# Patient Record
Sex: MALE | Race: WHITE | NOT HISPANIC OR LATINO | Employment: OTHER | URBAN - METROPOLITAN AREA
[De-identification: names, ages, dates, MRNs, and addresses within clinical notes are randomized per-mention and may not be internally consistent; named-entity substitution may affect disease eponyms.]

---

## 2018-07-25 ENCOUNTER — NON-PROVIDER VISIT (OUTPATIENT)
Dept: OCCUPATIONAL MEDICINE | Facility: CLINIC | Age: 58
End: 2018-07-25

## 2018-07-25 DIAGNOSIS — Z23 NEED FOR HEPATITIS VACCINATION: ICD-10-CM

## 2018-07-25 PROCEDURE — 90746 HEPB VACCINE 3 DOSE ADULT IM: CPT | Performed by: PREVENTIVE MEDICINE

## 2018-12-19 ENCOUNTER — HOSPITAL ENCOUNTER (INPATIENT)
Facility: MEDICAL CENTER | Age: 58
LOS: 21 days | DRG: 224 | End: 2019-01-10
Attending: EMERGENCY MEDICINE | Admitting: INTERNAL MEDICINE
Payer: COMMERCIAL

## 2018-12-19 DIAGNOSIS — I49.01 VENTRICULAR FIBRILLATION (HCC): ICD-10-CM

## 2018-12-19 DIAGNOSIS — E87.20 LACTIC ACIDOSIS: ICD-10-CM

## 2018-12-19 DIAGNOSIS — R40.2431 GLASGOW COMA SCALE TOTAL SCORE 3-8, IN THE FIELD (EMT OR AMBULANCE) (HCC): ICD-10-CM

## 2018-12-19 DIAGNOSIS — I46.9 CARDIAC ARREST (HCC): ICD-10-CM

## 2018-12-19 DIAGNOSIS — J96.01 ACUTE RESPIRATORY FAILURE WITH HYPOXIA (HCC): ICD-10-CM

## 2018-12-19 LAB — EKG IMPRESSION: NORMAL

## 2018-12-19 PROCEDURE — 36600 WITHDRAWAL OF ARTERIAL BLOOD: CPT

## 2018-12-19 PROCEDURE — 84484 ASSAY OF TROPONIN QUANT: CPT

## 2018-12-19 PROCEDURE — 82803 BLOOD GASES ANY COMBINATION: CPT

## 2018-12-19 PROCEDURE — 99291 CRITICAL CARE FIRST HOUR: CPT

## 2018-12-19 PROCEDURE — 83735 ASSAY OF MAGNESIUM: CPT

## 2018-12-19 PROCEDURE — 94002 VENT MGMT INPAT INIT DAY: CPT

## 2018-12-19 PROCEDURE — 83880 ASSAY OF NATRIURETIC PEPTIDE: CPT

## 2018-12-19 PROCEDURE — 85027 COMPLETE CBC AUTOMATED: CPT

## 2018-12-19 PROCEDURE — 80053 COMPREHEN METABOLIC PANEL: CPT

## 2018-12-19 PROCEDURE — 82010 KETONE BODYS QUAN: CPT

## 2018-12-19 PROCEDURE — 83605 ASSAY OF LACTIC ACID: CPT | Mod: 91

## 2018-12-19 PROCEDURE — 85610 PROTHROMBIN TIME: CPT

## 2018-12-19 PROCEDURE — 87040 BLOOD CULTURE FOR BACTERIA: CPT

## 2018-12-19 PROCEDURE — 85730 THROMBOPLASTIN TIME PARTIAL: CPT

## 2018-12-19 PROCEDURE — 84100 ASSAY OF PHOSPHORUS: CPT

## 2018-12-19 PROCEDURE — 93005 ELECTROCARDIOGRAM TRACING: CPT | Performed by: EMERGENCY MEDICINE

## 2018-12-19 RX ORDER — VECURONIUM BROMIDE 1 MG/ML
0.1 INJECTION, POWDER, LYOPHILIZED, FOR SOLUTION INTRAVENOUS ONCE
Status: DISCONTINUED | OUTPATIENT
Start: 2018-12-20 | End: 2018-12-20

## 2018-12-19 RX ORDER — MIDAZOLAM HYDROCHLORIDE 1 MG/ML
2 INJECTION INTRAMUSCULAR; INTRAVENOUS ONCE
Status: DISCONTINUED | OUTPATIENT
Start: 2018-12-20 | End: 2018-12-20

## 2018-12-19 RX ORDER — SODIUM CHLORIDE 9 MG/ML
INJECTION, SOLUTION INTRAVENOUS CONTINUOUS
Status: DISPENSED | OUTPATIENT
Start: 2018-12-20 | End: 2018-12-21

## 2018-12-19 RX ORDER — 0.9 % SODIUM CHLORIDE 0.9 %
10 INTRAVENOUS SOLUTION INTRAVENOUS
Status: DISCONTINUED | OUTPATIENT
Start: 2018-12-19 | End: 2018-12-21

## 2018-12-19 RX ORDER — MAGNESIUM SULFATE HEPTAHYDRATE 40 MG/ML
.5-2 INJECTION, SOLUTION INTRAVENOUS CONTINUOUS
Status: DISPENSED | OUTPATIENT
Start: 2018-12-20 | End: 2018-12-21

## 2018-12-19 RX ORDER — MIDAZOLAM HYDROCHLORIDE 1 MG/ML
1-5 INJECTION INTRAMUSCULAR; INTRAVENOUS
Status: DISCONTINUED | OUTPATIENT
Start: 2018-12-19 | End: 2018-12-20

## 2018-12-19 RX ORDER — MEPERIDINE HYDROCHLORIDE 50 MG/ML
25 INJECTION INTRAMUSCULAR; INTRAVENOUS; SUBCUTANEOUS
Status: DISCONTINUED | OUTPATIENT
Start: 2018-12-19 | End: 2018-12-20

## 2018-12-19 RX ORDER — 0.9 % SODIUM CHLORIDE 0.9 %
20 INTRAVENOUS SOLUTION INTRAVENOUS ONCE
Status: COMPLETED | OUTPATIENT
Start: 2018-12-20 | End: 2018-12-20

## 2018-12-20 ENCOUNTER — APPOINTMENT (OUTPATIENT)
Dept: RADIOLOGY | Facility: MEDICAL CENTER | Age: 58
DRG: 224 | End: 2018-12-20
Attending: INTERNAL MEDICINE
Payer: COMMERCIAL

## 2018-12-20 ENCOUNTER — APPOINTMENT (OUTPATIENT)
Dept: RADIOLOGY | Facility: MEDICAL CENTER | Age: 58
DRG: 224 | End: 2018-12-20
Attending: EMERGENCY MEDICINE
Payer: COMMERCIAL

## 2018-12-20 ENCOUNTER — HOSPITAL ENCOUNTER (OUTPATIENT)
Dept: RADIOLOGY | Facility: MEDICAL CENTER | Age: 58
End: 2018-12-20
Attending: EMERGENCY MEDICINE

## 2018-12-20 ENCOUNTER — APPOINTMENT (OUTPATIENT)
Dept: CARDIOLOGY | Facility: MEDICAL CENTER | Age: 58
DRG: 224 | End: 2018-12-20
Attending: INTERNAL MEDICINE
Payer: COMMERCIAL

## 2018-12-20 PROBLEM — G93.1 ANOXIC BRAIN INJURY (HCC): Status: ACTIVE | Noted: 2018-12-20

## 2018-12-20 PROBLEM — E83.39 HYPERPHOSPHATEMIA: Status: ACTIVE | Noted: 2018-12-20

## 2018-12-20 PROBLEM — D64.9 ANEMIA: Status: ACTIVE | Noted: 2018-12-20

## 2018-12-20 PROBLEM — R74.8 ELEVATED LIVER ENZYMES: Status: ACTIVE | Noted: 2018-12-20

## 2018-12-20 PROBLEM — E87.6 HYPOKALEMIA: Status: ACTIVE | Noted: 2018-12-20

## 2018-12-20 PROBLEM — E87.20 LACTIC ACIDOSIS: Status: ACTIVE | Noted: 2018-12-20

## 2018-12-20 PROBLEM — S22.43XA CLOSED FRACTURE OF MULTIPLE RIBS OF BOTH SIDES: Status: ACTIVE | Noted: 2018-12-20

## 2018-12-20 PROBLEM — D68.9 COAGULOPATHY (HCC): Status: ACTIVE | Noted: 2018-12-20

## 2018-12-20 PROBLEM — D72.823 LEUKEMOID REACTION: Status: ACTIVE | Noted: 2018-12-20

## 2018-12-20 PROBLEM — I46.9 CARDIAC ARREST (HCC): Status: ACTIVE | Noted: 2018-12-20

## 2018-12-20 PROBLEM — S22.41XA CLOSED FRACTURE OF MULTIPLE RIBS OF RIGHT SIDE: Status: ACTIVE | Noted: 2018-12-20

## 2018-12-20 PROBLEM — I49.01 VENTRICULAR FIBRILLATION (HCC): Status: ACTIVE | Noted: 2018-12-20

## 2018-12-20 PROBLEM — E87.20 METABOLIC ACIDOSIS: Status: ACTIVE | Noted: 2018-12-20

## 2018-12-20 PROBLEM — J96.01 ACUTE RESPIRATORY FAILURE WITH HYPOXIA (HCC): Status: ACTIVE | Noted: 2018-12-20

## 2018-12-20 PROBLEM — D72.829 LEUKOCYTOSIS: Status: ACTIVE | Noted: 2018-12-20

## 2018-12-20 LAB
ACTION RANGE TRIGGERED IACRT: YES
ACTION RANGE TRIGGERED IACRT: YES
ALBUMIN SERPL BCP-MCNC: 3.1 G/DL (ref 3.2–4.9)
ALBUMIN SERPL BCP-MCNC: 3.7 G/DL (ref 3.2–4.9)
ALBUMIN/GLOB SERPL: 1.4 G/DL
ALP SERPL-CCNC: 57 U/L (ref 30–99)
ALP SERPL-CCNC: 84 U/L (ref 30–99)
ALT SERPL-CCNC: 281 U/L (ref 2–50)
ALT SERPL-CCNC: 348 U/L (ref 2–50)
ANION GAP SERPL CALC-SCNC: 12 MMOL/L (ref 0–11.9)
ANION GAP SERPL CALC-SCNC: 19 MMOL/L (ref 0–11.9)
ANION GAP SERPL CALC-SCNC: 8 MMOL/L (ref 0–11.9)
ANION GAP SERPL CALC-SCNC: 9 MMOL/L (ref 0–11.9)
ANION GAP SERPL CALC-SCNC: 9 MMOL/L (ref 0–11.9)
APTT PPP: 26 SEC (ref 24.7–36)
APTT PPP: 27 SEC (ref 24.7–36)
APTT PPP: 28.6 SEC (ref 24.7–36)
APTT PPP: 29.7 SEC (ref 24.7–36)
APTT PPP: 46.2 SEC (ref 24.7–36)
AST SERPL-CCNC: 204 U/L (ref 12–45)
AST SERPL-CCNC: 277 U/L (ref 12–45)
B-OH-BUTYR SERPL-MCNC: 0.19 MMOL/L (ref 0.02–0.27)
BASE EXCESS BLDA CALC-SCNC: -10 MMOL/L (ref -4–3)
BASE EXCESS BLDA CALC-SCNC: -19 MMOL/L (ref -4–3)
BILIRUB CONJ SERPL-MCNC: 0.2 MG/DL (ref 0.1–0.5)
BILIRUB INDIRECT SERPL-MCNC: 0.4 MG/DL (ref 0–1)
BILIRUB SERPL-MCNC: 0.3 MG/DL (ref 0.1–1.5)
BILIRUB SERPL-MCNC: 0.6 MG/DL (ref 0.1–1.5)
BNP SERPL-MCNC: 186 PG/ML (ref 0–100)
BODY TEMPERATURE: ABNORMAL DEGREES
BODY TEMPERATURE: ABNORMAL DEGREES
BUN SERPL-MCNC: 15 MG/DL (ref 8–22)
BUN SERPL-MCNC: 21 MG/DL (ref 8–22)
BUN SERPL-MCNC: 21 MG/DL (ref 8–22)
BUN SERPL-MCNC: 23 MG/DL (ref 8–22)
BUN SERPL-MCNC: 25 MG/DL (ref 8–22)
CALCIUM SERPL-MCNC: 8 MG/DL (ref 8.5–10.5)
CALCIUM SERPL-MCNC: 8.2 MG/DL (ref 8.5–10.5)
CALCIUM SERPL-MCNC: 8.2 MG/DL (ref 8.5–10.5)
CALCIUM SERPL-MCNC: 8.3 MG/DL (ref 8.5–10.5)
CALCIUM SERPL-MCNC: 9.2 MG/DL (ref 8.5–10.5)
CHLORIDE SERPL-SCNC: 104 MMOL/L (ref 96–112)
CHLORIDE SERPL-SCNC: 110 MMOL/L (ref 96–112)
CHLORIDE SERPL-SCNC: 114 MMOL/L (ref 96–112)
CO2 BLDA-SCNC: 11 MMOL/L (ref 20–33)
CO2 BLDA-SCNC: 17 MMOL/L (ref 20–33)
CO2 SERPL-SCNC: 15 MMOL/L (ref 20–33)
CO2 SERPL-SCNC: 16 MMOL/L (ref 20–33)
CREAT SERPL-MCNC: 0.64 MG/DL (ref 0.5–1.4)
CREAT SERPL-MCNC: 0.69 MG/DL (ref 0.5–1.4)
CREAT SERPL-MCNC: 0.79 MG/DL (ref 0.5–1.4)
CREAT SERPL-MCNC: 0.89 MG/DL (ref 0.5–1.4)
CREAT SERPL-MCNC: 0.99 MG/DL (ref 0.5–1.4)
CYTOLOGY REG CYTOL: NORMAL
ERYTHROCYTE [DISTWIDTH] IN BLOOD BY AUTOMATED COUNT: 48.2 FL (ref 35.9–50)
ERYTHROCYTE [DISTWIDTH] IN BLOOD BY AUTOMATED COUNT: 48.3 FL (ref 35.9–50)
ERYTHROCYTE [DISTWIDTH] IN BLOOD BY AUTOMATED COUNT: 48.5 FL (ref 35.9–50)
ERYTHROCYTE [DISTWIDTH] IN BLOOD BY AUTOMATED COUNT: 48.9 FL (ref 35.9–50)
ERYTHROCYTE [DISTWIDTH] IN BLOOD BY AUTOMATED COUNT: 52.3 FL (ref 35.9–50)
GLOBULIN SER CALC-MCNC: 2.7 G/DL (ref 1.9–3.5)
GLUCOSE BLD-MCNC: 105 MG/DL (ref 65–99)
GLUCOSE BLD-MCNC: 108 MG/DL (ref 65–99)
GLUCOSE BLD-MCNC: 109 MG/DL (ref 65–99)
GLUCOSE BLD-MCNC: 112 MG/DL (ref 65–99)
GLUCOSE BLD-MCNC: 113 MG/DL (ref 65–99)
GLUCOSE BLD-MCNC: 124 MG/DL (ref 65–99)
GLUCOSE BLD-MCNC: 136 MG/DL (ref 65–99)
GLUCOSE BLD-MCNC: 159 MG/DL (ref 65–99)
GLUCOSE BLD-MCNC: 163 MG/DL (ref 65–99)
GLUCOSE BLD-MCNC: 195 MG/DL (ref 65–99)
GLUCOSE BLD-MCNC: 199 MG/DL (ref 65–99)
GLUCOSE BLD-MCNC: 336 MG/DL (ref 65–99)
GLUCOSE BLD-MCNC: 95 MG/DL (ref 65–99)
GLUCOSE SERPL-MCNC: 116 MG/DL (ref 65–99)
GLUCOSE SERPL-MCNC: 118 MG/DL (ref 65–99)
GLUCOSE SERPL-MCNC: 135 MG/DL (ref 65–99)
GLUCOSE SERPL-MCNC: 216 MG/DL (ref 65–99)
GLUCOSE SERPL-MCNC: 356 MG/DL (ref 65–99)
GRAM STN SPEC: NORMAL
HCO3 BLDA-SCNC: 10.2 MMOL/L (ref 17–25)
HCO3 BLDA-SCNC: 16 MMOL/L (ref 17–25)
HCT VFR BLD AUTO: 29.4 % (ref 42–52)
HCT VFR BLD AUTO: 32.4 % (ref 42–52)
HCT VFR BLD AUTO: 33.9 % (ref 42–52)
HCT VFR BLD AUTO: 36.7 % (ref 42–52)
HCT VFR BLD AUTO: 39.7 % (ref 42–52)
HGB BLD-MCNC: 10.1 G/DL (ref 14–18)
HGB BLD-MCNC: 10.8 G/DL (ref 14–18)
HGB BLD-MCNC: 11.6 G/DL (ref 14–18)
HGB BLD-MCNC: 11.6 G/DL (ref 14–18)
HGB BLD-MCNC: 9.6 G/DL (ref 14–18)
HOROWITZ INDEX BLDA+IHG-RTO: 200 MM[HG]
HOROWITZ INDEX BLDA+IHG-RTO: 340 MM[HG]
INR PPP: 1.15 (ref 0.87–1.13)
INR PPP: 1.17 (ref 0.87–1.13)
INR PPP: 1.2 (ref 0.87–1.13)
INR PPP: 1.25 (ref 0.87–1.13)
INR PPP: 1.71 (ref 0.87–1.13)
INST. QUALIFIED PATIENT IIQPT: YES
INST. QUALIFIED PATIENT IIQPT: YES
LACTATE BLD-SCNC: 11.8 MMOL/L (ref 0.5–2)
LACTATE BLD-SCNC: 2.6 MMOL/L (ref 0.5–2)
LACTATE BLD-SCNC: 2.9 MMOL/L (ref 0.5–2)
LACTATE BLD-SCNC: 8.9 MMOL/L (ref 0.5–2)
LV EJECT FRACT  99904: 45
LV EJECT FRACT MOD 2C 99903: 15.92
LV EJECT FRACT MOD 4C 99902: 39.8
LV EJECT FRACT MOD BP 99901: 17.5
MAGNESIUM SERPL-MCNC: 2.8 MG/DL (ref 1.5–2.5)
MAGNESIUM SERPL-MCNC: 3 MG/DL (ref 1.5–2.5)
MAGNESIUM SERPL-MCNC: 3.4 MG/DL (ref 1.5–2.5)
MAGNESIUM SERPL-MCNC: 3.8 MG/DL (ref 1.5–2.5)
MAGNESIUM SERPL-MCNC: 3.9 MG/DL (ref 1.5–2.5)
MCH RBC QN AUTO: 27.9 PG (ref 27–33)
MCH RBC QN AUTO: 28.2 PG (ref 27–33)
MCH RBC QN AUTO: 28.3 PG (ref 27–33)
MCH RBC QN AUTO: 28.4 PG (ref 27–33)
MCH RBC QN AUTO: 28.7 PG (ref 27–33)
MCHC RBC AUTO-ENTMCNC: 29.2 G/DL (ref 33.7–35.3)
MCHC RBC AUTO-ENTMCNC: 31.2 G/DL (ref 33.7–35.3)
MCHC RBC AUTO-ENTMCNC: 31.6 G/DL (ref 33.7–35.3)
MCHC RBC AUTO-ENTMCNC: 31.9 G/DL (ref 33.7–35.3)
MCHC RBC AUTO-ENTMCNC: 32.7 G/DL (ref 33.7–35.3)
MCV RBC AUTO: 87.8 FL (ref 81.4–97.8)
MCV RBC AUTO: 89 FL (ref 81.4–97.8)
MCV RBC AUTO: 89.5 FL (ref 81.4–97.8)
MCV RBC AUTO: 89.7 FL (ref 81.4–97.8)
MCV RBC AUTO: 96.6 FL (ref 81.4–97.8)
O2/TOTAL GAS SETTING VFR VENT: 100 %
O2/TOTAL GAS SETTING VFR VENT: 40 %
PCO2 BLDA: 34.5 MMHG (ref 26–37)
PCO2 BLDA: 36.7 MMHG (ref 26–37)
PCO2 TEMP ADJ BLDA: 34.4 MMHG (ref 26–37)
PCO2 TEMP ADJ BLDA: 34.5 MMHG (ref 26–37)
PH BLDA: 7.08 [PH] (ref 7.4–7.5)
PH BLDA: 7.25 [PH] (ref 7.4–7.5)
PH TEMP ADJ BLDA: 7.08 [PH] (ref 7.4–7.5)
PH TEMP ADJ BLDA: 7.27 [PH] (ref 7.4–7.5)
PHOSPHATE SERPL-MCNC: 10.2 MG/DL (ref 2.5–4.5)
PLATELET # BLD AUTO: 293 K/UL (ref 164–446)
PLATELET # BLD AUTO: 313 K/UL (ref 164–446)
PLATELET # BLD AUTO: 336 K/UL (ref 164–446)
PLATELET # BLD AUTO: 341 K/UL (ref 164–446)
PLATELET # BLD AUTO: 418 K/UL (ref 164–446)
PMV BLD AUTO: 10 FL (ref 9–12.9)
PMV BLD AUTO: 9.6 FL (ref 9–12.9)
PMV BLD AUTO: 9.7 FL (ref 9–12.9)
PMV BLD AUTO: 9.7 FL (ref 9–12.9)
PMV BLD AUTO: 9.8 FL (ref 9–12.9)
PO2 BLDA: 340 MMHG (ref 64–87)
PO2 BLDA: 80 MMHG (ref 64–87)
PO2 TEMP ADJ BLDA: 340 MMHG (ref 64–87)
PO2 TEMP ADJ BLDA: 73 MMHG (ref 64–87)
POTASSIUM SERPL-SCNC: 3.1 MMOL/L (ref 3.6–5.5)
POTASSIUM SERPL-SCNC: 3.8 MMOL/L (ref 3.6–5.5)
POTASSIUM SERPL-SCNC: 3.9 MMOL/L (ref 3.6–5.5)
POTASSIUM SERPL-SCNC: 3.9 MMOL/L (ref 3.6–5.5)
POTASSIUM SERPL-SCNC: 4.5 MMOL/L (ref 3.6–5.5)
PROT SERPL-MCNC: 5.6 G/DL (ref 6–8.2)
PROT SERPL-MCNC: 6.4 G/DL (ref 6–8.2)
PROTHROMBIN TIME: 14.8 SEC (ref 12–14.6)
PROTHROMBIN TIME: 15 SEC (ref 12–14.6)
PROTHROMBIN TIME: 15.4 SEC (ref 12–14.6)
PROTHROMBIN TIME: 15.8 SEC (ref 12–14.6)
PROTHROMBIN TIME: 20.1 SEC (ref 12–14.6)
RBC # BLD AUTO: 3.35 M/UL (ref 4.7–6.1)
RBC # BLD AUTO: 3.62 M/UL (ref 4.7–6.1)
RBC # BLD AUTO: 3.81 M/UL (ref 4.7–6.1)
RBC # BLD AUTO: 4.09 M/UL (ref 4.7–6.1)
RBC # BLD AUTO: 4.11 M/UL (ref 4.7–6.1)
SAO2 % BLDA: 100 % (ref 93–99)
SAO2 % BLDA: 94 % (ref 93–99)
SIGNIFICANT IND 70042: NORMAL
SITE SITE: NORMAL
SODIUM SERPL-SCNC: 137 MMOL/L (ref 135–145)
SODIUM SERPL-SCNC: 138 MMOL/L (ref 135–145)
SOURCE SOURCE: NORMAL
SPECIMEN DRAWN FROM PATIENT: ABNORMAL
SPECIMEN DRAWN FROM PATIENT: ABNORMAL
TROPONIN I SERPL-MCNC: 0.33 NG/ML (ref 0–0.04)
TROPONIN I SERPL-MCNC: 6.83 NG/ML (ref 0–0.04)
WBC # BLD AUTO: 12.8 K/UL (ref 4.8–10.8)
WBC # BLD AUTO: 13.6 K/UL (ref 4.8–10.8)
WBC # BLD AUTO: 14.1 K/UL (ref 4.8–10.8)
WBC # BLD AUTO: 15.8 K/UL (ref 4.8–10.8)
WBC # BLD AUTO: 21.1 K/UL (ref 4.8–10.8)

## 2018-12-20 PROCEDURE — 82962 GLUCOSE BLOOD TEST: CPT | Mod: 91

## 2018-12-20 PROCEDURE — 02H633Z INSERTION OF INFUSION DEVICE INTO RIGHT ATRIUM, PERCUTANEOUS APPROACH: ICD-10-PCS | Performed by: INTERNAL MEDICINE

## 2018-12-20 PROCEDURE — 87070 CULTURE OTHR SPECIMN AEROBIC: CPT

## 2018-12-20 PROCEDURE — 36556 INSERT NON-TUNNEL CV CATH: CPT

## 2018-12-20 PROCEDURE — 302978 HCHG BRONCHOSCOPY-DIAGNOSTIC

## 2018-12-20 PROCEDURE — 700111 HCHG RX REV CODE 636 W/ 250 OVERRIDE (IP): Performed by: INTERNAL MEDICINE

## 2018-12-20 PROCEDURE — B244ZZZ ULTRASONOGRAPHY OF RIGHT HEART: ICD-10-PCS | Performed by: INTERNAL MEDICINE

## 2018-12-20 PROCEDURE — 82803 BLOOD GASES ANY COMBINATION: CPT

## 2018-12-20 PROCEDURE — 700101 HCHG RX REV CODE 250: Performed by: EMERGENCY MEDICINE

## 2018-12-20 PROCEDURE — 80076 HEPATIC FUNCTION PANEL: CPT

## 2018-12-20 PROCEDURE — 87205 SMEAR GRAM STAIN: CPT

## 2018-12-20 PROCEDURE — 31624 DX BRONCHOSCOPE/LAVAGE: CPT | Performed by: INTERNAL MEDICINE

## 2018-12-20 PROCEDURE — 99291 CRITICAL CARE FIRST HOUR: CPT | Mod: 25 | Performed by: INTERNAL MEDICINE

## 2018-12-20 PROCEDURE — 700111 HCHG RX REV CODE 636 W/ 250 OVERRIDE (IP): Performed by: EMERGENCY MEDICINE

## 2018-12-20 PROCEDURE — 85730 THROMBOPLASTIN TIME PARTIAL: CPT

## 2018-12-20 PROCEDURE — 31645 BRNCHSC W/THER ASPIR 1ST: CPT | Performed by: INTERNAL MEDICINE

## 2018-12-20 PROCEDURE — 80048 BASIC METABOLIC PNL TOTAL CA: CPT

## 2018-12-20 PROCEDURE — 96365 THER/PROPH/DIAG IV INF INIT: CPT

## 2018-12-20 PROCEDURE — A9270 NON-COVERED ITEM OR SERVICE: HCPCS | Performed by: INTERNAL MEDICINE

## 2018-12-20 PROCEDURE — 87077 CULTURE AEROBIC IDENTIFY: CPT

## 2018-12-20 PROCEDURE — 85027 COMPLETE CBC AUTOMATED: CPT

## 2018-12-20 PROCEDURE — 96375 TX/PRO/DX INJ NEW DRUG ADDON: CPT

## 2018-12-20 PROCEDURE — 700105 HCHG RX REV CODE 258: Performed by: EMERGENCY MEDICINE

## 2018-12-20 PROCEDURE — 70450 CT HEAD/BRAIN W/O DYE: CPT

## 2018-12-20 PROCEDURE — 83735 ASSAY OF MAGNESIUM: CPT

## 2018-12-20 PROCEDURE — 6A4Z0ZZ HYPOTHERMIA, SINGLE: ICD-10-PCS | Performed by: EMERGENCY MEDICINE

## 2018-12-20 PROCEDURE — 303105 HCHG CATHETER EXTRA

## 2018-12-20 PROCEDURE — 83605 ASSAY OF LACTIC ACID: CPT

## 2018-12-20 PROCEDURE — 36556 INSERT NON-TUNNEL CV CATH: CPT | Mod: RT | Performed by: INTERNAL MEDICINE

## 2018-12-20 PROCEDURE — 0B9J8ZX DRAINAGE OF LEFT LOWER LUNG LOBE, VIA NATURAL OR ARTIFICIAL OPENING ENDOSCOPIC, DIAGNOSTIC: ICD-10-PCS | Performed by: INTERNAL MEDICINE

## 2018-12-20 PROCEDURE — 700102 HCHG RX REV CODE 250 W/ 637 OVERRIDE(OP): Performed by: INTERNAL MEDICINE

## 2018-12-20 PROCEDURE — 700102 HCHG RX REV CODE 250 W/ 637 OVERRIDE(OP): Performed by: EMERGENCY MEDICINE

## 2018-12-20 PROCEDURE — 99223 1ST HOSP IP/OBS HIGH 75: CPT | Performed by: INTERNAL MEDICINE

## 2018-12-20 PROCEDURE — 5A1945Z RESPIRATORY VENTILATION, 24-96 CONSECUTIVE HOURS: ICD-10-PCS | Performed by: INTERNAL MEDICINE

## 2018-12-20 PROCEDURE — 770022 HCHG ROOM/CARE - ICU (200)

## 2018-12-20 PROCEDURE — 93306 TTE W/DOPPLER COMPLETE: CPT | Mod: 26 | Performed by: INTERNAL MEDICINE

## 2018-12-20 PROCEDURE — 700117 HCHG RX CONTRAST REV CODE 255: Performed by: EMERGENCY MEDICINE

## 2018-12-20 PROCEDURE — 99292 CRITICAL CARE ADDL 30 MIN: CPT | Mod: 25 | Performed by: INTERNAL MEDICINE

## 2018-12-20 PROCEDURE — 71045 X-RAY EXAM CHEST 1 VIEW: CPT

## 2018-12-20 PROCEDURE — 700105 HCHG RX REV CODE 258: Performed by: INTERNAL MEDICINE

## 2018-12-20 PROCEDURE — 96368 THER/DIAG CONCURRENT INF: CPT

## 2018-12-20 PROCEDURE — 96367 TX/PROPH/DG ADDL SEQ IV INF: CPT

## 2018-12-20 PROCEDURE — 36620 INSERTION CATHETER ARTERY: CPT | Performed by: INTERNAL MEDICINE

## 2018-12-20 PROCEDURE — 36620 INSERTION CATHETER ARTERY: CPT

## 2018-12-20 PROCEDURE — 700101 HCHG RX REV CODE 250: Performed by: INTERNAL MEDICINE

## 2018-12-20 PROCEDURE — 87186 SC STD MICRODIL/AGAR DIL: CPT

## 2018-12-20 PROCEDURE — 96366 THER/PROPH/DIAG IV INF ADDON: CPT

## 2018-12-20 PROCEDURE — 51702 INSERT TEMP BLADDER CATH: CPT

## 2018-12-20 PROCEDURE — 03HY32Z INSERTION OF MONITORING DEVICE INTO UPPER ARTERY, PERCUTANEOUS APPROACH: ICD-10-PCS | Performed by: INTERNAL MEDICINE

## 2018-12-20 PROCEDURE — 71275 CT ANGIOGRAPHY CHEST: CPT

## 2018-12-20 PROCEDURE — 88112 CYTOPATH CELL ENHANCE TECH: CPT

## 2018-12-20 PROCEDURE — C1751 CATH, INF, PER/CENT/MIDLINE: HCPCS

## 2018-12-20 PROCEDURE — 0B9F8ZX DRAINAGE OF RIGHT LOWER LUNG LOBE, VIA NATURAL OR ARTIFICIAL OPENING ENDOSCOPIC, DIAGNOSTIC: ICD-10-PCS | Performed by: INTERNAL MEDICINE

## 2018-12-20 PROCEDURE — 88305 TISSUE EXAM BY PATHOLOGIST: CPT

## 2018-12-20 PROCEDURE — 36415 COLL VENOUS BLD VENIPUNCTURE: CPT

## 2018-12-20 PROCEDURE — 37799 UNLISTED PX VASCULAR SURGERY: CPT

## 2018-12-20 PROCEDURE — 85610 PROTHROMBIN TIME: CPT

## 2018-12-20 PROCEDURE — 93306 TTE W/DOPPLER COMPLETE: CPT

## 2018-12-20 PROCEDURE — A9270 NON-COVERED ITEM OR SERVICE: HCPCS | Performed by: EMERGENCY MEDICINE

## 2018-12-20 PROCEDURE — 84484 ASSAY OF TROPONIN QUANT: CPT

## 2018-12-20 PROCEDURE — 94003 VENT MGMT INPAT SUBQ DAY: CPT

## 2018-12-20 PROCEDURE — 99292 CRITICAL CARE ADDL 30 MIN: CPT | Performed by: INTERNAL MEDICINE

## 2018-12-20 RX ORDER — BISACODYL 10 MG
10 SUPPOSITORY, RECTAL RECTAL
Status: DISCONTINUED | OUTPATIENT
Start: 2018-12-20 | End: 2018-12-20

## 2018-12-20 RX ORDER — DEXTROSE MONOHYDRATE 25 G/50ML
12.5-25 INJECTION, SOLUTION INTRAVENOUS PRN
Status: DISCONTINUED | OUTPATIENT
Start: 2018-12-20 | End: 2019-01-10 | Stop reason: HOSPADM

## 2018-12-20 RX ORDER — SERTRALINE HYDROCHLORIDE 25 MG/1
25 TABLET, FILM COATED ORAL EVERY MORNING
COMMUNITY

## 2018-12-20 RX ORDER — AMOXICILLIN 250 MG
2 CAPSULE ORAL 2 TIMES DAILY
Status: DISCONTINUED | OUTPATIENT
Start: 2018-12-20 | End: 2018-12-21

## 2018-12-20 RX ORDER — POTASSIUM CHLORIDE 29.8 MG/ML
40 INJECTION INTRAVENOUS ONCE
Status: DISPENSED | OUTPATIENT
Start: 2018-12-20 | End: 2018-12-21

## 2018-12-20 RX ORDER — POTASSIUM CHLORIDE 7.45 MG/ML
10 INJECTION INTRAVENOUS ONCE
Status: COMPLETED | OUTPATIENT
Start: 2018-12-20 | End: 2018-12-20

## 2018-12-20 RX ORDER — ROCURONIUM BROMIDE 10 MG/ML
50 INJECTION, SOLUTION INTRAVENOUS ONCE
Status: COMPLETED | OUTPATIENT
Start: 2018-12-20 | End: 2018-12-20

## 2018-12-20 RX ORDER — ATORVASTATIN CALCIUM 80 MG/1
80 TABLET, FILM COATED ORAL EVERY EVENING
Status: DISCONTINUED | OUTPATIENT
Start: 2018-12-20 | End: 2018-12-21

## 2018-12-20 RX ORDER — DEXTROSE MONOHYDRATE 25 G/50ML
25 INJECTION, SOLUTION INTRAVENOUS
Status: DISCONTINUED | OUTPATIENT
Start: 2018-12-20 | End: 2018-12-20

## 2018-12-20 RX ORDER — FAMOTIDINE 20 MG/1
20 TABLET, FILM COATED ORAL EVERY 12 HOURS
Status: DISCONTINUED | OUTPATIENT
Start: 2018-12-20 | End: 2018-12-21

## 2018-12-20 RX ORDER — POLYETHYLENE GLYCOL 3350 17 G/17G
1 POWDER, FOR SOLUTION ORAL
Status: DISCONTINUED | OUTPATIENT
Start: 2018-12-20 | End: 2018-12-21

## 2018-12-20 RX ORDER — IPRATROPIUM BROMIDE AND ALBUTEROL SULFATE 2.5; .5 MG/3ML; MG/3ML
3 SOLUTION RESPIRATORY (INHALATION)
Status: DISCONTINUED | OUTPATIENT
Start: 2018-12-20 | End: 2018-12-26

## 2018-12-20 RX ORDER — LORAZEPAM 2 MG/ML
2 INJECTION INTRAMUSCULAR ONCE
Status: COMPLETED | OUTPATIENT
Start: 2018-12-20 | End: 2018-12-20

## 2018-12-20 RX ORDER — HEPARIN SODIUM 5000 [USP'U]/ML
5000 INJECTION, SOLUTION INTRAVENOUS; SUBCUTANEOUS EVERY 8 HOURS
Status: DISCONTINUED | OUTPATIENT
Start: 2018-12-20 | End: 2018-12-21

## 2018-12-20 RX ORDER — ASPIRIN 81 MG/1
324 TABLET, CHEWABLE ORAL DAILY
Status: DISCONTINUED | OUTPATIENT
Start: 2018-12-20 | End: 2018-12-21

## 2018-12-20 RX ORDER — ASPIRIN 325 MG
325 TABLET ORAL DAILY
Status: DISCONTINUED | OUTPATIENT
Start: 2018-12-20 | End: 2018-12-21

## 2018-12-20 RX ORDER — POLYETHYLENE GLYCOL 3350 17 G/17G
1 POWDER, FOR SOLUTION ORAL
Status: DISCONTINUED | OUTPATIENT
Start: 2018-12-20 | End: 2018-12-20

## 2018-12-20 RX ORDER — BISACODYL 10 MG
10 SUPPOSITORY, RECTAL RECTAL
Status: DISCONTINUED | OUTPATIENT
Start: 2018-12-20 | End: 2018-12-21

## 2018-12-20 RX ORDER — ASPIRIN 300 MG/1
300 SUPPOSITORY RECTAL DAILY
Status: DISCONTINUED | OUTPATIENT
Start: 2018-12-20 | End: 2018-12-21

## 2018-12-20 RX ORDER — LISINOPRIL 5 MG/1
5 TABLET ORAL EVERY MORNING
Status: ON HOLD | COMMUNITY
End: 2019-01-10

## 2018-12-20 RX ORDER — AMOXICILLIN 250 MG
2 CAPSULE ORAL 2 TIMES DAILY
Status: DISCONTINUED | OUTPATIENT
Start: 2018-12-20 | End: 2018-12-20

## 2018-12-20 RX ADMIN — FAMOTIDINE 20 MG: 10 INJECTION INTRAVENOUS at 17:35

## 2018-12-20 RX ADMIN — SODIUM CHLORIDE: 9 INJECTION, SOLUTION INTRAVENOUS at 05:27

## 2018-12-20 RX ADMIN — HEPARIN SODIUM 5000 UNITS: 5000 INJECTION, SOLUTION INTRAVENOUS; SUBCUTANEOUS at 23:08

## 2018-12-20 RX ADMIN — FENTANYL CITRATE 200 MCG: 50 INJECTION, SOLUTION INTRAMUSCULAR; INTRAVENOUS at 11:10

## 2018-12-20 RX ADMIN — MAGNESIUM SULFATE IN WATER 0.5 G/HR: 40 INJECTION, SOLUTION INTRAVENOUS at 01:18

## 2018-12-20 RX ADMIN — NOREPINEPHRINE BITARTRATE 20 MCG/MIN: 1 INJECTION INTRAVENOUS at 02:07

## 2018-12-20 RX ADMIN — SODIUM CHLORIDE 1632 ML: 9 INJECTION, SOLUTION INTRAVENOUS at 00:56

## 2018-12-20 RX ADMIN — ROCURONIUM BROMIDE 50 MG: 10 INJECTION, SOLUTION INTRAVENOUS at 03:45

## 2018-12-20 RX ADMIN — NOREPINEPHRINE BITARTRATE 6 MCG/MIN: 1 INJECTION INTRAVENOUS at 19:23

## 2018-12-20 RX ADMIN — MEPERIDINE HYDROCHLORIDE 25 MG: 50 INJECTION INTRAMUSCULAR; INTRAVENOUS; SUBCUTANEOUS at 01:26

## 2018-12-20 RX ADMIN — FENTANYL CITRATE 50 MCG/HR: 50 INJECTION, SOLUTION INTRAMUSCULAR; INTRAVENOUS at 01:31

## 2018-12-20 RX ADMIN — PROPOFOL 10 MCG/KG/MIN: 10 INJECTION, EMULSION INTRAVENOUS at 12:54

## 2018-12-20 RX ADMIN — SODIUM CHLORIDE 2 UNITS/HR: 9 INJECTION, SOLUTION INTRAVENOUS at 04:46

## 2018-12-20 RX ADMIN — LORAZEPAM 2 MG: 2 INJECTION INTRAMUSCULAR; INTRAVENOUS at 00:36

## 2018-12-20 RX ADMIN — MINERAL OIL AND WHITE PETROLATUM 1 APPLICATION: 150; 830 OINTMENT OPHTHALMIC at 14:08

## 2018-12-20 RX ADMIN — MINERAL OIL AND WHITE PETROLATUM 1 APPLICATION: 150; 830 OINTMENT OPHTHALMIC at 23:08

## 2018-12-20 RX ADMIN — FENTANYL CITRATE 200 MCG: 50 INJECTION, SOLUTION INTRAMUSCULAR; INTRAVENOUS at 12:40

## 2018-12-20 RX ADMIN — POTASSIUM CHLORIDE 10 MEQ: 7.46 INJECTION, SOLUTION INTRAVENOUS at 18:17

## 2018-12-20 RX ADMIN — ATORVASTATIN CALCIUM 80 MG: 80 TABLET, FILM COATED ORAL at 17:36

## 2018-12-20 RX ADMIN — PROPOFOL 50 MCG/KG/MIN: 10 INJECTION, EMULSION INTRAVENOUS at 05:35

## 2018-12-20 RX ADMIN — Medication 400 MCG/HR: at 13:23

## 2018-12-20 RX ADMIN — IOHEXOL 60 ML: 350 INJECTION, SOLUTION INTRAVENOUS at 00:28

## 2018-12-20 RX ADMIN — PROPOFOL 25 MCG/KG/MIN: 10 INJECTION, EMULSION INTRAVENOUS at 00:53

## 2018-12-20 RX ADMIN — PROPOFOL 40 MCG/KG/MIN: 10 INJECTION, EMULSION INTRAVENOUS at 22:57

## 2018-12-20 RX ADMIN — SODIUM CHLORIDE: 9 INJECTION, SOLUTION INTRAVENOUS at 16:35

## 2018-12-20 RX ADMIN — PROPOFOL 40 MCG/KG/MIN: 10 INJECTION, EMULSION INTRAVENOUS at 17:16

## 2018-12-20 RX ADMIN — FENTANYL CITRATE 100 MCG: 50 INJECTION, SOLUTION INTRAMUSCULAR; INTRAVENOUS at 10:42

## 2018-12-20 RX ADMIN — HEPARIN SODIUM 5000 UNITS: 5000 INJECTION, SOLUTION INTRAVENOUS; SUBCUTANEOUS at 05:28

## 2018-12-20 RX ADMIN — Medication 400 MCG/HR: at 19:26

## 2018-12-20 RX ADMIN — POTASSIUM CHLORIDE 10 MEQ: 7.46 INJECTION, SOLUTION INTRAVENOUS at 14:27

## 2018-12-20 RX ADMIN — FENTANYL CITRATE 100 MCG: 50 INJECTION, SOLUTION INTRAMUSCULAR; INTRAVENOUS at 05:49

## 2018-12-20 RX ADMIN — FENTANYL CITRATE 200 MCG: 50 INJECTION, SOLUTION INTRAMUSCULAR; INTRAVENOUS at 11:45

## 2018-12-20 RX ADMIN — FENTANYL CITRATE 100 MCG: 50 INJECTION, SOLUTION INTRAMUSCULAR; INTRAVENOUS at 08:55

## 2018-12-20 RX ADMIN — FAMOTIDINE 20 MG: 10 INJECTION INTRAVENOUS at 05:28

## 2018-12-20 RX ADMIN — FENTANYL CITRATE 200 MCG: 50 INJECTION, SOLUTION INTRAMUSCULAR; INTRAVENOUS at 16:07

## 2018-12-20 RX ADMIN — HEPARIN SODIUM 5000 UNITS: 5000 INJECTION, SOLUTION INTRAVENOUS; SUBCUTANEOUS at 14:08

## 2018-12-20 RX ADMIN — MEPERIDINE HYDROCHLORIDE 25 MG: 50 INJECTION INTRAMUSCULAR; INTRAVENOUS; SUBCUTANEOUS at 06:42

## 2018-12-20 RX ADMIN — PROPOFOL 20 MCG/KG/MIN: 10 INJECTION, EMULSION INTRAVENOUS at 02:11

## 2018-12-20 NOTE — PROCEDURES
Procedure Note    Date: 12/20/2018  Time: 0 345    Procedure: Arterial Line placement  Site: Right radial artery    Indication: Shock requiring continuous BP monitoring, frequent ABG monitoring  Consent: Informed consent obtained from patient or designated decision maker after explaining the benefits/risks of the procedure including but not limited to bleeding, infection, nerve or other deep structure injury, or failure of placement. Patient or surrogate expressed understanding and agreement and signed consent which can be found in the patient's chart.    Procedure: After obtaining consent, a time-out was performed. An Hola's test was performed to test for adequate collateral circulation. Patient positioned, prepped, and draped in sterile fashion.  0 mL of local anesthetic injected (1% lidocaine without epinephrine) achieving appropriate comfort level for patient. Artery was located using physical exam. A 18 gauge catheter was placed using Seldinger technique. Appropriate arterial blood visualized from the catheter and the arterial waveform confirmed on the monitor. Line secured and dressed in place. Patient tolerated procedure well without any difficulties and left in care of bedside nurse.     EBL: minimal  Complications: None    Jeremy Gonda, MD  Critical Care Medicine

## 2018-12-20 NOTE — PROCEDURES
Procedure Note    Date: 12/20/2018  Time: 0330    Procedure: Bronchoscopy with bronchoalveolar lavage and therapeutic aspiration of secretions    Indication: Atelectasis, pulmonary contusion  Consent: Informed consent obtained from patient or designated decision maker after explaining the benefits/risks of the procedure including but not limited to bleeding, infection, airway trauma or loss therof, pneumothorax/hemothorax, arrythmia, or death. Patient or surrogate expressed understanding and agreement and signed consent which can be found in the patient's chart.    Procedure: After obtaining consent, a time-out was performed. Respiratory therapy and nursing at bedside throughout procedure. Patient provided sedation and analgesia throughout the procedure. Placed on full ventilator support with an FiO2 of 100% throughout the procedure. Using a fiberoptic bronchoscope, trachea entered via 8.0 endotracheal tube.  0 mL of local anesthetic sprayed at the adan (2% lidocaine) achieving appropriate comfort level for patient. Airways visualized directly and the following intervention was performed: diagnostic and therapeutic lavage with all areas of lungs suctioned to clear. Findings as below. Patient tolerated procedure well without any difficulties and left in care of bedside nurse/RT.     Medications: Rocuronium 50 mg, propofol and fentanyl drips  Findings: Upper airway - Not visualized as bronchoscope passed through ETT.        Trachea to adan -normal appearing mucosa without lesions or mass, ETT tip measured 3 cm from the adan.        R proximal and distal airways -edematous, friable appearing mucosa without mass/lesion/anatomic variance, secretions: Thick, bloody, moderate        L proximal and distal airways -edematous, friable appearing mucosa without mass/lesion/anatomic variance, secretions: Thick, bloody, moderate        Samples -bilateral lower lobe bronchoalveolar lavage    Complications: None  CXR (if  applicable): Pending    Jeremy Gonda, MD  Critical Care Medicine

## 2018-12-20 NOTE — ED NOTES
Tech from Lab called with critical result of Lactic acid 8.9 at 0012. Critical lab result read back to tech.   Dr. Hawkins notified of critical lab result at 0012.  Critical lab result read back by Dr. Hawkins.

## 2018-12-20 NOTE — ASSESSMENT & PLAN NOTE
Probable given prolonged downtime and current neurologic exam  Temperature targeted management post cardiac arrest  Eventual EEG/neurology consultation once rewarmed  Close neurologic monitoring    Concerning picture for bad neurologic outcome  Will re-evaluate  Consider MRI in 1-2 days although bedside neuro exam most predictive test     Woke up as noted  Formal neurologic/cognitive testing may be warranted at a later date

## 2018-12-20 NOTE — CONSULTS
Critical Care Consultation    Date of consult: 2018    Referring Physician  CHANDRAKANT Shoemaker*    Reason for Consultation  Out of hospital cardiac arrest, respiratory failure    History of Presenting Illness  58 y.o. male who presented 2018 with a past medical history significant for hypertension, anxiety who was in his usual state of health today including going to work.  He ate dinner and was watching a movie on the couch this evening.  His son was speaking to him and approximately 30 minutes later came back and found him with agonal respiratory effort.  His son and wife started CPR and noted perioral cyanosis and he was unresponsive.  EMS was called immediately and arrived finding patient in ventricular fibrillation.  He was defibrillated 3 times and loaded with amiodarone.  He converted to torsades and then asystole requiring 20-30 minutes of CPR with 3 rounds of epinephrine before return of spontaneous circulation.  He was intubated in the field and arrived neurologically unresponsive and hypotensive.  In the emergency department patient was evaluated by cardiology and underwent CT imaging of his brain as well as chest to rule out pulmonary embolism which was negative.  He is being started on temperature targeted management post cardiac arrest and will be admitted to the cardiac ICU where I will manage his critical condition.  Per patient's wife he has not had any recent travel, chest pain, congestive medications.  He does not drink alcohol nor use illicit drugs.  His brother  a few months ago from unknown causes and his father passed away in his 80s from stroke.    Code Status  No Order -full code    Review of Systems  Review of Systems   Unable to perform ROS: Intubated       Past Medical History   has no past medical history on file. -Hypertension and anxiety    Surgical History   has no past surgical history on file. -Appendectomy    Family History  family history is not on file.  -Stroke, hypertension    Social History   reports that he has never smoked. He does not have any smokeless tobacco history on file. He reports that he does not drink alcohol or use drugs. 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       • famotidine (PEPCID) tablet 20 mg  20 mg Oral Q12HRS Jeremy M Gonda, M.D.        Or   • famotidine (PEPCID) injection 20 mg  20 mg Intravenous Q12HRS Jeremy M Gonda, M.D.       • senna-docusate (PERICOLACE or SENOKOT S) 8.6-50 MG per tablet 2 Tab  2 Tab Oral BID Jeremy M Gonda, M.D.        And   • polyethylene glycol/lytes (MIRALAX) PACKET 1 Packet  1 Packet Oral QDAY PRN Jeremy M Gonda, M.D.        And   • magnesium hydroxide (MILK OF MAGNESIA) suspension 30 mL  30 mL Oral QDAY PRN Jeremy M Gonda, M.D.        And   • bisacodyl (DULCOLAX) suppository 10 mg  10 mg Rectal QDAY PRN Jeremy M Gonda, M.D.       • MD Alert...ICU Electrolyte Replacement per Pharmacy   Other pharmacy to dose Jeremy M Gonda, M.D.       • heparin injection 5,000 Units  5,000 Units Subcutaneous Q8HRS Jeremy M Gonda, M.D.       • MD Alert...PROPOFOL CRITICAL CARE PROTOCOL 1 Each  1 Each Other PRN Jeremy M Gonda, M.D.       • ipratropium-albuterol (DUONEB) nebulizer solution  3 mL Nebulization Q2HRS PRN (RT) Jeremy M Gonda, M.D.       • insulin regular (HUMULIN R) injection 1-6 Units  1-6 Units Subcutaneous Q6HRS Jeremy M Gonda, M.D.        And   • glucose 4 g chewable tablet 16 g  16 g Oral Q15 MIN PRN Jeremy M Gonda, M.D.        And   • dextrose 50% (D50W) injection 25 mL  25 mL Intravenous Q15 MIN PRN Jeremy M Gonda, M.D.       • Cold NS infusion 816 mL   10 mL/kg Intravenous Once PRN Yevgeniy Hawkins M.D.       • NS infusion   Intravenous Continuous Yevgeniy Hawkins M.D.       • magnesium sulfate 20 g/500mL infusion  0.5-2 g/hr Intravenous Continuous Yevgeniy Hawkins M.D. 13 mL/hr at 12/20/18 0118 0.5 g/hr at 12/20/18 0118   • meperidine (DEMEROL) injection 25 mg  25 mg Intravenous Q HOUR PRN Yevgeniy Hawkins M.D.   25 mg at 12/20/18 0126   • fentaNYL (SUBLIMAZE) injection 100 mcg  100 mcg Intravenous Q HOUR PRN Yevgeniy Hawkins M.D.       • fentanyl 50 mcg/mL infusion   Intravenous Continuous Yevgeniy Hawkins M.D. 1 mL/hr at 12/20/18 0131 50 mcg/hr at 12/20/18 0131   • midazolam (VERSED) 2 MG/2ML injection 2 mg  2 mg Intravenous Once Yevgeniy Hawkins M.D.   Stopped at 12/20/18 0015    Followed by   • midazolam (VERSED) premix 125 mg/125 mL NS  0-10 mg/hr Intravenous Continuous Yevgeniy Hawkins M.D.   Stopped at 12/20/18 0015   • vecuronium (NORCURON) injection 8.16 mg  0.1 mg/kg Intravenous Once Yevgeniy Hawkins M.D.   Stopped at 12/20/18 0015    Followed by   • vecuronium (NORCURON) 60 mg in D5W 500 mL Infusion  0-2 mcg/kg/min Intravenous Continuous Yevgeniy Hawkins M.D.       • artificial tear ointment (REFRESH,LACRI-LUBE) 1 Application  1 Application Both Eyes Q8HRS Yevgeniy Hawkins M.D.       • norepinephrine (LEVOPHED) 8 mg in  mL Infusion  0-30 mcg/min Intravenous Continuous Yevgeniy Hawkins M.D.   Stopped at 12/20/18 0015   • K+ Scale: Goal of 4.5  1 Each Intravenous Q6HRS Yevgeniy Hawkins M.D.       • sodium phosphate 15 mmol in 1/2  mL ivpb  15 mmol Intravenous Once PRN Yevgeniy Hawkins M.D.        Or   • sodium phosphate 30 mmol in 1/2  mL ivpb  30 mmol Intravenous Once PRN Yevgeniy Hawkins M.D.       • propofol (DIPRIVAN) infusion  0-80 mcg/kg/min Intravenous Continuous Jeremy M Gonda, M.D. 14.7 mL/hr at 12/20/18 0141 30 mcg/kg/min at 12/20/18 0141   • midazolam  (VERSED) injection 1-5 mg  1-5 mg Intravenous Q HOUR PRN Yevgeniy Hawkins M.D.       • fentaNYL (SUBLIMAZE) injection 100 mcg  100 mcg Intravenous Once Yevgeniy Hawkins M.D.        And   • fentaNYL (SUBLIMAZE) injection 100 mcg  100 mcg Intravenous Q HOUR PRN Yevgeniy Hawkins M.D.         No current outpatient prescriptions on file.       Allergies  No Known Allergies    Vital Signs last 24 hours  Temp:  [35.3 °C (95.5 °F)] 35.3 °C (95.5 °F)  Pulse:  [64-78] 64  Resp:  [17-29] 28  BP: (143)/(87) 143/87   SaO2 99 200% on 40% FiO2    Physical Exam  Physical Exam   Constitutional: He appears well-developed and well-nourished. He has a sickly appearance. He appears ill. He is sedated and intubated.   HENT:   Head: Normocephalic and atraumatic.   Nose: Nose normal.   Mouth/Throat: Oropharynx is clear and moist. No oropharyngeal exudate.   Endotracheal tube and orogastric tube in position without evidence of intraoral trauma   Eyes: Pupils are equal, round, and reactive to light. Conjunctivae are normal. No scleral icterus.   Pupils 2-3 mm bilaterally symmetric   Neck: Neck supple. No JVD present. No tracheal deviation present.   Cardiovascular: Regular rhythm.   Occasional extrasystoles are present. Tachycardia present.  PMI is displaced.  Exam reveals distant heart sounds and decreased pulses.    No murmur heard.  Pulses:       Radial pulses are 1+ on the right side, and 1+ on the left side.   Delayed capillary refill, mottled   Pulmonary/Chest: No accessory muscle usage. Tachypnea noted. He is intubated. He has no decreased breath sounds. He has no wheezes. He has rhonchi in the right middle field, the right lower field, the left middle field and the left lower field. He has no rales.   CMV with a rate of 22, tidal volume 440, PEEP of 8, 40% FiO2   Abdominal: Soft. Bowel sounds are normal. He exhibits no distension. There is no tenderness. There is no rebound.   Palpable umbilical hernia that is  reducible, scar in right lower quadrant   Genitourinary: Penis normal.   Genitourinary Comments: Ramon catheter in place   Musculoskeletal: He exhibits no edema, tenderness or deformity.   Lymphadenopathy:     He has no cervical adenopathy.   Neurological: He is unresponsive. He exhibits normal muscle tone.   Sedated on propofol drip, myoclonic jerking with stimulation, spontaneous movement of right greater than left side of body but not crossing midline, no following commands, no opening eyes, withdraws to pain on left   Skin: Skin is dry. There is pallor.   Cool, mottled   Psychiatric:   Unable to assess given current clinical condition   Nursing note and vitals reviewed.      Fluids  No intake or output data in the 24 hours ending 12/20/18 0147    Laboratory  Recent Results (from the past 48 hour(s))   Troponin    Collection Time: 12/19/18 11:47 PM   Result Value Ref Range    Troponin I 0.33 (H) 0.00 - 0.04 ng/mL   CBC Without Differential every 6 hours until rewamed    Collection Time: 12/19/18 11:47 PM   Result Value Ref Range    WBC 12.8 (H) 4.8 - 10.8 K/uL    RBC 4.11 (L) 4.70 - 6.10 M/uL    Hemoglobin 11.6 (L) 14.0 - 18.0 g/dL    Hematocrit 39.7 (L) 42.0 - 52.0 %    MCV 96.6 81.4 - 97.8 fL    MCH 28.2 27.0 - 33.0 pg    MCHC 29.2 (L) 33.7 - 35.3 g/dL    RDW 52.3 (H) 35.9 - 50.0 fL    Platelet Count 313 164 - 446 K/uL    MPV 10.0 9.0 - 12.9 fL   APTT Every 6 hours until rewamed    Collection Time: 12/19/18 11:47 PM   Result Value Ref Range    APTT 46.2 (H) 24.7 - 36.0 sec   Prothrombin Time (INR) every 6 hours until rewamed    Collection Time: 12/19/18 11:47 PM   Result Value Ref Range    PT 20.1 (H) 12.0 - 14.6 sec    INR 1.71 (H) 0.87 - 1.13   Magnesium every 6 hours until rewamed    Collection Time: 12/19/18 11:47 PM   Result Value Ref Range    Magnesium 3.0 (H) 1.5 - 2.5 mg/dL   Phosphorus    Collection Time: 12/19/18 11:47 PM   Result Value Ref Range    Phosphorus 10.2 (H) 2.5 - 4.5 mg/dL   Btype  Natriuretic Peptide    Collection Time: 18 11:47 PM   Result Value Ref Range    B Natriuretic Peptide 186 (H) 0 - 100 pg/mL   LACTIC ACID    Collection Time: 18 11:47 PM   Result Value Ref Range    Lactic Acid 8.9 (HH) 0.5 - 2.0 mmol/L   CMP    Collection Time: 18 11:47 PM   Result Value Ref Range    Sodium 138 135 - 145 mmol/L    Potassium 3.1 (L) 3.6 - 5.5 mmol/L    Chloride 104 96 - 112 mmol/L    Co2 15 (L) 20 - 33 mmol/L    Anion Gap 19.0 (H) 0.0 - 11.9    Glucose 356 (H) 65 - 99 mg/dL    Bun 15 8 - 22 mg/dL    Creatinine 0.99 0.50 - 1.40 mg/dL    Calcium 9.2 8.5 - 10.5 mg/dL    AST(SGOT) 277 (H) 12 - 45 U/L    ALT(SGPT) 348 (H) 2 - 50 U/L    Alkaline Phosphatase 84 30 - 99 U/L    Total Bilirubin 0.3 0.1 - 1.5 mg/dL    Albumin 3.7 3.2 - 4.9 g/dL    Total Protein 6.4 6.0 - 8.2 g/dL    Globulin 2.7 1.9 - 3.5 g/dL    A-G Ratio 1.4 g/dL   ESTIMATED GFR    Collection Time: 18 11:47 PM   Result Value Ref Range    GFR If African American >60 >60 mL/min/1.73 m 2    GFR If Non African American >60 >60 mL/min/1.73 m 2   EKG    Collection Time: 18 11:54 PM   Result Value Ref Range    Report       [facility]    Test Date:  2018  Pt Name:    WINDY OTERO               Department: ER  MRN:        [id number]                      Room:       RD 09  Gender:     Male                         Technician: [id number]  :        1960                   Requested By:ER TRIAGE PROTOCOL  Order #:    [id number]                    Barbara MD:    Measurements  Intervals                                Axis  Rate:       60                           P:  KY:                                      QRS:        97  QRSD:       114                          T:          243  QT:         420  QTc:        420    Interpretive Statements  ACCELERATED JUNCTIONAL ESCAPE RHYTHM  INCOMPLETE RIGHT BUNDLE BRANCH BLOCK  ST DEPRESSION, CONSIDER ISCHEMIA, DIFFUSE LDS  No previous ECG available  for comparison     ISTAT ARTERIAL BLOOD GAS    Collection Time: 12/19/18 11:58 PM   Result Value Ref Range    Ph 7.080 (LL) 7.400 - 7.500    Pco2 34.5 26.0 - 37.0 mmHg    Po2 340 (H) 64 - 87 mmHg    Tco2 11 (L) 20 - 33 mmol/L    S02 100 (H) 93 - 99 %    Hco3 10.2 (L) 17.0 - 25.0 mmol/L    BE -19 (L) -4 - 3 mmol/L    Body Temp 37.0 C degrees    O2 Therapy 100 %    iPF Ratio 340     Ph Temp Daphne 7.080 (LL) 7.400 - 7.500    Pco2 Temp Co 34.5 26.0 - 37.0 mmHg    Po2 Temp Cor 340 (H) 64 - 87 mmHg    Specimen Arterial     Action Range Triggered YES     Inst. Qualified Patient YES    ISTAT LACTATE    Collection Time: 12/19/18 11:58 PM   Result Value Ref Range    iStat Lactate 11.8 (HH) 0.5 - 2.0 mmol/L   ACCU-CHEK GLUCOSE    Collection Time: 12/20/18 12:01 AM   Result Value Ref Range    Glucose - Accu-Ck 336 (H) 65 - 99 mg/dL       Imaging  CT-CTA CHEST PULMONARY ARTERY W/ RECONS   Final Result         1.  No large central pulmonary embolus is appreciated, evaluation of the subsegmental branches is essentially nondiagnostic due to motion artifacts. Additional imaging would be required for definitive exclusion of small distal pulmonary emboli.   2.  Hazy groundglass pulmonary opacities, predominantly on the right, appearance suggests atypical edema or infiltrates.   3.  Anterior bilateral second through sixth rib fractures   4.  Cardiomegaly   5.  Left nephrolithiasis   6.  Atherosclerosis and atherosclerotic coronary artery disease.      CT-HEAD W/O   Final Result         1.  No acute intracranial abnormality.   2.  Atherosclerosis.      DX-CHEST-PORTABLE (1 VIEW)   Final Result         1.  No acute cardiopulmonary disease.   2.  Cardiomegaly      DX-CHEST-PORTABLE (1 VIEW)    (Results Pending)    *Personally reviewed chest x-ray and compared to prior film showing a large cardiac silhouette with scattered infiltrates, endotracheal tube and gastric tube in good position   *Personally reviewed EKG showing accelerated  junctional escape rhythm at 60 with incomplete right bundle branch block, diffuse ST depression/ischemia, QTc interval of 420    Assessment/Plan  Ventricular fibrillation (HCC)   Assessment & Plan    Primary arrhythmia per EMS status post defibrillation  Continuous telemetry with initiation of amiodarone should it recur  Optimize electrolytes  Coronary evaluation pending     Anoxic brain injury (HCC)   Assessment & Plan    Probable given prolonged downtime and current neurologic exam  Temperature targeted management post cardiac arrest  Eventual EEG/neurology consultation once rewarmed  Close neurologic monitoring     Acute respiratory failure with hypoxia (HCC)   Assessment & Plan    Intubated in field 12/19  Continue full mechanical ventilatory support, increased respiratory rate to 22  Titrate FiO2 to goal SaO2 greater than 92%  RT/O2 protocol  Propofol drip with as needed fentanyl for sedation/analgesia  Hold on mobilization/sedation vacations/SBT while hypothermic       Cardiac arrest (HCC)   Assessment & Plan    Unknown etiology status post return of spontaneous circulation with prolonged downtime  Supportive care  Stat echocardiography  Cardiology consultation  Holding off on PCI evaluation in the setting of anoxic brain injury and need for ongoing resuscitation     Lactic acidosis   Assessment & Plan    Secondary to shock/cardiac arrest  Trend with resuscitation attempts     Coagulopathy (HCC)   Assessment & Plan    Secondary to cardiac arrest, possible DIC  Monitor platelet count, check fibrinogen, monitor for bleeding     Hypokalemia   Assessment & Plan    Aggressive repletion and monitoring closely     Metabolic acidosis   Assessment & Plan    Severe, partially compensated  Increase mandatory minute ventilation to help compensate  Fluid resuscitation     Hyperphosphatemia   Assessment & Plan    Monitor closely with resuscitation attempts     Anemia   Assessment & Plan    Monitor daily CBC  Conservative  transfusion strategy     Leukemoid reaction   Assessment & Plan    Stress induced, hold off on antibiotics for now as no preceding infectious symptoms     Closed fracture of multiple ribs of both sides   Assessment & Plan    Secondary to CPR  Analgesia as needed  Positive pressure ventilation     Full code, prophylaxis, poor prognosis, palliative care consultation    Discussed patient condition and risk of morbidity and/or mortality with Family, RN, RT, Pharmacy, Charge nurse / hot rounds, cardiology and Emergency physician.    The patient remains critically ill.  Critical care time = 125 minutes in directly providing and coordinating critical care and extensive data review.  No time overlap and excludes procedures.

## 2018-12-20 NOTE — ASSESSMENT & PLAN NOTE
Secondary to ventricular fibrillation likely associated with HOCM   [facility] - Non-obstructive mild coronary artery disease.12/28/2018  ICD placement 1/3/2019

## 2018-12-20 NOTE — ASSESSMENT & PLAN NOTE
Intubated in field 12/19 -12/29  Encourage incentive spirometry, out of bed to chair  Mobilization  RT/O2 protocols targeting SaO2 greater than 92%  Cont diuresis  IPV

## 2018-12-20 NOTE — ED TRIAGE NOTES
"Chief Complaint   Patient presents with   • Cardiac Arrest     witnessed      /87   Pulse 69   Temp (!) 35.3 °C (95.5 °F) (Hsu)   Resp 18   Ht 1.6 m (5' 3\")   Wt 81.6 kg (180 lb)   SpO2 98%   BMI 31.89 kg/m²     Pt had a witnessed cardiac arrest, approximately 30min before 30min before cpr started. In route pt was given 3 doses of epinephrine, and two of amiodarone. ROSC was obtained prior to arrival to ED. Upon arrival to ED pt was placed on zoll, PIVs established, hsu placed, and pt attacked to vent. EKG done and cardiology was called to bedside.     Pt was taken to Ct.   "

## 2018-12-20 NOTE — ASSESSMENT & PLAN NOTE
Initiated on hypothermic protocol  Monitor neurochecks once rewarmed  May need EEG and MRI if not improving

## 2018-12-20 NOTE — PROGRESS NOTES
Cardiology brief note    Patient evaluated.  No new recommendations from a cardiac standpoint.  Discussed plan with the patient's wife.    Ilene Moreno MD  Cardiologist  [facility]

## 2018-12-20 NOTE — H&P
"Hospital Medicine History & Physical Note    Date of Service  12/20/2018    Primary Care Physician  Pcp Pt States None    Consultants  Cardiology  Critical care    Code Status  Full code    Chief Complaint  Cardiac arrest    History of Presenting Illness  58 y.o. male with a past medical history of depression and hypertension who presented 12/19/2018 with unresponsiveness.  History is obtained from son and wife at bedside.  Patient was last seen in his usual state of health around 10:30 PM when he was watching TV.  The son the patient to be unresponsive with agonal breathing at around 10:50 PM.  The son started CPR and EMS was called.  The patient was found to be in ventricular fibrillation for which she was defibrillated 3 times and loaded with amiodarone. He converted to torsades for which he was shocked again.  Was given multiple rounds of CPR and epinephrine when he was found to be in asystole.  He then went into PEA and eventually had return of spontaneous circulation.  Patient was intubated in the field and brought to [facility] ER.  Family denies any previous history of MI, stroke, seizures.  Patient does not smoke, drink alcohol or use illicit drugs.  Patient takes sertraline and lisinopril.  [facility] \"[facility]\".  In the ER the patient was started on hypothermic protocol.  Cardiology and critical care were consulted by the ER physician.    EKG interpreted by me reveals ST elevation in V1 and V2 and ST depression in inferior lateral leads  Chest x-ray interpreted by me reveals no acute cardiopulmonary process.  Cardiomegaly    Review of Systems  Review of Systems   Unable to perform ROS: Intubated       Past Medical History  Unable to obtain at this time    Surgical History  Unable to obtain at this time    Family History  Unable to obtain at this    Social History   reports that he has never smoked. He does not have any smokeless tobacco history on file. He reports that he " does not drink alcohol or use drugs.    Allergies  No Known Allergies    Medications  None       Physical Exam  Temp:  [35.3 °C (95.5 °F)] 35.3 °C (95.5 °F)  Pulse:  [64-78] 64  Resp:  [17-29] 28  BP: (143)/(87) 143/87    Physical Exam   Constitutional: He appears well-developed.   HENT:   Head: Normocephalic and atraumatic.   Eyes: No scleral icterus.   Pinpoint pupils bilaterally   Neck: No thyromegaly present.   Cardiovascular: Normal rate, regular rhythm and normal heart sounds.    Pulmonary/Chest:   Clear to auscultation bilaterally   Abdominal: Soft. He exhibits no distension.   Musculoskeletal: He exhibits no edema.   Lymphadenopathy:     He has no cervical adenopathy.   Neurological:   Intubated and sedated  Unable to assess   Skin: No rash noted. There is pallor.   Cool and mottled   Psychiatric:   Unable to assess at this time   Nursing note and vitals reviewed.      Laboratory:  Recent Labs      12/19/18 2347   WBC  12.8*   RBC  4.11*   HEMOGLOBIN  11.6*   HEMATOCRIT  39.7*   MCV  96.6   MCH  28.2   MCHC  29.2*   RDW  52.3*   PLATELETCT  313   MPV  10.0     Recent Labs      12/19/18   2347   SODIUM  138   POTASSIUM  3.1*   CHLORIDE  104   CO2  15*   GLUCOSE  356*   BUN  15   CREATININE  0.99   CALCIUM  9.2     Recent Labs      12/19/18   2347   ALTSGPT  348*   ASTSGOT  277*   ALKPHOSPHAT  84   TBILIRUBIN  0.3   GLUCOSE  356*     Recent Labs      12/19/18   2347   APTT  46.2*   INR  1.71*     Recent Labs      12/19/18   2347   BNPBTYPENAT  186*         Recent Labs      12/19/18   2347   TROPONINI  0.33*       Urinalysis:    No results found     Imaging:  DX-CHEST-LIMITED (1 VIEW)   Final Result         1.  No acute cardiopulmonary disease.   2.  Right internal jugular central line terminates in the right atrium, could be withdrawn 3 cm.   3.  Cardiomegaly      CT-CTA CHEST PULMONARY ARTERY W/ RECONS   Final Result         1.  No large central pulmonary embolus is appreciated, evaluation of the  subsegmental branches is essentially nondiagnostic due to motion artifacts. Additional imaging would be required for definitive exclusion of small distal pulmonary emboli.   2.  Hazy groundglass pulmonary opacities, predominantly on the right, appearance suggests atypical edema or infiltrates.   3.  Anterior bilateral second through sixth rib fractures   4.  Cardiomegaly   5.  Left nephrolithiasis   6.  Atherosclerosis and atherosclerotic coronary artery disease.      CT-HEAD W/O   Final Result         1.  No acute intracranial abnormality.   2.  Atherosclerosis.      DX-CHEST-PORTABLE (1 VIEW)   Final Result         1.  No acute cardiopulmonary disease.   2.  Cardiomegaly      EC-ECHOCARDIOGRAM COMPLETE W/O CONT    (Results Pending)         Assessment/Plan:  I anticipate this patient will require at least two midnights for appropriate medical management, necessitating inpatient admission.    Ventricular fibrillation (HCC)- (present on admission)   Assessment & Plan    Status post cardioversion  Continuous cardiac monitoring  Cardiology has been consulted     Anoxic brain injury (HCC)- (present on admission)   Assessment & Plan    Patient has been started on hypothermic protocol  Aggressive IV fluid hydration  We will consider EEG and neurology consultation     Acute respiratory failure with hypoxia (HCC)- (present on admission)   Assessment & Plan    Patient has been intubated and is on full mechanical support  Pulmonology has been consulted     Cardiac arrest (HCC)- (present on admission)   Assessment & Plan    Unknown etiology, query ACS  I have started the patient on full dose aspirin  Cardiology has been consulted  2D echo pending  CTA chest was negative for PE       Lactic acidosis- (present on admission)   Assessment & Plan    Profound lactic acidosis  Aggressive IV fluid hydration with normal saline  Trend lactate     Metabolic acidosis- (present on admission)   Assessment & Plan    IV fluid hydration with  normal saline  Monitor BMP     Leukemoid reaction- (present on admission)   Assessment & Plan    Does not appear to be septic  Monitor CBC and vitals     Closed fracture of multiple ribs of both sides- (present on admission)   Assessment & Plan    Secondary to CPR         VTE prophylaxis: heparin

## 2018-12-20 NOTE — CARE PLAN
Problem: Ventilation Defect:  Goal: Ability to achieve and maintain unassisted ventilation or tolerate decreased levels of ventilator support  Adult Ventilation Update    Total Vent Days: 2    Patient Lines/Drains/Airways Status    Active Airway     Name: Placement date: Placement time: Site: Days:    Airway ETT Oral 7.0 12/19/18      Oral   1              In the last 24 hours, the patient tolerated SBT for 0 hours              Plateau Pressure (Q Shift): 11 (12/20/18 1125)  Static Compliance (ml / cm H2O): 28.5 (12/20/18 0908)    Patient failed trials because of    Barriers to SBT    Length of Weaning Trial           Events/Summary/Plan: ABG draw (12/20/18 1289)

## 2018-12-20 NOTE — ASSESSMENT & PLAN NOTE
Unknown etiology s/p ROSC with prolonged downtime  Continue supportive care  Therapies  Optimize electrolytes and continuous telemetry

## 2018-12-20 NOTE — PROGRESS NOTES
Hospital Medicine Daily Progress Note    Date of Service  12/20/2018    Chief Complaint  Out of hospital cardiac arrest at home with [facility]    58 y.o. male with a history of hypertension and depression admitted 12/19/2018 with witness cardiac arrest for which the son started CPR until EMS arrived.  He was found to be in ventricular fibrillation for which he was shocked 3 times and loaded with amiodarone and converted to torsades for which he was shocked again.  He had multiple rounds of CPR and epinephrine.  He went into pulseless electrical activity and then eventually had spontaneous return of circulation.      Interval Problem Update  On vent day #2  Hypothermic protocol  Pinpoint pupils  Sinus bradycardia  On propofol and fentanyl drip      Consultants/Specialty  Cardiology  Critical Care    Code Status  FULL    Disposition  To remain in ICU    Review of Systems  Review of Systems   Unable to perform ROS: Intubated        Physical Exam  Temp:  [34.1 °C (93.4 °F)-35.4 °C (95.7 °F)] 34.1 °C (93.4 °F)  Pulse:  [44-78] 47  Resp:  [17-29] 28  BP: (143)/(87) 143/87    Physical Exam   Constitutional: No distress. He is sedated, intubated and restrained.   Eyes: Pupils are equal, round, and reactive to light. Conjunctivae are normal. Right eye exhibits no discharge. Left eye exhibits no discharge.   Neck: No tracheal deviation present.   Cardiovascular: Regular rhythm.  Tachycardia present.    No murmur heard.  Pulses:       Radial pulses are 2+ on the right side, and 2+ on the left side.        Dorsalis pedis pulses are 2+ on the right side, and 2+ on the left side.   Pulmonary/Chest: Effort normal. He is intubated. No respiratory distress. He has no wheezes. He has no rales.   Abdominal: Soft. He exhibits no distension. There is no tenderness.   Musculoskeletal: He exhibits no edema.   Lymphadenopathy:     He has no cervical adenopathy.   Skin: Skin is warm. He is not diaphoretic.        Fluids    Intake/Output Summary (Last 24 hours) at 12/20/18 1526  Last data filed at 12/20/18 1500   Gross per 24 hour   Intake          3159.29 ml   Output              805 ml   Net          2354.29 ml       Laboratory  Recent Labs      12/19/18   2347  12/20/18   0515  12/20/18   1115   WBC  12.8*  21.1*  14.1*   RBC  4.11*  4.09*  3.81*   HEMOGLOBIN  11.6*  11.6*  10.8*   HEMATOCRIT  39.7*  36.7*  33.9*   MCV  96.6  89.7  89.0   MCH  28.2  28.4  28.3   MCHC  29.2*  31.6*  31.9*   RDW  52.3*  48.3  48.2   PLATELETCT  313  418  341   MPV  10.0  9.7  9.7     Recent Labs      12/19/18   2347  12/20/18   0515  12/20/18   1115   SODIUM  138  138  138   POTASSIUM  3.1*  4.5  3.9   CHLORIDE  104  110  114*   CO2  15*  16*  15*   GLUCOSE  356*  216*  118*   BUN  15  21  25*   CREATININE  0.99  0.89  0.79   CALCIUM  9.2  8.3*  8.2*     Recent Labs      12/19/18   2347  12/20/18   0515  12/20/18   1115   APTT  46.2*  27.0  26.0   INR  1.71*  1.20*  1.17*     Recent Labs      12/19/18 2347   BNPBTYPENAT  186*           Imaging  EC-ECHOCARDIOGRAM COMPLETE W/O CONT   Final Result      DX-CHEST-LIMITED (1 VIEW)   Final Result         1.  No acute cardiopulmonary disease.   2.  Right internal jugular central line terminates in the right atrium, could be withdrawn 3 cm.   3.  Cardiomegaly      CT-CTA CHEST PULMONARY ARTERY W/ RECONS   Final Result         1.  No large central pulmonary embolus is appreciated, evaluation of the subsegmental branches is essentially nondiagnostic due to motion artifacts. Additional imaging would be required for definitive exclusion of small distal pulmonary emboli.   2.  Hazy groundglass pulmonary opacities, predominantly on the right, appearance suggests atypical edema or infiltrates.   3.  Anterior bilateral second through sixth rib fractures   4.  Cardiomegaly   5.  Left nephrolithiasis   6.  Atherosclerosis and atherosclerotic coronary artery disease.      CT-HEAD W/O   Final Result          1.  No acute intracranial abnormality.   2.  Atherosclerosis.      DX-CHEST-PORTABLE (1 VIEW)   Final Result         1.  No acute cardiopulmonary disease.   2.  Cardiomegaly           Assessment/Plan  Ventricular fibrillation (HCC)- (present on admission)   Assessment & Plan    Status post cardioversion  Cardiology consulting  Monitor on telemetry  Correct electrolyte abnormalities     Anoxic brain injury (HCC)- (present on admission)   Assessment & Plan    Initiated on hypothermic protocol  Monitor neurochecks once rewarmed  May need EEG and MRI if not improving     Acute respiratory failure with hypoxia (HCC)- (present on admission)   Assessment & Plan    On ventilator  Minimize sedation as able  Monitor ABG, chest x-ray, labs, strict I's and O's, and vitals  Critical care consulting discussed with Dr. Flores     Cardiac arrest (HCC)- (present on admission)   Assessment & Plan    CTA negative for PE  Cardiology consulting  Monitor on telemetry  Aspirin, atorvastatin  Currently on pressor support with levophed to keep map greater than 65 and systolic blood pressure greater than 90  Await echocardiogram       Lactic acidosis- (present on admission)   Assessment & Plan    12/19 lactic acid: 8.9  Downtrending with 12/20 lactic acid: 2.6     Hypokalemia   Assessment & Plan    Repleted and monitoring     Metabolic acidosis- (present on admission)   Assessment & Plan    IV fluid hydration with normal saline  Monitor BMP     Elevated liver enzymes   Assessment & Plan    Question of hypotensive event/cardiac arrest prior to admission  Follow-up on hepatic function panel     Anemia- (present on admission)   Assessment & Plan    Normocytic normochromic  Monitor CBC  No obvious sign of bleeding  Check iron levels and guaiac stools     Leukocytosis- (present on admission)   Assessment & Plan    12/19 WBC: 12.8  12/20 WBC: 14.1  Watch for signs of infection     Closed fracture of multiple ribs of both sides- (present on  admission)   Assessment & Plan    Secondary to CPR          VTE prophylaxis: heparin

## 2018-12-20 NOTE — ED NOTES
Report given to [facility] RN, pt transported to [facility] with RNx3 and RT x 2.

## 2018-12-20 NOTE — ED PROVIDER NOTES
ED Provider Note    Scribed for Yevgeniy Hawkins M.D. by Carmita Sanders. 12/19/2018, 11:41 PM.    Primary care provider: Pcp Pt States None  Means of arrival: EMS  History obtained from: EMS  History limited by: patient is intubated upon arrival    CHIEF COMPLAINT  Cardiac arrest    JASMIN Nicholas is a 58 y.o. male who presents to the Emergency Department for evaluation of cardiac arrest onset just prior to arrival. Family reports that they found him around 9:00 PM with altered mental status with an altered breathing pattern which they thought was just snoring. He was last seen normal around 8:30 PM. They tried to wake him up but he was not arousable. They began CPR and called 911 at this time. Per EMS, he was initially in ventricular fibrillation upon arrival and then went into asystole, back to ventricular fibrillation, and torsades. He received four shocks, three rounds of epinephrine, and 300 mg amiodarone in the field. Patient received CPR and had 25-35 minutes of down time prior to arrival. He was in normal sinus rhythm with a rate of 80 beats per minute upon arrival to the ED. Patient did not receive any magnesium in the field. Per EMS, he has a history of cardiac valve insufficiency and hypertension but has no other history of cardiac problems. He is currently taking Zoloft for anxiety. Patient was afebrile upon arrival.    Further HPI limited secondary to the patient being intubated upon arrival.    REVIEW OF SYSTEMS  Pertinent positives include ventricular fibrillation, asystole. Pertinent negatives include fever.     Further ROS limited secondary to the patient being intubated upon arrival.     PAST MEDICAL HISTORY       SURGICAL HISTORY  patient denies any surgical history    SOCIAL HISTORY  Social History   Substance Use Topics   • Smoking status: Never Smoker   •     • Alcohol use No      History   Drug Use No       FAMILY HISTORY  None noted.     CURRENT MEDICATIONS  Daily Zoloft  "    ALLERGIES  No Known Allergies    PHYSICAL EXAM  VITAL SIGNS: Ht 1.6 m (5' 3\")   Wt 81.6 kg (180 lb)   BMI 31.89 kg/m²     Constitutional: Well developed, Well nourished.  In extremis extremities.  No response to painful stimulus  HENT: Normocephalic, Atraumatic, ET tube in place  Eyes: Conjunctiva normal, No discharge. Pupils are sluggish but reactive about 3 mm  Cardiovascular: Normal heart rate, Normal rhythm, No murmurs, palpable pulses.   Pulmonary: Normal breath sounds, No wheezing, No rales, No rhonchi. Guppy breathing  Chest: No chest wall deformity.   Abdomen:Soft, No masses, no rebound, no guarding. No obvious trauma  Musculoskeletal: No major deformities noted.   Skin: Warm, Dry, No erythema, No rash. Skin mottling from about the nipple line up, cool to touch  Neurologic patient has no response to painful stimulus.  Occasional agonal breathing on his own    LABS  Results for orders placed or performed during the hospital encounter of 12/19/18   Troponin   Result Value Ref Range    Troponin I 0.33 (H) 0.00 - 0.04 ng/mL   CBC Without Differential every 6 hours until rewamed   Result Value Ref Range    WBC 12.8 (H) 4.8 - 10.8 K/uL    RBC 4.11 (L) 4.70 - 6.10 M/uL    Hemoglobin 11.6 (L) 14.0 - 18.0 g/dL    Hematocrit 39.7 (L) 42.0 - 52.0 %    MCV 96.6 81.4 - 97.8 fL    MCH 28.2 27.0 - 33.0 pg    MCHC 29.2 (L) 33.7 - 35.3 g/dL    RDW 52.3 (H) 35.9 - 50.0 fL    Platelet Count 313 164 - 446 K/uL    MPV 10.0 9.0 - 12.9 fL   APTT Every 6 hours until rewamed   Result Value Ref Range    APTT 46.2 (H) 24.7 - 36.0 sec   Prothrombin Time (INR) every 6 hours until rewamed   Result Value Ref Range    PT 20.1 (H) 12.0 - 14.6 sec    INR 1.71 (H) 0.87 - 1.13   Magnesium every 6 hours until rewamed   Result Value Ref Range    Magnesium 3.0 (H) 1.5 - 2.5 mg/dL   Phosphorus   Result Value Ref Range    Phosphorus 10.2 (H) 2.5 - 4.5 mg/dL   Btype Natriuretic Peptide   Result Value Ref Range    B Natriuretic Peptide 186 (H) " 0 - 100 pg/mL   LACTIC ACID   Result Value Ref Range    Lactic Acid 8.9 (HH) 0.5 - 2.0 mmol/L   CMP   Result Value Ref Range    Sodium 138 135 - 145 mmol/L    Potassium 3.1 (L) 3.6 - 5.5 mmol/L    Chloride 104 96 - 112 mmol/L    Co2 15 (L) 20 - 33 mmol/L    Anion Gap 19.0 (H) 0.0 - 11.9    Glucose 356 (H) 65 - 99 mg/dL    Bun 15 8 - 22 mg/dL    Creatinine 0.99 0.50 - 1.40 mg/dL    Calcium 9.2 8.5 - 10.5 mg/dL    AST(SGOT) 277 (H) 12 - 45 U/L    ALT(SGPT) 348 (H) 2 - 50 U/L    Alkaline Phosphatase 84 30 - 99 U/L    Total Bilirubin 0.3 0.1 - 1.5 mg/dL    Albumin 3.7 3.2 - 4.9 g/dL    Total Protein 6.4 6.0 - 8.2 g/dL    Globulin 2.7 1.9 - 3.5 g/dL    A-G Ratio 1.4 g/dL   ESTIMATED GFR   Result Value Ref Range    GFR If African American >60 >60 mL/min/1.73 m 2    GFR If Non African American >60 >60 mL/min/1.73 m 2   BETA-HYDROXYBUTYRIC ACID   Result Value Ref Range    beta-Hydroxybutyric Acid 0.19 0.02 - 0.27 mmol/L   ACCU-CHEK GLUCOSE   Result Value Ref Range    Glucose - Accu-Ck 336 (H) 65 - 99 mg/dL   ISTAT LACTATE   Result Value Ref Range    iStat Lactate 11.8 (HH) 0.5 - 2.0 mmol/L   EKG   Result Value Ref Range    Report       [facility] Emergency Dept.    Test Date:  2018  Pt Name:    WINDY OTERO               Department: ER  MRN:        [id number]                      Room:       RD 09  Gender:     Male                         Technician: [id number]  :        1960                   Requested By:ER TRIAGE PROTOCOL  Order #:    [id number]                    Reading MD:    Measurements  Intervals                                Axis  Rate:       60                           P:  UT:                                      QRS:        97  QRSD:       114                          T:          243  QT:         420  QTc:        420    Interpretive Statements  ACCELERATED JUNCTIONAL ESCAPE RHYTHM  INCOMPLETE RIGHT BUNDLE BRANCH BLOCK  ST DEPRESSION, CONSIDER ISCHEMIA, DIFFUSE LDS  No previous ECG  available for comparison     ISTAT ARTERIAL BLOOD GAS   Result Value Ref Range    Ph 7.080 (LL) 7.400 - 7.500    Pco2 34.5 26.0 - 37.0 mmHg    Po2 340 (H) 64 - 87 mmHg    Tco2 11 (L) 20 - 33 mmol/L    S02 100 (H) 93 - 99 %    Hco3 10.2 (L) 17.0 - 25.0 mmol/L    BE -19 (L) -4 - 3 mmol/L    Body Temp 37.0 C degrees    O2 Therapy 100 %    iPF Ratio 340     Ph Temp Daphne 7.080 (LL) 7.400 - 7.500    Pco2 Temp Co 34.5 26.0 - 37.0 mmHg    Po2 Temp Cor 340 (H) 64 - 87 mmHg    Specimen Arterial     Action Range Triggered YES     Inst. Qualified Patient YES        All labs reviewed by me.    EKG  12 Lead EKG performed at 11:40 PM interpreted by me shows a normal sinus rhythm at a rate of 78. Rightward axis. RBBB. 4 mm ST elevations in V2. 3 mm ST elevations in V4-V6, lead 1, AVL. ST depresions in 2, 3, AVF. No T wave inversions. Final impression: concern for lateral MI with reciprocal changes.    Repeat EK Lead EKG performed at 11:54 PM interpreted by me shows accelerated junctional rhythm at a rate of 60. Rightward axis. RBBB. ST depressions in 2, 3, AVF, V4-6. ST elevations in lateral leads are resolved. 2mm ST elevation in V2. 1 mm ST elevation in V1. No T wave inversions. Final impression: Ischemic changes diffusely, no longer ST elevation MI criteria.    RADIOLOGY  CT-CTA CHEST PULMONARY ARTERY W/ RECONS   Final Result         1.  No large central pulmonary embolus is appreciated, evaluation of the subsegmental branches is essentially nondiagnostic due to motion artifacts. Additional imaging would be required for definitive exclusion of small distal pulmonary emboli.   2.  Hazy groundglass pulmonary opacities, predominantly on the right, appearance suggests atypical edema or infiltrates.   3.  Anterior bilateral second through sixth rib fractures   4.  Cardiomegaly   5.  Left nephrolithiasis   6.  Atherosclerosis and atherosclerotic coronary artery disease.      CT-HEAD W/O   Final Result         1.  No acute  intracranial abnormality.   2.  Atherosclerosis.      DX-CHEST-PORTABLE (1 VIEW)   Final Result         1.  No acute cardiopulmonary disease.   2.  Cardiomegaly      EC-ECHOCARDIOGRAM COMPLETE W/O CONT    (Results Pending)   DX-CHEST-LIMITED (1 VIEW)    (Results Pending)     The radiologist's interpretation of all radiological studies have been reviewed by me.    COURSE & MEDICAL DECISION MAKING  Pertinent Labs & Imaging studies reviewed. (See chart for details)    11:41 PM - Patient seen and examined at bedside. Patient will be treated with 25 mg demerol, 100 mcg sublimaze, 15 mmol odium phosphate, 2 mg versed, 0.05% afrin, 8 mg levophed, 60 mg norcuron, and cooling protocol. The patient will receive IV fluids because he is NPO status at this time. Ordered Phosphorus, lactic acid, BNP, blood culture, Dx-chest, EKG, troponin, BMP, CBC, APTT, PTT, magnesium, CT-head to evaluate his symptoms. The differential diagnoses include but are not limited to: intercranial hemorrhage, STEMI, cardiac arrest,      11:54 PM Spoke to Dr. Porter (cardiology) who does not believe the patient needs to go to the Cath Lab at this time.    12:08 AM Paged Critical Care Team.    12:14 AM Spoke to Dr. Gonda (Critical Care Medicine) who agrees to consult the patient.    12:28 AM Patient will receive 2 mg ativan at this time.     1:12 AM Patient reevaluated at bedside. He is mildly twitching at this time. Discussed plan of care with family and explained that he is currently being cooled in order to preserve brain function and prevent any possible damage. Explained that he does not have a PE or brain bleeding at this time. Discussed diagnostic results which reveal an elevated troponin and explained that it is possible that he had a myocardial infarction. Patient's family understands and agrees to plan of care at this time.     CRITICAL CARE  I provided critical care services, which included medication orders, frequent reevaluations of  the patient's condition and response to treatment, ordering and reviewing test results, and discussing the case with various consultants.  The critical care time associated with the care of the patient was 45 minutes. Review chart for interventions. This time is exclusive of any other billable procedures.       Medical Decision Making: This point time exact etiology of the patient's cardiac arrest is unclear may have been a primary arrhythmia.  CT of the head is negative for intracranial hemorrhage therefore because of the first rhythm being V. fib and he does not have any neurologic response to painful stimulus we will go ahead and cool him.  Initial EKG appears to be possibly a acute STEMI therefore cardiology was emergently consulted but repeat EKGs do not continue to show ST elevation.  It is felt that this point time the elevation was likely secondary to cardiac electric shock.  Possibly being so acidotic.  Patient does have a mildly elevated troponin.  CT of the chest was done to make sure that this was not a pulmonary embolism that puts the patient into cardiac arrest.        DISPOSITION:  Patient will be admitted to Dr. Magallanes (hospitalist) in critical condition.     FINAL IMPRESSION  1. Cardiac arrest (HCC)    2. Ventricular fibrillation (HCC)    3. Acute respiratory failure with hypoxia (HCC)    4. Lactic acidosis    5. [place] coma scale total score 3-8, in the field (EMT or ambulance) (HCC)         The critical care time associated with the care of the patient was 45 minutes. Review chart for interventions. This time is exclusive of any other billable procedures.      Carmita CORONADO (Alexsandra), am scribing for, and in the presence of, Yevgeniy Hawkins M.D.    Electronically signed by: Carmita Sanders (Alexsandra), 12/19/2018    Yevgeniy CORONADO M.D. personally performed the services described in this documentation, as scribed by Carmita Sanders in my presence, and it is both accurate and complete.  C    The note accurately reflects work and decisions made by me.  Yevgeniy Hawkins  12/20/2018  4:28 AM

## 2018-12-20 NOTE — PROGRESS NOTES
Received bedside report from ED nurse, Patient's vital signs stable, no apparent distress. Transferred to [facility].

## 2018-12-20 NOTE — DISCHARGE PLANNING
Medical Social Work     Referral: cardiac arrest     SW responded to a cardiac arrest. The pt was brought in by RA and Jason Fang. The pt name is Amadou Nicholas (: 1960). RA advised MISTI that the pt wife and family are on [facility] way [facility]. The ER lobby called and advised SW that the pt family arrived. SW escorted the pt family to the family room. The pt emergency contact is Marianne Nicholas (wife) [phone] and Asad (son) [phone]. The ERP met with the pt family and provided a medical update on the pt medical status. SW will escort the family to the pt room when SW is notified it is okay to take the family back to the pt room.     Plan: SW will remain available for pt and family support.

## 2018-12-20 NOTE — PROGRESS NOTES
Critical Care Progress Note    Date of admission  12/19/2018    Chief Complaint  58 y.o. male admitted 12/19/2018 s/p cardiopulmonary arrest with prolonged CPR in the field  Hospital Course  TTM started in ED and continued in ICU    Interval Problem Update  Mechan vent ARDS net settings  Levophed  Target temp management  Replete lytes  Serial neuro exams  Urine OK:      Review of Systems  Review of Systems   Unable to perform ROS: Intubated        Vital Signs for last 24 hours   Temp:  [34.1 °C (93.4 °F)-35.4 °C (95.7 °F)] 34.1 °C (93.4 °F)  Pulse:  [42-78] 44  Resp:  [17-29] 28  BP: (143)/(87) 143/87    Hemodynamic parameters for last 24 hours  CVP:  [9 MM HG-243 MM HG] 10 MM HG    Respiratory  Brooks Vent Mode: APVCMV  Rate (breaths/min): 28  Vt Target (mL): 350  PEEP/CPAP: 8  FiO2: 30  P MEAN: 14  Control VTE (exp VT): 358    Physical Exam   Physical Exam   Constitutional: He appears well-developed and well-nourished.   HENT:   Head: Normocephalic and atraumatic.   Eyes: Pupils are equal, round, and reactive to light. EOM are normal.   Sluggish but on IV narcotics   Neck: Normal range of motion.   Cardiovascular: Normal rate and regular rhythm.    Pulmonary/Chest: Effort normal and breath sounds normal.   ON mechanical vent   Abdominal: Soft. Bowel sounds are normal.   Musculoskeletal: Normal range of motion.   Neurological:   Sedated, no paralized. No withdrawal or purposeful movement on my exam after propofol stopped for 30 plus minutes   Skin:   TTM tempt 35.5       Medications  Current Facility-Administered Medications   Medication Dose Route Frequency Provider Last Rate Last Dose   • Respiratory Care per Protocol   Nebulization Continuous RT Jeremy M Gonda, M.D.       • famotidine (PEPCID) tablet 20 mg  20 mg Oral Q12HRS Jeremy M Gonda, M.D.        Or   • famotidine (PEPCID) injection 20 mg  20 mg Intravenous Q12HRS Jeremy M Gonda, M.D.   20 mg at 12/20/18 0528   • senna-docusate (PERICOLACE or SENOKOT S)  8.6-50 MG per tablet 2 Tab  2 Tab Oral BID Jeremy M Gonda, M.D.   Stopped at 12/20/18 0600    And   • polyethylene glycol/lytes (MIRALAX) PACKET 1 Packet  1 Packet Oral QDAY PRN Jeremy M Gonda, M.D.        And   • magnesium hydroxide (MILK OF MAGNESIA) suspension 30 mL  30 mL Oral QDAY PRN Jeremy M Gonda, M.D.        And   • bisacodyl (DULCOLAX) suppository 10 mg  10 mg Rectal QDAY PRN Jeremy M Gonda, M.D.       • MD Alert...ICU Electrolyte Replacement per Pharmacy   Other pharmacy to dose Jeremy M Gonda, M.D.       • heparin injection 5,000 Units  5,000 Units Subcutaneous Q8HRS Jeremy M Gonda, M.D.   5,000 Units at 12/20/18 1408   • MD Alert...PROPOFOL CRITICAL CARE PROTOCOL 1 Each  1 Each Other PRN Jeremy M Gonda, M.D.       • ipratropium-albuterol (DUONEB) nebulizer solution  3 mL Nebulization Q2HRS PRN (RT) Jeremy M Gonda, M.D.       • potassium chloride in water (KCL) ivpb **Administer in ICU only** 40 mEq  40 mEq Intravenous Once Jeremy M Gonda, M.D.   Stopped at 12/20/18 0430   • dextrose 50% (D50W) injection 25-50 mL  12.5-25 g Intravenous PRN Jeremy M Gonda, M.D.       • EPINEPHrine 4 mg in  mL Infusion  0-10 mcg/min Intravenous Continuous Jeremy M Gonda, M.D.   Stopped at 12/20/18 0230   • atorvastatin (LIPITOR) tablet 80 mg  80 mg Oral Q EVENING Yaron Magallanes M.D.       • aspirin (ASA) tablet 325 mg  325 mg Oral DAILY Yaron Magallanes M.D.   Stopped at 12/20/18 0230    Or   • aspirin (ASA) chewable tab 324 mg  324 mg Oral DAILY Yaron Magallanes M.D.        Or   • aspirin (ASA) suppository 300 mg  300 mg Rectal DAILY Yaron Magallanes M.D.       • fentanyl 50 mcg/mL infusion   Intravenous Continuous Bradley Flores M.D. 8 mL/hr at 12/20/18 1323 400 mcg/hr at 12/20/18 1323   • fentaNYL (SUBLIMAZE) injection 50 mcg  50 mcg Intravenous Q30 MIN PRN Bradley Flores M.D.        Or   • fentaNYL (SUBLIMAZE) injection 100 mcg  100 mcg Intravenous Q30 MIN PRN Bradley Flores M.D.   100 mcg at 12/20/18 1042    Or   •  fentaNYL (SUBLIMAZE) injection 200 mcg  200 mcg Intravenous Q30 MIN PRN Bradley Flores M.D.   200 mcg at 12/20/18 1240   • propofol (DIPRIVAN) injection  0-80 mcg/kg/min Intravenous Continuous Bradley Flores M.D. 9.8 mL/hr at 12/20/18 1450 20 mcg/kg/min at 12/20/18 1450   • Cold NS infusion 816 mL  10 mL/kg Intravenous Once PRN Yevgeniy Hawkins M.D.       • NS infusion   Intravenous Continuous Yevgeniy Hawkins M.D. 100 mL/hr at 12/20/18 0527     • magnesium sulfate 20 g/500mL infusion  0.5-2 g/hr Intravenous Continuous Bradley Flores M.D. 13 mL/hr at 12/20/18 0118 0.5 g/hr at 12/20/18 0118   • artificial tear ointment (REFRESH,LACRI-LUBE) 1 Application  1 Application Both Eyes Q8HRS Yevgeniy Hawkins M.D.   1 Application at 12/20/18 1408   • norepinephrine (LEVOPHED) 8 mg in  mL Infusion  0-30 mcg/min Intravenous Continuous Yevgeniy Hawkins M.D. 11.3 mL/hr at 12/20/18 1425 6 mcg/min at 12/20/18 1425   • K+ Scale: Goal of 4.5  1 Each Intravenous Q6HRS Bradley Flores M.D.   1 Each at 12/20/18 1200   • sodium phosphate 15 mmol in 1/2  mL ivpb  15 mmol Intravenous Once PRN Yevgeniy Hawkins M.D.        Or   • sodium phosphate 30 mmol in 1/2  mL ivpb  30 mmol Intravenous Once PRN Yevgeniy Hawkins M.D.           Fluids    Intake/Output Summary (Last 24 hours) at 12/20/18 1556  Last data filed at 12/20/18 1500   Gross per 24 hour   Intake          3159.29 ml   Output              805 ml   Net          2354.29 ml       Laboratory  Recent Labs      12/19/18   2358  12/20/18   0445   ISTATAPH  7.080*  7.247*   ISTATAPCO2  34.5  36.7   ISTATAPO2  340*  80   ISTATATCO2  11*  17*   JIHTHOU8ORL  100*  94   ISTATARTHCO3  10.2*  16.0*   ISTATARTBE  -19*  -10*   ISTATTEMP  37.0 C  35.5 C   ISTATFIO2  100  40   ISTATSPEC  Arterial  Arterial   ISTATAPHTC  7.080*  7.268*   [id number]  340*  73     Recent Labs      12/19/18   2347  12/20/18   0515   TROPONINI  0.33*  6.83*    BNPBTYPENAT  186*   --      Recent Labs      12/19/18   2347  12/20/18   0515  12/20/18   1115   SODIUM  138  138  138   POTASSIUM  3.1*  4.5  3.9   CHLORIDE  104  110  114*   CO2  15*  16*  15*   BUN  15  21  25*   CREATININE  0.99  0.89  0.79   MAGNESIUM  3.0*  2.8*  3.4*   PHOSPHORUS  10.2*   --    --    CALCIUM  9.2  8.3*  8.2*     Recent Labs      12/19/18   2347  12/20/18   0515  12/20/18   1115   ALTSGPT  348*   --    --    ASTSGOT  277*   --    --    ALKPHOSPHAT  84   --    --    TBILIRUBIN  0.3   --    --    GLUCOSE  356*  216*  118*     Recent Labs      12/19/18   2347  12/20/18   0515  12/20/18   1115   WBC  12.8*  21.1*  14.1*   ASTSGOT  277*   --    --    ALTSGPT  348*   --    --    ALKPHOSPHAT  84   --    --    TBILIRUBIN  0.3   --    --      Recent Labs      12/19/18   2347  12/20/18   0515  12/20/18   1115   RBC  4.11*  4.09*  3.81*   HEMOGLOBIN  11.6*  11.6*  10.8*   HEMATOCRIT  39.7*  36.7*  33.9*   PLATELETCT  313  418  341   PROTHROMBTM  20.1*  15.4*  15.0*   APTT  46.2*  27.0  26.0   INR  1.71*  1.20*  1.17*       Imaging  X-Ray:  I have personally reviewed the images and compared with prior images.    Assessment/Plan  Ventricular fibrillation (HCC)- (present on admission)   Assessment & Plan    Primary arrhythmia per EMS status post defibrillation  Continuous telemetry with initiation of amiodarone should it recur  Optimize electrolytes  Coronary evaluation pending    Because of potential for bad brain (no bystander CPR, long duration CPR by medics, poor neuro status on presentation) will defer on invasive cardiac w/u for now     Anoxic brain injury (HCC)- (present on admission)   Assessment & Plan    Probable given prolonged downtime and current neurologic exam  Temperature targeted management post cardiac arrest  Eventual EEG/neurology consultation once rewarmed  Close neurologic monitoring    Concerning picture for bad neurologic outcome  Will re-evaluate  Consider MRI in 1-2 days although  bedside neuro exam most predictive test      Acute respiratory failure with hypoxia (HCC)- (present on admission)   Assessment & Plan    Intubated in field 12/19  Continue full mechanical ventilatory support, increased respiratory rate to 22  Titrate FiO2 to goal SaO2 greater than 92%  RT/O2 protocol  Propofol drip with as needed fentanyl for sedation/analgesia  Hold on mobilization/sedation vacations/SBT while hypothermic       Cardiac arrest (HCC)- (present on admission)   Assessment & Plan    Unknown etiology status post return of spontaneous circulation with prolonged downtime  Supportive care  Stat echocardiography  Cardiology consultation  Holding off on PCI evaluation in the setting of anoxic brain injury and need for ongoing resuscitation    Palliative care consult  Prognosis for full recovery grave     Lactic acidosis- (present on admission)   Assessment & Plan    Secondary to shock/cardiac arrest  Trend with resuscitation attempts     Coagulopathy (HCC)- (present on admission)   Assessment & Plan    Secondary to cardiac arrest, possible DIC  Monitor platelet count, check fibrinogen, monitor for bleeding     Hypokalemia   Assessment & Plan    Continue to track and replete     Metabolic acidosis- (present on admission)   Assessment & Plan    Severe, partially compensated  Increase mandatory minute ventilation to help compensate  Fluid resuscitation    Improved with fluids, pressors   Trend     Elevated liver enzymes   Assessment & Plan    Likely secondary to shock liver     Hyperphosphatemia- (present on admission)   Assessment & Plan    Monitor closely with resuscitation attempts     Anemia- (present on admission)   Assessment & Plan    Monitor daily CBC  Conservative transfusion strategy     Leukocytosis- (present on admission)   Assessment & Plan    Stress induced, hold off on antibiotics for now as no preceding infectious symptoms  Doubt acute infection- inflammation from poor perfusion, poor heart  function and multi-organ dysfunction s/p prolonged arrest without bystander CPR     Closed fracture of multiple ribs of both sides- (present on admission)   Assessment & Plan    Secondary to CPR  Analgesia as needed  Positive pressure ventilation          VTE:  Heparin  Ulcer: PPI  Lines: Central Line  Ongoing indication addressed    I have performed a physical exam and reviewed and updated ROS and Plan today (12/20/2018). In review of yesterday's note (12/19/2018), there are no changes except as documented above.     Discussed patient condition and risk of morbidity and/or mortality with Hospitalist, RN, RT, Pharmacy and Charge nurse / hot rounds  The patient remains critically ill.  Critical care time = 95 minutes in directly providing and coordinating critical care and extensive data review.  No time overlap and excludes procedures.

## 2018-12-20 NOTE — ED NOTES
Code chill initiated, CIC RNx2 at bedside. Pt attached to code chill machine, medicated per MAR.     BP in 80's systolic, pharmacy contacted for levo gtt.

## 2018-12-20 NOTE — THERAPY
PT cardiac rehab eval order received. Pt currently intubated and non-responsive, additionally pending cardiac POC. Not appropriate for phase I cardiac rehab.

## 2018-12-20 NOTE — CONSULTS
"Cardiology consultation note    Requesting physician; Dr. Yevgeniy Hawkins    Reason for consultation; Out of the hospital cardiac arrest    History of present illness;    Patient is a 58 years old male with history of hypertension and anxiety but no known prior cardiac issue.  [facility]  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  He was brought in by EMS. The history was obtained from his family.    Reportedly he was in state of usual health earlier today.  This evening he was found unresponsive with agonal breathing by his family around 9 PM.  He was last seen normal around 8:30 PM. EMS was summoned.  On their arrival reportedly was in ventricular fibrillation.  He received defibrillation and amiodarone and reportedly went into asystole then torsade de pointes.  On arrival to emergency room he was unresponsive.  Initial electrocardiogram showed junctional rhythm with wide QRS complex with repolarization abnormality and appearance of ST elevation in the anterolateral precordial leads.  However subsequent electrocardiogram show improvement of the QRS duration and disappearance of ST elevation.  Initial arterial blood gas showed severe acidemia pH of 7.08 with lactic acid of over 11.  Reportedly he is taking Zoloft for anxiety. It is unclear what he takes for his hypertension    PAST MEDICAL HISTORY  Remarkable for hypertension and anxiety as mentioned above    SURGICAL HISTORY  patient denies any surgical history     SOCIAL HISTORY       Social History   Substance Use Topics   • Smoking status: Never Smoker   •       • Alcohol use No          History   Drug Use No         FAMILY HISTORY  None noted.     CURRENT MEDICATIONS  Daily Zoloft      ALLERGIES  No Known Allergies     Review of systems; unobtainable due to unresponsive    PHYSICAL EXAM  VITAL SIGNS: Ht 1.6 m (5' 3\")   Wt 81.6 kg (180 lb)   BMI 31.89 kg/m²      Constitutional: Well developed, Well nourished  HENT: Normocephalic, " Atraumatic, Oropharynx moist.   Eyes: Conjunctiva normal, No discharge. Pupils are sluggish but reactive  Cardiovascular: Normal heart rate, Normal rhythm, No murmurs, palpable pulses.   Pulmonary: Normal breath sounds, No wheezing, No rales, No rhonchi. Guppy breathing  Chest: No chest wall deformity.   Abdomen:Soft, No masses, no rebound, no guarding. No obvious trauma  Musculoskeletal: No major deformities noted.   Skin: Warm, Dry, No erythema, No rash. Skin mottling, cool to touch  Neurologic:   Unresponsive to pain stimuli  Psychiatric:  Unable to assess     LABS         Results for orders placed or performed during the hospital encounter of 12/19/18   CBC Without Differential every 6 hours until rewamed   Result Value Ref Range     WBC 12.8 (H) 4.8 - 10.8 K/uL     RBC 4.11 (L) 4.70 - 6.10 M/uL     Hemoglobin 11.6 (L) 14.0 - 18.0 g/dL     Hematocrit 39.7 (L) 42.0 - 52.0 %     MCV 96.6 81.4 - 97.8 fL     MCH 28.2 27.0 - 33.0 pg     MCHC 29.2 (L) 33.7 - 35.3 g/dL     RDW 52.3 (H) 35.9 - 50.0 fL     Platelet Count 313 164 - 446 K/uL     MPV 10.0 9.0 - 12.9 fL   LACTIC ACID   Result Value Ref Range     Lactic Acid 8.9 (HH) 0.5 - 2.0 mmol/L   ACCU-CHEK GLUCOSE   Result Value Ref Range     Glucose - Accu-Ck 336 (H) 65 - 99 mg/dL   ISTAT LACTATE   Result Value Ref Range     iStat Lactate 11.8 (HH) 0.5 - 2.0 mmol/L     Electrocardiogram from today at 2354 by my review showed accelerated junctional rhythm with diffuse ST depression and slight ST elevation in lead V1 and V2  CMP showed potassium 3.1 CO2 of 15 anion gap of 19 glucose 356   lactic acid 8.9 troponin  0.33 magnesium 3 phosphorus 10.2  CT head did not show any acute abnormality  CT chest did not show any large pulmonary embolus coronary artery calcification/atherosclerosis were noted    Assessment and plans;    1.  Out-of-hospital cardiac arrest  Patient has a relatively prolonged downtime.  He is currently unresponsive with severe lactic  acidosis.  Electrocardiogram did not show definite ST elevation myocardial infarction.  I believe that the first step should be to try to manage an anoxic brain injury.  He has been started on cooling protocol.  He is relatively stable hemodynamically and has not have any significant tachyarrhythmia since his arrival to emergency room. I do not believe that he required emergent cardiac catheterization.We will perform echocardiography. I will perform bedside echocardiography tonight. We will continue serial troponin.  We should try to correct his acidemia and metabolic derangement.     2.  History of hypertension  Blood pressure is borderline low or in the normotensive range.  We will withhold antihistamines hypertensive medication for now unless blood pressure become elevated.    3.  History of anxiety  Would withhold anxiolytic for now given his current neurological status and report torsade de pointes.  Would also repeat EKG once acidemia and electrolyte imbalance improved.    Critical care time more than 60 minutes.  Will follow the patient along with you.  Thank you for allowing us to participate in the care of this patient.

## 2018-12-20 NOTE — PROCEDURES
Procedure Note    Date: 12/20/2018  Time: 0 330    Procedure: Central Venous Line placement  Site: Right IJ vein    Indication: Shock  Consent: Informed consent obtained from patient or designated decision maker after explaining the benefits/risks of the procedure including but not limited to bleeding, infection, nerve or other deep structure injury, pneumothorax/hemothorax, arrythmia, or death. Patient or surrogate expressed understanding and agreement and signed consent which can be found in the patient's chart.    Procedure: After obtaining consent, a time-out was performed. Appropriate site confirmed with ultrasound and patient positioned, prepped, and draped in sterile fashion. All those present wearing cap and mask and those physically participating remained adhering to sterile fashion with cap, mask, gloves, and gown.  5 mL of local anesthetic injected (1% lidocaine without epinephrine) achieving appropriate comfort level for patient. Vein localized and accessed using continuous ultrasound guidance and a 7 Fr triple-lumen catheter placed using Seldinger technique. Able to aspirate dark, non-pulsatile blood through each lumen and sterile saline flushed easily before capping. Line secured and dressed in sterile fashion. Patient tolerated procedure well without any difficulties and remains in care of bedside nurse. CXR will be performed to confirm appropriate placement for internal jugular or subclavian CVLs.    EBL: minimal  Complications: None  CXR: Pending    Jeremy Gonda, MD  Critical Care Medicine

## 2018-12-21 ENCOUNTER — APPOINTMENT (OUTPATIENT)
Dept: RADIOLOGY | Facility: MEDICAL CENTER | Age: 58
DRG: 224 | End: 2018-12-21
Attending: INTERNAL MEDICINE
Payer: COMMERCIAL

## 2018-12-21 LAB
ACTION RANGE TRIGGERED IACRT: NO
ACTION RANGE TRIGGERED IACRT: YES
ACTION RANGE TRIGGERED IACRT: YES
ALBUMIN SERPL BCP-MCNC: 2.9 G/DL (ref 3.2–4.9)
ALBUMIN/GLOB SERPL: 1.3 G/DL
ALP SERPL-CCNC: 52 U/L (ref 30–99)
ALT SERPL-CCNC: 225 U/L (ref 2–50)
ANION GAP SERPL CALC-SCNC: 7 MMOL/L (ref 0–11.9)
APTT PPP: 29.2 SEC (ref 24.7–36)
AST SERPL-CCNC: 121 U/L (ref 12–45)
BASE EXCESS BLDA CALC-SCNC: -6 MMOL/L (ref -4–3)
BASE EXCESS BLDA CALC-SCNC: -9 MMOL/L (ref -4–3)
BASE EXCESS BLDA CALC-SCNC: -9 MMOL/L (ref -4–3)
BASOPHILS # BLD AUTO: 0.3 % (ref 0–1.8)
BASOPHILS # BLD: 0.03 K/UL (ref 0–0.12)
BILIRUB SERPL-MCNC: 0.6 MG/DL (ref 0.1–1.5)
BODY TEMPERATURE: ABNORMAL DEGREES
BUN SERPL-MCNC: 19 MG/DL (ref 8–22)
CALCIUM SERPL-MCNC: 7.9 MG/DL (ref 8.5–10.5)
CHLORIDE SERPL-SCNC: 113 MMOL/L (ref 96–112)
CO2 BLDA-SCNC: 15 MMOL/L (ref 20–33)
CO2 BLDA-SCNC: 20 MMOL/L (ref 20–33)
CO2 BLDA-SCNC: 23 MMOL/L (ref 20–33)
CO2 SERPL-SCNC: 17 MMOL/L (ref 20–33)
CREAT SERPL-MCNC: 0.68 MG/DL (ref 0.5–1.4)
EKG IMPRESSION: NORMAL
EOSINOPHIL # BLD AUTO: 0.06 K/UL (ref 0–0.51)
EOSINOPHIL NFR BLD: 0.5 % (ref 0–6.9)
ERYTHROCYTE [DISTWIDTH] IN BLOOD BY AUTOMATED COUNT: 50.1 FL (ref 35.9–50)
GLOBULIN SER CALC-MCNC: 2.3 G/DL (ref 1.9–3.5)
GLUCOSE BLD-MCNC: 109 MG/DL (ref 65–99)
GLUCOSE BLD-MCNC: 76 MG/DL (ref 65–99)
GLUCOSE BLD-MCNC: 80 MG/DL (ref 65–99)
GLUCOSE BLD-MCNC: 97 MG/DL (ref 65–99)
GLUCOSE SERPL-MCNC: 128 MG/DL (ref 65–99)
HCO3 BLDA-SCNC: 14.7 MMOL/L (ref 17–25)
HCO3 BLDA-SCNC: 18.8 MMOL/L (ref 17–25)
HCO3 BLDA-SCNC: 21.3 MMOL/L (ref 17–25)
HCT VFR BLD AUTO: 30 % (ref 42–52)
HGB BLD-MCNC: 9.4 G/DL (ref 14–18)
HOROWITZ INDEX BLDA+IHG-RTO: 115 MM[HG]
HOROWITZ INDEX BLDA+IHG-RTO: 156 MM[HG]
HOROWITZ INDEX BLDA+IHG-RTO: 323 MM[HG]
IMM GRANULOCYTES # BLD AUTO: 0.04 K/UL (ref 0–0.11)
IMM GRANULOCYTES NFR BLD AUTO: 0.3 % (ref 0–0.9)
INR PPP: 1.24 (ref 0.87–1.13)
INST. QUALIFIED PATIENT IIQPT: YES
LACTATE BLD-SCNC: 1 MMOL/L (ref 0.5–2)
LACTATE BLD-SCNC: 1.2 MMOL/L (ref 0.5–2)
LYMPHOCYTES # BLD AUTO: 1.15 K/UL (ref 1–4.8)
LYMPHOCYTES NFR BLD: 9.9 % (ref 22–41)
MAGNESIUM SERPL-MCNC: 3.4 MG/DL (ref 1.5–2.5)
MCH RBC QN AUTO: 27.9 PG (ref 27–33)
MCHC RBC AUTO-ENTMCNC: 31.3 G/DL (ref 33.7–35.3)
MCV RBC AUTO: 89 FL (ref 81.4–97.8)
MONOCYTES # BLD AUTO: 0.49 K/UL (ref 0–0.85)
MONOCYTES NFR BLD AUTO: 4.2 % (ref 0–13.4)
NEUTROPHILS # BLD AUTO: 9.87 K/UL (ref 1.82–7.42)
NEUTROPHILS NFR BLD: 84.8 % (ref 44–72)
NRBC # BLD AUTO: 0 K/UL
NRBC BLD-RTO: 0 /100 WBC
O2/TOTAL GAS SETTING VFR VENT: 30 %
O2/TOTAL GAS SETTING VFR VENT: 50 %
O2/TOTAL GAS SETTING VFR VENT: 80 %
PCO2 BLDA: 25 MMHG (ref 26–37)
PCO2 BLDA: 48.7 MMHG (ref 26–37)
PCO2 BLDA: 50.1 MMHG (ref 26–37)
PCO2 TEMP ADJ BLDA: 23.2 MMHG (ref 26–37)
PCO2 TEMP ADJ BLDA: 49.4 MMHG (ref 26–37)
PCO2 TEMP ADJ BLDA: 49.8 MMHG (ref 26–37)
PH BLDA: 7.2 [PH] (ref 7.4–7.5)
PH BLDA: 7.24 [PH] (ref 7.4–7.5)
PH BLDA: 7.38 [PH] (ref 7.4–7.5)
PH TEMP ADJ BLDA: 7.19 [PH] (ref 7.4–7.5)
PH TEMP ADJ BLDA: 7.24 [PH] (ref 7.4–7.5)
PH TEMP ADJ BLDA: 7.4 [PH] (ref 7.4–7.5)
PHOSPHATE SERPL-MCNC: 2.9 MG/DL (ref 2.5–4.5)
PLATELET # BLD AUTO: 291 K/UL (ref 164–446)
PMV BLD AUTO: 10.1 FL (ref 9–12.9)
PO2 BLDA: 78 MMHG (ref 64–87)
PO2 BLDA: 92 MMHG (ref 64–87)
PO2 BLDA: 97 MMHG (ref 64–87)
PO2 TEMP ADJ BLDA: 80 MMHG (ref 64–87)
PO2 TEMP ADJ BLDA: 87 MMHG (ref 64–87)
PO2 TEMP ADJ BLDA: 90 MMHG (ref 64–87)
POTASSIUM SERPL-SCNC: 4.1 MMOL/L (ref 3.6–5.5)
PROT SERPL-MCNC: 5.2 G/DL (ref 6–8.2)
PROTHROMBIN TIME: 15.7 SEC (ref 12–14.6)
RBC # BLD AUTO: 3.37 M/UL (ref 4.7–6.1)
SAO2 % BLDA: 92 % (ref 93–99)
SAO2 % BLDA: 95 % (ref 93–99)
SAO2 % BLDA: 98 % (ref 93–99)
SODIUM SERPL-SCNC: 137 MMOL/L (ref 135–145)
SPECIMEN DRAWN FROM PATIENT: ABNORMAL
WBC # BLD AUTO: 11.6 K/UL (ref 4.8–10.8)

## 2018-12-21 PROCEDURE — A9270 NON-COVERED ITEM OR SERVICE: HCPCS | Performed by: INTERNAL MEDICINE

## 2018-12-21 PROCEDURE — 93010 ELECTROCARDIOGRAM REPORT: CPT | Performed by: INTERNAL MEDICINE

## 2018-12-21 PROCEDURE — 0BC68ZZ EXTIRPATION OF MATTER FROM RIGHT LOWER LOBE BRONCHUS, VIA NATURAL OR ARTIFICIAL OPENING ENDOSCOPIC: ICD-10-PCS | Performed by: INTERNAL MEDICINE

## 2018-12-21 PROCEDURE — 87106 FUNGI IDENTIFICATION YEAST: CPT

## 2018-12-21 PROCEDURE — 302978 HCHG BRONCHOSCOPY-DIAGNOSTIC

## 2018-12-21 PROCEDURE — 88112 CYTOPATH CELL ENHANCE TECH: CPT

## 2018-12-21 PROCEDURE — 770022 HCHG ROOM/CARE - ICU (200)

## 2018-12-21 PROCEDURE — 700111 HCHG RX REV CODE 636 W/ 250 OVERRIDE (IP): Performed by: INTERNAL MEDICINE

## 2018-12-21 PROCEDURE — 71045 X-RAY EXAM CHEST 1 VIEW: CPT

## 2018-12-21 PROCEDURE — 94150 VITAL CAPACITY TEST: CPT

## 2018-12-21 PROCEDURE — 31500 INSERT EMERGENCY AIRWAY: CPT | Performed by: INTERNAL MEDICINE

## 2018-12-21 PROCEDURE — 83735 ASSAY OF MAGNESIUM: CPT

## 2018-12-21 PROCEDURE — 87206 SMEAR FLUORESCENT/ACID STAI: CPT

## 2018-12-21 PROCEDURE — 700101 HCHG RX REV CODE 250: Performed by: INTERNAL MEDICINE

## 2018-12-21 PROCEDURE — 82803 BLOOD GASES ANY COMBINATION: CPT

## 2018-12-21 PROCEDURE — 88305 TISSUE EXAM BY PATHOLOGIST: CPT

## 2018-12-21 PROCEDURE — 87205 SMEAR GRAM STAIN: CPT

## 2018-12-21 PROCEDURE — 0BC38ZZ EXTIRPATION OF MATTER FROM RIGHT MAIN BRONCHUS, VIA NATURAL OR ARTIFICIAL OPENING ENDOSCOPIC: ICD-10-PCS | Performed by: INTERNAL MEDICINE

## 2018-12-21 PROCEDURE — 83605 ASSAY OF LACTIC ACID: CPT | Mod: 91

## 2018-12-21 PROCEDURE — 87186 SC STD MICRODIL/AGAR DIL: CPT

## 2018-12-21 PROCEDURE — 87102 FUNGUS ISOLATION CULTURE: CPT

## 2018-12-21 PROCEDURE — 700102 HCHG RX REV CODE 250 W/ 637 OVERRIDE(OP): Performed by: INTERNAL MEDICINE

## 2018-12-21 PROCEDURE — 87281 PNEUMOCYSTIS CARINII AG IF: CPT

## 2018-12-21 PROCEDURE — 99291 CRITICAL CARE FIRST HOUR: CPT | Mod: 25 | Performed by: INTERNAL MEDICINE

## 2018-12-21 PROCEDURE — 31645 BRNCHSC W/THER ASPIR 1ST: CPT | Performed by: INTERNAL MEDICINE

## 2018-12-21 PROCEDURE — 51798 US URINE CAPACITY MEASURE: CPT

## 2018-12-21 PROCEDURE — 5A1955Z RESPIRATORY VENTILATION, GREATER THAN 96 CONSECUTIVE HOURS: ICD-10-PCS | Performed by: INTERNAL MEDICINE

## 2018-12-21 PROCEDURE — 99292 CRITICAL CARE ADDL 30 MIN: CPT | Mod: 25 | Performed by: INTERNAL MEDICINE

## 2018-12-21 PROCEDURE — 31500 INSERT EMERGENCY AIRWAY: CPT

## 2018-12-21 PROCEDURE — 99233 SBSQ HOSP IP/OBS HIGH 50: CPT | Performed by: INTERNAL MEDICINE

## 2018-12-21 PROCEDURE — 0B9F8ZX DRAINAGE OF RIGHT LOWER LUNG LOBE, VIA NATURAL OR ARTIFICIAL OPENING ENDOSCOPIC, DIAGNOSTIC: ICD-10-PCS | Performed by: INTERNAL MEDICINE

## 2018-12-21 PROCEDURE — 31624 DX BRONCHOSCOPE/LAVAGE: CPT | Performed by: INTERNAL MEDICINE

## 2018-12-21 PROCEDURE — 84100 ASSAY OF PHOSPHORUS: CPT

## 2018-12-21 PROCEDURE — 85025 COMPLETE CBC W/AUTO DIFF WBC: CPT

## 2018-12-21 PROCEDURE — 94003 VENT MGMT INPAT SUBQ DAY: CPT

## 2018-12-21 PROCEDURE — 93005 ELECTROCARDIOGRAM TRACING: CPT | Performed by: HOSPITALIST

## 2018-12-21 PROCEDURE — 87116 MYCOBACTERIA CULTURE: CPT

## 2018-12-21 PROCEDURE — 0BH18EZ INSERTION OF ENDOTRACHEAL AIRWAY INTO TRACHEA, VIA NATURAL OR ARTIFICIAL OPENING ENDOSCOPIC: ICD-10-PCS | Performed by: INTERNAL MEDICINE

## 2018-12-21 PROCEDURE — 99233 SBSQ HOSP IP/OBS HIGH 50: CPT | Performed by: HOSPITALIST

## 2018-12-21 PROCEDURE — 85610 PROTHROMBIN TIME: CPT

## 2018-12-21 PROCEDURE — 37799 UNLISTED PX VASCULAR SURGERY: CPT

## 2018-12-21 PROCEDURE — 87077 CULTURE AEROBIC IDENTIFY: CPT

## 2018-12-21 PROCEDURE — 82962 GLUCOSE BLOOD TEST: CPT | Mod: 91

## 2018-12-21 PROCEDURE — 80053 COMPREHEN METABOLIC PANEL: CPT

## 2018-12-21 PROCEDURE — 85730 THROMBOPLASTIN TIME PARTIAL: CPT

## 2018-12-21 PROCEDURE — 87070 CULTURE OTHR SPECIMN AEROBIC: CPT

## 2018-12-21 RX ORDER — SODIUM CHLORIDE 9 MG/ML
INJECTION, SOLUTION INTRAVENOUS CONTINUOUS
Status: CANCELLED | OUTPATIENT
Start: 2018-12-27

## 2018-12-21 RX ORDER — FAMOTIDINE 20 MG/1
20 TABLET, FILM COATED ORAL EVERY 12 HOURS
Status: DISCONTINUED | OUTPATIENT
Start: 2018-12-22 | End: 2018-12-25

## 2018-12-21 RX ORDER — ASPIRIN 81 MG/1
324 TABLET, CHEWABLE ORAL DAILY
Status: DISCONTINUED | OUTPATIENT
Start: 2018-12-22 | End: 2019-01-04

## 2018-12-21 RX ORDER — BISACODYL 10 MG
10 SUPPOSITORY, RECTAL RECTAL
Status: DISCONTINUED | OUTPATIENT
Start: 2018-12-21 | End: 2018-12-21

## 2018-12-21 RX ORDER — PROPOFOL 10 MG/ML
200 INJECTION, EMULSION INTRAVENOUS ONCE
Status: COMPLETED | OUTPATIENT
Start: 2018-12-21 | End: 2018-12-21

## 2018-12-21 RX ORDER — FUROSEMIDE 10 MG/ML
20 INJECTION INTRAMUSCULAR; INTRAVENOUS EVERY 12 HOURS
Status: DISCONTINUED | OUTPATIENT
Start: 2018-12-22 | End: 2018-12-22

## 2018-12-21 RX ORDER — POLYETHYLENE GLYCOL 3350 17 G/17G
1 POWDER, FOR SOLUTION ORAL
Status: DISCONTINUED | OUTPATIENT
Start: 2018-12-21 | End: 2018-12-21

## 2018-12-21 RX ORDER — BISACODYL 10 MG
10 SUPPOSITORY, RECTAL RECTAL
Status: DISCONTINUED | OUTPATIENT
Start: 2018-12-21 | End: 2019-01-10 | Stop reason: HOSPADM

## 2018-12-21 RX ORDER — HEPARIN SODIUM 5000 [USP'U]/ML
5000 INJECTION, SOLUTION INTRAVENOUS; SUBCUTANEOUS EVERY 8 HOURS
Status: DISCONTINUED | OUTPATIENT
Start: 2018-12-21 | End: 2018-12-22

## 2018-12-21 RX ORDER — PHENYLEPHRINE HCL IN 0.9% NACL 0.5 MG/5ML
SYRINGE (ML) INTRAVENOUS
Status: DISCONTINUED
Start: 2018-12-21 | End: 2018-12-22

## 2018-12-21 RX ORDER — ROCURONIUM BROMIDE 10 MG/ML
50 INJECTION, SOLUTION INTRAVENOUS ONCE
Status: COMPLETED | OUTPATIENT
Start: 2018-12-21 | End: 2018-12-21

## 2018-12-21 RX ORDER — ASPIRIN 300 MG/1
300 SUPPOSITORY RECTAL DAILY
Status: DISCONTINUED | OUTPATIENT
Start: 2018-12-22 | End: 2019-01-04

## 2018-12-21 RX ORDER — POLYETHYLENE GLYCOL 3350 17 G/17G
1 POWDER, FOR SOLUTION ORAL
Status: DISCONTINUED | OUTPATIENT
Start: 2018-12-21 | End: 2019-01-10 | Stop reason: HOSPADM

## 2018-12-21 RX ORDER — AMOXICILLIN 250 MG
2 CAPSULE ORAL 2 TIMES DAILY
Status: DISCONTINUED | OUTPATIENT
Start: 2018-12-21 | End: 2018-12-21

## 2018-12-21 RX ORDER — AMOXICILLIN 250 MG
2 CAPSULE ORAL 2 TIMES DAILY
Status: DISCONTINUED | OUTPATIENT
Start: 2018-12-22 | End: 2019-01-10 | Stop reason: HOSPADM

## 2018-12-21 RX ORDER — ATORVASTATIN CALCIUM 80 MG/1
80 TABLET, FILM COATED ORAL EVERY EVENING
Status: DISCONTINUED | OUTPATIENT
Start: 2018-12-22 | End: 2019-01-10 | Stop reason: HOSPADM

## 2018-12-21 RX ORDER — ASPIRIN 325 MG
325 TABLET ORAL DAILY
Status: DISCONTINUED | OUTPATIENT
Start: 2018-12-22 | End: 2019-01-04

## 2018-12-21 RX ORDER — FAMOTIDINE 20 MG/1
20 TABLET, FILM COATED ORAL EVERY 12 HOURS
Status: DISCONTINUED | OUTPATIENT
Start: 2018-12-21 | End: 2018-12-21

## 2018-12-21 RX ADMIN — FENTANYL CITRATE 200 MCG: 50 INJECTION, SOLUTION INTRAMUSCULAR; INTRAVENOUS at 17:20

## 2018-12-21 RX ADMIN — PROPOFOL 40 MCG/KG/MIN: 10 INJECTION, EMULSION INTRAVENOUS at 03:20

## 2018-12-21 RX ADMIN — HEPARIN SODIUM 5000 UNITS: 5000 INJECTION, SOLUTION INTRAVENOUS; SUBCUTANEOUS at 21:52

## 2018-12-21 RX ADMIN — FENTANYL CITRATE 100 MCG: 50 INJECTION, SOLUTION INTRAMUSCULAR; INTRAVENOUS at 21:19

## 2018-12-21 RX ADMIN — FENTANYL CITRATE 200 MCG: 50 INJECTION, SOLUTION INTRAMUSCULAR; INTRAVENOUS at 22:58

## 2018-12-21 RX ADMIN — FAMOTIDINE 20 MG: 10 INJECTION INTRAVENOUS at 18:29

## 2018-12-21 RX ADMIN — FENTANYL CITRATE 200 MCG: 50 INJECTION, SOLUTION INTRAMUSCULAR; INTRAVENOUS at 21:59

## 2018-12-21 RX ADMIN — ROCURONIUM BROMIDE 50 MG: 10 INJECTION, SOLUTION INTRAVENOUS at 16:29

## 2018-12-21 RX ADMIN — FENTANYL CITRATE 200 MCG: 50 INJECTION, SOLUTION INTRAMUSCULAR; INTRAVENOUS at 00:00

## 2018-12-21 RX ADMIN — Medication 400 MCG/HR: at 01:31

## 2018-12-21 RX ADMIN — PROPOFOL 200 MG: 10 INJECTION, EMULSION INTRAVENOUS at 16:26

## 2018-12-21 RX ADMIN — ASPIRIN 325 MG: 325 TABLET, COATED ORAL at 04:56

## 2018-12-21 RX ADMIN — HEPARIN SODIUM 5000 UNITS: 5000 INJECTION, SOLUTION INTRAVENOUS; SUBCUTANEOUS at 04:56

## 2018-12-21 RX ADMIN — FUROSEMIDE 20 MG: 10 INJECTION, SOLUTION INTRAMUSCULAR; INTRAVENOUS at 23:36

## 2018-12-21 RX ADMIN — MINERAL OIL AND WHITE PETROLATUM 1 APPLICATION: 150; 830 OINTMENT OPHTHALMIC at 04:56

## 2018-12-21 RX ADMIN — Medication 100 MCG/HR: at 16:32

## 2018-12-21 RX ADMIN — FENTANYL CITRATE 200 MCG: 50 INJECTION, SOLUTION INTRAMUSCULAR; INTRAVENOUS at 16:30

## 2018-12-21 RX ADMIN — FAMOTIDINE 20 MG: 20 TABLET ORAL at 04:56

## 2018-12-21 RX ADMIN — MINERAL OIL AND WHITE PETROLATUM 1 APPLICATION: 150; 830 OINTMENT OPHTHALMIC at 22:00

## 2018-12-21 RX ADMIN — PROPOFOL 5 MCG/KG/MIN: 10 INJECTION, EMULSION INTRAVENOUS at 23:32

## 2018-12-21 RX ADMIN — HEPARIN SODIUM 5000 UNITS: 5000 INJECTION, SOLUTION INTRAVENOUS; SUBCUTANEOUS at 15:24

## 2018-12-21 ASSESSMENT — PAIN SCALES - GENERAL
PAINLEVEL_OUTOF10: 0

## 2018-12-21 ASSESSMENT — PULMONARY FUNCTION TESTS: FVC: 1.1

## 2018-12-21 NOTE — PROGRESS NOTES
Critical Care Progress Note    Date of admission  12/19/2018    Chief Complaint  58 y.o. male admitted 12/19/2018 s/p cardiopulmonary arrest with prolonged CPR in the field  Hospital Course  TTM started in ED and continued in ICU    Interval Problem Update  Last 24 hours  Rewarmed  Followed commands  Passed SBT   Trial of extubation  Failed secondary to inability to manage secretions  Reintubated  Bronched for airway clearance  Desats during night leading to adding propofol, paralysis  Propofol lead to hypotension, bradycardia  Norepi added  This am propofol stopped with improved heart rate, off pressors, improved oxygenation  On Fentanyl 750 mics/hour  Follows commands  Sleepy (Rass minus 2)    Prior 24 hours  Mechan vent ARDS net settings  Levophed  Target temp management  Replete lytes  Serial neuro exams  Urine OK:      Review of Systems  Review of Systems   Unable to perform ROS: Intubated        Vital Signs for last 24 hours   Temp:  [36.3 °C (97.3 °F)-36.5 °C (97.7 °F)] 36.5 °C (97.7 °F)  Pulse:  [] 59  Resp:  [13-31] 30    Hemodynamic parameters for last 24 hours  CVP:  [7 MM HG-15 MM HG] 7 MM HG    Respiratory  Brooks Vent Mode: APVCMV  Rate (breaths/min): 30  Vt Target (mL): 350  PEEP/CPAP: 8  FiO2: 50  P MEAN: 17  Control VTE (exp VT): 398    Physical Exam   Physical Exam   Constitutional: He appears well-developed and well-nourished.   HENT:   Head: Normocephalic and atraumatic.   Eyes: Pupils are equal, round, and reactive to light. EOM are normal.   Sluggish but on IV narcotics   Neck: Normal range of motion.   Cardiovascular: Normal rate and regular rhythm.    Pulmonary/Chest: Effort normal and breath sounds normal.   ON mechanical vent   Abdominal: Soft. Bowel sounds are normal.   Musculoskeletal: Normal range of motion.   Neurological:   Sedated on fentanyl   Skin: Skin is warm and dry.   TTM tempt 35.5       Medications  Current Facility-Administered Medications   Medication Dose Route  Frequency Provider Last Rate Last Dose   • heparin injection 3,200 Units  3,200 Units Intravenous PRN Ilene ANTONIO Moreno        And   • heparin infusion 25,000 units in 500 ml 0.45% nacl   Intravenous Continuous Ilene ANTONIO Moreno 24 mL/hr at 12/22/18 1142 1,200 Units/hr at 12/22/18 1142   • Respiratory Care per Protocol   Nebulization Continuous RT Bradley Flores M.D.       • MD Alert...ICU Electrolyte Replacement per Pharmacy   Other pharmacy to dose Bradley Flores M.D.       • Pharmacy Consult: Enteral tube feeding - review meds/change route/product selection   Other PRN Bradley Flores M.D.       • aspirin (ASA) tablet 325 mg  325 mg Feeding Tube DAILY Yaron Magallanes M.D.   325 mg at 12/22/18 0522    Or   • aspirin (ASA) chewable tab 324 mg  324 mg Per NG Tube DAILY Yaron Magallanes M.D.        Or   • aspirin (ASA) suppository 300 mg  300 mg Rectal DAILY Yaron Magallanes M.D.       • atorvastatin (LIPITOR) tablet 80 mg  80 mg Per NG Tube Q EVENING Yaron Magallanes M.D.       • famotidine (PEPCID) tablet 20 mg  20 mg Feeding Tube Q12HRS Yaron Magallanes M.D.   20 mg at 12/22/18 0522    Or   • famotidine (PEPCID) injection 20 mg  20 mg Intravenous Q12HRS Yaron Magallanes M.D.       • senna-docusate (PERICOLACE or SENOKOT S) 8.6-50 MG per tablet 2 Tab  2 Tab Feeding Tube BID Yaron Magallanes M.D.   2 Tab at 12/22/18 0522    And   • polyethylene glycol/lytes (MIRALAX) PACKET 1 Packet  1 Packet Feeding Tube QDAY PRN Yaron Magallanes M.D.        And   • magnesium hydroxide (MILK OF MAGNESIA) suspension 30 mL  30 mL Feeding Tube QDAY PRN Yaron Magallanes M.D.        And   • bisacodyl (DULCOLAX) suppository 10 mg  10 mg Rectal QDAY PRN Yaron Magallanes M.D.       • ipratropium-albuterol (DUONEB) nebulizer solution  3 mL Nebulization Q2HRS PRN (RT) Jeremy M Gonda, M.D.       • dextrose 50% (D50W) injection 25-50 mL  12.5-25 g Intravenous PRN Jeremy M Gonda, M.D.       • fentanyl 50 mcg/mL infusion   Intravenous Continuous Bradley Flores M.D. 14  mL/hr at 12/22/18 0903 700 mcg at 12/22/18 1109   • fentaNYL (SUBLIMAZE) injection 50 mcg  50 mcg Intravenous Q30 MIN PRN Bradley Flores M.D.        Or   • fentaNYL (SUBLIMAZE) injection 100 mcg  100 mcg Intravenous Q30 MIN PRN Bradley Flores M.D.   Stopped at 12/21/18 2257    Or   • fentaNYL (SUBLIMAZE) injection 200 mcg  200 mcg Intravenous Q30 MIN PRN Bradley Flores M.D.   200 mcg at 12/22/18 0639   • propofol (DIPRIVAN) injection  0-80 mcg/kg/min Intravenous Continuous Bradley Flores M.D.   Stopped at 12/22/18 0550   • artificial tear ointment (REFRESH,LACRI-LUBE) 1 Application  1 Application Both Eyes Q8HRS Yevgeniy Hawkins M.D.   1 Application at 12/22/18 0600   • norepinephrine (LEVOPHED) 8 mg in  mL Infusion  0-30 mcg/min Intravenous Continuous Yevgeniy Hawkins M.D. 1.9 mL/hr at 12/22/18 1217 1 mcg/min at 12/22/18 1217       Fluids    Intake/Output Summary (Last 24 hours) at 12/22/18 1302  Last data filed at 12/22/18 1200   Gross per 24 hour   Intake            842.2 ml   Output             1675 ml   Net           -832.8 ml       Laboratory  Recent Labs      12/21/18   1840  12/22/18   0406  12/22/18   0838   ISTATAPH  7.238*  7.245*  7.394*   ISTATAPCO2  50.1*  52.0*  35.3   ISTATAPO2  92*  147*  106*   ISTATATCO2  23  24  23   KIEJAOL1TDU  95  99  98   ISTATARTHCO3  21.3  22.5  21.6   ISTATARTBE  -6*  -5*  -3   ISTATTEMP  36.7 C  36.5 C  36.5 C   ISTATFIO2  80  60  50   ISTATSPEC  Arterial  Arterial  Arterial   ISTATAPHTC  7.242*  7.252*  7.401   ZNTNEKRH1NX  90*  144*  103*     Recent Labs      12/19/18   2347  12/20/18   0515   TROPONINI  0.33*  6.83*   BNPBTYPENAT  186*   --      Recent Labs      12/19/18   2347   12/20/18   2300  12/21/18   0440  12/22/18   0500   SODIUM  138   < >  137  137  140   POTASSIUM  3.1*   < >  3.9  4.1  4.5   CHLORIDE  104   < >  114*  113*  112   CO2  15*   < >  15*  17*  21   BUN  15   < >  21  19  24*   CREATININE  0.99   < >  0.64  0.68  0.70    MAGNESIUM  3.0*   < >  3.9*  3.4*  2.3   PHOSPHORUS  10.2*   --    --   2.9  4.3   CALCIUM  9.2   < >  8.0*  7.9*  9.8    < > = values in this interval not displayed.     Recent Labs      12/19/18 2347 12/20/18 1700 12/20/18 2300 12/21/18 0440  12/22/18   0500   ALTSGPT  348*   --   281*   --   225*   --    ASTSGOT  277*   --   204*   --   121*   --    ALKPHOSPHAT  84   --   57   --   52   --    TBILIRUBIN  0.3   --   0.6   --   0.6   --    DBILIRUBIN   --    --   0.2   --    --    --    GLUCOSE  356*   < >  116*  135*  128*  124*    < > = values in this interval not displayed.     Recent Labs      12/19/18 2347 12/20/18 1700 12/20/18 2300 12/21/18   0440   WBC  12.8*   < >  15.8*  13.6*  11.6*   NEUTSPOLYS   --    --    --    --   84.80*   LYMPHOCYTES   --    --    --    --   9.90*   MONOCYTES   --    --    --    --   4.20   EOSINOPHILS   --    --    --    --   0.50   BASOPHILS   --    --    --    --   0.30   ASTSGOT  277*   --   204*   --   121*   ALTSGPT  348*   --   281*   --   225*   ALKPHOSPHAT  84   --   57   --   52   TBILIRUBIN  0.3   --   0.6   --   0.6    < > = values in this interval not displayed.     Recent Labs      12/20/18 1700 12/20/18 2300 12/21/18 0440 12/22/18   1126   RBC  3.62*  3.35*  3.37*   --    HEMOGLOBIN  10.1*  9.6*  9.4*   --    HEMATOCRIT  32.4*  29.4*  30.0*   --    PLATELETCT  336  293  291   --    PROTHROMBTM  14.8*  15.8*  15.7*   --    APTT  29.7  28.6  29.2  35.8   INR  1.15*  1.25*  1.24*   --        Imaging  X-Ray:  I have personally reviewed the images and compared with prior images.    Assessment/Plan  Ventricular fibrillation (HCC)- (present on admission)   Assessment & Plan    Primary arrhythmia per EMS status post defibrillation  Continuous telemetry with initiation of amiodarone should it recur  Optimize electrolytes  Coronary evaluation pending    Because of potential for bad brain (no bystander CPR, long duration CPR by medics, poor  neuro status on presentation) will defer on invasive cardiac w/u for now    Cardiology brought pt to cath lab but decided to defer cath for now    No arrhythmia noted during ICU stay. Cardiology notes will re-evaluate for cath several days post-extubation     Acute respiratory failure with hypoxia (HCC)- (present on admission)   Assessment & Plan    Intubated in field 12/19  Continue full mechanical ventilatory support, increased respiratory rate to 22  Extubated but reintubated for pulmonary toilet    Will use fentanyl alone as needed       Cardiac arrest (HCC)- (present on admission)   Assessment & Plan    Unknown etiology status post return of spontaneous circulation with prolonged downtime  Supportive care  Stat echocardiography  Cardiology consultation  Holding off on PCI evaluation in the setting of anoxic brain injury and need for ongoing resuscitation      Weakly follows commands today with all four despite high dose fentanyl infusion  Will actively decrease fentanyl inf     Lactic acidosis- (present on admission)   Assessment & Plan    Secondary to shock/cardiac arrest  Trend with resuscitation attempts    Clinically resoloved     Coagulopathy (HCC)- (present on admission)   Assessment & Plan    Secondary to cardiac arrest, possible DIC  Monitor platelet count, check fibrinogen, monitor for bleeding    No evidence bleeding- monitor     Hypokalemia   Assessment & Plan    Continue to track and replete     Metabolic acidosis- (present on admission)   Assessment & Plan    Severe, partially compensated  Increase mandatory minute ventilation to help compensate  Fluid resuscitation    Improved with fluids, pressors   Trend    Resolved     Elevated liver enzymes   Assessment & Plan    Likely secondary to shock liver     Hyperphosphatemia- (present on admission)   Assessment & Plan    Monitor closely with resuscitation attempts     Anemia- (present on admission)   Assessment & Plan    Monitor daily CBC  Conservative  transfusion strategy     Leukocytosis- (present on admission)   Assessment & Plan    Stress induced, hold off on antibiotics for now as no preceding infectious symptoms  Doubt acute infection- inflammation from poor perfusion, poor heart function and multi-organ dysfunction s/p prolonged arrest without bystander CPR    Infection still seems unlikely     Closed fracture of multiple ribs of both sides- (present on admission)   Assessment & Plan    Secondary to CPR  Analgesia as needed  Positive pressure ventilation          VTE:  Heparin  Ulcer: PPI  Lines: Central Line  Ongoing indication addressed    I have performed a physical exam and reviewed and updated ROS and Plan today (12/22/2018). In review of yesterday's note (12/21/2018), there are no changes except as documented above.     Discussed patient condition and risk of morbidity and/or mortality with Hospitalist, RN, RT, Pharmacy and Charge nurse / hot rounds  The patient remains critically ill.  Critical care time = 100 minutes in directly providing and coordinating critical care and extensive data review.  No time overlap and excludes procedures.

## 2018-12-21 NOTE — CARE PLAN
Problem: Safety  Goal: Will remain free from falls  Outcome: PROGRESSING AS EXPECTED  Treaded socks on, bed in lowest position, personal belongings within reach, patient educated to use call light, and lower bed rails up.     Problem: Bowel/Gastric:  Goal: Normal bowel function is maintained or improved  Outcome: PROGRESSING SLOWER THAN EXPECTED

## 2018-12-21 NOTE — CARE PLAN
Problem: Ventilation Defect:  Goal: Ability to achieve and maintain unassisted ventilation or tolerate decreased levels of ventilator support    Intervention: Support and monitor invasive and noninvasive mechanical ventilation  Adult Ventilation Update    Total Vent Days: 2  Vent: CMV 28/350/+8/40%.    Patient Lines/Drains/Airways Status    Active Airway     Name:7.0 @  23 Placement date: Placement time: Site: Days:2    Airway ETT Oral 7.0 12/19/18      Oral   2                Barriers to SBT: Hypothermia protocol    Mobility  Activity Performed: Unable to mobilize (12/20/18 1600)  Reason Not Mobilized: Unstable condition (12/20/18 1600)  Mobilization Comments: Hypothermia protocol (12/20/18 1600)    Events/Summary/Plan: Titrate O2 to 21%. (12/20/18 3169).

## 2018-12-21 NOTE — PROGRESS NOTES
Cardiology Follow Up Progress Note    Date of Service  12/21/2018    Attending Physician  Yaron Magallanes M.D.    Chief Complaint   Cardiac arrest    HPI  Amadou Nicholas is a 58 y.o. male admitted 12/19/2018 post cardiac arrest.    Interim Events  Still intubated but awake. Following commands. Moving around in bed.     Review of Systems  Review of Systems   Unable to perform ROS: Intubated       Vital signs in last 24 hours  Temp:  [34.1 °C (93.4 °F)-36.4 °C (97.5 °F)] 36.4 °C (97.5 °F)  Pulse:  [42-90] 90  Resp:  [7-29] 21    Physical Exam  Physical Exam   Constitutional: No distress.   Intubated.  Moving around in bed.  Following commands   HENT:   Head: Normocephalic and atraumatic.   Neck: Neck supple.   Cardiovascular: Normal rate and regular rhythm.  Exam reveals no gallop and no friction rub.    No murmur heard.  Pulmonary/Chest: He has no wheezes. He has no rales.   Abdominal: Soft. He exhibits no distension.   Musculoskeletal: He exhibits no edema.   Neurological:   intubated   Skin: Skin is warm. He is not diaphoretic.   Psychiatric:   intubated   Nursing note and vitals reviewed.    Lab Review  Lab Results   Component Value Date/Time    WBC 11.6 (H) 12/21/2018 04:40 AM    RBC 3.37 (L) 12/21/2018 04:40 AM    HEMOGLOBIN 9.4 (L) 12/21/2018 04:40 AM    HEMATOCRIT 30.0 (L) 12/21/2018 04:40 AM    MCV 89.0 12/21/2018 04:40 AM    MCH 27.9 12/21/2018 04:40 AM    MCHC 31.3 (L) 12/21/2018 04:40 AM    MPV 10.1 12/21/2018 04:40 AM      Lab Results   Component Value Date/Time    SODIUM 137 12/21/2018 04:40 AM    POTASSIUM 4.1 12/21/2018 04:40 AM    CHLORIDE 113 (H) 12/21/2018 04:40 AM    CO2 17 (L) 12/21/2018 04:40 AM    GLUCOSE 128 (H) 12/21/2018 04:40 AM    BUN 19 12/21/2018 04:40 AM    CREATININE 0.68 12/21/2018 04:40 AM      Lab Results   Component Value Date/Time    ASTSGOT 121 (H) 12/21/2018 04:40 AM    ALTSGPT 225 (H) 12/21/2018 04:40 AM     Lab Results   Component Value Date/Time    TROPONINI 6.83 (H) 12/20/2018  05:15 AM       Recent Labs      12/19/18   2347   BNPBTYPENAT  186*     Labs reviewed. Stable creatinine.     Cardiac Imaging and Procedures Review  Telemetry reviewed and shows sinus rhythm. Frequent PVCs noted.     Echocardiogram performed yesterday was personally reviewed and per my interpretation shows systolic function with ejection fraction of about 45-50%.    Assessment/Plan    Status post cardiac arrest:  Elevated troponin:  Mild cardiomyopathy:    Patient is alert and following commands.  He will be extubated later today.  We will plan for coronary angiogram later today after extubation.  His elevated troponin could be a primary event versus secondary to his cardiac arrest.  We will follow-up on coronary angiogram results.  For now he is on aspirin and statin.  Ideally he needs a heparin drip however since the patient will be going for a coronary angiogram later today we will hold off for now.  He does not have much blood pressure room for addition of other cardioprotective medications.    Will follow.     Ilene Moreno M.D.   Cardiologist, [facility]  [phone]

## 2018-12-21 NOTE — CONSULTS
Reason for PC Consult: Advance Care Planning    Consulted by: Jeremy M Gonda, M.D.    Assessment:  General: 58 y.o. male admitted 12/19/2018 s/p cardiopulmonary arrest with prolonged CPR in the field. Pt emergently intubated. PMH: depression and htn.     Dyspnea: Yes-  (30% Ventilator)  Last BM: 12/21/18-    Pain: No-  (Per RN Assessment)  Depression: Unable to determine-    Dementia: Unable to Determine;       Spiritual:  Is Adventist or spirituality important for coping with this illness? Unable to determine  Has a  or spiritual provider visit been requested? No    Palliative Performance Scale: 20%    Advance Directive: None-   DPOA: No- NOK, spouse, Marianne Nicholas [phone].    POLST: None    Code Status: Full-      Outcome:  Pt is vented and disoriented to event and time.     PC RN left detailed vm message for pt's spouse, Marianne ([phone]) re: setting up a PC RN consult meeting time. No return call as of yet.     Updated: BS RN, Palliative     Plan: TBD     Recommendations: I do not recommend an ethics or hospice consult at this time because pt is actively pursuing aggressive treatment. PC RN to meet with pt's family to establish pt GOC..    Thank you for allowing Palliative Care to participate in this patient's care. Please feel free to call [phone] with any questions or concerns.

## 2018-12-21 NOTE — RESPIRATORY CARE
Extubation    Cuff leak noted Yes  Stridor present No     FiO2%: 30 % (12/21/18 1000)        Patient toleration Yes    RCP Complete? Yes  Events/Summary/Plan: Extubation (MD aware of weaning parameters.) (12/21/18 1035)

## 2018-12-21 NOTE — PROGRESS NOTES
12 Hour Chart Check    MS: .20/.10/.48  Sinus Julian 30s to 50s. Occasional PVCs.    2 RN Skin Check

## 2018-12-21 NOTE — PROGRESS NOTES
Monitor Summary:  Rhythm-\SB\SR\  HR-\40-50  Ectopy-  Measurements-.20/.08/.58  12-Hour chart check  Patient turned every two hours, skin assessed at beginning of shift, heels floated and lines, cords and drains are free from the skin.

## 2018-12-21 NOTE — PROGRESS NOTES
Med rec complete per pt family with RX bottles at bedside  Verified RX bottles and returned to family at bedside  Allergies have been verified  No oral ABX within the last 30 days

## 2018-12-21 NOTE — PROGRESS NOTES
Hospital Medicine Daily Progress Note    Date of Service  12/21/2018    Chief Complaint  Out of hospital cardiac arrest at home with [facility]    58 y.o. male with a history of hypertension and depression admitted 12/19/2018 with witness cardiac arrest for which the son started CPR until EMS arrived.  He was found to be in ventricular fibrillation for which he was shocked 3 times and loaded with amiodarone and converted to torsades for which he was shocked again.  He had multiple rounds of CPR and epinephrine.  He went into pulseless electrical activity and then eventually had spontaneous return of circulation.      Interval Problem Update  On vent day #3  Rewarmed and follows  Moves all ext  Off levophed earlier this am.  NPO  UOP;535cc overnight  Met with the patient's wife and gave updates    Update: Extubated this a.m. after I had evaluated.  Requiring oxygen mask.  Later in the day he had difficulty clearing his secretions and increased respiratory distress and was reintubated by Dr. Flores.      Consultants/Specialty  Cardiology  Critical Care    Code Status  FULL    Disposition  To remain in ICU    Review of Systems  Review of Systems   Unable to perform ROS: Intubated        Physical Exam  Temp:  [34.5 °C (94.1 °F)-36.4 °C (97.5 °F)] 36.3 °C (97.3 °F)  Pulse:  [] 98  Resp:  [7-30] 24    Physical Exam   Constitutional: No distress. He is sedated, intubated and restrained.   Eyes: Pupils are equal, round, and reactive to light. Conjunctivae are normal. Right eye exhibits no discharge. Left eye exhibits no discharge.   Neck: No tracheal deviation present.   Cardiovascular: Regular rhythm.  Tachycardia present.    No murmur heard.  Pulses:       Radial pulses are 2+ on the right side, and 2+ on the left side.        Dorsalis pedis pulses are 2+ on the right side, and 2+ on the left side.   Pulmonary/Chest: Effort normal. He is intubated. No respiratory distress. He has no wheezes. He has  no rales.   Abdominal: Soft. He exhibits no distension. There is no tenderness.   Musculoskeletal: He exhibits no edema.   Lymphadenopathy:     He has no cervical adenopathy.   Neurological: No cranial nerve deficit. Coordination normal.   Skin: Skin is warm. He is not diaphoretic.   Psychiatric: He has a normal mood and affect.       Fluids    Intake/Output Summary (Last 24 hours) at 12/21/18 1924  Last data filed at 12/21/18 1800   Gross per 24 hour   Intake          1208.09 ml   Output             1235 ml   Net           -26.91 ml       Laboratory  Recent Labs      12/20/18   1700  12/20/18   2300  12/21/18   0440   WBC  15.8*  13.6*  11.6*   RBC  3.62*  3.35*  3.37*   HEMOGLOBIN  10.1*  9.6*  9.4*   HEMATOCRIT  32.4*  29.4*  30.0*   MCV  89.5  87.8  89.0   MCH  27.9  28.7  27.9   MCHC  31.2*  32.7*  31.3*   RDW  48.9  48.5  50.1*   PLATELETCT  336  293  291   MPV  9.8  9.6  10.1     Recent Labs      12/20/18   1700  12/20/18   2300  12/21/18   0440   SODIUM  138  137  137   POTASSIUM  3.8  3.9  4.1   CHLORIDE  114*  114*  113*   CO2  15*  15*  17*   GLUCOSE  116*  135*  128*   BUN  23*  21  19   CREATININE  0.69  0.64  0.68   CALCIUM  8.2*  8.0*  7.9*     Recent Labs      12/20/18   1700  12/20/18   2300  12/21/18   0440   APTT  29.7  28.6  29.2   INR  1.15*  1.25*  1.24*     Recent Labs      12/19/18   2347   BNPBTYPENAT  186*           Imaging  DX-CHEST-PORTABLE (1 VIEW)   Final Result      1.  Stable cardiomegaly      2.  Worsening pulmonary vascular congestion and interstitial edema.      3.  Bibasilar opacities may be related to atelectasis. Developing pneumonia could have a similar appearance.      DX-CHEST-PORTABLE (1 VIEW)   Final Result         1.  Mild pulmonary edema and/or infiltrates.   2.  Right internal jugular central line terminates in the right atrium, could be withdrawn 3 cm.   3.  Cardiomegaly         EC-ECHOCARDIOGRAM COMPLETE W/O CONT   Final Result      DX-CHEST-LIMITED (1 VIEW)   Final  Result         1.  No acute cardiopulmonary disease.   2.  Right internal jugular central line terminates in the right atrium, could be withdrawn 3 cm.   3.  Cardiomegaly      CT-CTA CHEST PULMONARY ARTERY W/ RECONS   Final Result         1.  No large central pulmonary embolus is appreciated, evaluation of the subsegmental branches is essentially nondiagnostic due to motion artifacts. Additional imaging would be required for definitive exclusion of small distal pulmonary emboli.   2.  Hazy groundglass pulmonary opacities, predominantly on the right, appearance suggests atypical edema or infiltrates.   3.  Anterior bilateral second through sixth rib fractures   4.  Cardiomegaly   5.  Left nephrolithiasis   6.  Atherosclerosis and atherosclerotic coronary artery disease.      CT-HEAD W/O   Final Result         1.  No acute intracranial abnormality.   2.  Atherosclerosis.      DX-CHEST-PORTABLE (1 VIEW)   Final Result         1.  No acute cardiopulmonary disease.   2.  Cardiomegaly      DX-ABDOMEN FOR TUBE PLACEMENT    (Results Pending)        Assessment/Plan  Ventricular fibrillation (HCC)- (present on admission)   Assessment & Plan    Status post cardioversion  Cardiology consulting  Monitor on telemetry  Correct electrolyte abnormalities     Acute respiratory failure with hypoxia (HCC)- (present on admission)   Assessment & Plan    On ventilator  Minimize sedation as able  Monitor ABG, chest x-ray, labs, strict I's and O's, and vitals  Extubated and reintubated 12/21  Critical care consulting     Cardiac arrest (HCC)- (present on admission)   Assessment & Plan    CTA negative for PE  Cardiology consulting  I have discussed with cardiologist 12/21 consideration of cardiac catheterization as patient appears to be waking up and following commands and unclear etiology of cardiac arrest.  I discussed with Dr. Moreno and patient was originally placed on Cath Lab schedule however due to reintubation this is been  postponed.  Monitor on telemetry  Aspirin, atorvastatin  pressor support as needed with Levophed to keep map greater than 65 and systolic blood pressure greater than 90  Echocardiogram showing EF 45%  Evaluate neuro status for any signs of anoxic brain injury.  On 12/21 patient appear to be following commands and appropriate.       Lactic acidosis- (present on admission)   Assessment & Plan    12/19 lactic acid: 8.9  Downtrending with 12/20 lactic acid: 2.6     Hypokalemia   Assessment & Plan    Repleted and monitoring     Metabolic acidosis- (present on admission)   Assessment & Plan    IV fluid hydration with normal saline  Monitor BMP     Elevated liver enzymes   Assessment & Plan    Question of hypotensive event/cardiac arrest prior to admission  Follow-up on hepatic function panel     Anemia- (present on admission)   Assessment & Plan    Normocytic normochromic  Monitor CBC  No obvious sign of bleeding     Leukocytosis- (present on admission)   Assessment & Plan    12/19 WBC: 12.8  12/20 WBC: 14.1  12/21 WBC: 11.6  Watch for signs of infection     Closed fracture of multiple ribs of both sides- (present on admission)   Assessment & Plan    Secondary to CPR  Pain management          VTE prophylaxis: heparin

## 2018-12-21 NOTE — DISCHARGE PLANNING
LSW spoke with bedside regarding request for current admission letter to provide Pt's work, LSW provided letter and CONI to be filled out. LSW tubed letter to [phone] per request.

## 2018-12-21 NOTE — CARE PLAN
Problem: Ventilation Defect:  Goal: Ability to achieve and maintain unassisted ventilation or tolerate decreased levels of ventilator support    Intervention: Support and monitor invasive and noninvasive mechanical ventilation  Adult Ventilation Update    Total Vent Days: 3    Patient Lines/Drains/Airways Status    Active Airway     Name: Placement date: Placement time: Site: Days:    Airway ETT Oral 7.0 @ 23 12/19/18      Oral   3              VD#3  APVCMV  28  350  +8  30%    12/21   ABG drawn      7.40  23  87-P02  14     Will continue to monitor

## 2018-12-21 NOTE — CARE PLAN
Problem: Safety  Goal: Will remain free from injury  Outcome: PROGRESSING AS EXPECTED  Assessed environment for risks of injury.    Problem: Skin Integrity  Goal: Risk for impaired skin integrity will decrease  Outcome: PROGRESSING AS EXPECTED  Assessed patient for signs and symptoms of skin breakdown frequently. Provided Q2H turns, waffle cushion in place, heels floated on pillows. Checked under arctic sun pads for sings of breakdown.

## 2018-12-22 ENCOUNTER — APPOINTMENT (OUTPATIENT)
Dept: RADIOLOGY | Facility: MEDICAL CENTER | Age: 58
DRG: 224 | End: 2018-12-22
Attending: INTERNAL MEDICINE
Payer: COMMERCIAL

## 2018-12-22 PROBLEM — J96.91 RESPIRATORY FAILURE WITH HYPOXIA (HCC): Status: ACTIVE | Noted: 2018-12-22

## 2018-12-22 LAB
ACTION RANGE TRIGGERED IACRT: NO
ACTION RANGE TRIGGERED IACRT: YES
ANION GAP SERPL CALC-SCNC: 7 MMOL/L (ref 0–11.9)
APTT PPP: 35.8 SEC (ref 24.7–36)
APTT PPP: 37.8 SEC (ref 24.7–36)
BASE EXCESS BLDA CALC-SCNC: -3 MMOL/L (ref -4–3)
BASE EXCESS BLDA CALC-SCNC: -5 MMOL/L (ref -4–3)
BODY TEMPERATURE: ABNORMAL DEGREES
BODY TEMPERATURE: ABNORMAL DEGREES
BUN SERPL-MCNC: 24 MG/DL (ref 8–22)
CALCIUM SERPL-MCNC: 9.8 MG/DL (ref 8.5–10.5)
CHLORIDE SERPL-SCNC: 112 MMOL/L (ref 96–112)
CO2 BLDA-SCNC: 23 MMOL/L (ref 20–33)
CO2 BLDA-SCNC: 24 MMOL/L (ref 20–33)
CO2 SERPL-SCNC: 21 MMOL/L (ref 20–33)
CREAT SERPL-MCNC: 0.7 MG/DL (ref 0.5–1.4)
CYTOLOGY REG CYTOL: NORMAL
EKG IMPRESSION: NORMAL
GLUCOSE BLD-MCNC: 102 MG/DL (ref 65–99)
GLUCOSE BLD-MCNC: 108 MG/DL (ref 65–99)
GLUCOSE SERPL-MCNC: 124 MG/DL (ref 65–99)
GRAM STN SPEC: NORMAL
HCO3 BLDA-SCNC: 21.6 MMOL/L (ref 17–25)
HCO3 BLDA-SCNC: 22.5 MMOL/L (ref 17–25)
HOROWITZ INDEX BLDA+IHG-RTO: 212 MM[HG]
HOROWITZ INDEX BLDA+IHG-RTO: 245 MM[HG]
INST. QUALIFIED PATIENT IIQPT: YES
INST. QUALIFIED PATIENT IIQPT: YES
MAGNESIUM SERPL-MCNC: 2.3 MG/DL (ref 1.5–2.5)
O2/TOTAL GAS SETTING VFR VENT: 50 %
O2/TOTAL GAS SETTING VFR VENT: 60 %
P JIROVECII AG SPEC QL IF: NORMAL
PCO2 BLDA: 35.3 MMHG (ref 26–37)
PCO2 BLDA: 52 MMHG (ref 26–37)
PCO2 TEMP ADJ BLDA: 34.5 MMHG (ref 26–37)
PCO2 TEMP ADJ BLDA: 50.8 MMHG (ref 26–37)
PH BLDA: 7.25 [PH] (ref 7.4–7.5)
PH BLDA: 7.39 [PH] (ref 7.4–7.5)
PH TEMP ADJ BLDA: 7.25 [PH] (ref 7.4–7.5)
PH TEMP ADJ BLDA: 7.4 [PH] (ref 7.4–7.5)
PHOSPHATE SERPL-MCNC: 4.3 MG/DL (ref 2.5–4.5)
PO2 BLDA: 106 MMHG (ref 64–87)
PO2 BLDA: 147 MMHG (ref 64–87)
PO2 TEMP ADJ BLDA: 103 MMHG (ref 64–87)
PO2 TEMP ADJ BLDA: 144 MMHG (ref 64–87)
POTASSIUM SERPL-SCNC: 4.5 MMOL/L (ref 3.6–5.5)
RHODAMINE-AURAMINE STN SPEC: NORMAL
SAO2 % BLDA: 98 % (ref 93–99)
SAO2 % BLDA: 99 % (ref 93–99)
SIGNIFICANT IND 70042: NORMAL
SITE SITE: NORMAL
SODIUM SERPL-SCNC: 140 MMOL/L (ref 135–145)
SOURCE SOURCE: NORMAL
SPECIMEN DRAWN FROM PATIENT: ABNORMAL
SPECIMEN DRAWN FROM PATIENT: ABNORMAL

## 2018-12-22 PROCEDURE — 700105 HCHG RX REV CODE 258

## 2018-12-22 PROCEDURE — 770022 HCHG ROOM/CARE - ICU (200)

## 2018-12-22 PROCEDURE — 700111 HCHG RX REV CODE 636 W/ 250 OVERRIDE (IP): Performed by: INTERNAL MEDICINE

## 2018-12-22 PROCEDURE — A9270 NON-COVERED ITEM OR SERVICE: HCPCS | Performed by: INTERNAL MEDICINE

## 2018-12-22 PROCEDURE — 82803 BLOOD GASES ANY COMBINATION: CPT

## 2018-12-22 PROCEDURE — 85730 THROMBOPLASTIN TIME PARTIAL: CPT

## 2018-12-22 PROCEDURE — 99233 SBSQ HOSP IP/OBS HIGH 50: CPT | Performed by: HOSPITALIST

## 2018-12-22 PROCEDURE — 700102 HCHG RX REV CODE 250 W/ 637 OVERRIDE(OP): Performed by: INTERNAL MEDICINE

## 2018-12-22 PROCEDURE — 83735 ASSAY OF MAGNESIUM: CPT

## 2018-12-22 PROCEDURE — 700101 HCHG RX REV CODE 250: Performed by: EMERGENCY MEDICINE

## 2018-12-22 PROCEDURE — 93005 ELECTROCARDIOGRAM TRACING: CPT | Performed by: INTERNAL MEDICINE

## 2018-12-22 PROCEDURE — 80048 BASIC METABOLIC PNL TOTAL CA: CPT

## 2018-12-22 PROCEDURE — 71045 X-RAY EXAM CHEST 1 VIEW: CPT

## 2018-12-22 PROCEDURE — 84100 ASSAY OF PHOSPHORUS: CPT

## 2018-12-22 PROCEDURE — 37799 UNLISTED PX VASCULAR SURGERY: CPT

## 2018-12-22 PROCEDURE — 94003 VENT MGMT INPAT SUBQ DAY: CPT

## 2018-12-22 PROCEDURE — 93010 ELECTROCARDIOGRAM REPORT: CPT | Performed by: INTERNAL MEDICINE

## 2018-12-22 PROCEDURE — 700105 HCHG RX REV CODE 258: Performed by: EMERGENCY MEDICINE

## 2018-12-22 PROCEDURE — 99291 CRITICAL CARE FIRST HOUR: CPT | Performed by: INTERNAL MEDICINE

## 2018-12-22 PROCEDURE — 99233 SBSQ HOSP IP/OBS HIGH 50: CPT | Performed by: INTERNAL MEDICINE

## 2018-12-22 RX ORDER — MAGNESIUM SULFATE HEPTAHYDRATE 40 MG/ML
2 INJECTION, SOLUTION INTRAVENOUS ONCE
Status: COMPLETED | OUTPATIENT
Start: 2018-12-22 | End: 2018-12-23

## 2018-12-22 RX ORDER — SODIUM CHLORIDE, SODIUM LACTATE, POTASSIUM CHLORIDE, CALCIUM CHLORIDE 600; 310; 30; 20 MG/100ML; MG/100ML; MG/100ML; MG/100ML
INJECTION, SOLUTION INTRAVENOUS
Status: COMPLETED
Start: 2018-12-22 | End: 2018-12-22

## 2018-12-22 RX ORDER — ACETAMINOPHEN 325 MG/1
650 TABLET ORAL EVERY 4 HOURS PRN
Status: DISCONTINUED | OUTPATIENT
Start: 2018-12-22 | End: 2018-12-24

## 2018-12-22 RX ORDER — SODIUM CHLORIDE, SODIUM LACTATE, POTASSIUM CHLORIDE, CALCIUM CHLORIDE 600; 310; 30; 20 MG/100ML; MG/100ML; MG/100ML; MG/100ML
250 INJECTION, SOLUTION INTRAVENOUS ONCE
Status: COMPLETED | OUTPATIENT
Start: 2018-12-22 | End: 2018-12-22

## 2018-12-22 RX ORDER — HEPARIN SODIUM 1000 [USP'U]/ML
3200 INJECTION, SOLUTION INTRAVENOUS; SUBCUTANEOUS PRN
Status: DISCONTINUED | OUTPATIENT
Start: 2018-12-22 | End: 2018-12-26

## 2018-12-22 RX ORDER — HEPARIN SODIUM 1000 [USP'U]/ML
6000 INJECTION, SOLUTION INTRAVENOUS; SUBCUTANEOUS ONCE
Status: COMPLETED | OUTPATIENT
Start: 2018-12-22 | End: 2018-12-22

## 2018-12-22 RX ORDER — MEPERIDINE HYDROCHLORIDE 50 MG/ML
75 INJECTION INTRAMUSCULAR; INTRAVENOUS; SUBCUTANEOUS EVERY 4 HOURS PRN
Status: DISCONTINUED | OUTPATIENT
Start: 2018-12-22 | End: 2018-12-25

## 2018-12-22 RX ORDER — VECURONIUM BROMIDE 1 MG/ML
10 INJECTION, POWDER, LYOPHILIZED, FOR SOLUTION INTRAVENOUS ONCE
Status: COMPLETED | OUTPATIENT
Start: 2018-12-22 | End: 2018-12-22

## 2018-12-22 RX ADMIN — FENTANYL CITRATE 200 MCG: 50 INJECTION, SOLUTION INTRAMUSCULAR; INTRAVENOUS at 16:09

## 2018-12-22 RX ADMIN — ASPIRIN 325 MG: 325 TABLET ORAL at 05:22

## 2018-12-22 RX ADMIN — MEPERIDINE HYDROCHLORIDE 75 MG: 50 INJECTION INTRAMUSCULAR; INTRAVENOUS; SUBCUTANEOUS at 22:22

## 2018-12-22 RX ADMIN — FENTANYL CITRATE 200 MCG: 50 INJECTION, SOLUTION INTRAMUSCULAR; INTRAVENOUS at 05:59

## 2018-12-22 RX ADMIN — Medication 700 MCG: at 11:09

## 2018-12-22 RX ADMIN — HEPARIN SODIUM 5000 UNITS: 5000 INJECTION, SOLUTION INTRAVENOUS; SUBCUTANEOUS at 05:22

## 2018-12-22 RX ADMIN — SODIUM CHLORIDE, POTASSIUM CHLORIDE, SODIUM LACTATE AND CALCIUM CHLORIDE 250 ML: 600; 310; 30; 20 INJECTION, SOLUTION INTRAVENOUS at 05:34

## 2018-12-22 RX ADMIN — STANDARDIZED SENNA CONCENTRATE AND DOCUSATE SODIUM 2 TABLET: 8.6; 5 TABLET, FILM COATED ORAL at 17:55

## 2018-12-22 RX ADMIN — FENTANYL CITRATE 200 MCG: 50 INJECTION, SOLUTION INTRAMUSCULAR; INTRAVENOUS at 06:39

## 2018-12-22 RX ADMIN — Medication 700 MCG/HR: at 20:37

## 2018-12-22 RX ADMIN — HEPARIN SODIUM 1450 UNITS: 5000 INJECTION, SOLUTION INTRAVENOUS at 19:13

## 2018-12-22 RX ADMIN — STANDARDIZED SENNA CONCENTRATE AND DOCUSATE SODIUM 2 TABLET: 8.6; 5 TABLET, FILM COATED ORAL at 05:22

## 2018-12-22 RX ADMIN — HEPARIN SODIUM 1200 UNITS/HR: 5000 INJECTION, SOLUTION INTRAVENOUS at 11:42

## 2018-12-22 RX ADMIN — Medication 750 MCG/HR: at 07:22

## 2018-12-22 RX ADMIN — NOREPINEPHRINE BITARTRATE 2 MCG/MIN: 1 INJECTION INTRAVENOUS at 09:02

## 2018-12-22 RX ADMIN — FAMOTIDINE 20 MG: 20 TABLET ORAL at 05:22

## 2018-12-22 RX ADMIN — VECURONIUM BROMIDE 10 MG: 10 INJECTION, POWDER, LYOPHILIZED, FOR SOLUTION INTRAVENOUS at 04:50

## 2018-12-22 RX ADMIN — Medication 700 MCG/HR: at 13:44

## 2018-12-22 RX ADMIN — Medication 200 MCG/HR: at 02:29

## 2018-12-22 RX ADMIN — SODIUM CHLORIDE, SODIUM LACTATE, POTASSIUM CHLORIDE, CALCIUM CHLORIDE 250 ML: 600; 310; 30; 20 INJECTION, SOLUTION INTRAVENOUS at 05:34

## 2018-12-22 RX ADMIN — MAGNESIUM SULFATE IN WATER 2 G: 40 INJECTION, SOLUTION INTRAVENOUS at 22:21

## 2018-12-22 RX ADMIN — MINERAL OIL AND WHITE PETROLATUM 1 APPLICATION: 150; 830 OINTMENT OPHTHALMIC at 14:00

## 2018-12-22 RX ADMIN — ATORVASTATIN CALCIUM 80 MG: 80 TABLET, FILM COATED ORAL at 17:55

## 2018-12-22 RX ADMIN — FAMOTIDINE 20 MG: 20 TABLET ORAL at 17:55

## 2018-12-22 RX ADMIN — Medication 700 MCG/HR: at 17:05

## 2018-12-22 RX ADMIN — MINERAL OIL AND WHITE PETROLATUM 1 APPLICATION: 150; 830 OINTMENT OPHTHALMIC at 06:00

## 2018-12-22 RX ADMIN — HEPARIN SODIUM 6000 UNITS: 1000 INJECTION, SOLUTION INTRAVENOUS; SUBCUTANEOUS at 11:42

## 2018-12-22 RX ADMIN — MINERAL OIL AND WHITE PETROLATUM 1 APPLICATION: 150; 830 OINTMENT OPHTHALMIC at 22:00

## 2018-12-22 RX ADMIN — HEPARIN SODIUM 3200 UNITS: 1000 INJECTION, SOLUTION INTRAVENOUS; SUBCUTANEOUS at 19:11

## 2018-12-22 NOTE — PROGRESS NOTES
Patient continues to be unable to clear secretions. Dr. Flores at bedside decided to reintubate patient. Wife signed concent. 200mg propofol and 50mg rocuronium administered. Fentanyl gtt started.    Patient pressure drops to the 60s, levophed restarted. Once intubated patient O2 saturation drops to the 70%, RT bagged pt to improve sats. Dr Vargas at bedside to bronch.

## 2018-12-22 NOTE — CARE PLAN
Problem: Communication  Goal: The ability to communicate needs accurately and effectively will improve  Outcome: PROGRESSING AS EXPECTED  Educated patient on the importance of communicating needs and wants. Patient needs frequent orientation.    Problem: Pain Management  Goal: Pain level will decrease to patient's comfort goal  Outcome: PROGRESSING AS EXPECTED  Assessed patient for signs and symptoms of skin breakdown frequently. Provided Q2H turns, waffle cushion in place, heels floated on pillows.

## 2018-12-22 NOTE — PROCEDURES
Procedures  Procedure Note    Date: 12/21/2018  Time: 5:35    Procedure: Bronchoscopy    Indication: Aspiration, mucus plugging  Consent: Informed consent obtained from patient or designated decision maker after explaining the benefits/risks of the procedure including but not limited to bleeding, infection, airway trauma or loss therof, pneumothorax/hemothorax, arrythmia, or death. Patient or surrogate expressed understanding and agreement and signed consent which can be found in the patient's chart.    Procedure: After obtaining consent, a time-out was performed. Respiratory therapy and nursing at bedside throughout procedure. Patient provided sedation and analgesia throughout the procedure. Placed on full ventilator support with an FiO2 of 100% throughout the procedure. Using a fiberoptic bronchoscope, trachea entered via ETT.  5 mL of local anesthetic sprayed at the adan (2% lidocaine) achieving appropriate comfort level for patient. Airways visualized directly and the following intervention was performed: Suctioning of obstructing mucus and bronchial alveolar lavage. Findings as below. Patient tolerated procedure well without any difficulties and left in care of bedside nurse/RT.     Medications: Lidocaine, fentanyl  Findings: Upper airway - Not visualized as bronchoscope passed through ETT, trace amount of blood seen above the glottis.        Trachea to adan -normal appearing mucosa without lesions or mass, ETT tip measured 3 cm from the adan.        R proximal and distal airways -normal appearing mucosa without mass/lesion/anatomic variance, secretions: Thin, brown/tan, mucus in the right mainstem and right lower lobe.  All bronchi were lavaged until clear        L proximal and distal airways -normal appearing mucosa without mass/lesion/anatomic variance, secretions: Scant, clear, thin mucus        Samples -right lower lobe BAL    Complications: None apparent  CXR (if applicable): Pending    Steve  Alicia, DO  Critical Care Medicine

## 2018-12-22 NOTE — PROGRESS NOTES
12 Hour Chart Check    MS: .20/.08/.40  Sinus Rhythm 70s to 100s. Occasional PVCs.    2 RN Skin Check

## 2018-12-22 NOTE — PROGRESS NOTES
Pt moving all extremities on fentanyl 750 mcg/hr, intermittently follows commands.  Pt's systolic blood pressure dropping to < 90 and MAP < 60.  Per orders, levophed drip initiated at 2 mcg/min to maintain blood pressure within prescribed ranges and fentanyl drip decreased to 700 mcg/hr to allow pt to become more alert prior to mobilization and assist with blood pressure control.

## 2018-12-22 NOTE — PROGRESS NOTES
Patient sounds gurgly and has coarse lung sounds. Patient unable to give a strong cough to clear secretions. RT and RN attempt NT suctions. Moderate sputum cleared. Patient sounds improved.

## 2018-12-22 NOTE — CARE PLAN
Problem: Safety  Goal: Will remain free from falls  Outcome: PROGRESSING AS EXPECTED  Bed in locked and in low position, lower bed rails raised, bed alarm in use, call light within reach, and patient room near nursing station.     Patient's family communicates and demonstrates understanding that a nurse must be present during mobilization while patient in the ICU and how to use the call light when staff assistance is required.     See safety flow sheet for further documentation.     Problem: Pain Management  Goal: Pain level will decrease to patient's comfort goal  Outcome: PROGRESSING AS EXPECTED  Assess pain every two hours. Administer prescribed PRN pain medication per CPOT scale. Reassess pain within two hours of pharmacological administration.     See pain flow sheet for further documentation.

## 2018-12-22 NOTE — PROCEDURES
Intubation  Date/Time: 12/21/2018 5:01 PM  Performed by: BETHANIE FREGOSO  Authorized by: BETHANIE FREGOSO     Consent:     Consent obtained:  Verbal    Consent given by:  Spouse    Risks discussed:  Aspiration, brain injury, dental trauma, laryngeal injury, bleeding, death, hypoxia and pneumothorax    Alternatives discussed:  No treatment  Pre-procedure details:     Patient status:  Altered mental status    Mallampati score:  II    Pretreatment medications:  None  Procedure details:     Preoxygenation:  Nonrebreather mask    CPR in progress: no      Intubation method:  Oral    Laryngoscope type:  GlideScope    Laryngoscope blade:  Mac 3    Tube size (mm):  7.5    Tube type:  Cuffed    Number of attempts:  1    Cricoid pressure: no      Tube visualized through cords: yes    Placement assessment:     ETT to lip:  23    ETT to teeth:  22    Tube secured with:  ETT evans    Breath sounds:  Equal and absent over the epigastrium    Placement verification: chest rise, condensation, direct visualization, equal breath sounds, ETCO2 detector and tube exhalation    Post-procedure details:     Patient tolerance of procedure:  Tolerated well, no immediate complications  Comments:      Copious oral secretions, transient desats post intubation responding well to suctioning and manual bagging

## 2018-12-22 NOTE — PROGRESS NOTES
Patient transported to cath lab via hospital bed by Patient transport and RN. VSS. Patient still unable to clear secretions.    1530: Cath lab holding called and said they are going to postpone the cath due to patient status. Patient returned to room and connected to unit monitors. VSS.

## 2018-12-22 NOTE — RESPIRATORY CARE
Critical ABG Value- hand delivered to Dr. Flores, New orders received increase RR-30 and ABG in the morning. RN aware.    ABG  Ph-7.24  Co2- 49.4  P02- 90  Hco3-21.3

## 2018-12-22 NOTE — DIETARY
Nutrition Services: Nutrition Support Assessment  Day 2 of admit.  Amadou Nicholas is a 58 y.o. male with admitting DX of Cardiac arrest (HCC)     Current problem list:  1. Cardiac arrest  2. Acute respiratory failure with hypoxia  3. Ventricular fibrillation  4. Metabolic acidosis  5. Hypokalemia  6. Coagulopathy  7. Lactic acidosis  8. Elevated liver enzymes  9. Closed fracture of multiple ribs of both sides  10. Leukocytosis  11. Anemia  12. Hyperphosphatemia     Assessment:  Estimated Nutritional Needs: based on: Ht: 160 cm, Wt : 84.9 kg via bed scale, IBW: 56.2 kg, BMI: 33.16   Current wt : 86.5 kg via bed scale - wt increased since admit, suspect related to fluids (per I/Os pt +2.7 L)     Calculation/Equation: REE per MSJ x 1 = 1566 kcal/day (65-70%=2826-0160 kcal/day); RMR per PSU (vent L/min 10.1, T max 24 hours 36.8) = 1748 kcal/day (65-70%=3509-3300 kcal/day)  Total Calories / day: 934 - 1189  (Calories / k - 14)  Total Grams Protein / day: 84 - 112  (Grams Protein / kg IBW: 1.5 - 2) (Grams Protein / k - 1.3)  Total Fluids ml / day: 2123  (mL / k)    Evaluation:   1. Consult received for TF assessment. Pt is intubated and in need on nutrition support. Pt was extubated on , then re-intubated.   2. Enteral access: Cortrak placed in distal stomach or proximal duodenum   3. Consult received for cardiac diet education, will provide education when appropriate  4. Labs: Glucose 124, BUN 24  5. Meds: lipitor, pepcid, electrolyte replacement, pericolace, levophed @ 2 mcg/min, propofol (paused)  6. Pt with BMI > 30 will hypocalorically feed pt per SCCM guidelines.   7. Last BM:   8. Peptamen Intense VHP appropriate to meet pt's estimated protein needs.       Recommendations/Plan:  Start Peptamen Intense VHP @ 25 ml/hr and advance per protocol to 50 ml/hr (goal rate) to provide 1200 kcal (14 kcal/kg), 110 grams protein (1.3 gm/kg, 2 gm/kg IBW), and 1008 ml free water per day.   Fluids  per MD. LOPEZ following

## 2018-12-22 NOTE — PROGRESS NOTES
Dr. Gonda paged and informed of patients UOP of ~ 20mls/hr and RASS +3 despite multiple Fentanyl IVP administrations. Dr. Gonda ordered fentanyl and OK'd use of propofol. Propofol started at 5mcg/kg/min

## 2018-12-22 NOTE — PROGRESS NOTES
Cardiology Follow Up Progress Note    Date of Service  12/22/2018    Attending Physician  Yaron Magallanes M.D.    Chief Complaint   Cardiac arrest    HPI  Amadou Nicholas is a 58 y.o. male admitted 12/19/2018 post cardiac arrest.    Interim Events  Patient was extubated yesterday and was following commands, doing well.  Later in the afternoon, patient started having thicker secretions.  Coronary angiogram was canceled.  ABG at that time showed a pH of 7.1.  He was subsequently reintubated.    Review of Systems  Review of Systems   Unable to perform ROS: Intubated       Vital signs in last 24 hours  Temp:  [36.3 °C (97.3 °F)-36.4 °C (97.5 °F)] 36.3 °C (97.3 °F)  Pulse:  [] 61  Resp:  [13-31] 30    Physical Exam  Physical Exam   Constitutional: He is oriented to person, place, and time. He appears well-developed and well-nourished. No distress.   Eyes: Conjunctivae are normal.   Neck: Normal range of motion. Neck supple.   Cardiovascular: Normal rate, regular rhythm and intact distal pulses.  Exam reveals no gallop and no friction rub.    No murmur heard.  Pulmonary/Chest: He has no wheezes. He has no rales.   Coarse breath sounds bilaterally   Abdominal: Soft. There is no tenderness.   Musculoskeletal: He exhibits no edema.   Neurological: He is alert and oriented to person, place, and time.   Skin: Skin is warm and dry. He is not diaphoretic.   Psychiatric: He has a normal mood and affect.   Nursing note and vitals reviewed.    Lab Review  Lab Results   Component Value Date/Time    WBC 11.6 (H) 12/21/2018 04:40 AM    RBC 3.37 (L) 12/21/2018 04:40 AM    HEMOGLOBIN 9.4 (L) 12/21/2018 04:40 AM    HEMATOCRIT 30.0 (L) 12/21/2018 04:40 AM    MCV 89.0 12/21/2018 04:40 AM    MCH 27.9 12/21/2018 04:40 AM    MCHC 31.3 (L) 12/21/2018 04:40 AM    MPV 10.1 12/21/2018 04:40 AM      Lab Results   Component Value Date/Time    SODIUM 140 12/22/2018 05:00 AM    POTASSIUM 4.5 12/22/2018 05:00 AM    CHLORIDE 112 12/22/2018 05:00 AM     CO2 21 12/22/2018 05:00 AM    GLUCOSE 124 (H) 12/22/2018 05:00 AM    BUN 24 (H) 12/22/2018 05:00 AM    CREATININE 0.70 12/22/2018 05:00 AM      Lab Results   Component Value Date/Time    ASTSGOT 121 (H) 12/21/2018 04:40 AM    ALTSGPT 225 (H) 12/21/2018 04:40 AM     Lab Results   Component Value Date/Time    TROPONINI 6.83 (H) 12/20/2018 05:15 AM       Recent Labs      12/19/18   2347   BNPBTYPENAT  186*     Labs reviewed.  Stable creatinine.    Cardiac Imaging and Procedures Review  Telemetry personally reviewed.  Sinus rhythm.  ST changes are noted on telemetry compared to yesterday.    EKG performed today at 0603 hours was personally reviewed and per my interpretation shows sinus bradycardia at 46 bpm, left ventricular hypertrophy with ST depressions in the anterolateral leads.  No significant change from prior EKG.    Assessment/Plan    Status post cardiac arrest:  Elevated troponin:  Mild cardiomyopathy:    Patient unfortunately got reintubated last night.  From a cardiac standpoint he appears to be fairly stable.  Continue aspirin and Lipitor.  Coronary angiogram will be deferred for now as the patient got reintubated.  In the meantime, he will be started on heparin drip for medical management of his NSTEMI.  He is not fluid overloaded on exam.  He does not have adequate blood pressure and also addition of other cardioprotective medications.    Will follow.     Ilene Moreno M.D.   Cardiologist, [facility]  [phone]

## 2018-12-22 NOTE — PROGRESS NOTES
Cortrak Placement    Tube Team verified patient name and medical record number prior to tube placement.  Cortrak tube (43 inches, 10 [place]) placed at 80 cm in right nare.  Per Cortrak picture, tube appears to be in the stomach.  Nursing Instructions: Awaiting KUB to confirm placement before use for medications or feeding. Once placement confirmed, flush tube with 30 ml of water, and then remove and save stylet, in patient medication drawer.

## 2018-12-22 NOTE — PROGRESS NOTES
Critical Care Progress Note    Date of admission  12/19/2018    Chief Complaint  58 y.o. male admitted 12/19/2018 s/p cardiopulmonary arrest with prolonged CPR in the field  Hospital Course  TTM started in ED and continued in ICU    Interval Problem Updates  Past 24 hours  Woke up this am off sedation  Followed commands with all 4  Good cough  Passed SBT  Extubated  1-2 hours post extubation had gurgling respirations with inability to clear  Reintubuted by me uneventfully but copious secretions  Bronch by Dr Vargas post-intubation  Cooling finished last night  Serial exams today by me  Taken by cardiology to cath lab but returned no cath   Family updated multiple times    Prior 24 hours  Mechan vent ARDS net settings  Levophed  Target temp management  Replete lytes  Serial neuro exams  Urine OK:      Review of Systems  Review of Systems   Unable to perform ROS: Intubated        Vital Signs for last 24 hours   Temp:  [34.5 °C (94.1 °F)-36.4 °C (97.5 °F)] 36.3 °C (97.3 °F)  Pulse:  [42-99] 86  Resp:  [7-28] 19    Hemodynamic parameters for last 24 hours  CVP:  [10 MM HG-21 MM HG] 12 MM HG    Respiratory  Brooks Vent Mode: APVCMV  Rate (breaths/min): 28  Vt Target (mL): 350  PEEP/CPAP: 8  FiO2: 30  P MEAN: 12  Length of Weaning Trial (Hours): .3  Control VTE (exp VT): 346    Physical Exam   Physical Exam   Constitutional: He appears well-developed and well-nourished.   HENT:   Head: Normocephalic and atraumatic.   Eyes: Pupils are equal, round, and reactive to light. EOM are normal.   Sluggish but on IV narcotics   Neck: Normal range of motion.   Cardiovascular: Normal rate and regular rhythm.    Pulmonary/Chest: Breath sounds normal. He is in respiratory distress.   Gurgling respirations post extubation   Abdominal: Soft. Bowel sounds are normal.   Musculoskeletal: Normal range of motion.   Neurological: He is alert.   Appropriate, oriented X 2, modest cough, did not handle secretions adequately post extubation    Skin: Skin is warm and dry.   TTM tempt 35.5   Psychiatric: He has a normal mood and affect.       Medications  Current Facility-Administered Medications   Medication Dose Route Frequency Provider Last Rate Last Dose   • PHENYLEPHRINE HCL 10 MG/ML INJ SOLN            • PHENYLEPHRINE HCL-NACL 1-0.9 MG/10ML-% IV SOSY            • Respiratory Care per Protocol   Nebulization Continuous RT Jeremy M Gonda, M.D.       • famotidine (PEPCID) tablet 20 mg  20 mg Oral Q12HRS Jeremy M Gonda, M.D.   20 mg at 12/21/18 0456    Or   • famotidine (PEPCID) injection 20 mg  20 mg Intravenous Q12HRS Jeremy M Gonda, M.D.   20 mg at 12/20/18 1735   • senna-docusate (PERICOLACE or SENOKOT S) 8.6-50 MG per tablet 2 Tab  2 Tab Oral BID Jeremy M Gonda, M.D.   Stopped at 12/20/18 0600    And   • polyethylene glycol/lytes (MIRALAX) PACKET 1 Packet  1 Packet Oral QDAY PRN Jeremy M Gonda, M.D.        And   • magnesium hydroxide (MILK OF MAGNESIA) suspension 30 mL  30 mL Oral QDAY PRN Jeremy M Gonda, M.D.        And   • bisacodyl (DULCOLAX) suppository 10 mg  10 mg Rectal QDAY PRN Jeremy M Gonda, M.D.       • MD Alert...ICU Electrolyte Replacement per Pharmacy   Other pharmacy to dose Jeremy M Gonda, M.D.       • heparin injection 5,000 Units  5,000 Units Subcutaneous Q8HRS Jeremy M Gonda, M.D.   5,000 Units at 12/21/18 1524   • MD Alert...PROPOFOL CRITICAL CARE PROTOCOL 1 Each  1 Each Other PRN Jeremy M Gonda, M.D.       • ipratropium-albuterol (DUONEB) nebulizer solution  3 mL Nebulization Q2HRS PRN (RT) Jeremy M Gonda, M.D.       • dextrose 50% (D50W) injection 25-50 mL  12.5-25 g Intravenous PRN Jeremy M Gonda, M.D.       • EPINEPHrine 4 mg in  mL Infusion  0-10 mcg/min Intravenous Continuous Jeremy M Gonda, M.D.   Stopped at 12/20/18 0230   • atorvastatin (LIPITOR) tablet 80 mg  80 mg Oral Q EVENING Yaron Magallanes M.D.   80 mg at 12/20/18 1736   • aspirin (ASA) tablet 325 mg  325 mg Oral DAILY Yaron Magallanes M.D.   325 mg at 12/21/18  0456    Or   • aspirin (ASA) chewable tab 324 mg  324 mg Oral DAILY Yaron Magallanes M.D.        Or   • aspirin (ASA) suppository 300 mg  300 mg Rectal DAILY Yaron Magallanes M.D.       • fentanyl 50 mcg/mL infusion   Intravenous Continuous Bradley Flores M.D.   Stopped at 12/21/18 0825   • fentaNYL (SUBLIMAZE) injection 50 mcg  50 mcg Intravenous Q30 MIN PRN Bradley Flores M.D.        Or   • fentaNYL (SUBLIMAZE) injection 100 mcg  100 mcg Intravenous Q30 MIN PRN Bradley Flores M.D.   100 mcg at 12/20/18 1042    Or   • fentaNYL (SUBLIMAZE) injection 200 mcg  200 mcg Intravenous Q30 MIN PRN Bradley Flores M.D.   200 mcg at 12/21/18 0000   • propofol (DIPRIVAN) injection  0-80 mcg/kg/min Intravenous Continuous Bradley Flores M.D.   Stopped at 12/21/18 0740   • artificial tear ointment (REFRESH,LACRI-LUBE) 1 Application  1 Application Both Eyes Q8HRS Yevgeniy Hawkins M.D.   Stopped at 12/21/18 1400   • norepinephrine (LEVOPHED) 8 mg in  mL Infusion  0-30 mcg/min Intravenous Continuous Yevgeniy Hawkins M.D.   Stopped at 12/21/18 0745       Fluids    Intake/Output Summary (Last 24 hours) at 12/21/18 1720  Last data filed at 12/21/18 1400   Gross per 24 hour   Intake          1504.37 ml   Output             1075 ml   Net           429.37 ml       Laboratory  Recent Labs      12/20/18   0445  12/21/18   0317  12/21/18   1402   ISTATAPH  7.247*  7.377*  7.195*   ISTATAPCO2  36.7  25.0*  48.7*   ISTATAPO2  80  97*  78   ISTATATCO2  17*  15*  20   EMCWLDJ2NUP  94  98  92*   ISTATARTHCO3  16.0*  14.7*  18.8   ISTATARTBE  -10*  -9*  -9*   ISTATTEMP  35.5 C  35.3 C  37.5 C   ISTATFIO2  40  30  50   ISTATSPEC  Arterial  Arterial  Arterial   ISTATAPHTC  7.268*  7.402  7.189*   LSXRJQUI2OE  73  87  80     Recent Labs      12/19/18   2347  12/20/18   0515   TROPONINI  0.33*  6.83*   BNPBTYPENAT  186*   --      Recent Labs      12/19/18   2347   12/20/18   1700  12/20/18   2300  12/21/18   0440   SODIUM  138    < >  138  137  137   POTASSIUM  3.1*   < >  3.8  3.9  4.1   CHLORIDE  104   < >  114*  114*  113*   CO2  15*   < >  15*  15*  17*   BUN  15   < >  23*  21  19   CREATININE  0.99   < >  0.69  0.64  0.68   MAGNESIUM  3.0*   < >  3.8*  3.9*  3.4*   PHOSPHORUS  10.2*   --    --    --   2.9   CALCIUM  9.2   < >  8.2*  8.0*  7.9*    < > = values in this interval not displayed.     Recent Labs      12/19/18 2347 12/20/18 1700 12/20/18 2300 12/21/18   0440   ALTSGPT  348*   --   281*   --   225*   ASTSGOT  277*   --   204*   --   121*   ALKPHOSPHAT  84   --   57   --   52   TBILIRUBIN  0.3   --   0.6   --   0.6   DBILIRUBIN   --    --   0.2   --    --    GLUCOSE  356*   < >  116*  135*  128*    < > = values in this interval not displayed.     Recent Labs      12/19/18 2347 12/20/18 1700 12/20/18 2300 12/21/18   0440   WBC  12.8*   < >  15.8*  13.6*  11.6*   NEUTSPOLYS   --    --    --    --   84.80*   LYMPHOCYTES   --    --    --    --   9.90*   MONOCYTES   --    --    --    --   4.20   EOSINOPHILS   --    --    --    --   0.50   BASOPHILS   --    --    --    --   0.30   ASTSGOT  277*   --   204*   --   121*   ALTSGPT  348*   --   281*   --   225*   ALKPHOSPHAT  84   --   57   --   52   TBILIRUBIN  0.3   --   0.6   --   0.6    < > = values in this interval not displayed.     Recent Labs      12/20/18 1700 12/20/18 2300 12/21/18   0440   RBC  3.62*  3.35*  3.37*   HEMOGLOBIN  10.1*  9.6*  9.4*   HEMATOCRIT  32.4*  29.4*  30.0*   PLATELETCT  336  293  291   PROTHROMBTM  14.8*  15.8*  15.7*   APTT  29.7  28.6  29.2   INR  1.15*  1.25*  1.24*       Imaging  X-Ray:  I have personally reviewed the images and compared with prior images.    Assessment/Plan  Ventricular fibrillation (HCC)- (present on admission)   Assessment & Plan    Primary arrhythmia per EMS status post defibrillation  Continuous telemetry with initiation of amiodarone should it recur  Optimize electrolytes  Coronary evaluation  pending    Because of potential for bad brain (no bystander CPR, long duration CPR by medics, poor neuro status on presentation) will defer on invasive cardiac w/u for now    Cardiology brought pt to cath lab but decided to defer cath for now     Anoxic brain injury (HCC)- (present on admission)   Assessment & Plan    Probable given prolonged downtime and current neurologic exam  Temperature targeted management post cardiac arrest  Eventual EEG/neurology consultation once rewarmed  Close neurologic monitoring    Concerning picture for bad neurologic outcome  Will re-evaluate  Consider MRI in 1-2 days although bedside neuro exam most predictive test     Woke up as noted  Formal neurologic/cognitive testing may be warranted at a later date     Acute respiratory failure with hypoxia (HCC)- (present on admission)   Assessment & Plan    Intubated in field 12/19  Continue full mechanical ventilatory support, increased respiratory rate to 22  Extubated but reintubated for pulmonary toilet    Will use fentanyl alone as needed       Cardiac arrest (HCC)- (present on admission)   Assessment & Plan    Unknown etiology status post return of spontaneous circulation with prolonged downtime  Supportive care  Stat echocardiography  Cardiology consultation  Holding off on PCI evaluation in the setting of anoxic brain injury and need for ongoing resuscitation    Palliative care consult  Prognosis for full recovery grave    Surprisingly good neuro response today. Unknown yet if pt will get back to baseline but did wake up, followed commands and was appropriate     Lactic acidosis- (present on admission)   Assessment & Plan    Secondary to shock/cardiac arrest  Trend with resuscitation attempts    Clinically resoloved     Coagulopathy (HCC)- (present on admission)   Assessment & Plan    Secondary to cardiac arrest, possible DIC  Monitor platelet count, check fibrinogen, monitor for bleeding    No evidence bleeding- monitor      Hypokalemia   Assessment & Plan    Continue to track and replete     Metabolic acidosis- (present on admission)   Assessment & Plan    Severe, partially compensated  Increase mandatory minute ventilation to help compensate  Fluid resuscitation    Improved with fluids, pressors   Trend    Resolved     Elevated liver enzymes   Assessment & Plan    Likely secondary to shock liver     Hyperphosphatemia- (present on admission)   Assessment & Plan    Monitor closely with resuscitation attempts     Anemia- (present on admission)   Assessment & Plan    Monitor daily CBC  Conservative transfusion strategy     Leukocytosis- (present on admission)   Assessment & Plan    Stress induced, hold off on antibiotics for now as no preceding infectious symptoms  Doubt acute infection- inflammation from poor perfusion, poor heart function and multi-organ dysfunction s/p prolonged arrest without bystander CPR    No change in perception of infection     Closed fracture of multiple ribs of both sides- (present on admission)   Assessment & Plan    Secondary to CPR  Analgesia as needed  Positive pressure ventilation          VTE:  Heparin  Ulcer: PPI  Lines: Central Line  Ongoing indication addressed    I have performed a physical exam and reviewed and updated ROS and Plan today (12/21/2018). In review of yesterday's note (12/20/2018), there are no changes except as documented above.     Discussed patient condition and risk of morbidity and/or mortality with Hospitalist, RN, RT, Pharmacy and Charge nurse / hot rounds  The patient remains critically ill.  Critical care time = 100 minutes in directly providing and coordinating critical care and extensive data review.  No time overlap and excludes procedures.

## 2018-12-22 NOTE — PROGRESS NOTES
Pt mobilized to edge of bed with 3 staff assist.  Pt had eyes open, did follow some commands to kick legs, required max assist to hold upright.  Pt did not cough during activity, did have brown drainage from mouth and nose.  Wife at bedside at the time, updated on pt condition and plan of care.  Wife assisted with coaching patient to exercise during activity.

## 2018-12-22 NOTE — PROGRESS NOTES
Hospital Medicine Daily Progress Note    Date of Service  12/22/2018    Chief Complaint  Out of hospital cardiac arrest at home with [facility] Course    58 y.o. male with a history of hypertension and depression admitted 12/19/2018 with witness cardiac arrest for which the son started CPR until EMS arrived.  He was found to be in ventricular fibrillation for which he was shocked 3 times and loaded with amiodarone and converted to torsades for which he was shocked again.  He had multiple rounds of CPR and epinephrine.  He went into pulseless electrical activity and then eventually had spontaneous return of circulation.      Interval Problem Update  On ventilator  Mobilized to edge of bed with nursing during my evaluation of patient.  Awake. Follows and moves all ext  Wife at bedside.  On fentanyl drip  NSR  On levophed drip  Tmax:36  Enteral feed at goal  CXR low lung volumes, some edema RLL      Consultants/Specialty  Cardiology  Critical Care    Code Status  FULL    Disposition  To remain in ICU    Review of Systems  Review of Systems   Unable to perform ROS: Intubated        Physical Exam  Temp:  [36.3 °C (97.3 °F)-36.5 °C (97.7 °F)] 36.5 °C (97.7 °F)  Pulse:  [] 60  Resp:  [13-31] 25    Physical Exam   Constitutional: He appears ill. No distress. He is sedated, intubated and restrained.   Eyes: Pupils are equal, round, and reactive to light. Conjunctivae and EOM are normal. Right eye exhibits no discharge. Left eye exhibits no discharge. No scleral icterus.   Neck: No tracheal deviation present.   Cardiovascular: Normal rate and regular rhythm.    No murmur heard.  Pulses:       Radial pulses are 2+ on the right side, and 2+ on the left side.        Dorsalis pedis pulses are 2+ on the right side, and 2+ on the left side.   Pulmonary/Chest: Effort normal and breath sounds normal. He is intubated. No respiratory distress. He has no wheezes. He has no rales.   Abdominal: Soft. He exhibits no  distension. There is no tenderness.   Musculoskeletal: He exhibits no edema.   Neurological: No cranial nerve deficit. Coordination normal.   Skin: Skin is warm. He is not diaphoretic.   Psychiatric: He has a normal mood and affect.   Vitals reviewed.      Fluids    Intake/Output Summary (Last 24 hours) at 12/22/18 1513  Last data filed at 12/22/18 1200   Gross per 24 hour   Intake            842.2 ml   Output             1575 ml   Net           -732.8 ml       Laboratory  Recent Labs      12/20/18   1700  12/20/18   2300  12/21/18   0440   WBC  15.8*  13.6*  11.6*   RBC  3.62*  3.35*  3.37*   HEMOGLOBIN  10.1*  9.6*  9.4*   HEMATOCRIT  32.4*  29.4*  30.0*   MCV  89.5  87.8  89.0   MCH  27.9  28.7  27.9   MCHC  31.2*  32.7*  31.3*   RDW  48.9  48.5  50.1*   PLATELETCT  336  293  291   MPV  9.8  9.6  10.1     Recent Labs      12/20/18   2300  12/21/18   0440  12/22/18   0500   SODIUM  137  137  140   POTASSIUM  3.9  4.1  4.5   CHLORIDE  114*  113*  112   CO2  15*  17*  21   GLUCOSE  135*  128*  124*   BUN  21  19  24*   CREATININE  0.64  0.68  0.70   CALCIUM  8.0*  7.9*  9.8     Recent Labs      12/20/18   1700  12/20/18   2300  12/21/18   0440  12/22/18   1126   APTT  29.7  28.6  29.2  35.8   INR  1.15*  1.25*  1.24*   --      Recent Labs      12/19/18   2347   BNPBTYPENAT  186*           Imaging  DX-CHEST-PORTABLE (1 VIEW)   Final Result         1.  Mild pulmonary edema and/or infiltrates.   2.  Right internal jugular central line terminates in the right atrium, could be withdrawn 3 cm.   3.  Cardiomegaly            DX-ABDOMEN FOR TUBE PLACEMENT   Final Result      Feeding tube placement with the tip projecting over the distal stomach or proximal duodenum      DX-CHEST-PORTABLE (1 VIEW)   Final Result      1.  Stable cardiomegaly      2.  Worsening pulmonary vascular congestion and interstitial edema.      3.  Bibasilar opacities may be related to atelectasis. Developing pneumonia could have a similar appearance.       DX-CHEST-PORTABLE (1 VIEW)   Final Result         1.  Mild pulmonary edema and/or infiltrates.   2.  Right internal jugular central line terminates in the right atrium, could be withdrawn 3 cm.   3.  Cardiomegaly         EC-ECHOCARDIOGRAM COMPLETE W/O CONT   Final Result      DX-CHEST-LIMITED (1 VIEW)   Final Result         1.  No acute cardiopulmonary disease.   2.  Right internal jugular central line terminates in the right atrium, could be withdrawn 3 cm.   3.  Cardiomegaly      CT-CTA CHEST PULMONARY ARTERY W/ RECONS   Final Result         1.  No large central pulmonary embolus is appreciated, evaluation of the subsegmental branches is essentially nondiagnostic due to motion artifacts. Additional imaging would be required for definitive exclusion of small distal pulmonary emboli.   2.  Hazy groundglass pulmonary opacities, predominantly on the right, appearance suggests atypical edema or infiltrates.   3.  Anterior bilateral second through sixth rib fractures   4.  Cardiomegaly   5.  Left nephrolithiasis   6.  Atherosclerosis and atherosclerotic coronary artery disease.      CT-HEAD W/O   Final Result         1.  No acute intracranial abnormality.   2.  Atherosclerosis.      DX-CHEST-PORTABLE (1 VIEW)   Final Result         1.  No acute cardiopulmonary disease.   2.  Cardiomegaly           Assessment/Plan  Ventricular fibrillation (HCC)- (present on admission)   Assessment & Plan    Status post cardioversion  Cardiology consulting  Monitor on telemetry  Correct electrolyte abnormalities     Acute respiratory failure with hypoxia (HCC)- (present on admission)   Assessment & Plan    On ventilator  Minimize sedation as able  Monitor ABG, chest x-ray, labs, strict I's and O's, and vitals  Extubated and reintubated 12/21  Critical care consulting     Cardiac arrest (HCC)- (present on admission)   Assessment & Plan    CTA negative for PE  Cardiology consulting  I have discussed with cardiologist 12/21  consideration of cardiac catheterization as patient appears to be waking up and following commands and unclear etiology of cardiac arrest.  I discussed with Dr. Moreno and patient was originally placed on Cath Lab schedule however due to reintubation this is been postponed.  Monitor on telemetry  Aspirin, atorvastatin  pressor support as needed with Levophed to keep map greater than 65 and systolic blood pressure greater than 90  Echocardiogram showing EF 45%  Evaluate neuro status for any signs of anoxic brain injury.  On 12/21 patient appear to be following commands and appropriate.       Lactic acidosis- (present on admission)   Assessment & Plan    12/19 lactic acid: 8.9  Downtrending with 12/20 lactic acid: 2.6     Hypokalemia   Assessment & Plan    Repleted and monitoring     Metabolic acidosis- (present on admission)   Assessment & Plan    IV fluid hydration with normal saline  Monitor BMP     Elevated liver enzymes   Assessment & Plan    Question of hypotensive event/cardiac arrest prior to admission  Follow-up on hepatic function panel     Hyperphosphatemia- (present on admission)   Assessment & Plan    Monitor     Anemia- (present on admission)   Assessment & Plan    Normocytic normochromic  Monitor CBC  No obvious sign of bleeding     Leukocytosis- (present on admission)   Assessment & Plan    12/19 WBC: 12.8  12/20 WBC: 14.1  12/21 WBC: 11.6  Watch for signs of infection     Closed fracture of multiple ribs of both sides- (present on admission)   Assessment & Plan    Secondary to CPR  Pain management          VTE prophylaxis: heparin

## 2018-12-22 NOTE — PROGRESS NOTES
Dr. Flores at bedside. Ordered to stop propofol, increase fentanyl to 750 and give a 200mcg IVP now.

## 2018-12-23 ENCOUNTER — APPOINTMENT (OUTPATIENT)
Dept: RADIOLOGY | Facility: MEDICAL CENTER | Age: 58
DRG: 224 | End: 2018-12-23
Attending: INTERNAL MEDICINE
Payer: COMMERCIAL

## 2018-12-23 ENCOUNTER — APPOINTMENT (OUTPATIENT)
Dept: CARDIOLOGY | Facility: MEDICAL CENTER | Age: 58
DRG: 224 | End: 2018-12-23
Attending: INTERNAL MEDICINE
Payer: COMMERCIAL

## 2018-12-23 PROBLEM — G93.1 ANOXIC BRAIN INJURY (HCC): Status: ACTIVE | Noted: 2018-12-23

## 2018-12-23 LAB
ACTION RANGE TRIGGERED IACRT: NO
ANION GAP SERPL CALC-SCNC: 5 MMOL/L (ref 0–11.9)
ANISOCYTOSIS BLD QL SMEAR: ABNORMAL
APTT PPP: 120.6 SEC (ref 24.7–36)
APTT PPP: 74.4 SEC (ref 24.7–36)
APTT PPP: 78.2 SEC (ref 24.7–36)
BACTERIA BRONCH AEROBE CULT: ABNORMAL
BACTERIA SPEC RESP CULT: ABNORMAL
BACTERIA SPEC RESP CULT: ABNORMAL
BASE EXCESS BLDA CALC-SCNC: -4 MMOL/L (ref -4–3)
BASOPHILS # BLD AUTO: 0.9 % (ref 0–1.8)
BASOPHILS # BLD: 0.07 K/UL (ref 0–0.12)
BODY TEMPERATURE: ABNORMAL DEGREES
BUN SERPL-MCNC: 30 MG/DL (ref 8–22)
CALCIUM SERPL-MCNC: 9.3 MG/DL (ref 8.5–10.5)
CHLORIDE SERPL-SCNC: 108 MMOL/L (ref 96–112)
CO2 BLDA-SCNC: 22 MMOL/L (ref 20–33)
CO2 SERPL-SCNC: 24 MMOL/L (ref 20–33)
CREAT SERPL-MCNC: 0.58 MG/DL (ref 0.5–1.4)
EOSINOPHIL # BLD AUTO: 0.07 K/UL (ref 0–0.51)
EOSINOPHIL NFR BLD: 0.8 % (ref 0–6.9)
ERYTHROCYTE [DISTWIDTH] IN BLOOD BY AUTOMATED COUNT: 50 FL (ref 35.9–50)
GLUCOSE SERPL-MCNC: 111 MG/DL (ref 65–99)
GRAM STN SPEC: ABNORMAL
GRAM STN SPEC: ABNORMAL
HCO3 BLDA-SCNC: 20.8 MMOL/L (ref 17–25)
HCT VFR BLD AUTO: 28.8 % (ref 42–52)
HGB BLD-MCNC: 8.8 G/DL (ref 14–18)
HOROWITZ INDEX BLDA+IHG-RTO: 225 MM[HG]
INST. QUALIFIED PATIENT IIQPT: YES
LV EJECT FRACT  99904: 60
LV EJECT FRACT MOD 2C 99903: 58.92
LV EJECT FRACT MOD 4C 99902: 59.77
LV EJECT FRACT MOD BP 99901: 60.35
LYMPHOCYTES # BLD AUTO: 0.5 K/UL (ref 1–4.8)
LYMPHOCYTES NFR BLD: 6 % (ref 22–41)
MAGNESIUM SERPL-MCNC: 2.5 MG/DL (ref 1.5–2.5)
MANUAL DIFF BLD: NORMAL
MCH RBC QN AUTO: 27.7 PG (ref 27–33)
MCHC RBC AUTO-ENTMCNC: 30.6 G/DL (ref 33.7–35.3)
MCV RBC AUTO: 90.6 FL (ref 81.4–97.8)
METAMYELOCYTES NFR BLD MANUAL: 0.9 %
MONOCYTES # BLD AUTO: 0.22 K/UL (ref 0–0.85)
MONOCYTES NFR BLD AUTO: 2.6 % (ref 0–13.4)
MORPHOLOGY BLD-IMP: NORMAL
NEUTROPHILS # BLD AUTO: 7.37 K/UL (ref 1.82–7.42)
NEUTROPHILS NFR BLD: 81.9 % (ref 44–72)
NEUTS BAND NFR BLD MANUAL: 6.9 % (ref 0–10)
NRBC # BLD AUTO: 0 K/UL
NRBC BLD-RTO: 0 /100 WBC
O2/TOTAL GAS SETTING VFR VENT: 40 %
OVALOCYTES BLD QL SMEAR: NORMAL
PCO2 BLDA: 37.6 MMHG (ref 26–37)
PCO2 TEMP ADJ BLDA: 36.9 MMHG (ref 26–37)
PH BLDA: 7.35 [PH] (ref 7.4–7.5)
PH TEMP ADJ BLDA: 7.36 [PH] (ref 7.4–7.5)
PHOSPHATE SERPL-MCNC: 2.6 MG/DL (ref 2.5–4.5)
PLATELET # BLD AUTO: 259 K/UL (ref 164–446)
PLATELET BLD QL SMEAR: NORMAL
PMV BLD AUTO: 10.3 FL (ref 9–12.9)
PO2 BLDA: 90 MMHG (ref 64–87)
PO2 TEMP ADJ BLDA: 88 MMHG (ref 64–87)
POIKILOCYTOSIS BLD QL SMEAR: NORMAL
POLYCHROMASIA BLD QL SMEAR: NORMAL
POTASSIUM SERPL-SCNC: 4 MMOL/L (ref 3.6–5.5)
RBC # BLD AUTO: 3.18 M/UL (ref 4.7–6.1)
RBC BLD AUTO: PRESENT
SAO2 % BLDA: 97 % (ref 93–99)
SIGNIFICANT IND 70042: ABNORMAL
SIGNIFICANT IND 70042: ABNORMAL
SITE SITE: ABNORMAL
SITE SITE: ABNORMAL
SODIUM SERPL-SCNC: 137 MMOL/L (ref 135–145)
SOURCE SOURCE: ABNORMAL
SOURCE SOURCE: ABNORMAL
SPECIMEN DRAWN FROM PATIENT: ABNORMAL
WBC # BLD AUTO: 8.3 K/UL (ref 4.8–10.8)

## 2018-12-23 PROCEDURE — 85027 COMPLETE CBC AUTOMATED: CPT

## 2018-12-23 PROCEDURE — 700111 HCHG RX REV CODE 636 W/ 250 OVERRIDE (IP): Performed by: INTERNAL MEDICINE

## 2018-12-23 PROCEDURE — 94690 O2 UPTK REST INDIRECT: CPT

## 2018-12-23 PROCEDURE — 71045 X-RAY EXAM CHEST 1 VIEW: CPT

## 2018-12-23 PROCEDURE — 85730 THROMBOPLASTIN TIME PARTIAL: CPT | Mod: 91

## 2018-12-23 PROCEDURE — 94003 VENT MGMT INPAT SUBQ DAY: CPT

## 2018-12-23 PROCEDURE — 99233 SBSQ HOSP IP/OBS HIGH 50: CPT | Performed by: INTERNAL MEDICINE

## 2018-12-23 PROCEDURE — 37799 UNLISTED PX VASCULAR SURGERY: CPT

## 2018-12-23 PROCEDURE — 93325 DOPPLER ECHO COLOR FLOW MAPG: CPT

## 2018-12-23 PROCEDURE — 99292 CRITICAL CARE ADDL 30 MIN: CPT | Performed by: INTERNAL MEDICINE

## 2018-12-23 PROCEDURE — 99291 CRITICAL CARE FIRST HOUR: CPT | Performed by: INTERNAL MEDICINE

## 2018-12-23 PROCEDURE — 74018 RADEX ABDOMEN 1 VIEW: CPT

## 2018-12-23 PROCEDURE — 700102 HCHG RX REV CODE 250 W/ 637 OVERRIDE(OP): Performed by: INTERNAL MEDICINE

## 2018-12-23 PROCEDURE — A9270 NON-COVERED ITEM OR SERVICE: HCPCS | Performed by: INTERNAL MEDICINE

## 2018-12-23 PROCEDURE — 80048 BASIC METABOLIC PNL TOTAL CA: CPT

## 2018-12-23 PROCEDURE — 93308 TTE F-UP OR LMTD: CPT | Mod: 26 | Performed by: INTERNAL MEDICINE

## 2018-12-23 PROCEDURE — 770022 HCHG ROOM/CARE - ICU (200)

## 2018-12-23 PROCEDURE — 99233 SBSQ HOSP IP/OBS HIGH 50: CPT | Performed by: HOSPITALIST

## 2018-12-23 PROCEDURE — 82803 BLOOD GASES ANY COMBINATION: CPT

## 2018-12-23 PROCEDURE — 93325 DOPPLER ECHO COLOR FLOW MAPG: CPT | Performed by: INTERNAL MEDICINE

## 2018-12-23 PROCEDURE — 700105 HCHG RX REV CODE 258: Performed by: INTERNAL MEDICINE

## 2018-12-23 PROCEDURE — 84100 ASSAY OF PHOSPHORUS: CPT

## 2018-12-23 PROCEDURE — 83735 ASSAY OF MAGNESIUM: CPT

## 2018-12-23 PROCEDURE — 93321 DOPPLER ECHO F-UP/LMTD STD: CPT | Performed by: INTERNAL MEDICINE

## 2018-12-23 PROCEDURE — 85007 BL SMEAR W/DIFF WBC COUNT: CPT

## 2018-12-23 RX ADMIN — FENTANYL CITRATE 200 MCG: 50 INJECTION, SOLUTION INTRAMUSCULAR; INTRAVENOUS at 17:10

## 2018-12-23 RX ADMIN — FENTANYL CITRATE 200 MCG: 50 INJECTION, SOLUTION INTRAMUSCULAR; INTRAVENOUS at 14:14

## 2018-12-23 RX ADMIN — STANDARDIZED SENNA CONCENTRATE AND DOCUSATE SODIUM 2 TABLET: 8.6; 5 TABLET, FILM COATED ORAL at 17:13

## 2018-12-23 RX ADMIN — FAMOTIDINE 20 MG: 10 INJECTION INTRAVENOUS at 06:14

## 2018-12-23 RX ADMIN — Medication 500 MCG/HR: at 23:20

## 2018-12-23 RX ADMIN — ATORVASTATIN CALCIUM 80 MG: 80 TABLET, FILM COATED ORAL at 17:13

## 2018-12-23 RX ADMIN — MEPERIDINE HYDROCHLORIDE 75 MG: 50 INJECTION INTRAMUSCULAR; INTRAVENOUS; SUBCUTANEOUS at 14:25

## 2018-12-23 RX ADMIN — Medication 300 MCG/HR: at 14:08

## 2018-12-23 RX ADMIN — AMPICILLIN SODIUM AND SULBACTAM SODIUM 3 G: 2; 1 INJECTION, POWDER, FOR SOLUTION INTRAMUSCULAR; INTRAVENOUS at 11:27

## 2018-12-23 RX ADMIN — ACETAMINOPHEN 650 MG: 325 TABLET, FILM COATED ORAL at 04:25

## 2018-12-23 RX ADMIN — POLYETHYLENE GLYCOL 3350 1 PACKET: 17 POWDER, FOR SOLUTION ORAL at 11:36

## 2018-12-23 RX ADMIN — FENTANYL CITRATE 100 MCG: 50 INJECTION, SOLUTION INTRAMUSCULAR; INTRAVENOUS at 14:15

## 2018-12-23 RX ADMIN — STANDARDIZED SENNA CONCENTRATE AND DOCUSATE SODIUM 2 TABLET: 8.6; 5 TABLET, FILM COATED ORAL at 06:14

## 2018-12-23 RX ADMIN — MEPERIDINE HYDROCHLORIDE 75 MG: 50 INJECTION INTRAMUSCULAR; INTRAVENOUS; SUBCUTANEOUS at 02:57

## 2018-12-23 RX ADMIN — MEPERIDINE HYDROCHLORIDE 75 MG: 50 INJECTION INTRAMUSCULAR; INTRAVENOUS; SUBCUTANEOUS at 23:01

## 2018-12-23 RX ADMIN — Medication 500 MCG/HR: at 18:45

## 2018-12-23 RX ADMIN — AMPICILLIN SODIUM AND SULBACTAM SODIUM 3 G: 2; 1 INJECTION, POWDER, FOR SOLUTION INTRAMUSCULAR; INTRAVENOUS at 17:13

## 2018-12-23 RX ADMIN — FENTANYL CITRATE 200 MCG: 50 INJECTION, SOLUTION INTRAMUSCULAR; INTRAVENOUS at 14:00

## 2018-12-23 RX ADMIN — MINERAL OIL AND WHITE PETROLATUM 1 APPLICATION: 150; 830 OINTMENT OPHTHALMIC at 22:00

## 2018-12-23 RX ADMIN — METOPROLOL TARTRATE 12.5 MG: 25 TABLET, FILM COATED ORAL at 11:43

## 2018-12-23 RX ADMIN — ACETAMINOPHEN 650 MG: 325 TABLET, FILM COATED ORAL at 14:25

## 2018-12-23 RX ADMIN — FENTANYL CITRATE 200 MCG: 50 INJECTION, SOLUTION INTRAMUSCULAR; INTRAVENOUS at 11:08

## 2018-12-23 RX ADMIN — HEPARIN SODIUM 1200 UNITS/HR: 5000 INJECTION, SOLUTION INTRAVENOUS at 09:19

## 2018-12-23 RX ADMIN — ACETAMINOPHEN 650 MG: 325 TABLET, FILM COATED ORAL at 18:27

## 2018-12-23 RX ADMIN — MINERAL OIL AND WHITE PETROLATUM 1 APPLICATION: 150; 830 OINTMENT OPHTHALMIC at 14:20

## 2018-12-23 RX ADMIN — Medication 700 MCG/HR: at 00:28

## 2018-12-23 RX ADMIN — METOPROLOL TARTRATE 12.5 MG: 25 TABLET, FILM COATED ORAL at 17:13

## 2018-12-23 RX ADMIN — MINERAL OIL AND WHITE PETROLATUM 1 APPLICATION: 150; 830 OINTMENT OPHTHALMIC at 06:00

## 2018-12-23 RX ADMIN — ACETAMINOPHEN 650 MG: 325 TABLET, FILM COATED ORAL at 08:42

## 2018-12-23 RX ADMIN — MEPERIDINE HYDROCHLORIDE 75 MG: 50 INJECTION INTRAMUSCULAR; INTRAVENOUS; SUBCUTANEOUS at 08:46

## 2018-12-23 RX ADMIN — FAMOTIDINE 20 MG: 20 TABLET ORAL at 17:13

## 2018-12-23 RX ADMIN — Medication 2500 MCG: at 04:14

## 2018-12-23 RX ADMIN — ACETAMINOPHEN 650 MG: 325 TABLET, FILM COATED ORAL at 23:02

## 2018-12-23 RX ADMIN — MEPERIDINE HYDROCHLORIDE 75 MG: 50 INJECTION INTRAMUSCULAR; INTRAVENOUS; SUBCUTANEOUS at 18:37

## 2018-12-23 ASSESSMENT — PAIN SCALES - GENERAL: PAINLEVEL_OUTOF10: 0

## 2018-12-23 NOTE — PROGRESS NOTES
Patient RASS +3 and blood pressure rising to the 200s. Unable to calm patient down. Administered 200mcg push of fentanyl, no affect seen. SBP 230s, updated Dr Flores. Per MD give 300mcg push of fentanyl and increase fentanyl gtt to 500mcg/hr. Patient calmed down RASS -1 and started shivering, demerol and tylenol administered.

## 2018-12-23 NOTE — PROGRESS NOTES
Monitor summary-sinus bradycardia, sinus rhythm, rate 55-90 with PVCs noted frequently.  VT=0.16/QRS=0.08/0.38

## 2018-12-23 NOTE — PROGRESS NOTES
[facility] Medicine Daily Progress Note    Date of Service  12/23/2018    Chief Complaint  Out of hospital cardiac arrest at home with [facility]    58 y.o. male with a history of hypertension and depression admitted 12/19/2018 with witness cardiac arrest for which the son started CPR until EMS arrived.  He was found to be in ventricular fibrillation for which he was shocked 3 times and loaded with amiodarone and converted to torsades for which he was shocked again.  He had multiple rounds of CPR and epinephrine.  He went into pulseless electrical activity and then eventually had spontaneous return of circulation.      Interval Problem Update  Opens eyes to name  Stood up with mobilization while on vent with three of us for balance.  Wife given updates  SBT as tolerates  Much secretions per RT      Consultants/Specialty  Cardiology  Critical Care    Code Status  FULL    Disposition  To remain in ICU    Review of Systems  Review of Systems   Unable to perform ROS: Intubated        Physical Exam  Temp:  [36 °C (96.8 °F)-36.8 °C (98.2 °F)] 36.2 °C (97.2 °F)  Pulse:  [] 70  Resp:  [13-31] 31    Physical Exam   Constitutional: He appears lethargic. He appears ill. No distress. He is sedated, intubated and restrained.   HENT:   Head: Normocephalic and atraumatic.   Eyes: Pupils are equal, round, and reactive to light. Conjunctivae and EOM are normal. Right eye exhibits no discharge. Left eye exhibits no discharge. No scleral icterus.   Neck: No tracheal deviation present.   Cardiovascular: Normal rate and regular rhythm.    No murmur heard.  Pulses:       Radial pulses are 2+ on the right side, and 2+ on the left side.        Dorsalis pedis pulses are 2+ on the right side, and 2+ on the left side.   Pulmonary/Chest: Effort normal. He is intubated. No respiratory distress. He has no wheezes. He has rales.   Abdominal: Soft. He exhibits no distension. There is no tenderness.   Musculoskeletal: He  exhibits no edema.   Neurological: He appears lethargic. No cranial nerve deficit. Coordination normal.   Skin: Skin is warm. He is not diaphoretic.   Psychiatric: He has a normal mood and affect.   Vitals reviewed.      Fluids    Intake/Output Summary (Last 24 hours) at 12/23/18 2147  Last data filed at 12/23/18 1800   Gross per 24 hour   Intake             1916 ml   Output              885 ml   Net             1031 ml       Laboratory  Recent Labs      12/20/18   2300  12/21/18   0440  12/23/18   0408   WBC  13.6*  11.6*  8.3   RBC  3.35*  3.37*  3.18*   HEMOGLOBIN  9.6*  9.4*  8.8*   HEMATOCRIT  29.4*  30.0*  28.8*   MCV  87.8  89.0  90.6   MCH  28.7  27.9  27.7   MCHC  32.7*  31.3*  30.6*   RDW  48.5  50.1*  50.0   PLATELETCT  293  291  259   MPV  9.6  10.1  10.3     Recent Labs      12/21/18   0440  12/22/18   0500  12/23/18   0408   SODIUM  137  140  137   POTASSIUM  4.1  4.5  4.0   CHLORIDE  113*  112  108   CO2  17*  21  24   GLUCOSE  128*  124*  111*   BUN  19  24*  30*   CREATININE  0.68  0.70  0.58   CALCIUM  7.9*  9.8  9.3     Recent Labs      12/20/18   2300  12/21/18   0440   12/23/18   0121  12/23/18   0920  12/23/18   1513   APTT  28.6  29.2   < >  120.6*  74.4*  78.2*   INR  1.25*  1.24*   --    --    --    --     < > = values in this interval not displayed.               Imaging  [id number] VIEW   Final Result         No specific finding to suggest small bowel obstruction.      EC-ECHOCARDIOGRAM LTD W/O CONT   Final Result      DX-CHEST-PORTABLE (1 VIEW)   Final Result         1.  Pulmonary edema and/or infiltrates are identified, which are stable since the prior exam.   2.  Right internal jugular central line terminates in the right atrium, could be withdrawn 3 cm.   3.  Cardiomegaly               DX-CHEST-PORTABLE (1 VIEW)   Final Result         1.  Mild pulmonary edema and/or infiltrates.   2.  Right internal jugular central line terminates in the right atrium, could be withdrawn 3 cm.   3.   Cardiomegaly            DX-ABDOMEN FOR TUBE PLACEMENT   Final Result      Feeding tube placement with the tip projecting over the distal stomach or proximal duodenum      DX-CHEST-PORTABLE (1 VIEW)   Final Result      1.  Stable cardiomegaly      2.  Worsening pulmonary vascular congestion and interstitial edema.      3.  Bibasilar opacities may be related to atelectasis. Developing pneumonia could have a similar appearance.      DX-CHEST-PORTABLE (1 VIEW)   Final Result         1.  Mild pulmonary edema and/or infiltrates.   2.  Right internal jugular central line terminates in the right atrium, could be withdrawn 3 cm.   3.  Cardiomegaly         EC-ECHOCARDIOGRAM COMPLETE W/O CONT   Final Result      DX-CHEST-LIMITED (1 VIEW)   Final Result         1.  No acute cardiopulmonary disease.   2.  Right internal jugular central line terminates in the right atrium, could be withdrawn 3 cm.   3.  Cardiomegaly      CT-CTA CHEST PULMONARY ARTERY W/ RECONS   Final Result         1.  No large central pulmonary embolus is appreciated, evaluation of the subsegmental branches is essentially nondiagnostic due to motion artifacts. Additional imaging would be required for definitive exclusion of small distal pulmonary emboli.   2.  Hazy groundglass pulmonary opacities, predominantly on the right, appearance suggests atypical edema or infiltrates.   3.  Anterior bilateral second through sixth rib fractures   4.  Cardiomegaly   5.  Left nephrolithiasis   6.  Atherosclerosis and atherosclerotic coronary artery disease.      CT-HEAD W/O   Final Result         1.  No acute intracranial abnormality.   2.  Atherosclerosis.      DX-CHEST-PORTABLE (1 VIEW)   Final Result         1.  No acute cardiopulmonary disease.   2.  Cardiomegaly      DX-CHEST-PORTABLE (1 VIEW)    (Results Pending)   DX-CHEST-PORTABLE (1 VIEW)    (Results Pending)        Assessment/Plan  Ventricular fibrillation (HCC)- (present on admission)   Assessment & Plan     Status post cardioversion  Cardiology consulting  Monitor on telemetry  Correct electrolyte abnormalities     Acute respiratory failure with hypoxia (HCC)- (present on admission)   Assessment & Plan    On ventilator  Minimize sedation as able  Monitor ABG, chest x-ray, labs, strict I's and O's, and vitals  Extubated and reintubated 12/21  Critical care consulting     Cardiac arrest (HCC)- (present on admission)   Assessment & Plan    CTA negative for PE  Cardiology consulting  I have discussed with cardiologist 12/21 consideration of cardiac catheterization as patient appears to be waking up and following commands and unclear etiology of cardiac arrest.  I discussed with Dr. Moreno and patient was originally placed on Cath Lab schedule however due to reintubation this is been postponed.  Monitor on telemetry  Aspirin, atorvastatin  pressor support as needed with Levophed to keep map greater than 65 and systolic blood pressure greater than 90  Echocardiogram showing EF 45%  Evaluate neuro status for any signs of anoxic brain injury.  On 12/21 patient appear to be following commands and appropriate.       Lactic acidosis- (present on admission)   Assessment & Plan    12/19 lactic acid: 8.9  Downtrending with 12/20 lactic acid: 2.6     Hypokalemia   Assessment & Plan    Repleted and monitoring     Metabolic acidosis- (present on admission)   Assessment & Plan    IV fluid hydration with normal saline  Monitor BMP     Elevated liver enzymes   Assessment & Plan    Question of hypotensive event/cardiac arrest prior to admission  Follow-up on hepatic function panel     Hyperphosphatemia- (present on admission)   Assessment & Plan    Monitor     Anemia- (present on admission)   Assessment & Plan    Normocytic normochromic  Monitor CBC  No obvious sign of bleeding  12/23 HGb:8.8  12/20 HGb:9.6     Leukocytosis- (present on admission)   Assessment & Plan    12/19 WBC: 12.8  12/20 WBC: 14.1  12/21 WBC: 11.6  Watch for signs of  infection     Closed fracture of multiple ribs of both sides- (present on admission)   Assessment & Plan    Secondary to CPR  Pain management          VTE prophylaxis: heparin

## 2018-12-23 NOTE — PROGRESS NOTES
Cardiology Follow Up Progress Note    Date of Service  12/23/2018    Attending Physician  Yaron Magallanes M.D.    Chief Complaint   Cardiac arrest    HPI  Amadou Nicholas is a 58 y.o. male admitted 12/19/2018 post cardiac arrest.    Interim Events  Patient remains intubated.  No acute events overnight.    Review of Systems  Review of Systems   Unable to perform ROS: Intubated     Vital signs in last 24 hours  Temp:  [36 °C (96.8 °F)-36.9 °C (98.4 °F)] 36.4 °C (97.5 °F)  Pulse:  [57-98] 65  Resp:  [11-34] 27    Physical Exam  Physical Exam   Constitutional:   Intubated.    HENT:   Head: Normocephalic and atraumatic.   Neck: Neck supple.   Cardiovascular: Normal rate and regular rhythm.  Exam reveals no gallop and no friction rub.    Murmur heard.   Systolic murmur is present with a grade of 2/6   Holosystolic murmur appreciated throughout.   Pulmonary/Chest: He has no wheezes. He has no rales.   Abdominal: Soft. He exhibits no distension.   Musculoskeletal: He exhibits no edema.   Neurological:   intubated   Skin: Skin is warm.   Psychiatric:   intubated   Nursing note and vitals reviewed.    Lab Review  Lab Results   Component Value Date/Time    WBC 8.3 12/23/2018 04:08 AM    RBC 3.18 (L) 12/23/2018 04:08 AM    HEMOGLOBIN 8.8 (L) 12/23/2018 04:08 AM    HEMATOCRIT 28.8 (L) 12/23/2018 04:08 AM    MCV 90.6 12/23/2018 04:08 AM    MCH 27.7 12/23/2018 04:08 AM    MCHC 30.6 (L) 12/23/2018 04:08 AM    MPV 10.3 12/23/2018 04:08 AM      Lab Results   Component Value Date/Time    SODIUM 137 12/23/2018 04:08 AM    POTASSIUM 4.0 12/23/2018 04:08 AM    CHLORIDE 108 12/23/2018 04:08 AM    CO2 24 12/23/2018 04:08 AM    GLUCOSE 111 (H) 12/23/2018 04:08 AM    BUN 30 (H) 12/23/2018 04:08 AM    CREATININE 0.58 12/23/2018 04:08 AM      Lab Results   Component Value Date/Time    ASTSGOT 121 (H) 12/21/2018 04:40 AM    ALTSGPT 225 (H) 12/21/2018 04:40 AM     Lab Results   Component Value Date/Time    TROPONINI 6.83 (H) 12/20/2018 05:15 AM        Labs reviewed.  Stable creatinine.  Hemoglobin trending downward.    Cardiac Imaging and Procedures Review  Telemetry reviewed.  Sinus rhythm.  No sustained events.      Assessment/Plan    Systolic murmur: New today.  An urgent echocardiogram has been ordered for further evaluation for valvular pathology.  Patient also had significant LVH on his echocardiogram on presentation.  We will need to evaluate for LVOT obstruction.    Status post cardiac arrest:  Elevated troponin:  Mild cardiomyopathy:  Patient continues to be intubated.  Coronary angiogram held for now given his respiratory status.  Once the patient has been extubated, will reevaluate for coronary angiogram.  In the meantime continue heparin drip.  Continue aspirin and statin.  Patient is not fluid overloaded on exam.    Will follow.     Ilene Moreno M.D.   Cardiologist, [facility]  [phone]    ADDENDUM:  Echocardiogram performed today was personally reviewed and per my interpretation shows LV outflow tract obstruction with a peak gradient of 64 mmHg.  Systolic anterior motion of the mitral valve is noted as well.  There is a concern for possible echodensity attached to the anterior mitral valve leaflet however this could be artifact.     We will consider repeat echocardiogram in about 2-3 days for reevaluation of this echodensity.  Start metoprolol 12.5 mg twice daily and titrate as tolerated for the LVOT obstruction.   Avoid diuretics.  Avoid beta agonists.    Will follow.     Ilene Moreno MD  Cardiologist  [facility]

## 2018-12-24 ENCOUNTER — APPOINTMENT (OUTPATIENT)
Dept: RADIOLOGY | Facility: MEDICAL CENTER | Age: 58
DRG: 224 | End: 2018-12-24
Attending: INTERNAL MEDICINE
Payer: COMMERCIAL

## 2018-12-24 PROBLEM — R50.9 FEVER: Status: ACTIVE | Noted: 2018-12-24

## 2018-12-24 LAB
ACTION RANGE TRIGGERED IACRT: YES
ALBUMIN SERPL BCP-MCNC: 2.9 G/DL (ref 3.2–4.9)
ALBUMIN/GLOB SERPL: 0.9 G/DL
ALP SERPL-CCNC: 60 U/L (ref 30–99)
ALT SERPL-CCNC: 85 U/L (ref 2–50)
ANION GAP SERPL CALC-SCNC: 10 MMOL/L (ref 0–11.9)
ANISOCYTOSIS BLD QL SMEAR: ABNORMAL
APTT PPP: 59 SEC (ref 24.7–36)
AST SERPL-CCNC: 45 U/L (ref 12–45)
BASE EXCESS BLDA CALC-SCNC: -3 MMOL/L (ref -4–3)
BASOPHILS # BLD AUTO: 0 % (ref 0–1.8)
BASOPHILS # BLD: 0 K/UL (ref 0–0.12)
BILIRUB SERPL-MCNC: 0.6 MG/DL (ref 0.1–1.5)
BODY TEMPERATURE: ABNORMAL DEGREES
BUN SERPL-MCNC: 33 MG/DL (ref 8–22)
CALCIUM SERPL-MCNC: 9.4 MG/DL (ref 8.5–10.5)
CHLORIDE SERPL-SCNC: 105 MMOL/L (ref 96–112)
CO2 BLDA-SCNC: 25 MMOL/L (ref 20–33)
CO2 SERPL-SCNC: 23 MMOL/L (ref 20–33)
CREAT SERPL-MCNC: 0.54 MG/DL (ref 0.5–1.4)
DACRYOCYTES BLD QL SMEAR: NORMAL
EOSINOPHIL # BLD AUTO: 0.15 K/UL (ref 0–0.51)
EOSINOPHIL NFR BLD: 1.7 % (ref 0–6.9)
ERYTHROCYTE [DISTWIDTH] IN BLOOD BY AUTOMATED COUNT: 48 FL (ref 35.9–50)
GLOBULIN SER CALC-MCNC: 3.2 G/DL (ref 1.9–3.5)
GLUCOSE SERPL-MCNC: 99 MG/DL (ref 65–99)
HCO3 BLDA-SCNC: 23.4 MMOL/L (ref 17–25)
HCT VFR BLD AUTO: 27.1 % (ref 42–52)
HGB BLD-MCNC: 8.4 G/DL (ref 14–18)
HOROWITZ INDEX BLDA+IHG-RTO: 163 MM[HG]
INST. QUALIFIED PATIENT IIQPT: YES
LG PLATELETS BLD QL SMEAR: NORMAL
LYMPHOCYTES # BLD AUTO: 0.71 K/UL (ref 1–4.8)
LYMPHOCYTES NFR BLD: 7.8 % (ref 22–41)
MANUAL DIFF BLD: ABNORMAL
MCH RBC QN AUTO: 27.7 PG (ref 27–33)
MCHC RBC AUTO-ENTMCNC: 31 G/DL (ref 33.7–35.3)
MCV RBC AUTO: 89.4 FL (ref 81.4–97.8)
METAMYELOCYTES NFR BLD MANUAL: 0.9 %
MONOCYTES # BLD AUTO: 0.64 K/UL (ref 0–0.85)
MONOCYTES NFR BLD AUTO: 7 % (ref 0–13.4)
MORPHOLOGY BLD-IMP: NORMAL
MYELOCYTES NFR BLD MANUAL: 0.9 %
NEUTROPHILS # BLD AUTO: 7.35 K/UL (ref 1.82–7.42)
NEUTROPHILS NFR BLD: 33 % (ref 44–72)
NEUTS BAND NFR BLD MANUAL: 47.8 % (ref 0–10)
NRBC # BLD AUTO: 0.03 K/UL
NRBC BLD-RTO: 0.3 /100 WBC
O2/TOTAL GAS SETTING VFR VENT: 40 %
OVALOCYTES BLD QL SMEAR: NORMAL
PCO2 BLDA: 50 MMHG (ref 26–37)
PCO2 TEMP ADJ BLDA: 49 MMHG (ref 26–37)
PH BLDA: 7.28 [PH] (ref 7.4–7.5)
PH TEMP ADJ BLDA: 7.29 [PH] (ref 7.4–7.5)
PLATELET # BLD AUTO: 270 K/UL (ref 164–446)
PLATELET BLD QL SMEAR: NORMAL
PMV BLD AUTO: 10.2 FL (ref 9–12.9)
PO2 BLDA: 65 MMHG (ref 64–87)
PO2 TEMP ADJ BLDA: 62 MMHG (ref 64–87)
POIKILOCYTOSIS BLD QL SMEAR: NORMAL
POLYCHROMASIA BLD QL SMEAR: NORMAL
POTASSIUM SERPL-SCNC: 4 MMOL/L (ref 3.6–5.5)
PROMYELOCYTES NFR BLD MANUAL: 0.9 %
PROT SERPL-MCNC: 6.1 G/DL (ref 6–8.2)
RBC # BLD AUTO: 3.03 M/UL (ref 4.7–6.1)
RBC BLD AUTO: PRESENT
SAO2 % BLDA: 89 % (ref 93–99)
SODIUM SERPL-SCNC: 138 MMOL/L (ref 135–145)
SPECIMEN DRAWN FROM PATIENT: ABNORMAL
WBC # BLD AUTO: 9.1 K/UL (ref 4.8–10.8)

## 2018-12-24 PROCEDURE — 99233 SBSQ HOSP IP/OBS HIGH 50: CPT | Performed by: INTERNAL MEDICINE

## 2018-12-24 PROCEDURE — A9270 NON-COVERED ITEM OR SERVICE: HCPCS | Performed by: INTERNAL MEDICINE

## 2018-12-24 PROCEDURE — 80053 COMPREHEN METABOLIC PANEL: CPT

## 2018-12-24 PROCEDURE — 37799 UNLISTED PX VASCULAR SURGERY: CPT

## 2018-12-24 PROCEDURE — 700111 HCHG RX REV CODE 636 W/ 250 OVERRIDE (IP): Performed by: HOSPITALIST

## 2018-12-24 PROCEDURE — 700102 HCHG RX REV CODE 250 W/ 637 OVERRIDE(OP): Performed by: HOSPITALIST

## 2018-12-24 PROCEDURE — 700111 HCHG RX REV CODE 636 W/ 250 OVERRIDE (IP): Performed by: INTERNAL MEDICINE

## 2018-12-24 PROCEDURE — A9270 NON-COVERED ITEM OR SERVICE: HCPCS | Performed by: HOSPITALIST

## 2018-12-24 PROCEDURE — 85007 BL SMEAR W/DIFF WBC COUNT: CPT

## 2018-12-24 PROCEDURE — 700102 HCHG RX REV CODE 250 W/ 637 OVERRIDE(OP): Performed by: INTERNAL MEDICINE

## 2018-12-24 PROCEDURE — 85027 COMPLETE CBC AUTOMATED: CPT

## 2018-12-24 PROCEDURE — 700105 HCHG RX REV CODE 258: Performed by: INTERNAL MEDICINE

## 2018-12-24 PROCEDURE — 94003 VENT MGMT INPAT SUBQ DAY: CPT

## 2018-12-24 PROCEDURE — 71045 X-RAY EXAM CHEST 1 VIEW: CPT

## 2018-12-24 PROCEDURE — 770022 HCHG ROOM/CARE - ICU (200)

## 2018-12-24 PROCEDURE — 82803 BLOOD GASES ANY COMBINATION: CPT

## 2018-12-24 PROCEDURE — 700105 HCHG RX REV CODE 258: Performed by: HOSPITALIST

## 2018-12-24 PROCEDURE — 700105 HCHG RX REV CODE 258

## 2018-12-24 PROCEDURE — 99291 CRITICAL CARE FIRST HOUR: CPT | Performed by: INTERNAL MEDICINE

## 2018-12-24 PROCEDURE — 99233 SBSQ HOSP IP/OBS HIGH 50: CPT | Performed by: HOSPITALIST

## 2018-12-24 PROCEDURE — 85730 THROMBOPLASTIN TIME PARTIAL: CPT

## 2018-12-24 RX ORDER — SODIUM CHLORIDE 9 MG/ML
INJECTION, SOLUTION INTRAVENOUS
Status: COMPLETED
Start: 2018-12-24 | End: 2018-12-24

## 2018-12-24 RX ORDER — ACETAMINOPHEN 325 MG/1
650 TABLET ORAL EVERY 6 HOURS PRN
Status: DISCONTINUED | OUTPATIENT
Start: 2018-12-24 | End: 2018-12-25

## 2018-12-24 RX ADMIN — ACETAMINOPHEN 650 MG: 325 TABLET, FILM COATED ORAL at 08:41

## 2018-12-24 RX ADMIN — ACETAMINOPHEN 650 MG: 325 TABLET, FILM COATED ORAL at 23:45

## 2018-12-24 RX ADMIN — METOPROLOL TARTRATE 12.5 MG: 25 TABLET, FILM COATED ORAL at 06:35

## 2018-12-24 RX ADMIN — AMPICILLIN SODIUM AND SULBACTAM SODIUM 3 G: 2; 1 INJECTION, POWDER, FOR SOLUTION INTRAMUSCULAR; INTRAVENOUS at 11:40

## 2018-12-24 RX ADMIN — ACETAMINOPHEN 650 MG: 325 TABLET, FILM COATED ORAL at 04:14

## 2018-12-24 RX ADMIN — MINERAL OIL AND WHITE PETROLATUM 1 APPLICATION: 150; 830 OINTMENT OPHTHALMIC at 06:00

## 2018-12-24 RX ADMIN — PROPOFOL 10 MCG/KG/MIN: 10 INJECTION, EMULSION INTRAVENOUS at 19:41

## 2018-12-24 RX ADMIN — AMPICILLIN SODIUM AND SULBACTAM SODIUM 3 G: 2; 1 INJECTION, POWDER, FOR SOLUTION INTRAMUSCULAR; INTRAVENOUS at 17:17

## 2018-12-24 RX ADMIN — FAMOTIDINE 20 MG: 10 INJECTION INTRAVENOUS at 06:36

## 2018-12-24 RX ADMIN — HEPARIN SODIUM 1200 UNITS/HR: 5000 INJECTION, SOLUTION INTRAVENOUS at 08:16

## 2018-12-24 RX ADMIN — AMPICILLIN SODIUM AND SULBACTAM SODIUM 3 G: 2; 1 INJECTION, POWDER, FOR SOLUTION INTRAMUSCULAR; INTRAVENOUS at 06:35

## 2018-12-24 RX ADMIN — AMPICILLIN SODIUM AND SULBACTAM SODIUM 3 G: 2; 1 INJECTION, POWDER, FOR SOLUTION INTRAMUSCULAR; INTRAVENOUS at 23:29

## 2018-12-24 RX ADMIN — Medication 300 MCG/HR: at 09:39

## 2018-12-24 RX ADMIN — MEPERIDINE HYDROCHLORIDE 75 MG: 50 INJECTION INTRAMUSCULAR; INTRAVENOUS; SUBCUTANEOUS at 04:14

## 2018-12-24 RX ADMIN — ACETAMINOPHEN 650 MG: 325 TABLET, FILM COATED ORAL at 14:55

## 2018-12-24 RX ADMIN — FAMOTIDINE 20 MG: 20 TABLET ORAL at 17:17

## 2018-12-24 RX ADMIN — STANDARDIZED SENNA CONCENTRATE AND DOCUSATE SODIUM 2 TABLET: 8.6; 5 TABLET, FILM COATED ORAL at 17:18

## 2018-12-24 RX ADMIN — AMPICILLIN SODIUM AND SULBACTAM SODIUM 3 G: 2; 1 INJECTION, POWDER, FOR SOLUTION INTRAMUSCULAR; INTRAVENOUS at 00:00

## 2018-12-24 RX ADMIN — MAGNESIUM HYDROXIDE 30 ML: 400 SUSPENSION ORAL at 12:28

## 2018-12-24 RX ADMIN — PROPOFOL 20 MCG/KG/MIN: 10 INJECTION, EMULSION INTRAVENOUS at 08:47

## 2018-12-24 RX ADMIN — METOPROLOL TARTRATE 12.5 MG: 25 TABLET, FILM COATED ORAL at 17:18

## 2018-12-24 RX ADMIN — Medication 500 MCG/HR: at 04:11

## 2018-12-24 RX ADMIN — ATORVASTATIN CALCIUM 80 MG: 80 TABLET, FILM COATED ORAL at 17:18

## 2018-12-24 RX ADMIN — MEPERIDINE HYDROCHLORIDE 75 MG: 50 INJECTION INTRAMUSCULAR; INTRAVENOUS; SUBCUTANEOUS at 09:15

## 2018-12-24 RX ADMIN — SODIUM CHLORIDE 500 ML: 9 INJECTION, SOLUTION INTRAVENOUS at 23:45

## 2018-12-24 RX ADMIN — STANDARDIZED SENNA CONCENTRATE AND DOCUSATE SODIUM 2 TABLET: 8.6; 5 TABLET, FILM COATED ORAL at 06:36

## 2018-12-24 NOTE — CARE PLAN
Problem: Bowel/Gastric:  Goal: Normal bowel function is maintained or improved  Outcome: PROGRESSING SLOWER THAN EXPECTED  Receiving stool softeners to encourage bowel movements.

## 2018-12-24 NOTE — PALLIATIVE CARE
Palliative Care follow-up  PC RN left detailed vm message again for pt's spouse, Marianne - [phone] to set up a PC consult time. No return call as of yet.        Updated: ADRIAN RN, Palliative    Plan: TBD.     Thank you for allowing Palliative Care to participate in this patient's care. Please feel free to call [phone] with any questions or concerns.

## 2018-12-24 NOTE — PROGRESS NOTES
Critical Care Progress Note    Date of admission  12/19/2018    Chief Complaint  58 y.o. male admitted 12/19/2018 s/p cardiopulmonary arrest with prolonged CPR in the field  Hospital Course  TTM started in ED and continued in ICU    Interval Problem Updates  Past 24 hours  Shaking chills but kept cool with [place]  Shivers like related to [place]  CXR clearing, lines and tubes in good position  Follows verbal commands   Fentanyl infusion at 500 to RASS minus 1  Antibiotics started to treat possible aspiration pnuemonia (based on the bronch results  Sat on side of bed today and stood in room  Acts appropriately  Neuro exam grossly intact  Apneic during SBT (on high dose narcotics)      Prior 24 hours  Periodic desats  PEEP 10, AC, Pplat < 28  Afebrile  Bronch cultures pending    2 days ago  Woke up this am off sedation  Followed commands with all 4  Good cough  Passed SBT  Extubated  1-2 hours post extubation had gurgling respirations with inability to clear  Reintubuted by me uneventfully but copious secretions  Bronch by Dr Vargas post-intubation  Cooling finished last night  Serial exams today by me  Taken by cardiology to cath lab but returned no cath   Family updated multiple times    Prior 24 hours  Mechan vent ARDS net settings  Levophed  Target temp management  Replete lytes  Serial neuro exams  Urine OK:      Review of Systems  Review of Systems   Unable to perform ROS: Intubated        Vital Signs for last 24 hours   Temp:  [36 °C (96.8 °F)-36.9 °C (98.4 °F)] 36.8 °C (98.2 °F)  Pulse:  [] 63  Resp:  [11-31] 24    Hemodynamic parameters for last 24 hours  CVP:  [16 MM HG-21 MM HG] 18 MM HG    Respiratory  Brooks Vent Mode: APVCMV  Rate (breaths/min): 30  Vt Target (mL): 350  PEEP/CPAP: 10  FiO2: 50  P MEAN: 17  Length of Weaning Trial (Hours): 0  Control VTE (exp VT): 383    Physical Exam   Physical Exam   Constitutional: He appears well-developed and well-nourished.   HENT:   Head:  Normocephalic and atraumatic.   Eyes: Pupils are equal, round, and reactive to light. EOM are normal.   Sluggish but on IV narcotics   Neck: Normal range of motion.   Cardiovascular: Normal rate and regular rhythm.    Pulmonary/Chest: Breath sounds normal. He is in respiratory distress.   Gurgling respirations post extubation   Abdominal: Soft. Bowel sounds are normal.   Musculoskeletal: Normal range of motion.   Neurological: He is alert.   Appropriate, oriented X 2, modest cough, did not handle secretions adequately post extubation   Skin: Skin is warm and dry.   TTM tempt 35.5   Psychiatric: He has a normal mood and affect.       Medications  Current Facility-Administered Medications   Medication Dose Route Frequency Provider Last Rate Last Dose   • ampicillin/sulbactam (UNASYN) 3 g in  mL IVPB  3 g Intravenous Q6HRS Bradley Flores M.D. 200 mL/hr at 12/23/18 1713 3 g at 12/23/18 1713   • metoprolol (LOPRESSOR) tablet 12.5 mg  12.5 mg Oral TWICE DAILY Ilene ANTONIO Moreno   12.5 mg at 12/23/18 1713   • heparin injection 3,200 Units  3,200 Units Intravenous PRN Ilene ANTONIO Moreno   3,200 Units at 12/22/18 1911    And   • heparin infusion 25,000 units in 500 ml 0.45% nacl   Intravenous Continuous Ilene ANTONIO Moreno 24 mL/hr at 12/23/18 1535 1,200 Units/hr at 12/23/18 1535   • meperidine (DEMEROL) injection 75 mg  75 mg Intravenous Q4HRS PRN Jeremy M Gonda, M.D.   75 mg at 12/23/18 1425   • acetaminophen (TYLENOL) tablet 650 mg  650 mg Oral Q4HRS PRN Jeremy M Gonda, M.D.   650 mg at 12/23/18 1425   • Respiratory Care per Protocol   Nebulization Continuous RT Bradley Flores M.D.       • MD Alert...ICU Electrolyte Replacement per Pharmacy   Other pharmacy to dose Bradley Flores M.D.       • Pharmacy Consult: Enteral tube feeding - review meds/change route/product selection   Other PRN Bradley Flores M.D.       • aspirin (ASA) tablet 325 mg  325 mg Feeding Tube DAILY Yaron Magallanes M.D.   Stopped at 12/23/18  0600    Or   • aspirin (ASA) chewable tab 324 mg  324 mg Per NG Tube DAILY Yaron Magallanes M.D.        Or   • aspirin (ASA) suppository 300 mg  300 mg Rectal DAILY Yaron Magallanes M.D.       • atorvastatin (LIPITOR) tablet 80 mg  80 mg Per NG Tube Q EVENING Yaron Magallanes M.D.   80 mg at 12/23/18 1713   • famotidine (PEPCID) tablet 20 mg  20 mg Feeding Tube Q12HRS Yaron Magallanes M.D.   20 mg at 12/23/18 1713    Or   • famotidine (PEPCID) injection 20 mg  20 mg Intravenous Q12HRS Yaron Magallanes M.D.   20 mg at 12/23/18 0614   • senna-docusate (PERICOLACE or SENOKOT S) 8.6-50 MG per tablet 2 Tab  2 Tab Feeding Tube BID Yaron Magallanes M.D.   2 Tab at 12/23/18 1713    And   • polyethylene glycol/lytes (MIRALAX) PACKET 1 Packet  1 Packet Feeding Tube QDAY PRN Yaron Magallanes M.D.   1 Packet at 12/23/18 1136    And   • magnesium hydroxide (MILK OF MAGNESIA) suspension 30 mL  30 mL Feeding Tube QDAY PRN Yaron Magallanes M.D.        And   • bisacodyl (DULCOLAX) suppository 10 mg  10 mg Rectal QDAY PRN Yaron Magallanes M.D.       • ipratropium-albuterol (DUONEB) nebulizer solution  3 mL Nebulization Q2HRS PRN (RT) Jeremy M Gonda, M.D.       • dextrose 50% (D50W) injection 25-50 mL  12.5-25 g Intravenous PRN Jeremy M Gonda, M.D.       • fentanyl 50 mcg/mL infusion   Intravenous Continuous Bradley Flores M.D. 10 mL/hr at 12/23/18 1410 500 mcg/hr at 12/23/18 1410   • fentaNYL (SUBLIMAZE) injection 50 mcg  50 mcg Intravenous Q30 MIN PRN Bradley Flores M.D.        Or   • fentaNYL (SUBLIMAZE) injection 100 mcg  100 mcg Intravenous Q30 MIN PRN Bradley Flores M.D.   100 mcg at 12/23/18 1415    Or   • fentaNYL (SUBLIMAZE) injection 200 mcg  200 mcg Intravenous Q30 MIN PRN Bradley Flores M.D.   200 mcg at 12/23/18 1710   • propofol (DIPRIVAN) injection  0-80 mcg/kg/min Intravenous Continuous Bradley Flores M.D.   Stopped at 12/22/18 0550   • artificial tear ointment (REFRESH,LACRI-LUBE) 1 Application  1 Application Both Eyes Q8HRS Yevgeniy BARBER  ANTONIO Hawkins   1 Application at 12/23/18 1420   • norepinephrine (LEVOPHED) 8 mg in  mL Infusion  0-30 mcg/min Intravenous Continuous Yevgeniy Hawkins M.D.   Stopped at 12/22/18 1500       Fluids    Intake/Output Summary (Last 24 hours) at 12/23/18 1742  Last data filed at 12/23/18 1600   Gross per 24 hour   Intake             1869 ml   Output              970 ml   Net              899 ml       Laboratory  Recent Labs      12/22/18   0406  12/22/18   0838  12/23/18   0405   ISTATAPH  7.245*  7.394*  7.351*   ISTATAPCO2  52.0*  35.3  37.6*   ISTATAPO2  147*  106*  90*   ISTATATCO2  24  23  22   [id number]  99  98  97   ISTATARTHCO3  22.5  21.6  20.8   ISTATARTBE  -5*  -3  -4   ISTATTEMP  36.5 C  36.5 C  36.6 C   ISTATFIO2  60  50  40   ISTATSPEC  Arterial  Arterial  Arterial   ISTATAPHTC  7.252*  7.401  7.357*   [id number]  144*  103*  88*         Recent Labs      12/21/18   0440  12/22/18   0500  12/23/18   0408   SODIUM  137  140  137   POTASSIUM  4.1  4.5  4.0   CHLORIDE  113*  112  108   CO2  17*  21  24   BUN  19  24*  30*   CREATININE  0.68  0.70  0.58   MAGNESIUM  3.4*  2.3  2.5   PHOSPHORUS  2.9  4.3  2.6   CALCIUM  7.9*  9.8  9.3     Recent Labs      12/21/18   0440  12/22/18   0500  12/23/18   0408   ALTSGPT  225*   --    --    ASTSGOT  121*   --    --    ALKPHOSPHAT  52   --    --    TBILIRUBIN  0.6   --    --    GLUCOSE  128*  124*  111*     Recent Labs      12/20/18   2300  12/21/18   0440  12/23/18   0408   WBC  13.6*  11.6*  8.3   NEUTSPOLYS   --   84.80*  81.90*   LYMPHOCYTES   --   9.90*  6.00*   MONOCYTES   --   4.20  2.60   EOSINOPHILS   --   0.50  0.80   BASOPHILS   --   0.30  0.90   ASTSGOT   --   121*   --    ALTSGPT   --   225*   --    ALKPHOSPHAT   --   52   --    TBILIRUBIN   --   0.6   --      Recent Labs      12/20/18   2300  12/21/18   0440   12/23/18   0121  12/23/18   0408  12/23/18   0920  12/23/18   1513   RBC  3.35*  3.37*   --    --   3.18*   --    --    HEMOGLOBIN   9.6*  9.4*   --    --   8.8*   --    --    HEMATOCRIT  29.4*  30.0*   --    --   28.8*   --    --    PLATELETCT  293  291   --    --   259   --    --    PROTHROMBTM  15.8*  15.7*   --    --    --    --    --    APTT  28.6  29.2   < >  120.6*   --   74.4*  78.2*   INR  1.25*  1.24*   --    --    --    --    --     < > = values in this interval not displayed.       Imaging  X-Ray:  I have personally reviewed the images and compared with prior images.    Assessment/Plan  Respiratory failure with hypoxia (HCC)   Assessment & Plan    Acute hypoxic respiratory failure secondary to cardio-pulmonary arrest possibly complicated by aspiration pneumonia with mixed infection by bronch results. Antibiotics for 7 days  Daily SBT  Recommend that pt be awake, alert, vigorous prior to next trial of extubation. Pt failed trial of extubation 2 days ago after easily passing SBT. Pt could not handle his secretions.     Ventricular fibrillation (HCC)- (present on admission)   Assessment & Plan    Primary arrhythmia per EMS status post defibrillation  Continuous telemetry with initiation of amiodarone should it recur  Optimize electrolytes  Coronary evaluation pending    Because of potential for bad brain (no bystander CPR, long duration CPR by medics, poor neuro status on presentation) will defer on invasive cardiac w/u for now    Cardiology brought pt to cath lab but decided to defer cath for now    No arrhythmia noted during ICU stay. Cardiology notes will re-evaluate for cath several days post-extubation    No cardiac events since admission, on heparin infusion per cardiology. Cardiology did not state during of heparin treatment that they would like to use.     Acute respiratory failure with hypoxia (HCC)- (present on admission)   Assessment & Plan    Intubated in field 12/19  Continue full mechanical ventilatory support, increased respiratory rate to 22  Extubated but reintubated for pulmonary toilet    Will use fentanyl alone to  RASS minus one to plus one with daily sedation holidays       Cardiac arrest (HCC)- (present on admission)   Assessment & Plan    Unknown etiology status post return of spontaneous circulation with prolonged downtime  Supportive care  Stat echocardiography  Cardiology consultation  Holding off on PCI evaluation in the setting of anoxic brain injury and need for ongoing resuscitation      Weakly follows commands today with all four despite high dose fentanyl infusion  Will actively decrease fentanyl inf     Lactic acidosis- (present on admission)   Assessment & Plan    Secondary to shock/cardiac arrest  Trend with resuscitation attempts    Clinically resoloved     Coagulopathy (HCC)- (present on admission)   Assessment & Plan    Secondary to cardiac arrest, possible DIC  Monitor platelet count, check fibrinogen, monitor for bleeding    No evidence bleeding- monitor     Hypokalemia   Assessment & Plan    Continue to track and replete     Metabolic acidosis- (present on admission)   Assessment & Plan    Severe, partially compensated  Increase mandatory minute ventilation to help compensate  Fluid resuscitation    Improved with fluids, pressors   Trend    Resolved     Anoxic brain injury (HCC)- (present on admission)   Assessment & Plan    Pt had remarkable recovery post prolonged arrest. He was awake, appropriate, non-focal. However he failed a trial of extubation secondary to inablity to handle his secretions. Pt may have significant long-term cognitive deficits but will be difficult to measure until extubated     Elevated liver enzymes   Assessment & Plan    Likely secondary to shock liver     Hyperphosphatemia- (present on admission)   Assessment & Plan    Monitor closely with resuscitation attempts     Anemia- (present on admission)   Assessment & Plan    Monitor daily CBC  Conservative transfusion strategy     Leukocytosis- (present on admission)   Assessment & Plan    Stress induced, hold off on antibiotics for  now as no preceding infectious symptoms  Doubt acute infection- inflammation from poor perfusion, poor heart function and multi-organ dysfunction s/p prolonged arrest without bystander CPR     Antibiotics started today     Closed fracture of multiple ribs of both sides- (present on admission)   Assessment & Plan    Secondary to CPR  Analgesia as needed  Positive pressure ventilation          VTE:  Heparin  Ulcer: PPI  Lines: Central Line  Ongoing indication addressed    I have performed a physical exam and reviewed and updated ROS and Plan today (12/23/2018). In review of yesterday's note (12/22/2018), there are no changes except as documented above.     Discussed patient condition and risk of morbidity and/or mortality with Hospitalist, RN, RT, Pharmacy and Charge nurse / hot rounds  The patient remains critically ill.  Critical care time = 95 minutes in directly providing and coordinating critical care and extensive data review.  No time overlap and excludes procedures.

## 2018-12-24 NOTE — ASSESSMENT & PLAN NOTE
Pt had remarkable recovery post prolonged arrest. He was awake, appropriate, non-focal. However he failed a trial of extubation secondary to inablity to handle his secretions. Pt may have significant long-term cognitive deficits but will be difficult to measure until extubated

## 2018-12-24 NOTE — CARE PLAN
Problem: Nutritional:  Goal: Nutrition support tolerated and meeting greater than 85% of estimated needs  Outcome: MET Date Met: 12/24/18  TF @ goal: Peptamen Intense @ 50 mL/hr.

## 2018-12-24 NOTE — PROGRESS NOTES
Critical Care Progress Note    Date of admission  12/19/2018    Chief Complaint  58 y.o. male admitted 12/19/2018 s/p cardiopulmonary arrest with prolonged CPR in the field  Hospital Course  TTM started in ED and continued in ICU    Interval Problem Updates  Past 24 hours  +1170 fluid balance   Ph 7.279 this morning with pco2 of 50.0  APTT 59.0  Afebrile overnight   On Unasyn for aspiration pneumonia   Started on propofol drips   Fentanyl decreased to 200  Did not tolerate SBT this morning   Arctic Marlena kwokglenda (was on to treat fever)  Chest x-ray did not show significant change from yesterday  Urine output total of 1000 mL yesterday  Follows simple commands, move all extremities    Review of Systems  Review of Systems   Unable to perform ROS: Intubated        Vital Signs for last 24 hours   Temp:  [36.2 °C (97.2 °F)-36.8 °C (98.2 °F)] 36.3 °C (97.3 °F)  Pulse:  [] 74  Resp:  [14-33] 30  BP: (115)/(58) 115/58    Hemodynamic parameters for last 24 hours  CVP:  [16 MM HG-171 MM HG] 17 MM HG    Respiratory  Brooks Vent Mode: APVCMV  Rate (breaths/min): 301  Vt Target (mL): 350  PEEP/CPAP: 10  FiO2: 40  P MEAN: 19  Length of Weaning Trial (Hours): 0  Control VTE (exp VT): 351    Physical Exam   Physical Exam   Constitutional: He appears well-developed and well-nourished. He is intubated.   HENT:   Head: Normocephalic and atraumatic.   Eyes: Pupils are equal, round, and reactive to light. EOM are normal.   Neck: Normal range of motion.   Cardiovascular: Normal rate and regular rhythm.    Pulmonary/Chest: Effort normal and breath sounds normal. No accessory muscle usage. He is intubated. No respiratory distress.   's home scattered rhonchi no wheezing   Abdominal: Soft. Bowel sounds are normal.   Musculoskeletal: Normal range of motion.   Neurological:   Intubated, slightly sedated follow commands move all extremities   Skin: Skin is warm and dry.   Psychiatric: He has a normal mood and affect.        Medications  Current Facility-Administered Medications   Medication Dose Route Frequency Provider Last Rate Last Dose   • ampicillin/sulbactam (UNASYN) 3 g in  mL IVPB  3 g Intravenous Q6HRS Bradley Flores M.D. 200 mL/hr at 12/24/18 0635 3 g at 12/24/18 0635   • metoprolol (LOPRESSOR) tablet 12.5 mg  12.5 mg Oral TWICE DAILY Ilene Moreno M.D.   12.5 mg at 12/24/18 0635   • heparin injection 3,200 Units  3,200 Units Intravenous PRN Ilene Moreno M.D.   3,200 Units at 12/22/18 1911    And   • heparin infusion 25,000 units in 500 ml 0.45% nacl   Intravenous Continuous Ilene ANTONIO Moreno   Stopped at 12/24/18 0214   • meperidine (DEMEROL) injection 75 mg  75 mg Intravenous Q4HRS PRN Jeremy M Gonda, M.D.   75 mg at 12/24/18 0414   • acetaminophen (TYLENOL) tablet 650 mg  650 mg Oral Q4HRS PRN Jeremy M Gonda, M.D.   650 mg at 12/24/18 0414   • Respiratory Care per Protocol   Nebulization Continuous RT Bradley Flores M.D.       • MD Alert...ICU Electrolyte Replacement per Pharmacy   Other pharmacy to dose Bradley Flores M.D.       • Pharmacy Consult: Enteral tube feeding - review meds/change route/product selection   Other PRN Bradley Flores M.D.       • aspirin (ASA) tablet 325 mg  325 mg Feeding Tube DAILY Yaron Magallanes M.D.   Stopped at 12/23/18 0600    Or   • aspirin (ASA) chewable tab 324 mg  324 mg Per NG Tube DAILY Yaron Magallanes M.D.        Or   • aspirin (ASA) suppository 300 mg  300 mg Rectal DAILY Yaron Magallanes M.D.       • atorvastatin (LIPITOR) tablet 80 mg  80 mg Per NG Tube Q EVENING Yaron Magallanes M.D.   80 mg at 12/23/18 1713   • famotidine (PEPCID) tablet 20 mg  20 mg Feeding Tube Q12HRS Yaron Magallanes M.D.   20 mg at 12/23/18 1713    Or   • famotidine (PEPCID) injection 20 mg  20 mg Intravenous Q12HRS Yaron Magallanes M.D.   20 mg at 12/24/18 0636   • senna-docusate (PERICOLACE or SENOKOT S) 8.6-50 MG per tablet 2 Tab  2 Tab Feeding Tube BID Yaron Magallanes M.D.   2 Tab at 12/24/18 0636    And   •  polyethylene glycol/lytes (MIRALAX) PACKET 1 Packet  1 Packet Feeding Tube QDAY PRN Yaron Mgaallanes M.D.   1 Packet at 12/23/18 1136    And   • magnesium hydroxide (MILK OF MAGNESIA) suspension 30 mL  30 mL Feeding Tube QDAY PRN Yaron Magallanes M.D.        And   • bisacodyl (DULCOLAX) suppository 10 mg  10 mg Rectal QDAY PRN Yaron Magallanes M.D.       • ipratropium-albuterol (DUONEB) nebulizer solution  3 mL Nebulization Q2HRS PRN (RT) Jeremy M Gonda, M.D.       • dextrose 50% (D50W) injection 25-50 mL  12.5-25 g Intravenous PRN Jeremy M Gonda, M.D.       • fentanyl 50 mcg/mL infusion   Intravenous Continuous Bradley Flores M.D. 10 mL/hr at 12/24/18 0411 500 mcg/hr at 12/24/18 0411   • fentaNYL (SUBLIMAZE) injection 50 mcg  50 mcg Intravenous Q30 MIN PRN Bradley Flores M.D.        Or   • fentaNYL (SUBLIMAZE) injection 100 mcg  100 mcg Intravenous Q30 MIN PRN Bradley Flores M.D.   100 mcg at 12/23/18 1415    Or   • fentaNYL (SUBLIMAZE) injection 200 mcg  200 mcg Intravenous Q30 MIN PRN Bradley Flores M.D.   200 mcg at 12/23/18 1710   • propofol (DIPRIVAN) injection  0-80 mcg/kg/min Intravenous Continuous Bradley Flores M.D.   Stopped at 12/22/18 0550   • artificial tear ointment (REFRESH,LACRI-LUBE) 1 Application  1 Application Both Eyes Q8HRS Yevgeniy Hawkins M.D.   1 Application at 12/24/18 0600   • norepinephrine (LEVOPHED) 8 mg in  mL Infusion  0-30 mcg/min Intravenous Continuous Yevgeniy Hawkins M.D.   Stopped at 12/22/18 1500       Fluids    Intake/Output Summary (Last 24 hours) at 12/24/18 0650  Last data filed at 12/24/18 0400   Gross per 24 hour   Intake             2170 ml   Output             1000 ml   Net             1170 ml       Laboratory  Recent Labs      12/22/18   0838  12/23/18   0405  12/24/18   0420   ISTATAPH  7.394*  7.351*  7.279*   ISTATAPCO2  35.3  37.6*  50.0*   ISTATAPO2  106*  90*  65   ISTATATCO2  23  22  25   [id number]  98  97  89*   ISTATARTHCO3  21.6  20.8   23.4   ISTATARTBE  -3  -4  -3   ISTATTEMP  36.5 C  36.6 C  36.5 C   ISTATFIO2  50  40  40   ISTATSPEC  Arterial  Arterial  Arterial   ISTATAPHTC  7.401  7.357*  7.286*   [id number]  103*  88*  62*         Recent Labs      12/22/18   0500  12/23/18   0408  12/24/18   0440   SODIUM  140  137  138   POTASSIUM  4.5  4.0  4.0   CHLORIDE  112  108  105   CO2  21  24  23   BUN  24*  30*  33*   CREATININE  0.70  0.58  0.54   MAGNESIUM  2.3  2.5   --    PHOSPHORUS  4.3  2.6   --    CALCIUM  9.8  9.3  9.4     Recent Labs      12/22/18   0500  12/23/18   0408  12/24/18   0440   ALTSGPT   --    --   85*   ASTSGOT   --    --   45   ALKPHOSPHAT   --    --   60   TBILIRUBIN   --    --   0.6   GLUCOSE  124*  111*  99     Recent Labs      12/23/18   0408  12/24/18   0440   WBC  8.3  9.1   NEUTSPOLYS  81.90*  33.00*   LYMPHOCYTES  6.00*  7.80*   MONOCYTES  2.60  7.00   EOSINOPHILS  0.80  1.70   BASOPHILS  0.90  0.00   ASTSGOT   --   45   ALTSGPT   --   85*   ALKPHOSPHAT   --   60   TBILIRUBIN   --   0.6     Recent Labs      12/23/18   0408  12/23/18   0920  12/23/18   1513  12/24/18   0440   RBC  3.18*   --    --   3.03*   HEMOGLOBIN  8.8*   --    --   8.4*   HEMATOCRIT  28.8*   --    --   27.1*   PLATELETCT  259   --    --   270   APTT   --   74.4*  78.2*  59.0*       Imaging  X-Ray:  I have personally reviewed the images and compared with prior images.    Assessment/Plan  Respiratory failure with hypoxia (HCC)   Assessment & Plan    Acute hypoxic respiratory failure secondary to cardio-pulmonary arrest possibly complicated by aspiration pneumonia with mixed infection by bronch results. Antibiotics for 7 days  Daily SBT, did not tolerate SBT today with increased high blood pressure and respiratory distress  Failed extubation 2 days ago with excessive secretions  Continue daily chest x-ray and ABG  HOB greater than 30 degrees  Oral care, PPI for GI prophylaxis.  Will attempt to extubate tomorrow morning.     Ventricular fibrillation  (HCC)- (present on admission)   Assessment & Plan    Primary arrhythmia per EMS status post defibrillation  Continuous telemetry with initiation of amiodarone should it recur  Optimize electrolytes  Coronary evaluation pending    Cardiology service following     Acute respiratory failure with hypoxia (HCC)- (present on admission)   Assessment & Plan    Intubated in field 12/19  Continue full mechanical ventilatory support, increased respiratory rate to 22  Extubated but reintubated for pulmonary toilet  Failed extubation 2 days ago with excessive secretions  Continue daily chest x-ray and ABG  HOB greater than 30 degrees  Oral care, PPI for GI prophylaxis.  Will attempt to extubate tomorrow morning.  Fentanyl decreased to 200 today and patient was started on propofol  Daily weaning trials       Cardiac arrest (HCC)- (present on admission)   Assessment & Plan    Unknown etiology status post return of spontaneous circulation with prolonged downtime  Supportive care  Stat echocardiography, moderate to severe mitral regurgitation  Cardiology consultation, the patient will need cardiac catheterization when he is more stable  Holding off on PCI evaluation in the setting of anoxic brain injury and need for ongoing resuscitation     Lactic acidosis- (present on admission)   Assessment & Plan    Secondary to shock/cardiac arrest  Clinically resoloved     Coagulopathy (HCC)- (present on admission)   Assessment & Plan    Probably secondary to acute illness/cardiac arrest improved significantly since yesterday  No evidence bleeding- monitor     Hypokalemia   Assessment & Plan    Continue to track and replete               Anoxic brain injury (HCC)- (present on admission)   Assessment & Plan    Remarkable recovery after prolonged cardiac arrest  Following commands moving all extremities  Difficult to assess long-term cognitive effects of his cardiac arrest..      Elevated liver enzymes   Assessment & Plan    Likely secondary to  shock liver  Improving   Hyperphosphatemia- (present on admission)   Assessment & Plan    Monitor closely with resuscitation attempts     Anemia- (present on admission)   Assessment & Plan    Monitor daily CBC  Conservative transfusion strategy     Closed fracture of multiple ribs of both sides- (present on admission)   Assessment & Plan    Secondary to CPR  Analgesia as needed  Positive pressure ventilation     Aspiration pneumonia  Continue antibiotics  Follow-up cultures  VTE:  Heparin  Ulcer: PPI  Lines: Central Line  Ongoing indication addressed    I have performed a physical exam and reviewed and updated ROS and Plan today (12/24/2018). In review of yesterday's note (12/23/2018), there are no changes except as documented above.     Discussed patient condition and risk of morbidity and/or mortality with Hospitalist, Family, RN, RT, Pharmacy, Charge nurse / hot rounds and cardiology  The patient remains critically ill.  Critical care time = 45  minutes in directly providing and coordinating critical care and extensive data review.  No time overlap and excludes procedures.   none

## 2018-12-24 NOTE — PROGRESS NOTES
Hospital Medicine Daily Progress Note    Date of Service  12/24/2018    Chief Complaint  Out of hospital cardiac arrest at home with [facility] Course    58 y.o. male with a history of hypertension and depression admitted 12/19/2018 with witness cardiac arrest for which the son started CPR until EMS arrived.  He was found to be in ventricular fibrillation for which he was shocked 3 times and loaded with amiodarone and converted to torsades for which he was shocked again.  He had multiple rounds of CPR and epinephrine.  He went into pulseless electrical activity and then eventually had spontaneous return of circulation.      Interval Problem Update  Remains with fevers  On cooling blanket and tylenol  Enteral feed at goal  On vent  Mobilizing  SBT but had a jump in his BP>180 and dc'd SBT per RT      Consultants/Specialty  Cardiology  Critical Care    Code Status  FULL    Disposition  To remain in ICU    Review of Systems  Review of Systems   Unable to perform ROS: Intubated        Physical Exam  Temp:  [36.2 °C (97.2 °F)-38.2 °C (100.8 °F)] 38.2 °C (100.8 °F)  Pulse:  [] 121  Resp:  [15-33] 15  BP: (115)/(58) 115/58    Physical Exam   Constitutional: He appears well-developed. He appears ill. No distress. He is sedated, intubated and restrained.   HENT:   Head: Normocephalic and atraumatic.   Eyes: Pupils are equal, round, and reactive to light. Conjunctivae and EOM are normal. Right eye exhibits no discharge. Left eye exhibits no discharge. No scleral icterus.   Neck: No tracheal deviation present.   Cardiovascular: Normal rate, regular rhythm and normal heart sounds.    No murmur heard.  Pulses:       Radial pulses are 2+ on the right side, and 2+ on the left side.        Dorsalis pedis pulses are 2+ on the right side, and 2+ on the left side.   Pulmonary/Chest: Effort normal. He is intubated. No respiratory distress. He has no wheezes.   Abdominal: Soft. He exhibits no distension. There is no  tenderness.   Musculoskeletal: He exhibits no edema.   Neurological: He is alert. No cranial nerve deficit. Coordination normal.   Skin: Skin is warm. He is not diaphoretic.   Psychiatric: He has a normal mood and affect.   Vitals reviewed.      Fluids    Intake/Output Summary (Last 24 hours) at 12/24/18 1646  Last data filed at 12/24/18 1600   Gross per 24 hour   Intake          2265.95 ml   Output             1315 ml   Net           950.95 ml       Laboratory  Recent Labs      12/23/18   0408  12/24/18   0440   WBC  8.3  9.1   RBC  3.18*  3.03*   HEMOGLOBIN  8.8*  8.4*   HEMATOCRIT  28.8*  27.1*   MCV  90.6  89.4   MCH  27.7  27.7   MCHC  30.6*  31.0*   RDW  50.0  48.0   PLATELETCT  259  270   MPV  10.3  10.2     Recent Labs      12/22/18   0500  12/23/18   0408  12/24/18   0440   SODIUM  140  137  138   POTASSIUM  4.5  4.0  4.0   CHLORIDE  112  108  105   CO2  21  24  23   GLUCOSE  124*  111*  99   BUN  24*  30*  33*   CREATININE  0.70  0.58  0.54   CALCIUM  9.8  9.3  9.4     Recent Labs      12/23/18   0920  12/23/18   1513  12/24/18   0440   APTT  74.4*  78.2*  59.0*               Imaging  DX-CHEST-PORTABLE (1 VIEW)   Final Result         1. Lines and tubes as above.   2. Hypoinflation with persistent right perihilar atelectasis versus consolidation.   3. Retrocardiac atelectasis versus consolidation. No large pleural effusions.      [id number] VIEW   Final Result         No specific finding to suggest small bowel obstruction.      EC-ECHOCARDIOGRAM LTD W/O CONT   Final Result      DX-ABDOMEN FOR TUBE PLACEMENT   Final Result      Feeding tube placement with the tip projecting over the distal stomach or proximal duodenum      DX-CHEST-PORTABLE (1 VIEW)   Final Result      1.  Stable cardiomegaly      2.  Worsening pulmonary vascular congestion and interstitial edema.      3.  Bibasilar opacities may be related to atelectasis. Developing pneumonia could have a similar appearance.      EC-ECHOCARDIOGRAM  COMPLETE W/O CONT   Final Result      DX-CHEST-LIMITED (1 VIEW)   Final Result         1.  No acute cardiopulmonary disease.   2.  Right internal jugular central line terminates in the right atrium, could be withdrawn 3 cm.   3.  Cardiomegaly      CT-CTA CHEST PULMONARY ARTERY W/ RECONS   Final Result         1.  No large central pulmonary embolus is appreciated, evaluation of the subsegmental branches is essentially nondiagnostic due to motion artifacts. Additional imaging would be required for definitive exclusion of small distal pulmonary emboli.   2.  Hazy groundglass pulmonary opacities, predominantly on the right, appearance suggests atypical edema or infiltrates.   3.  Anterior bilateral second through sixth rib fractures   4.  Cardiomegaly   5.  Left nephrolithiasis   6.  Atherosclerosis and atherosclerotic coronary artery disease.      CT-HEAD W/O   Final Result         1.  No acute intracranial abnormality.   2.  Atherosclerosis.      DX-CHEST-PORTABLE (1 VIEW)   Final Result         1.  No acute cardiopulmonary disease.   2.  Cardiomegaly           Assessment/Plan  Ventricular fibrillation (HCC)- (present on admission)   Assessment & Plan    Status post cardioversion  Cardiology consulting  Monitor on telemetry  Correct electrolyte abnormalities     Acute respiratory failure with hypoxia (HCC)- (present on admission)   Assessment & Plan    On ventilator  Minimize sedation as able  Monitor ABG, chest x-ray, labs, strict I's and O's, and vitals  Extubated and reintubated 12/21  Critical care consulting     Cardiac arrest (HCC)- (present on admission)   Assessment & Plan    CTA negative for PE  Cardiology consulting  I have discussed with cardiologist 12/21 consideration of cardiac catheterization as patient appears to be waking up and following commands and unclear etiology of cardiac arrest.  I discussed with Dr. Moreno and patient was originally placed on Cath Lab schedule however due to reintubation  this is been postponed.  Monitor on telemetry  Aspirin, atorvastatin  pressor support as needed with Levophed to keep map greater than 65 and systolic blood pressure greater than 90  Echocardiogram showing EF 45%  Evaluate neuro status for any signs of anoxic brain injury.  On 12/21 patient appear to be following commands and appropriate.       Lactic acidosis- (present on admission)   Assessment & Plan    12/19 lactic acid: 8.9  Downtrending with 12/20 lactic acid: 2.6     Hypokalemia   Assessment & Plan    Repleted and monitoring     Metabolic acidosis- (present on admission)   Assessment & Plan    IV fluid hydration with normal saline  Monitor BMP     Fever   Assessment & Plan    Central vs infection?  Normal WBC   Doubt medication  Monitor vitals, labs  Cooling blanket and acetaminophen       Elevated liver enzymes   Assessment & Plan    Question of hypotensive event/cardiac arrest prior to admission  Follow-up on hepatic function panel     Hyperphosphatemia- (present on admission)   Assessment & Plan    Monitor     Anemia- (present on admission)   Assessment & Plan    Normocytic normochromic  Monitor CBC  No obvious sign of bleeding  12/23 HGb:8.8  12/20 HGb:9.6     Leukocytosis- (present on admission)   Assessment & Plan    12/19 WBC: 12.8  12/20 WBC: 14.1  12/21 WBC: 11.6  Watch for signs of infection     Closed fracture of multiple ribs of both sides- (present on admission)   Assessment & Plan    Secondary to CPR  Pain management          VTE prophylaxis: heparin

## 2018-12-24 NOTE — PROGRESS NOTES
[facility] Cardiology Follow-up Consult Note    Date of note:    12/24/2018      Consulting Physician: Yaron Magallanes M.D.    Patient ID:  Name:   Amadou Nicholas     YOB: 1960  Age:   58 y.o.  male   MRN:   [id number]    Chief Complaint   Patient presents with   • Cardiac Arrest     witnessed        ID: 58-year-old man who presented with out of hospital cardiac arrest (said to have been V. fib arrest) found to have hypertrophic obstructive cardiomyopathy and started on metoprolol for that (peak LVOT gradient 64 mmHg).  Angiography and defibrillator are pending recovery from respiratory failure.      Interim Events:  No acute events, remains intubated      ROS  No NV, No Bleeding, No dizziness   All other review of systems reviewed and negative.    Past medical, surgical, social, and family history reviewed and unchanged from admission except as noted in assessment and plan.    Medications: Reviewed in MAR  Current Facility-Administered Medications   Medication Dose Frequency Provider Last Rate Last Dose   • acetaminophen (TYLENOL) tablet 650 mg  650 mg Q6HRS PRN HAN oFrd.O.       • ampicillin/sulbactam (UNASYN) 3 g in  mL IVPB  3 g Q6HRS Amadou Salomon D.O. 200 mL/hr at 12/24/18 1140 3 g at 12/24/18 1140   • metoprolol (LOPRESSOR) tablet 12.5 mg  12.5 mg TWICE DAILY Ilene ANTONIO Moreno   12.5 mg at 12/24/18 0635   • heparin injection 3,200 Units  3,200 Units PRN Ilene Moreno M.D.   3,200 Units at 12/22/18 1911    And   • heparin infusion 25,000 units in 500 ml 0.45% nacl   Continuous Ilene ANTONIO Moreno 24 mL/hr at 12/24/18 0816 1,200 Units/hr at 12/24/18 0816   • meperidine (DEMEROL) injection 75 mg  75 mg Q4HRS PRN Aleshia Uribe M.D.   75 mg at 12/24/18 0915   • Respiratory Care per Protocol   Continuous RT Bradley Flores M.D.       • MD Alert...ICU Electrolyte Replacement per Pharmacy   pharmacy to dose Bradley Flores M.D.       • Pharmacy Consult: Enteral tube feeding - review  meds/change route/product selection   PRN Bradley Flores M.D.       • aspirin (ASA) tablet 325 mg  325 mg DAILY Yaron Magallanes M.D.   Stopped at 12/23/18 0600    Or   • aspirin (ASA) chewable tab 324 mg  324 mg DAILY Yaron Magallanes M.D.        Or   • aspirin (ASA) suppository 300 mg  300 mg DAILY Yaron Magallanes M.D.       • atorvastatin (LIPITOR) tablet 80 mg  80 mg Q EVENING Yaron Magallanes M.D.   80 mg at 12/23/18 1713   • famotidine (PEPCID) tablet 20 mg  20 mg Q12HRS Yaron Magallanes M.D.   20 mg at 12/23/18 1713    Or   • famotidine (PEPCID) injection 20 mg  20 mg Q12HRS Yaron Magallanes M.D.   20 mg at 12/24/18 0636   • senna-docusate (PERICOLACE or SENOKOT S) 8.6-50 MG per tablet 2 Tab  2 Tab BID Yaron Magallanes M.D.   2 Tab at 12/24/18 0636    And   • polyethylene glycol/lytes (MIRALAX) PACKET 1 Packet  1 Packet QDAY PRN Yaron Magallanes M.D.   1 Packet at 12/23/18 1136    And   • magnesium hydroxide (MILK OF MAGNESIA) suspension 30 mL  30 mL QDAY PRN Yaron Magallanes M.D.        And   • bisacodyl (DULCOLAX) suppository 10 mg  10 mg QDAY PRN Yaron Magallanes M.D.       • ipratropium-albuterol (DUONEB) nebulizer solution  3 mL Q2HRS PRN (RT) Jeremy M Gonda, M.D.       • dextrose 50% (D50W) injection 25-50 mL  12.5-25 g PRN Jeremy M Gonda, M.D.       • fentanyl 50 mcg/mL infusion   Continuous Bradley Flores M.D. 4 mL/hr at 12/24/18 0949 200 mcg/hr at 12/24/18 0949   • fentaNYL (SUBLIMAZE) injection 50 mcg  50 mcg Q30 MIN PRN Bradley Flores M.D.        Or   • fentaNYL (SUBLIMAZE) injection 100 mcg  100 mcg Q30 MIN PRN Bradley Flores M.D.   100 mcg at 12/23/18 1415    Or   • fentaNYL (SUBLIMAZE) injection 200 mcg  200 mcg Q30 MIN PRN Bradley Flores M.D.   200 mcg at 12/23/18 1710   • propofol (DIPRIVAN) injection  0-80 mcg/kg/min Continuous Bradley Flores M.D. 2.4 mL/hr at 12/24/18 1014 5 mcg/kg/min at 12/24/18 1014   • artificial tear ointment (REFRESH,LACRI-LUBE) 1 Application  1 Application Q8HRS Yevgeniy Hawkins M.D.  "  1 Application at 12/24/18 0600   • norepinephrine (LEVOPHED) 8 mg in  mL Infusion  0-30 mcg/min Continuous Yevgeniy Hawkins M.D.   Stopped at 12/22/18 1500     Last reviewed on 12/20/2018  5:05 PM by Mae Jane T  No Known Allergies    Physical Exam  Body mass index is 33.67 kg/m². Blood pressure 115/58, pulse 76, temperature 36.4 °C (97.5 °F), temperature source Ramon, resp. rate (!) 27, height 1.6 m (5' 2.99\"), weight 86.2 kg (190 lb 0.6 oz), SpO2 94 %. Vitals:    12/24/18 0900 12/24/18 1000 12/24/18 1002 12/24/18 1100   BP:       Pulse: 64 62  76   Resp: (!) 24 (!) 31  (!) 27   Temp:  36.4 °C (97.5 °F)     TempSrc:  Ramon     SpO2: 100% 100% 100% 94%   Weight:       Height:        Oxygen Therapy:  Pulse Oximetry: 94 %, FiO2%: 40 %, O2 Delivery: Ventilator    General: Intubated, ill-appearing, middle-aged man in the ICU  Eyes: nl conjunctiva  ENT: OP clear  Neck: no JVD   Lungs: normal respiratory effort, CTAB.  Cooling catheters remain in place to control his fevers.  Heart: RRR, grade 3 blowing systolic murmur at cardiac apex, no rubs or gallops,   EXT 1-2+ edema bilateral lower extremities. 2+ pedal pulses. no cyanosis  Abdomen: soft, non tender, non distended,  Neurological: Intubated, sedated, full exam deferred  Psychiatric: Deferred  Skin: Warm extremities    Labs (personally reviewed and notable for):   Recent Results (from the past 24 hour(s))   APTT    Collection Time: 12/23/18  3:13 PM   Result Value Ref Range    APTT 78.2 (H) 24.7 - 36.0 sec   ISTAT ARTERIAL BLOOD GAS    Collection Time: 12/24/18  4:20 AM   Result Value Ref Range    Ph 7.279 (LL) 7.400 - 7.500    Pco2 50.0 (H) 26.0 - 37.0 mmHg    Po2 65 64 - 87 mmHg    Tco2 25 20 - 33 mmol/L    S02 89 (L) 93 - 99 %    Hco3 23.4 17.0 - 25.0 mmol/L    BE -3 -4 - 3 mmol/L    Body Temp 36.5 C degrees    O2 Therapy 40 %    iPF Ratio 163     Ph Temp Daphne 7.286 (LL) 7.400 - 7.500    Pco2 Temp Co 49.0 (H) 26.0 - 37.0 mmHg    Po2 Temp Cor 62 " (L) 64 - 87 mmHg    Specimen Arterial     Action Range Triggered YES     Inst. Qualified Patient YES    CBC with Differential    Collection Time: 12/24/18  4:40 AM   Result Value Ref Range    WBC 9.1 4.8 - 10.8 K/uL    RBC 3.03 (L) 4.70 - 6.10 M/uL    Hemoglobin 8.4 (L) 14.0 - 18.0 g/dL    Hematocrit 27.1 (L) 42.0 - 52.0 %    MCV 89.4 81.4 - 97.8 fL    MCH 27.7 27.0 - 33.0 pg    MCHC 31.0 (L) 33.7 - 35.3 g/dL    RDW 48.0 35.9 - 50.0 fL    Platelet Count 270 164 - 446 K/uL    MPV 10.2 9.0 - 12.9 fL    Nucleated RBC 0.30 /100 WBC    NRBC (Absolute) 0.03 K/uL    Neutrophils-Polys 33.00 (L) 44.00 - 72.00 %    Lymphocytes 7.80 (L) 22.00 - 41.00 %    Monocytes 7.00 0.00 - 13.40 %    Eosinophils 1.70 0.00 - 6.90 %    Basophils 0.00 0.00 - 1.80 %    Neutrophils (Absolute) 7.35 1.82 - 7.42 K/uL    Lymphs (Absolute) 0.71 (L) 1.00 - 4.80 K/uL    Monos (Absolute) 0.64 0.00 - 0.85 K/uL    Eos (Absolute) 0.15 0.00 - 0.51 K/uL    Baso (Absolute) 0.00 0.00 - 0.12 K/uL    Anisocytosis 1+    APTT    Collection Time: 12/24/18  4:40 AM   Result Value Ref Range    APTT 59.0 (H) 24.7 - 36.0 sec   COMP METABOLIC PANEL    Collection Time: 12/24/18  4:40 AM   Result Value Ref Range    Sodium 138 135 - 145 mmol/L    Potassium 4.0 3.6 - 5.5 mmol/L    Chloride 105 96 - 112 mmol/L    Co2 23 20 - 33 mmol/L    Anion Gap 10.0 0.0 - 11.9    Glucose 99 65 - 99 mg/dL    Bun 33 (H) 8 - 22 mg/dL    Creatinine 0.54 0.50 - 1.40 mg/dL    Calcium 9.4 8.5 - 10.5 mg/dL    AST(SGOT) 45 12 - 45 U/L    ALT(SGPT) 85 (H) 2 - 50 U/L    Alkaline Phosphatase 60 30 - 99 U/L    Total Bilirubin 0.6 0.1 - 1.5 mg/dL    Albumin 2.9 (L) 3.2 - 4.9 g/dL    Total Protein 6.1 6.0 - 8.2 g/dL    Globulin 3.2 1.9 - 3.5 g/dL    A-G Ratio 0.9 g/dL   ESTIMATED GFR    Collection Time: 12/24/18  4:40 AM   Result Value Ref Range    GFR If African American >60 >60 mL/min/1.73 m 2    GFR If Non African American >60 >60 mL/min/1.73 m 2   DIFFERENTIAL MANUAL    Collection Time: 12/24/18   4:40 AM   Result Value Ref Range    Bands-Stabs 47.80 (H) 0.00 - 10.00 %    Metamyelocytes 0.90 %    Myelocytes 0.90 %    Progranulocytes 0.90 %    Manual Diff Status PERFORMED    PERIPHERAL SMEAR REVIEW    Collection Time: 12/24/18  4:40 AM   Result Value Ref Range    Peripheral Smear Review see below    PLATELET ESTIMATE    Collection Time: 12/24/18  4:40 AM   Result Value Ref Range    Plt Estimation Normal    MORPHOLOGY    Collection Time: 12/24/18  4:40 AM   Result Value Ref Range    RBC Morphology Present     Large Platelets 1+     Polychromia 1+     Poikilocytosis 1+     Ovalocytes 1+     Tear Drop Cells 1+        Cardiac Imaging and Procedures Review:    EKG and telemetry tracings personally reviewed    Impression and Medical Decision Making:  Active Problems:    Cardiac arrest (HCC) POA: Yes    Acute respiratory failure with hypoxia (HCC) POA: Yes    Ventricular fibrillation (HCC) POA: Yes    Respiratory failure with hypoxia (HCC) POA: Unknown      Overview: Acute hypoxic respiratory failure on this admission initially related to       cardiopulomary arrest 20-30 minutes      Extubated yesterday after meting pre-established criteria      Reintubated when unable handle secretions      'Challenges last night and today with plugging, desaturations, ventilation      Improved somewhat with PEEP and decreasing sedation    Metabolic acidosis POA: Yes    Hypokalemia POA: Unknown    Coagulopathy (HCC) POA: Yes    Lactic acidosis POA: Yes    Closed fracture of multiple ribs of both sides POA: Yes    Leukocytosis POA: Yes    Anemia POA: Yes    Hyperphosphatemia POA: Yes    Elevated liver enzymes POA: Unknown    Anoxic brain injury (HCC) POA: Yes  Resolved Problems:    * No resolved hospital problems. *      Cardiac arrest: I do have significant concern that this is ventricular tachycardia/fibrillation though I cannot confirm the report of the same based on my review of the medical record.  This is in the setting of his  hypertrophic cardiomyopathy with significant left ventricular outflow tract gradient.  He is appropriately started on a beta-blocker for secondary prevention, and I do think would qualify for a defibrillator provided that he has no infectious complications that would preclude that therapy.  He also will require cardiac catheterization when he is recovered from a respiratory standpoint.  At the least, I would advocate for a LifeVest for secondary prevention of sudden cardiac death after discharge.      I personally discussed his case with  Dr Yaron Magallanes M.D.    No future appointments.    Thank you for allowing me to participate in the care of this patient.  Please call or text via [employer] [place] [place] if clarification would be useful.    Moustapha Hoff MD  Cardiologist, [facility]

## 2018-12-24 NOTE — CARE PLAN
Problem: Pain Management  Goal: Pain level will decrease to patient's comfort goal  Outcome: PROGRESSING AS EXPECTED  Assessed patient for signs and symptoms of pain frequently, administered medication when necessary.    Problem: Mobility  Goal: Risk for activity intolerance will decrease  Outcome: PROGRESSING AS EXPECTED  Mobilized patient to the edge of bed, patient stood edge of bed as well. Tires quickly but tolerated well.

## 2018-12-24 NOTE — ASSESSMENT & PLAN NOTE
Acute hypoxic respiratory failure secondary to cardio-pulmonary arrest possibly complicated by aspiration pneumonia with mixed infection by bronch results. Antibiotics for 7 days  Daily SBT  Recommend that pt be awake, alert, vigorous prior to next trial of extubation. Pt failed trial of extubation 2 days ago after easily passing SBT. Pt could not handle his secretions.

## 2018-12-25 ENCOUNTER — APPOINTMENT (OUTPATIENT)
Dept: RADIOLOGY | Facility: MEDICAL CENTER | Age: 58
DRG: 224 | End: 2018-12-25
Attending: INTERNAL MEDICINE
Payer: COMMERCIAL

## 2018-12-25 PROBLEM — J69.0 ASPIRATION PNEUMONIA (HCC): Status: ACTIVE | Noted: 2018-12-25

## 2018-12-25 LAB
ACTION RANGE TRIGGERED IACRT: NO
ANION GAP SERPL CALC-SCNC: 9 MMOL/L (ref 0–11.9)
APTT PPP: 75.7 SEC (ref 24.7–36)
BACTERIA BLD CULT: NORMAL
BACTERIA BLD CULT: NORMAL
BASE EXCESS BLDA CALC-SCNC: -1 MMOL/L (ref -4–3)
BASOPHILS # BLD AUTO: 0.9 % (ref 0–1.8)
BASOPHILS # BLD: 0.12 K/UL (ref 0–0.12)
BNP SERPL-MCNC: 374 PG/ML (ref 0–100)
BODY TEMPERATURE: ABNORMAL DEGREES
BUN SERPL-MCNC: 31 MG/DL (ref 8–22)
CALCIUM SERPL-MCNC: 9.2 MG/DL (ref 8.5–10.5)
CHLORIDE SERPL-SCNC: 106 MMOL/L (ref 96–112)
CO2 BLDA-SCNC: 27 MMOL/L (ref 20–33)
CO2 SERPL-SCNC: 25 MMOL/L (ref 20–33)
CREAT SERPL-MCNC: 0.73 MG/DL (ref 0.5–1.4)
CRP SERPL HS-MCNC: 28.66 MG/DL (ref 0–0.75)
EOSINOPHIL # BLD AUTO: 0.35 K/UL (ref 0–0.51)
EOSINOPHIL NFR BLD: 2.6 % (ref 0–6.9)
ERYTHROCYTE [DISTWIDTH] IN BLOOD BY AUTOMATED COUNT: 47.7 FL (ref 35.9–50)
GLUCOSE SERPL-MCNC: 127 MG/DL (ref 65–99)
HCO3 BLDA-SCNC: 25.1 MMOL/L (ref 17–25)
HCT VFR BLD AUTO: 24 % (ref 42–52)
HGB BLD-MCNC: 7.6 G/DL (ref 14–18)
HOROWITZ INDEX BLDA+IHG-RTO: 185 MM[HG]
INST. QUALIFIED PATIENT IIQPT: YES
LG PLATELETS BLD QL SMEAR: NORMAL
LYMPHOCYTES # BLD AUTO: 1.65 K/UL (ref 1–4.8)
LYMPHOCYTES NFR BLD: 12.3 % (ref 22–41)
MANUAL DIFF BLD: ABNORMAL
MCH RBC QN AUTO: 27.6 PG (ref 27–33)
MCHC RBC AUTO-ENTMCNC: 31.7 G/DL (ref 33.7–35.3)
MCV RBC AUTO: 87.3 FL (ref 81.4–97.8)
MONOCYTES # BLD AUTO: 0.94 K/UL (ref 0–0.85)
MONOCYTES NFR BLD AUTO: 7 % (ref 0–13.4)
MORPHOLOGY BLD-IMP: NORMAL
MYELOCYTES NFR BLD MANUAL: 0.9 %
NEUTROPHILS # BLD AUTO: 10.22 K/UL (ref 1.82–7.42)
NEUTROPHILS NFR BLD: 60.5 % (ref 44–72)
NEUTS BAND NFR BLD MANUAL: 15.8 % (ref 0–10)
NRBC # BLD AUTO: 0.2 K/UL
NRBC BLD-RTO: 1.5 /100 WBC
O2/TOTAL GAS SETTING VFR VENT: 40 %
OVALOCYTES BLD QL SMEAR: NORMAL
PCO2 BLDA: 45.8 MMHG (ref 26–37)
PCO2 TEMP ADJ BLDA: 48.2 MMHG (ref 26–37)
PH BLDA: 7.35 [PH] (ref 7.4–7.5)
PH TEMP ADJ BLDA: 7.33 [PH] (ref 7.4–7.5)
PLATELET # BLD AUTO: 293 K/UL (ref 164–446)
PLATELET BLD QL SMEAR: NORMAL
PMV BLD AUTO: 10.5 FL (ref 9–12.9)
PO2 BLDA: 74 MMHG (ref 64–87)
PO2 TEMP ADJ BLDA: 80 MMHG (ref 64–87)
POIKILOCYTOSIS BLD QL SMEAR: NORMAL
POTASSIUM SERPL-SCNC: 3.4 MMOL/L (ref 3.6–5.5)
PREALB SERPL-MCNC: <3 MG/DL (ref 18–38)
RBC # BLD AUTO: 2.75 M/UL (ref 4.7–6.1)
RBC BLD AUTO: PRESENT
SAO2 % BLDA: 94 % (ref 93–99)
SIGNIFICANT IND 70042: NORMAL
SIGNIFICANT IND 70042: NORMAL
SITE SITE: NORMAL
SITE SITE: NORMAL
SODIUM SERPL-SCNC: 140 MMOL/L (ref 135–145)
SOURCE SOURCE: NORMAL
SOURCE SOURCE: NORMAL
SPECIMEN DRAWN FROM PATIENT: ABNORMAL
WBC # BLD AUTO: 13.4 K/UL (ref 4.8–10.8)

## 2018-12-25 PROCEDURE — 700105 HCHG RX REV CODE 258: Performed by: HOSPITALIST

## 2018-12-25 PROCEDURE — 99291 CRITICAL CARE FIRST HOUR: CPT | Performed by: INTERNAL MEDICINE

## 2018-12-25 PROCEDURE — A9270 NON-COVERED ITEM OR SERVICE: HCPCS | Performed by: INTERNAL MEDICINE

## 2018-12-25 PROCEDURE — 700111 HCHG RX REV CODE 636 W/ 250 OVERRIDE (IP): Performed by: INTERNAL MEDICINE

## 2018-12-25 PROCEDURE — 82803 BLOOD GASES ANY COMBINATION: CPT

## 2018-12-25 PROCEDURE — 99233 SBSQ HOSP IP/OBS HIGH 50: CPT | Performed by: HOSPITALIST

## 2018-12-25 PROCEDURE — 84134 ASSAY OF PREALBUMIN: CPT

## 2018-12-25 PROCEDURE — 700111 HCHG RX REV CODE 636 W/ 250 OVERRIDE (IP): Performed by: HOSPITALIST

## 2018-12-25 PROCEDURE — 85027 COMPLETE CBC AUTOMATED: CPT

## 2018-12-25 PROCEDURE — 86140 C-REACTIVE PROTEIN: CPT

## 2018-12-25 PROCEDURE — 700102 HCHG RX REV CODE 250 W/ 637 OVERRIDE(OP): Performed by: INTERNAL MEDICINE

## 2018-12-25 PROCEDURE — 94150 VITAL CAPACITY TEST: CPT

## 2018-12-25 PROCEDURE — 80048 BASIC METABOLIC PNL TOTAL CA: CPT

## 2018-12-25 PROCEDURE — 71045 X-RAY EXAM CHEST 1 VIEW: CPT

## 2018-12-25 PROCEDURE — 83880 ASSAY OF NATRIURETIC PEPTIDE: CPT

## 2018-12-25 PROCEDURE — 700102 HCHG RX REV CODE 250 W/ 637 OVERRIDE(OP): Performed by: HOSPITALIST

## 2018-12-25 PROCEDURE — A9270 NON-COVERED ITEM OR SERVICE: HCPCS | Performed by: HOSPITALIST

## 2018-12-25 PROCEDURE — 770022 HCHG ROOM/CARE - ICU (200)

## 2018-12-25 PROCEDURE — 94003 VENT MGMT INPAT SUBQ DAY: CPT

## 2018-12-25 PROCEDURE — 85007 BL SMEAR W/DIFF WBC COUNT: CPT

## 2018-12-25 PROCEDURE — 85730 THROMBOPLASTIN TIME PARTIAL: CPT

## 2018-12-25 RX ORDER — FAMOTIDINE 20 MG/1
20 TABLET, FILM COATED ORAL 2 TIMES DAILY
Status: DISCONTINUED | OUTPATIENT
Start: 2018-12-25 | End: 2018-12-26

## 2018-12-25 RX ORDER — ACETAMINOPHEN 325 MG/1
650 TABLET ORAL EVERY 6 HOURS PRN
Status: DISCONTINUED | OUTPATIENT
Start: 2018-12-25 | End: 2019-01-10 | Stop reason: HOSPADM

## 2018-12-25 RX ADMIN — Medication 100 MCG/HR: at 06:04

## 2018-12-25 RX ADMIN — HEPARIN SODIUM 1200 UNITS/HR: 5000 INJECTION, SOLUTION INTRAVENOUS at 06:05

## 2018-12-25 RX ADMIN — AMPICILLIN SODIUM AND SULBACTAM SODIUM 3 G: 2; 1 INJECTION, POWDER, FOR SOLUTION INTRAMUSCULAR; INTRAVENOUS at 06:05

## 2018-12-25 RX ADMIN — AMPICILLIN SODIUM AND SULBACTAM SODIUM 3 G: 2; 1 INJECTION, POWDER, FOR SOLUTION INTRAMUSCULAR; INTRAVENOUS at 17:37

## 2018-12-25 RX ADMIN — METOPROLOL TARTRATE 12.5 MG: 25 TABLET, FILM COATED ORAL at 17:37

## 2018-12-25 RX ADMIN — ATORVASTATIN CALCIUM 80 MG: 80 TABLET, FILM COATED ORAL at 17:37

## 2018-12-25 RX ADMIN — FAMOTIDINE 20 MG: 20 TABLET ORAL at 17:37

## 2018-12-25 RX ADMIN — POTASSIUM BICARBONATE 50 MEQ: 25 TABLET, EFFERVESCENT ORAL at 08:23

## 2018-12-25 RX ADMIN — METOPROLOL TARTRATE 12.5 MG: 25 TABLET, FILM COATED ORAL at 06:05

## 2018-12-25 RX ADMIN — PROPOFOL 10 MCG/KG/MIN: 10 INJECTION, EMULSION INTRAVENOUS at 07:17

## 2018-12-25 RX ADMIN — STANDARDIZED SENNA CONCENTRATE AND DOCUSATE SODIUM 2 TABLET: 8.6; 5 TABLET, FILM COATED ORAL at 06:05

## 2018-12-25 RX ADMIN — ASPIRIN 325 MG: 325 TABLET ORAL at 06:05

## 2018-12-25 RX ADMIN — FAMOTIDINE 20 MG: 10 INJECTION INTRAVENOUS at 06:05

## 2018-12-25 RX ADMIN — ACETAMINOPHEN 650 MG: 325 TABLET, FILM COATED ORAL at 06:05

## 2018-12-25 RX ADMIN — AMPICILLIN SODIUM AND SULBACTAM SODIUM 3 G: 2; 1 INJECTION, POWDER, FOR SOLUTION INTRAMUSCULAR; INTRAVENOUS at 13:06

## 2018-12-25 RX ADMIN — AMPICILLIN SODIUM AND SULBACTAM SODIUM 3 G: 2; 1 INJECTION, POWDER, FOR SOLUTION INTRAMUSCULAR; INTRAVENOUS at 23:03

## 2018-12-25 ASSESSMENT — PULMONARY FUNCTION TESTS: FVC: .97

## 2018-12-25 NOTE — CARE PLAN
Problem: Skin Integrity  Goal: Risk for impaired skin integrity will decrease  Outcome: PROGRESSING AS EXPECTED  Assessed patient for signs and symptoms of skin breakdown frequently. Provided Q2H turns, waffle cushion in place, heels floated on pillows.    Problem: Mobility  Goal: Risk for activity intolerance will decrease  Outcome: PROGRESSING AS EXPECTED  Mobilized patient to the edge of bed. Patient tolerated well.

## 2018-12-25 NOTE — PROGRESS NOTES
[facility] Medicine Daily Progress Note    Date of Service  12/25/2018    Chief Complaint  58 y.o. male admitted 12/19/2018 with cardiac arrest    Hospital Course    Mr Nicholas has a history of depression and hypertension, he was in his normal state of health but was noted to be unresponsive with agonal breathing.  His son started CPR.  Initial rhythm was ventricular fibrillation, he required multiple rounds of CPR, he was intubated in the field and brought to the emergency room.  Hypothermia protocol was initiated he was admitted to the ICU.  Patient was extubated on 12/21/2018, he required reintubation later that day.      Interval Problem Update  Patient intubated and sedated in the ICU, thus unable to provide interval history  Sedated with propofol and fentanyl drips, on sedation vacations will follow commands with all 4 ext  Systolic blood pressure ranges from 70's-110's, in SR, HR 50's-90's  Urine output 1.8L over last 24 hours  On heparin drip, no episataxis or signs of GI bleed    Consultants/Specialty  Critical Care, I discussed the patient's condition with Dr. Uribe this morning on ICU Rounds    Code Status  Full Code    Disposition  ICU    Review of Systems  Review of Systems   Unable to perform ROS: Intubated        Physical Exam  Temp:  [36.4 °C (97.5 °F)-39.4 °C (102.9 °F)] 37.6 °C (99.7 °F)  Pulse:  [] 77  Resp:  [15-32] 17    Physical Exam   Constitutional: He appears well-developed and well-nourished.   HENT:   Head: Normocephalic and atraumatic.   Eyes: Conjunctivae are normal. Right eye exhibits no discharge. Left eye exhibits no discharge. No scleral icterus.   Cardiovascular: Normal rate, regular rhythm and intact distal pulses.    No murmur heard.  Pulmonary/Chest:   Equal chest rise and fall  Mechanical breath sounds bilaterally  No overt wheezing   Abdominal: Soft. Bowel sounds are normal. He exhibits no distension. There is no tenderness.   Musculoskeletal: Normal range of motion. He  exhibits no edema.   Neurological: He is alert.   Intubated, alert  Moves all ext to command with good strength   Skin: Skin is warm and dry. He is not diaphoretic.       Fluids    Intake/Output Summary (Last 24 hours) at 12/25/18 0823  Last data filed at 12/25/18 0800   Gross per 24 hour   Intake          2197.86 ml   Output             1650 ml   Net           547.86 ml       Laboratory  Recent Labs      12/23/18   0408  12/24/18   0440  12/25/18   0510   WBC  8.3  9.1  13.4*   RBC  3.18*  3.03*  2.75*   HEMOGLOBIN  8.8*  8.4*  7.6*   HEMATOCRIT  28.8*  27.1*  24.0*   MCV  90.6  89.4  87.3   MCH  27.7  27.7  27.6   MCHC  30.6*  31.0*  31.7*   RDW  50.0  48.0  47.7   PLATELETCT  259  270  293   MPV  10.3  10.2  10.5     Recent Labs      12/23/18   0408  12/24/18   0440  12/25/18   0510   SODIUM  137  138  140   POTASSIUM  4.0  4.0  3.4*   CHLORIDE  108  105  106   CO2  24  23  25   GLUCOSE  111*  99  127*   BUN  30*  33*  31*   CREATININE  0.58  0.54  0.73   CALCIUM  9.3  9.4  9.2     Recent Labs      12/23/18   1513  12/24/18   0440  12/25/18   0510   APTT  78.2*  59.0*  75.7*               Imaging  CXR (12/25/18): interpreted by myself: bordeline cardiomegaly with interstitail edema     Assessment/Plan  Respiratory failure with hypoxia (HCC)   Assessment & Plan    Had to be re-intubated on 12/21  Mechanical ventilation as per critical care     Ventricular fibrillation (HCC)- (present on admission)   Assessment & Plan    Now in SR  Ischemic workup when extubated  Continue metoprolol     Acute respiratory failure with hypoxia (HCC)- (present on admission)   Assessment & Plan    Patient had to be reintubated on 12/21/2018  Mechanical ventilation as per critical care       Cardiac arrest (HCC)- (present on admission)   Assessment & Plan    CTA negative for PE, ischemic workup to be determined by cardiology, probable cath when extubated  Cardiology consulting  Appears to have made compete neurologic recovery  Continue  heparin drip, rate of heparin drip will be adjusted based on serial pTT measurements to ensure adequate anticoagulation and to avoid potential complications of bleeding         Lactic acidosis- (present on admission)   Assessment & Plan    Resolved     Hypokalemia   Assessment & Plan    Replace     Metabolic acidosis- (present on admission)   Assessment & Plan    Resolved with fluid resuscitation     Aspiration pneumonia (HCC)   Assessment & Plan    Unasyn     Fever   Assessment & Plan    Tylenol and cooling blanket as needed       Elevated liver enzymes- (present on admission)   Assessment & Plan    Likely related to cardiac arrest     Hyperphosphatemia- (present on admission)   Assessment & Plan    Monitor     Anemia- (present on admission)   Assessment & Plan    No need for transfusion today  Follow CBC daily     Leukocytosis- (present on admission)   Assessment & Plan    Monitor daily labs     Closed fracture of multiple ribs of both sides- (present on admission)   Assessment & Plan    Secondary to CPR  Pain management          VTE prophylaxis: heparin

## 2018-12-25 NOTE — PROGRESS NOTES
· 2 RN skin check complete with JUAN Keen.   · Devices in place Cardiac/BP/SpO2 monitoring, ET tube/jakob, Cortrak, radial Art line, SCDs.  · Skin assessed under devices Completed.  · Confirmed pressure ulcers found on NONE.  · The following interventions in place Q2 turns, heels floated on pillows, ET tube repositioned Q2 hours, all equipment repositioned skin assess under equipment, full bed waffle.*

## 2018-12-25 NOTE — PROGRESS NOTES
Critical Care Progress Note    Date of admission  12/19/2018    Chief Complaint  58 y.o. male admitted 12/19/2018 s/p cardiopulmonary arrest with prolonged CPR in the field  Hospital Course  TTM started in ED and continued in ICU    Interval Problem Updates  Past 24 hours  + 4.5 fluid balance since admission  Ph 7.349 this morning with pco2 of 45  APTT 75.7  Fever overnight, T-max 202.9  On Unasyn for aspiration pneumonia   Started on propofol drips   Fentanyl decreased to 100  Did not tolerate SBT this morning   Chest x-ray did not show significant change from yesterday  Urine output 1.7 L for the last 24 hours  More awake today than yesterday.  BAL grew staph aureus  Review of Systems  Review of Systems   Unable to perform ROS: Intubated        Vital Signs for last 24 hours   Temp:  [37 °C (98.6 °F)-39.4 °C (102.9 °F)] 37.3 °C (99.1 °F)  Pulse:  [] 66  Resp:  [15-31] 26    Hemodynamic parameters for last 24 hours  CVP:  [8 MM HG-164 MM HG] 18 MM HG    Respiratory  Brooks Vent Mode: APVCMV  Rate (breaths/min): 30  Vt Target (mL): 400  PEEP/CPAP: 8  FiO2: 30  P Support: 5  P MEAN: 16  Length of Weaning Trial (Hours): 1  Control VTE (exp VT): 376    Physical Exam   Physical Exam   Constitutional: He appears well-developed and well-nourished. He is intubated.   HENT:   Head: Normocephalic and atraumatic.   Eyes: Pupils are equal, round, and reactive to light. EOM are normal.   Neck: Normal range of motion.   Cardiovascular: Normal rate and regular rhythm.    Pulmonary/Chest: Effort normal and breath sounds normal. No accessory muscle usage. He is intubated. No respiratory distress.   's home scattered rhonchi no wheezing   Abdominal: Soft. Bowel sounds are normal.   Musculoskeletal: Normal range of motion.   Neurological:   Intubated, slightly sedated follow commands move all extremities   Skin: Skin is warm and dry.   Psychiatric: He has a normal mood and affect.       Medications  Current  Facility-Administered Medications   Medication Dose Route Frequency Provider Last Rate Last Dose   • acetaminophen (TYLENOL) tablet 650 mg  650 mg Oral Q6HRS PRN ADAM FordOKatya   650 mg at 12/25/18 0605   • ampicillin/sulbactam (UNASYN) 3 g in  mL IVPB  3 g Intravenous Q6HRS ADAM FordOKatya 200 mL/hr at 12/25/18 1306 3 g at 12/25/18 1306   • metoprolol (LOPRESSOR) tablet 12.5 mg  12.5 mg Oral TWICE DAILY Ilene ANTONIO Moreno   12.5 mg at 12/25/18 0605   • heparin injection 3,200 Units  3,200 Units Intravenous PRN Ilene ANTONIO Moreno   3,200 Units at 12/22/18 1911    And   • heparin infusion 25,000 units in 500 ml 0.45% nacl   Intravenous Continuous Ilene ANTONIO Moreno 24 mL/hr at 12/25/18 0718 1,200 Units/hr at 12/25/18 0718   • meperidine (DEMEROL) injection 75 mg  75 mg Intravenous Q4HRS PRN Aleshia Uribe M.D.   75 mg at 12/24/18 0915   • Respiratory Care per Protocol   Nebulization Continuous RT Bradley Flores M.D.       • MD Alert...ICU Electrolyte Replacement per Pharmacy   Other pharmacy to dose Bradley Flores M.D.       • Pharmacy Consult: Enteral tube feeding - review meds/change route/product selection   Other PRN Bradley Flores M.D.       • aspirin (ASA) tablet 325 mg  325 mg Feeding Tube DAILY Yaron Magallanes M.D.   325 mg at 12/25/18 0605    Or   • aspirin (ASA) chewable tab 324 mg  324 mg Per NG Tube DAILY Yaron Magallanes M.D.        Or   • aspirin (ASA) suppository 300 mg  300 mg Rectal DAILY Yaron Magallanes M.D.       • atorvastatin (LIPITOR) tablet 80 mg  80 mg Per NG Tube Q EVENING Yaron Magallanes M.D.   80 mg at 12/24/18 1718   • famotidine (PEPCID) tablet 20 mg  20 mg Feeding Tube Q12HRS Yaron Magallanes M.D.   20 mg at 12/24/18 1717    Or   • famotidine (PEPCID) injection 20 mg  20 mg Intravenous Q12HRS Yaron Magallanes M.D.   20 mg at 12/25/18 0605   • senna-docusate (PERICOLACE or SENOKOT S) 8.6-50 MG per tablet 2 Tab  2 Tab Feeding Tube BID Yaron Magallanes M.D.   2 Tab at 12/25/18 0605    And   •  polyethylene glycol/lytes (MIRALAX) PACKET 1 Packet  1 Packet Feeding Tube QDAY PRN Yaron Magallanes M.D.   1 Packet at 12/23/18 1136    And   • magnesium hydroxide (MILK OF MAGNESIA) suspension 30 mL  30 mL Feeding Tube QDAY PRN Yaron Magallanes M.D.   30 mL at 12/24/18 1228    And   • bisacodyl (DULCOLAX) suppository 10 mg  10 mg Rectal QDAY PRN Yaron Magallanes M.D.       • ipratropium-albuterol (DUONEB) nebulizer solution  3 mL Nebulization Q2HRS PRN (RT) Jeremy M Gonda, M.D.       • dextrose 50% (D50W) injection 25-50 mL  12.5-25 g Intravenous PRN Jeremy M Gonda, M.D.       • fentanyl 50 mcg/mL infusion   Intravenous Continuous Bradley Flores M.D. 1 mL/hr at 12/25/18 0827 50 mcg/hr at 12/25/18 0827   • fentaNYL (SUBLIMAZE) injection 50 mcg  50 mcg Intravenous Q30 MIN PRN Bradley Flores M.D.        Or   • fentaNYL (SUBLIMAZE) injection 100 mcg  100 mcg Intravenous Q30 MIN PRN Bradley Flores M.D.   100 mcg at 12/23/18 1415    Or   • fentaNYL (SUBLIMAZE) injection 200 mcg  200 mcg Intravenous Q30 MIN PRN Bradley Flores M.D.   200 mcg at 12/23/18 1710   • propofol (DIPRIVAN) injection  0-80 mcg/kg/min Intravenous Continuous Bradley Flores M.D. 4.9 mL/hr at 12/25/18 0717 10 mcg/kg/min at 12/25/18 0717       Fluids    Intake/Output Summary (Last 24 hours) at 12/25/18 1410  Last data filed at 12/25/18 1200   Gross per 24 hour   Intake          2006.84 ml   Output             1625 ml   Net           381.84 ml       Laboratory  Recent Labs      12/23/18   0405  12/24/18   0420  12/25/18   0516   ISTATAPH  7.351*  7.279*  7.348*   ISTATAPCO2  37.6*  50.0*  45.8*   ISTATAPO2  90*  65  74   ISTATATCO2  22  25  27   BFMIZEG0YME  97  89*  94   ISTATARTHCO3  20.8  23.4  25.1*   ISTATARTBE  -4  -3  -1   ISTATTEMP  36.6 C  36.5 C  38.2 C   ISTATFIO2  40  40  40   ISTATSPEC  Arterial  Arterial  Arterial   ISTATAPHTC  7.357*  7.286*  7.331*   GSIZASJT0LA  88*  62*  80     Recent Labs      12/25/18   0510   BNPBTYPENAT  374*      Recent Labs      12/23/18   0408  12/24/18   0440  12/25/18   0510   SODIUM  137  138  140   POTASSIUM  4.0  4.0  3.4*   CHLORIDE  108  105  106   CO2  24 23 25   BUN  30*  33*  31*   CREATININE  0.58  0.54  0.73   MAGNESIUM  2.5   --    --    PHOSPHORUS  2.6   --    --    CALCIUM  9.3  9.4  9.2     Recent Labs      12/23/18   0408  12/24/18   0440  12/25/18   0510   ALTSGPT   --   85*   --    ASTSGOT   --   45   --    ALKPHOSPHAT   --   60   --    TBILIRUBIN   --   0.6   --    PREALBUMIN   --    --   <3.0*   GLUCOSE  111*  99  127*     Recent Labs      12/23/18   0408  12/24/18   0440  12/25/18   0510   WBC  8.3  9.1  13.4*   NEUTSPOLYS  81.90*  33.00*  60.50   LYMPHOCYTES  6.00*  7.80*  12.30*   MONOCYTES  2.60  7.00  7.00   EOSINOPHILS  0.80  1.70  2.60   BASOPHILS  0.90  0.00  0.90   ASTSGOT   --   45   --    ALTSGPT   --   85*   --    ALKPHOSPHAT   --   60   --    TBILIRUBIN   --   0.6   --      Recent Labs      12/23/18   0408   12/23/18   1513  12/24/18   0440  12/25/18   0510   RBC  3.18*   --    --   3.03*  2.75*   HEMOGLOBIN  8.8*   --    --   8.4*  7.6*   HEMATOCRIT  28.8*   --    --   27.1*  24.0*   PLATELETCT  259   --    --   270  293   APTT   --    < >  78.2*  59.0*  75.7*    < > = values in this interval not displayed.       Imaging  X-Ray:  I have personally reviewed the images and compared with prior images.    Assessment/Plan  Respiratory failure with hypoxia (HCC)   Assessment & Plan    Acute hypoxic respiratory failure secondary to cardio-pulmonary arrest possibly complicated by aspiration pneumonia.  Antibiotics for 7 days  Daily SBT, did not tolerate SBT today with increased high blood pressure and respiratory distress  Failed extubation 2 days ago with excessive secretions  Continue daily chest x-ray and ABG  HOB greater than 30 degrees  Oral care, PPI for GI prophylaxis.  Weaning parameters were not satisfactory this morning with low negative inspiratory force.  The patient was also  febrile overnight.  BNP was elevated.  Will hold an exacerbation for today 12/25/2018 and try tomorrow.       Ventricular fibrillation (HCC)- (present on admission)   Assessment & Plan    Primary arrhythmia per EMS status post defibrillation  Continuous telemetry with initiation of amiodarone should it recur  Optimize electrolytes  Coronary evaluation pending    Cardiology service following     Acute respiratory failure with hypoxia (HCC)- (present on admission)   Assessment & Plan    Intubated in field 12/19  Continue full mechanical ventilatory support, increased respiratory rate to 22  Extubated but reintubated for pulmonary toilet  Failed extubation 2 days ago with excessive secretions  Continue daily chest x-ray and ABG  HOB greater than 30 degrees  Oral care, PPI for GI prophylaxis.  Weaning parameters were not satisfactory this morning with low negative inspiratory force.  The patient was also febrile overnight.  BNP was elevated.  Will hold an exacerbation for today 12/25/2018 and try tomorrow.       Cardiac arrest (HCC)- (present on admission)   Assessment & Plan    Unknown etiology status post return of spontaneous circulation with prolonged downtime  Supportive care  Stat echocardiography, moderate to severe mitral regurgitation  Cardiology consultation, the patient will need cardiac catheterization when he is more stable       Lactic acidosis- (present on admission)   Assessment & Plan    Secondary to shock/cardiac arrest  Clinically resoloved               Hypokalemia   Assessment & Plan    Continue to track and replete               Anoxic brain injury (HCC)- (present on admission)   Assessment & Plan    Remarkable recovery after prolonged cardiac arrest  Following commands moving all extremities  Difficult to assess long-term cognitive effects of his cardiac arrest..      Elevated liver enzymes   Assessment & Plan    Likely secondary to shock liver  Improving       Closed fracture of multiple ribs of  both sides- (present on admission)   Assessment & Plan    Secondary to CPR  Analgesia as needed  This probably will make extubation and the patient from mechanical ventilation a little bit more difficult.  The patient already failed one extubation trial.     Aspiration pneumonia  Continue antibiotics  Patient remains febrile after few days of Unasyn for aspiration pneumonia   BAL now grew staph aureus   I am going to start the patient on vancomycin   Also we will check procrastinating     VTE:  Heparin  Ulcer: PPI  Lines: Central Line  Ongoing indication addressed    I have performed a physical exam and reviewed and updated ROS and Plan today (12/25/2018). In review of yesterday's note (12/24/2018), there are no changes except as documented above.     Discussed patient condition and risk of morbidity and/or mortality with Hospitalist, Family, RN, RT, Pharmacy, Charge nurse / hot rounds and cardiology  The patient remains critically ill.  Critical care time = 35  minutes in directly providing and coordinating critical care and extensive data review.  No time overlap and excludes procedures.

## 2018-12-25 NOTE — PROGRESS NOTES
12 Hour Chart Check    MS: .14/.12/.32  Sinus Rhythm to Sinus Tachycardia 60s to 110s.    2 RN Skin Check

## 2018-12-26 ENCOUNTER — APPOINTMENT (OUTPATIENT)
Dept: RADIOLOGY | Facility: MEDICAL CENTER | Age: 58
DRG: 224 | End: 2018-12-26
Attending: INTERNAL MEDICINE
Payer: COMMERCIAL

## 2018-12-26 PROBLEM — D50.9 IRON DEFICIENCY ANEMIA: Status: ACTIVE | Noted: 2018-12-20

## 2018-12-26 LAB
ABO GROUP BLD: NORMAL
ABO GROUP BLD: NORMAL
ACTION RANGE TRIGGERED IACRT: NO
ANION GAP SERPL CALC-SCNC: 7 MMOL/L (ref 0–11.9)
ANISOCYTOSIS BLD QL SMEAR: ABNORMAL
APTT PPP: 53.4 SEC (ref 24.7–36)
BARCODED ABORH UBTYP: 6200
BARCODED PRD CODE UBPRD: NORMAL
BARCODED UNIT NUM UBUNT: NORMAL
BASE EXCESS BLDA CALC-SCNC: -1 MMOL/L (ref -4–3)
BASO STIPL BLD QL SMEAR: NORMAL
BASOPHILS # BLD AUTO: 0.9 % (ref 0–1.8)
BASOPHILS # BLD: 0.11 K/UL (ref 0–0.12)
BLD GP AB SCN SERPL QL: NORMAL
BODY TEMPERATURE: ABNORMAL DEGREES
BUN SERPL-MCNC: 30 MG/DL (ref 8–22)
CALCIUM SERPL-MCNC: 9 MG/DL (ref 8.5–10.5)
CHLORIDE SERPL-SCNC: 110 MMOL/L (ref 96–112)
CO2 BLDA-SCNC: 22 MMOL/L (ref 20–33)
CO2 SERPL-SCNC: 21 MMOL/L (ref 20–33)
COMPONENT R 8504R: NORMAL
CREAT SERPL-MCNC: 0.66 MG/DL (ref 0.5–1.4)
EOSINOPHIL # BLD AUTO: 0.11 K/UL (ref 0–0.51)
EOSINOPHIL NFR BLD: 0.9 % (ref 0–6.9)
ERYTHROCYTE [DISTWIDTH] IN BLOOD BY AUTOMATED COUNT: 48.7 FL (ref 35.9–50)
GIANT PLATELETS BLD QL SMEAR: NORMAL
GLUCOSE SERPL-MCNC: 122 MG/DL (ref 65–99)
HCO3 BLDA-SCNC: 21.5 MMOL/L (ref 17–25)
HCT VFR BLD AUTO: 20.3 % (ref 42–52)
HCT VFR BLD AUTO: 24.4 % (ref 42–52)
HEMOCCULT STL QL: POSITIVE
HGB BLD-MCNC: 6.4 G/DL (ref 14–18)
HGB BLD-MCNC: 7.8 G/DL (ref 14–18)
HOROWITZ INDEX BLDA+IHG-RTO: 253 MM[HG]
HYPOCHROMIA BLD QL SMEAR: ABNORMAL
INST. QUALIFIED PATIENT IIQPT: YES
IRON SATN MFR SERPL: ABNORMAL % (ref 15–55)
IRON SERPL-MCNC: <10 UG/DL (ref 50–180)
LYMPHOCYTES # BLD AUTO: 1.62 K/UL (ref 1–4.8)
LYMPHOCYTES NFR BLD: 13.4 % (ref 22–41)
MACROCYTES BLD QL SMEAR: ABNORMAL
MANUAL DIFF BLD: NORMAL
MCH RBC QN AUTO: 27.6 PG (ref 27–33)
MCHC RBC AUTO-ENTMCNC: 31.5 G/DL (ref 33.7–35.3)
MCV RBC AUTO: 87.5 FL (ref 81.4–97.8)
MICROCYTES BLD QL SMEAR: ABNORMAL
MONOCYTES # BLD AUTO: 0.64 K/UL (ref 0–0.85)
MONOCYTES NFR BLD AUTO: 5.3 % (ref 0–13.4)
MORPHOLOGY BLD-IMP: NORMAL
MYELOCYTES NFR BLD MANUAL: 1.8 %
NEUTROPHILS # BLD AUTO: 9.4 K/UL (ref 1.82–7.42)
NEUTROPHILS NFR BLD: 77.7 % (ref 44–72)
NRBC # BLD AUTO: 0.15 K/UL
NRBC BLD-RTO: 1.2 /100 WBC
O2/TOTAL GAS SETTING VFR VENT: 30 %
PCO2 BLDA: 24.5 MMHG (ref 26–37)
PCO2 TEMP ADJ BLDA: 25.6 MMHG (ref 26–37)
PH BLDA: 7.55 [PH] (ref 7.4–7.5)
PH TEMP ADJ BLDA: 7.54 [PH] (ref 7.4–7.5)
PLATELET # BLD AUTO: 281 K/UL (ref 164–446)
PLATELET BLD QL SMEAR: NORMAL
PMV BLD AUTO: 10.7 FL (ref 9–12.9)
PO2 BLDA: 76 MMHG (ref 64–87)
PO2 TEMP ADJ BLDA: 82 MMHG (ref 64–87)
POLYCHROMASIA BLD QL SMEAR: NORMAL
POTASSIUM SERPL-SCNC: 3.5 MMOL/L (ref 3.6–5.5)
PROCALCITONIN SERPL-MCNC: 0.54 NG/ML
PRODUCT TYPE UPROD: NORMAL
RBC # BLD AUTO: 2.32 M/UL (ref 4.7–6.1)
RBC BLD AUTO: PRESENT
RH BLD: NORMAL
RH BLD: NORMAL
SAO2 % BLDA: 97 % (ref 93–99)
SODIUM SERPL-SCNC: 138 MMOL/L (ref 135–145)
SPECIMEN DRAWN FROM PATIENT: ABNORMAL
TIBC SERPL-MCNC: 281 UG/DL (ref 250–450)
TOXIC GRANULES BLD QL SMEAR: SLIGHT
UNIT STATUS USTAT: NORMAL
WBC # BLD AUTO: 12.1 K/UL (ref 4.8–10.8)

## 2018-12-26 PROCEDURE — 99233 SBSQ HOSP IP/OBS HIGH 50: CPT | Performed by: HOSPITALIST

## 2018-12-26 PROCEDURE — 84145 PROCALCITONIN (PCT): CPT

## 2018-12-26 PROCEDURE — P9016 RBC LEUKOCYTES REDUCED: HCPCS

## 2018-12-26 PROCEDURE — 94150 VITAL CAPACITY TEST: CPT

## 2018-12-26 PROCEDURE — 700105 HCHG RX REV CODE 258: Performed by: HOSPITALIST

## 2018-12-26 PROCEDURE — 86901 BLOOD TYPING SEROLOGIC RH(D): CPT

## 2018-12-26 PROCEDURE — 85007 BL SMEAR W/DIFF WBC COUNT: CPT

## 2018-12-26 PROCEDURE — 37799 UNLISTED PX VASCULAR SURGERY: CPT

## 2018-12-26 PROCEDURE — 82803 BLOOD GASES ANY COMBINATION: CPT

## 2018-12-26 PROCEDURE — 85018 HEMOGLOBIN: CPT

## 2018-12-26 PROCEDURE — 82272 OCCULT BLD FECES 1-3 TESTS: CPT

## 2018-12-26 PROCEDURE — C9113 INJ PANTOPRAZOLE SODIUM, VIA: HCPCS | Performed by: INTERNAL MEDICINE

## 2018-12-26 PROCEDURE — 80048 BASIC METABOLIC PNL TOTAL CA: CPT

## 2018-12-26 PROCEDURE — 99233 SBSQ HOSP IP/OBS HIGH 50: CPT | Performed by: INTERNAL MEDICINE

## 2018-12-26 PROCEDURE — A9270 NON-COVERED ITEM OR SERVICE: HCPCS | Performed by: INTERNAL MEDICINE

## 2018-12-26 PROCEDURE — 85027 COMPLETE CBC AUTOMATED: CPT

## 2018-12-26 PROCEDURE — 99291 CRITICAL CARE FIRST HOUR: CPT | Performed by: INTERNAL MEDICINE

## 2018-12-26 PROCEDURE — 83540 ASSAY OF IRON: CPT

## 2018-12-26 PROCEDURE — 700111 HCHG RX REV CODE 636 W/ 250 OVERRIDE (IP): Performed by: INTERNAL MEDICINE

## 2018-12-26 PROCEDURE — 85730 THROMBOPLASTIN TIME PARTIAL: CPT

## 2018-12-26 PROCEDURE — 85014 HEMATOCRIT: CPT

## 2018-12-26 PROCEDURE — 770022 HCHG ROOM/CARE - ICU (200)

## 2018-12-26 PROCEDURE — 86900 BLOOD TYPING SEROLOGIC ABO: CPT

## 2018-12-26 PROCEDURE — 83550 IRON BINDING TEST: CPT

## 2018-12-26 PROCEDURE — 700102 HCHG RX REV CODE 250 W/ 637 OVERRIDE(OP): Performed by: INTERNAL MEDICINE

## 2018-12-26 PROCEDURE — 86923 COMPATIBILITY TEST ELECTRIC: CPT

## 2018-12-26 PROCEDURE — 36430 TRANSFUSION BLD/BLD COMPNT: CPT

## 2018-12-26 PROCEDURE — 30243N1 TRANSFUSION OF NONAUTOLOGOUS RED BLOOD CELLS INTO CENTRAL VEIN, PERCUTANEOUS APPROACH: ICD-10-PCS | Performed by: HOSPITALIST

## 2018-12-26 PROCEDURE — 71045 X-RAY EXAM CHEST 1 VIEW: CPT

## 2018-12-26 PROCEDURE — 86850 RBC ANTIBODY SCREEN: CPT

## 2018-12-26 PROCEDURE — 94003 VENT MGMT INPAT SUBQ DAY: CPT

## 2018-12-26 PROCEDURE — 700111 HCHG RX REV CODE 636 W/ 250 OVERRIDE (IP): Performed by: HOSPITALIST

## 2018-12-26 PROCEDURE — 700105 HCHG RX REV CODE 258: Performed by: INTERNAL MEDICINE

## 2018-12-26 RX ORDER — SODIUM CHLORIDE 9 MG/ML
INJECTION, SOLUTION INTRAVENOUS
Status: DISCONTINUED
Start: 2018-12-26 | End: 2018-12-26

## 2018-12-26 RX ORDER — PANTOPRAZOLE SODIUM 40 MG/10ML
40 INJECTION, POWDER, LYOPHILIZED, FOR SOLUTION INTRAVENOUS 2 TIMES DAILY
Status: DISCONTINUED | OUTPATIENT
Start: 2018-12-26 | End: 2018-12-26

## 2018-12-26 RX ADMIN — METOPROLOL TARTRATE 12.5 MG: 25 TABLET, FILM COATED ORAL at 05:04

## 2018-12-26 RX ADMIN — PROPOFOL 30 MCG/KG/MIN: 10 INJECTION, EMULSION INTRAVENOUS at 23:49

## 2018-12-26 RX ADMIN — SODIUM CHLORIDE 8 MG/HR: 9 INJECTION, SOLUTION INTRAVENOUS at 17:08

## 2018-12-26 RX ADMIN — AMPICILLIN SODIUM AND SULBACTAM SODIUM 3 G: 2; 1 INJECTION, POWDER, FOR SOLUTION INTRAMUSCULAR; INTRAVENOUS at 23:45

## 2018-12-26 RX ADMIN — PROPOFOL 10 MCG/KG/MIN: 10 INJECTION, EMULSION INTRAVENOUS at 01:32

## 2018-12-26 RX ADMIN — FAMOTIDINE 20 MG: 20 TABLET ORAL at 06:00

## 2018-12-26 RX ADMIN — Medication 100 MCG/HR: at 17:27

## 2018-12-26 RX ADMIN — AMPICILLIN SODIUM AND SULBACTAM SODIUM 3 G: 2; 1 INJECTION, POWDER, FOR SOLUTION INTRAMUSCULAR; INTRAVENOUS at 12:03

## 2018-12-26 RX ADMIN — PROPOFOL 40 MCG/KG/MIN: 10 INJECTION, EMULSION INTRAVENOUS at 18:04

## 2018-12-26 RX ADMIN — PROPOFOL 30 MCG/KG/MIN: 10 INJECTION, EMULSION INTRAVENOUS at 14:48

## 2018-12-26 RX ADMIN — FENTANYL CITRATE 100 MCG: 50 INJECTION, SOLUTION INTRAMUSCULAR; INTRAVENOUS at 11:15

## 2018-12-26 RX ADMIN — AMPICILLIN SODIUM AND SULBACTAM SODIUM 3 G: 2; 1 INJECTION, POWDER, FOR SOLUTION INTRAMUSCULAR; INTRAVENOUS at 17:06

## 2018-12-26 RX ADMIN — HEPARIN SODIUM 3200 UNITS: 1000 INJECTION, SOLUTION INTRAVENOUS; SUBCUTANEOUS at 07:50

## 2018-12-26 RX ADMIN — ATORVASTATIN CALCIUM 80 MG: 80 TABLET, FILM COATED ORAL at 17:06

## 2018-12-26 RX ADMIN — FENTANYL CITRATE 200 MCG: 50 INJECTION, SOLUTION INTRAMUSCULAR; INTRAVENOUS at 17:00

## 2018-12-26 RX ADMIN — AMPICILLIN SODIUM AND SULBACTAM SODIUM 3 G: 2; 1 INJECTION, POWDER, FOR SOLUTION INTRAMUSCULAR; INTRAVENOUS at 05:04

## 2018-12-26 RX ADMIN — POTASSIUM BICARBONATE 50 MEQ: 25 TABLET, EFFERVESCENT ORAL at 10:05

## 2018-12-26 RX ADMIN — ASPIRIN 325 MG: 325 TABLET ORAL at 05:04

## 2018-12-26 RX ADMIN — FAMOTIDINE 20 MG: 20 TABLET ORAL at 17:06

## 2018-12-26 RX ADMIN — METOPROLOL TARTRATE 12.5 MG: 25 TABLET, FILM COATED ORAL at 17:07

## 2018-12-26 RX ADMIN — HEPARIN SODIUM 1200 UNITS/HR: 5000 INJECTION, SOLUTION INTRAVENOUS at 03:04

## 2018-12-26 RX ADMIN — SODIUM CHLORIDE 80 MG: 9 INJECTION, SOLUTION INTRAVENOUS at 17:06

## 2018-12-26 ASSESSMENT — PULMONARY FUNCTION TESTS: FVC: .93

## 2018-12-26 ASSESSMENT — LIFESTYLE VARIABLES: REASON UNABLE TO ASSESS: INTUBATED

## 2018-12-26 NOTE — RESPIRATORY CARE
COPD EDUCATION by COPD CLINICAL EDUCATOR  12/26/2018 at 9:47 AM by Ria Vasquez     Patient reviewed by COPD education team. Patient does not qualify for COPD program at this time

## 2018-12-26 NOTE — CARE PLAN
Problem: Ventilation Defect:  Goal: Ability to achieve and maintain unassisted ventilation or tolerate decreased levels of ventilator support  Outcome: PROGRESSING AS EXPECTED  Adult Ventilation Update    Total Vent Days: 6    Patient Lines/Drains/Airways Status    Active Airway     Name: Placement date: Placement time: Site: Days:    Airway ETT Oral 8.0 12/21/18   1620   Oral   6                    Events/Summary/Plan: CMV, 22, 400, +8, 35%.  Post ABG with pt. having a high pH, MD Evangelista states okay to drop pt's RR from 30 to 22.

## 2018-12-26 NOTE — CARE PLAN
Problem: Respiratory:  Goal: Respiratory status will improve  Patient sedation reduced to maximize ability to participate in SBT's for possible extubation today.    Problem: Mobility  Goal: Risk for activity intolerance will decrease  Patient mobilized to edge of bed with minimal assistance, patient sat at EOB for approx 15 minutes. Patient then stood for approx 5 minutes with 1 person assist. Patient tolerated well, no change in vitals.

## 2018-12-26 NOTE — CARE PLAN
Problem: Ventilation Defect:  Goal: Ability to achieve and maintain unassisted ventilation or tolerate decreased levels of ventilator support    Intervention: Monitor ventilator weaning response  Adult Ventilation Update    Total Vent Days: 5    APVCMV    30  400  +8  35%    Patient Lines/Drains/Airways Status    Active Airway     Name: Placement date: Placement time: Site: Days:    Airway ETT Oral 8.0@22 12/21/18   1620   Oral   4              #FVC / Vital Capacity (liters) : 0.97 (12/25/18 0830)  NIF (cm H2O) : -23 (12/25/18 0830)  Rapid Shallow Breathing Index (RR/VT): 40 (12/25/18 0830)  Plateau Pressure (Q Shift): 23 (12/25/18 1109)  Static Compliance (ml / cm H2O): 45 (12/25/18 1522)    Patient failed trials because of Barriers to Wean: Other (Comments) (per md order) (12/22/18 1502)  Barriers to SBT Weaning Trial Stopped due to:: Pt weaned for 1 hour and returned to rest settings per protocol (12/25/18 0830)  Length of Weaning Trial Length of Weaning Trial (Hours): 1 (12/25/18 0830)        Events/Summary/Plan: Vt increased to 400, peep decreased to 8 per Dr. Uribe, Pt did 1 hour SBT on 5/8 settings. (see above for parameters). Continue SBT trials tmrw for possible extubation.

## 2018-12-26 NOTE — PROGRESS NOTES
[facility] Cardiology Follow-up Consult Note    Date of note:    12/26/2018      Consulting Physician: Johan Morales M.D.    Patient ID:  Name:   Amadou Nicholas     YOB: 1960  Age:   58 y.o.  male   MRN:   [id number]    Chief Complaint   Patient presents with   • Cardiac Arrest     witnessed        ID: 58-year-old man who presented with out of hospital cardiac arrest (said to have been V. fib arrest) found to have hypertrophic obstructive cardiomyopathy and started on metoprolol for that (peak LVOT gradient 64 mmHg).  Angiography and defibrillator are pending recovery from respiratory failure.    Interim Events:  Actively being weaned from the ventilator, persistent moderate anemia with blood products planned for later today      ROS  No NV, No Bleeding, No dizziness   All other review of systems reviewed and negative.    Past medical, surgical, social, and family history reviewed and unchanged from admission except as noted in assessment and plan.    Medications: Reviewed in MAR  Current Facility-Administered Medications   Medication Dose Frequency Provider Last Rate Last Dose   • famotidine (PEPCID) tablet 20 mg  20 mg BID Aleshia Uribe M.D.   20 mg at 12/26/18 0600   • metoprolol (LOPRESSOR) tablet 12.5 mg  12.5 mg TWICE DAILY Aleshia Uribe M.D.   12.5 mg at 12/26/18 0504   • acetaminophen (TYLENOL) tablet 650 mg  650 mg Q6HRS PRN Aleshia Uribe M.D.       • ampicillin/sulbactam (UNASYN) 3 g in  mL IVPB  3 g Q6HRS Amadou Salomon D.O.   Stopped at 12/26/18 0534   • Respiratory Care per Protocol   Continuous RT Bradley Flores M.D.       • MD Alert...ICU Electrolyte Replacement per Pharmacy   pharmacy to dose Bradley Flores M.D.       • aspirin (ASA) tablet 325 mg  325 mg DAILY Yaron Magallanes M.D.   325 mg at 12/26/18 0504    Or   • aspirin (ASA) chewable tab 324 mg  324 mg DAILY Yaron Magallanes M.D.        Or   • aspirin (ASA) suppository 300 mg  300 mg DAILY Yaron Magallanes M.D.       •  "atorvastatin (LIPITOR) tablet 80 mg  80 mg Q EVENING Yaron Magallanes M.D.   80 mg at 12/25/18 1737   • senna-docusate (PERICOLACE or SENOKOT S) 8.6-50 MG per tablet 2 Tab  2 Tab BID aYron Magallanes M.D.   Stopped at 12/25/18 1800    And   • polyethylene glycol/lytes (MIRALAX) PACKET 1 Packet  1 Packet QDAY PRN Yaron Magallanes M.D.   1 Packet at 12/23/18 1136    And   • magnesium hydroxide (MILK OF MAGNESIA) suspension 30 mL  30 mL QDAY PRN Yaron Magallanes M.D.   30 mL at 12/24/18 1228    And   • bisacodyl (DULCOLAX) suppository 10 mg  10 mg QDAY PRN Yaron Magallanes M.D.       • dextrose 50% (D50W) injection 25-50 mL  12.5-25 g PRN Jeremy M Gonda, M.D.       • fentanyl 50 mcg/mL infusion   Continuous Bradley Flores M.D. 0.5 mL/hr at 12/26/18 0511 25 mcg/hr at 12/26/18 0511   • fentaNYL (SUBLIMAZE) injection 50 mcg  50 mcg Q30 MIN PRN Bradley Flores M.D.        Or   • fentaNYL (SUBLIMAZE) injection 100 mcg  100 mcg Q30 MIN PRN Bradley Flores M.D.   100 mcg at 12/23/18 1415    Or   • fentaNYL (SUBLIMAZE) injection 200 mcg  200 mcg Q30 MIN PRN Bradley Flores M.D.   200 mcg at 12/23/18 1710   • propofol (DIPRIVAN) injection  0-80 mcg/kg/min Continuous Bradley Flores M.D. 4.9 mL/hr at 12/26/18 0930 10 mcg/kg/min at 12/26/18 0930     Last reviewed on 12/20/2018  5:05 PM by Tan Grimm  No Known Allergies    Physical Exam  Body mass index is 32.77 kg/m². Blood pressure 148/74, pulse (!) 52, temperature 37.3 °C (99.1 °F), temperature source Ramon, resp. rate (!) 22, height 1.6 m (5' 2.99\"), weight 83.9 kg (184 lb 15.5 oz), SpO2 99 %. Vitals:    12/26/18 0800 12/26/18 0900 12/26/18 0915 12/26/18 1000   BP:  129/64 148/74    Pulse: 62 64 78 (!) 52   Resp: (!) 22 (!) 22 19 (!) 22   Temp: 37.4 °C (99.3 °F) 37.3 °C (99.1 °F) 37.3 °C (99.1 °F)    TempSrc: Ramon Ramon Ramon    SpO2: 96% 95% 93% 99%   Weight:       Height:        Oxygen Therapy:  Pulse Oximetry: 99 %, FiO2%: 35 %, O2 Delivery: Ventilator    General: " Middle-aged man intubated though interactive well on the ventilator with me during my examination  Eyes: nl conjunctiva  ENT: OP clear  Neck: no JVD   Lungs: normal respiratory effort, coarse bilaterally, again mechanically ventilated  Heart: RRR, grade 2-3/6 systolic ejection murmur at cardiac base, no rubs or gallops,   EXT 2+ edema bilateral lower extremities. 2+ pedal pulses. no cyanosis  Abdomen: soft, non tender, non distended,  Neurological: Opens eyes spontaneously, moves all extremities, no focal deficit appreciated, full exam deferred  Psychiatric: Exam deferred, intubated  Skin: Warm extremities    Labs (personally reviewed and notable for):   Recent Results (from the past 24 hour(s))   PROCALCITONIN    Collection Time: 12/26/18  4:50 AM   Result Value Ref Range    Procalcitonin 0.54 (H) <0.25 ng/mL   Basic Metabolic Panel (BMP)    Collection Time: 12/26/18  4:50 AM   Result Value Ref Range    Sodium 138 135 - 145 mmol/L    Potassium 3.5 (L) 3.6 - 5.5 mmol/L    Chloride 110 96 - 112 mmol/L    Co2 21 20 - 33 mmol/L    Glucose 122 (H) 65 - 99 mg/dL    Bun 30 (H) 8 - 22 mg/dL    Creatinine 0.66 0.50 - 1.40 mg/dL    Calcium 9.0 8.5 - 10.5 mg/dL    Anion Gap 7.0 0.0 - 11.9   CBC with Differential    Collection Time: 12/26/18  4:50 AM   Result Value Ref Range    WBC 12.1 (H) 4.8 - 10.8 K/uL    RBC 2.32 (L) 4.70 - 6.10 M/uL    Hemoglobin 6.4 (L) 14.0 - 18.0 g/dL    Hematocrit 20.3 (L) 42.0 - 52.0 %    MCV 87.5 81.4 - 97.8 fL    MCH 27.6 27.0 - 33.0 pg    MCHC 31.5 (L) 33.7 - 35.3 g/dL    RDW 48.7 35.9 - 50.0 fL    Platelet Count 281 164 - 446 K/uL    MPV 10.7 9.0 - 12.9 fL    Nucleated RBC 1.20 /100 WBC    NRBC (Absolute) 0.15 K/uL    Neutrophils-Polys 77.70 (H) 44.00 - 72.00 %    Lymphocytes 13.40 (L) 22.00 - 41.00 %    Monocytes 5.30 0.00 - 13.40 %    Eosinophils 0.90 0.00 - 6.90 %    Basophils 0.90 0.00 - 1.80 %    Neutrophils (Absolute) 9.40 (H) 1.82 - 7.42 K/uL    Lymphs (Absolute) 1.62 1.00 - 4.80 K/uL     Monos (Absolute) 0.64 0.00 - 0.85 K/uL    Eos (Absolute) 0.11 0.00 - 0.51 K/uL    Baso (Absolute) 0.11 0.00 - 0.12 K/uL    Hypochromia 3+     Anisocytosis 1+     Macrocytosis 1+     Microcytosis 1+    APTT    Collection Time: 12/26/18  4:50 AM   Result Value Ref Range    APTT 53.4 (H) 24.7 - 36.0 sec   ESTIMATED GFR    Collection Time: 12/26/18  4:50 AM   Result Value Ref Range    GFR If African American >60 >60 mL/min/1.73 m 2    GFR If Non African American >60 >60 mL/min/1.73 m 2   ABO AND RH CONFIRMATION    Collection Time: 12/26/18  4:50 AM   Result Value Ref Range    ABO Confirm A     Second Rh Group POS    DIFFERENTIAL MANUAL    Collection Time: 12/26/18  4:50 AM   Result Value Ref Range    Myelocytes 1.80 %    Manual Diff Status PERFORMED    PERIPHERAL SMEAR REVIEW    Collection Time: 12/26/18  4:50 AM   Result Value Ref Range    Peripheral Smear Review see below    PLATELET ESTIMATE    Collection Time: 12/26/18  4:50 AM   Result Value Ref Range    Plt Estimation Normal    MORPHOLOGY    Collection Time: 12/26/18  4:50 AM   Result Value Ref Range    RBC Morphology Present     Giant Platelets 1+     Polychromia 2+     Basophilic Stippling Few     Toxic Gran Slight    ISTAT ARTERIAL BLOOD GAS    Collection Time: 12/26/18  4:56 AM   Result Value Ref Range    Ph 7.551 (H) 7.400 - 7.500    Pco2 24.5 (L) 26.0 - 37.0 mmHg    Po2 76 64 - 87 mmHg    Tco2 22 20 - 33 mmol/L    S02 97 93 - 99 %    Hco3 21.5 17.0 - 25.0 mmol/L    BE -1 -4 - 3 mmol/L    Body Temp 38.0 C degrees    O2 Therapy 30 %    iPF Ratio 253     Ph Temp Daphne 7.535 (H) 7.400 - 7.500    Pco2 Temp Co 25.6 (L) 26.0 - 37.0 mmHg    Po2 Temp Cor 82 64 - 87 mmHg    Specimen Arterial     Action Range Triggered NO     Inst. Qualified Patient YES    COD - Adult (Type and Screen)    Collection Time: 12/26/18  7:30 AM   Result Value Ref Range    ABO Grouping Only A     Rh Grouping Only POS     Antibody Screen-Cod NEG     Component R       R3                  Red  Blood Cells3    [id number]   issued       12/26/18   08:47      Product Type Red Blood Cells LR Pheresis     Dispense Status Issued     Unit Number (Barcoded) [id number]     Product Code (Barcoded) L3160P28     Blood Type (Barcoded) 6200        Cardiac Imaging and Procedures Review:    EKG and telemetry tracings personally reviewed    Impression and Medical Decision Making:  Active Problems:    Cardiac arrest (HCC) POA: Yes    Acute respiratory failure with hypoxia (HCC) POA: Yes    Ventricular fibrillation (HCC) POA: Yes    Respiratory failure with hypoxia (HCC) POA: Unknown      Overview: Acute hypoxic respiratory failure on this admission initially related to       cardiopulomary arrest 20-30 minutes      Extubated yesterday after meting pre-established criteria      Reintubated when unable handle secretions      'Challenges last night and today with plugging, desaturations, ventilation      Improved somewhat with PEEP and decreasing sedation    Metabolic acidosis POA: Yes    Hypokalemia POA: Unknown    Coagulopathy (HCC) POA: Yes    Lactic acidosis POA: Yes    Closed fracture of multiple ribs of both sides POA: Yes    Leukocytosis POA: Yes    Anemia POA: Yes    Hyperphosphatemia POA: Yes    Elevated liver enzymes POA: Yes    Anoxic brain injury (HCC) POA: Yes    Fever POA: No    Aspiration pneumonia (HCC) POA: No  Resolved Problems:    * No resolved hospital problems. *    Presumed ventricular fibrillation arrest: I do think he should have angiography and be evaluated for defibrillator implantation.  However, his worsening anemia is concerning for gastrointestinal bleed.  I do think he will require endoscopy before cardiac catheterization would be appropriate.  I appreciate the help of the primary service and organizing GI consultation.  He continues on metoprolol tartrate at low dose for secondary.  Prevention of VT/VF.    Dyslipidemia: Continue atorvastatin 80 mg p.o. nightly    Hypertrophic  cardiomyopathy: Metoprolol as above    I personally discussed his case with  Dr Johan Morales M.D.      Thank you for allowing me to participate in the care of this patient.  Please call or text via [employer] if clarification would be useful.    Moustapha Hoff MD  Cardiologist, [facility]

## 2018-12-26 NOTE — PROGRESS NOTES
Hospital Medicine Daily Progress Note    Date of Service  12/26/2018    Chief Complaint  58 y.o. male admitted 12/19/2018 with cardiac arrest    Hospital Course    Mr Nicholas has a history of depression and hypertension, he was in his normal state of health but was noted to be unresponsive with agonal breathing.  His son started CPR.  Initial rhythm was ventricular fibrillation, he required multiple rounds of CPR, he was intubated in the field and brought to the emergency room.  Hypothermia protocol was initiated he was admitted to the ICU.  Patient was extubated on 12/21/2018, he required reintubation later that day.      Interval Problem Update  Indicates that he doesn't want restraints and wants ETT out, beyond that he is unable to provide interval history as he is intubated and sedated with propofol  Sedated with propofol and fentanyl drips, when sedation is turned down he gets agitated  Systolic blood pressure ranges from 70's-110's, in SR, HR 50's-90's  Urine output 2.1L over last 24 hours  Heparin drip stopped for drop in hemoglobin    Consultants/Specialty  Critical Care, I discussed the patient's condition with Dr. Uribe this morning on ICU Rounds  Gastroenterology    Code Status  Full Code    Disposition  ICU    Review of Systems  Review of Systems   Unable to perform ROS: Intubated        Physical Exam  Temp:  [37.3 °C (99.1 °F)-38.2 °C (100.8 °F)] 37.3 °C (99.1 °F)  Pulse:  [52-78] 52  Resp:  [19-31] 22  BP: (129-148)/(64-74) 148/74    Physical Exam   Constitutional: He appears well-developed and well-nourished.   HENT:   Head: Normocephalic and atraumatic.   Eyes: Conjunctivae are normal. Right eye exhibits no discharge. Left eye exhibits no discharge. No scleral icterus.   Neck: Normal range of motion. Neck supple. No thyromegaly present.   Cardiovascular: Normal rate, regular rhythm and intact distal pulses.    No murmur heard.  Pulmonary/Chest:   Equal chest rise and fall  Mechanical breath sounds  bilaterally  No overt wheezing   Abdominal: Soft. Bowel sounds are normal. He exhibits no distension. There is no tenderness.   Musculoskeletal: Normal range of motion. He exhibits no edema.   Neurological: He is alert.   Intubated, alert  Moves all ext to command with good strength   Skin: Skin is warm and dry. He is not diaphoretic.       Fluids    Intake/Output Summary (Last 24 hours) at 12/26/18 1016  Last data filed at 12/26/18 1000   Gross per 24 hour   Intake          2057.35 ml   Output             1885 ml   Net           172.35 ml       Laboratory  Recent Labs      12/24/18   0440  12/25/18   0510  12/26/18   0450   WBC  9.1  13.4*  12.1*   RBC  3.03*  2.75*  2.32*   HEMOGLOBIN  8.4*  7.6*  6.4*   HEMATOCRIT  27.1*  24.0*  20.3*   MCV  89.4  87.3  87.5   MCH  27.7  27.6  27.6   MCHC  31.0*  31.7*  31.5*   RDW  48.0  47.7  48.7   PLATELETCT  270  293  281   MPV  10.2  10.5  10.7     Recent Labs      12/24/18   0440  12/25/18   0510  12/26/18   0450   SODIUM  138  140  138   POTASSIUM  4.0  3.4*  3.5*   CHLORIDE  105  106  110   CO2  23  25  21   GLUCOSE  99  127*  122*   BUN  33*  31*  30*   CREATININE  0.54  0.73  0.66   CALCIUM  9.4  9.2  9.0     Recent Labs      12/24/18   0440  12/25/18   0510  12/26/18   0450   APTT  59.0*  75.7*  53.4*     Recent Labs      12/25/18   0510   BNPBTYPENAT  374*           Imaging  DX-CHEST-PORTABLE (1 VIEW)   Final Result      Slight improvement in lung aeration.      DX-CHEST-PORTABLE (1 VIEW)   Final Result         1. Lines and tubes as above, with ETT at the level of thoracic inlet. Recommend advancing 1-2 cm.   2. Persistent interstitial edema. No large pleural effusions.   3. Stable right perihilar atelectasis versus consolidation. Retrocardiac opacity has cleared.      DX-CHEST-PORTABLE (1 VIEW)   Final Result         1. Lines and tubes as above.   2. Hypoinflation with persistent right perihilar atelectasis versus consolidation.   3. Retrocardiac atelectasis versus  consolidation. No large pleural effusions.      IP-CBIGESV-4 VIEW   Final Result         No specific finding to suggest small bowel obstruction.      EC-ECHOCARDIOGRAM LTD W/O CONT   Final Result      DX-ABDOMEN FOR TUBE PLACEMENT   Final Result      Feeding tube placement with the tip projecting over the distal stomach or proximal duodenum      DX-CHEST-PORTABLE (1 VIEW)   Final Result      1.  Stable cardiomegaly      2.  Worsening pulmonary vascular congestion and interstitial edema.      3.  Bibasilar opacities may be related to atelectasis. Developing pneumonia could have a similar appearance.      EC-ECHOCARDIOGRAM COMPLETE W/O CONT   Final Result      DX-CHEST-LIMITED (1 VIEW)   Final Result         1.  No acute cardiopulmonary disease.   2.  Right internal jugular central line terminates in the right atrium, could be withdrawn 3 cm.   3.  Cardiomegaly      CT-CTA CHEST PULMONARY ARTERY W/ RECONS   Final Result         1.  No large central pulmonary embolus is appreciated, evaluation of the subsegmental branches is essentially nondiagnostic due to motion artifacts. Additional imaging would be required for definitive exclusion of small distal pulmonary emboli.   2.  Hazy groundglass pulmonary opacities, predominantly on the right, appearance suggests atypical edema or infiltrates.   3.  Anterior bilateral second through sixth rib fractures   4.  Cardiomegaly   5.  Left nephrolithiasis   6.  Atherosclerosis and atherosclerotic coronary artery disease.      CT-HEAD W/O   Final Result         1.  No acute intracranial abnormality.   2.  Atherosclerosis.      DX-CHEST-PORTABLE (1 VIEW)   Final Result         1.  No acute cardiopulmonary disease.   2.  Cardiomegaly           Assessment/Plan  Respiratory failure with hypoxia (HCC)- (present on admission)   Assessment & Plan    Had to be re-intubated on 12/21  Mechanical ventilation as per critical care     Ventricular fibrillation (HCC)- (present on admission)    Assessment & Plan    Now in   Cardiac catheterization planned, may need intervention, may need AICD  Continue metoprolol     Acute respiratory failure with hypoxia (HCC)- (present on admission)   Assessment & Plan    Patient had to be reintubated on 12/21/2018  Mechanical ventilation as per critical care       Cardiac arrest (HCC)- (present on admission)   Assessment & Plan    CTA negative for PE, ischemic workup to be determined by cardiology, probable cath when extubated  Cardiology consulting  Appears to have made compete neurologic recovery  Heparin drip stopped for worsened anemia         Lactic acidosis- (present on admission)   Assessment & Plan    Resolved     Hypokalemia   Assessment & Plan    Replace     Metabolic acidosis- (present on admission)   Assessment & Plan    Resolved with fluid resuscitation     Aspiration pneumonia (HCC)   Assessment & Plan    Unasyn     Fever   Assessment & Plan    Tylenol and cooling blanket as needed       Elevated liver enzymes- (present on admission)   Assessment & Plan    Likely related to cardiac arrest     Hyperphosphatemia- (present on admission)   Assessment & Plan    Monitor     Iron deficiency anemia- (present on admission)   Assessment & Plan    Acute on chronic, the patient has iron deficiency suggesting possible chronic GI loss  I discussed with Dr. Hoff of cardiology this morning, he requests GI evaluation prior to cardiac catheterization  Transfuse 1 unit packed red blood cells now, repeat labs this afternoon  I consulted Dr. Sal who has kindly agreed to see the patient       Leukocytosis- (present on admission)   Assessment & Plan    Monitor daily labs     Closed fracture of multiple ribs of both sides- (present on admission)   Assessment & Plan    Secondary to CPR  Pain management          VTE prophylaxis: SCD's

## 2018-12-26 NOTE — CARE PLAN
Problem: Communication  Goal: The ability to communicate needs accurately and effectively will improve  Outcome: PROGRESSING AS EXPECTED  Patient able to write notes and nod appropriately to communicate.    Problem: Mobility  Goal: Risk for activity intolerance will decrease  Outcome: PROGRESSING AS EXPECTED  Patient mobilized to the edge of bed. He was able to do most of the work himself and stood for 5 minutes.

## 2018-12-26 NOTE — PROGRESS NOTES
Critical Care Progress Note    Date of admission  12/19/2018    Chief Complaint  58 y.o. male admitted 12/19/2018 s/p cardiopulmonary arrest with prolonged CPR in the field  Hospital Course  TTM started in ED and continued in ICU    Interval Problem Updates  Past 24 hours  Hemoglobin dropped to 6.4.  Status post 1 packed red blood cell transfusion  + 4.8 fluid balance since admission  Ph 7.55 this morning with pco2 of 25  APTT 75.7  Fever overnight, T-max 38.2  On Unasyn for aspiration pneumonia   Chest x-ray did not show significant change from yesterday  Urine output 1.8 L for the last 24 hours  BAL grew staph aureus, MSSA  Review of Systems  Review of Systems   Unable to perform ROS: Intubated        Vital Signs for last 24 hours   Temp:  [37 °C (98.6 °F)-38.2 °C (100.8 °F)] 38.2 °C (100.8 °F)  Pulse:  [58-74] 62  Resp:  [20-31] 22    Hemodynamic parameters for last 24 hours  CVP:  [8 MM HG-209 MM HG] 203 MM HG    Respiratory  Brooks Vent Mode: APVCMV  Rate (breaths/min): 22  Vt Target (mL): 400  PEEP/CPAP: 8  FiO2: 30  P MEAN: 10  Length of Weaning Trial (Hours): 1 hour  Control VTE (exp VT): 307    Physical Exam   Physical Exam   Constitutional: He appears well-developed and well-nourished. He is intubated.   HENT:   Head: Normocephalic and atraumatic.   Eyes: Pupils are equal, round, and reactive to light. EOM are normal.   Neck: Normal range of motion.   Cardiovascular: Normal rate and regular rhythm.    Pulmonary/Chest: Effort normal and breath sounds normal. No accessory muscle usage. He is intubated. No respiratory distress.   's home scattered rhonchi no wheezing   Abdominal: Soft. Bowel sounds are normal.   Musculoskeletal: Normal range of motion.   Neurological:   Intubated, slightly sedated follow commands move all extremities   Skin: Skin is warm and dry.   Psychiatric: He has a normal mood and affect.       Medications  Current Facility-Administered Medications   Medication Dose Route Frequency  Provider Last Rate Last Dose   • potassium bicarbonate (KLYTE) effervescent tablet 50 mEq  50 mEq Feeding Tube Once Aleshia Uribe M.D.       • famotidine (PEPCID) tablet 20 mg  20 mg Feeding Tube BID Aleshia Uribe M.D.   20 mg at 12/26/18 0600   • metoprolol (LOPRESSOR) tablet 12.5 mg  12.5 mg Feeding Tube TWICE DAILY Aleshia Uribe M.D.   12.5 mg at 12/26/18 0504   • acetaminophen (TYLENOL) tablet 650 mg  650 mg Feeding Tube Q6HRS PRN Aleshia Uribe M.D.       • ampicillin/sulbactam (UNASYN) 3 g in  mL IVPB  3 g Intravenous Q6HRS HAN Ford.ESPERANZA   Stopped at 12/26/18 0534   • Respiratory Care per Protocol   Nebulization Continuous RT Bradley Flores M.D.       • MD Alert...ICU Electrolyte Replacement per Pharmacy   Other pharmacy to dose Bradley Flores M.D.       • aspirin (ASA) tablet 325 mg  325 mg Feeding Tube DAILY Yaron Magallanes M.D.   325 mg at 12/26/18 0504    Or   • aspirin (ASA) chewable tab 324 mg  324 mg Per NG Tube DAILY Yaron Magallanes M.D.        Or   • aspirin (ASA) suppository 300 mg  300 mg Rectal DAILY Yaron Magallanes M.D.       • atorvastatin (LIPITOR) tablet 80 mg  80 mg Per NG Tube Q EVENING Yaron Magallanes M.D.   80 mg at 12/25/18 1737   • senna-docusate (PERICOLACE or SENOKOT S) 8.6-50 MG per tablet 2 Tab  2 Tab Feeding Tube BID Yaron Magallanes M.D.   Stopped at 12/25/18 1800    And   • polyethylene glycol/lytes (MIRALAX) PACKET 1 Packet  1 Packet Feeding Tube QDAY PRN Yaron Magallanes M.D.   1 Packet at 12/23/18 1136    And   • magnesium hydroxide (MILK OF MAGNESIA) suspension 30 mL  30 mL Feeding Tube QDAY PRN Yaron Magallanes M.D.   30 mL at 12/24/18 1228    And   • bisacodyl (DULCOLAX) suppository 10 mg  10 mg Rectal QDAY PRN Yaron Magallanes M.D.       • dextrose 50% (D50W) injection 25-50 mL  12.5-25 g Intravenous PRN Jeremy M Gonda, M.D.       • fentanyl 50 mcg/mL infusion   Intravenous Continuous Bradley Flores M.D. 0.5 mL/hr at 12/26/18 0511 25 mcg/hr at 12/26/18 0511   • fentaNYL  (SUBLIMAZE) injection 50 mcg  50 mcg Intravenous Q30 MIN PRN Bradley Flores M.D.        Or   • fentaNYL (SUBLIMAZE) injection 100 mcg  100 mcg Intravenous Q30 MIN PRN Bradley Flores M.D.   100 mcg at 12/23/18 1415    Or   • fentaNYL (SUBLIMAZE) injection 200 mcg  200 mcg Intravenous Q30 MIN PRN Bradley Flores M.D.   200 mcg at 12/23/18 1710   • propofol (DIPRIVAN) injection  0-80 mcg/kg/min Intravenous Continuous Bradley Flores M.D. 4.9 mL/hr at 12/26/18 0132 10 mcg/kg/min at 12/26/18 0132       Fluids    Intake/Output Summary (Last 24 hours) at 12/26/18 0842  Last data filed at 12/26/18 0600   Gross per 24 hour   Intake           1957.3 ml   Output             1975 ml   Net            -17.7 ml       Laboratory  Recent Labs      12/24/18   0420  12/25/18   0516  12/26/18   0456   ISTATAPH  7.279*  7.348*  7.551*   ISTATAPCO2  50.0*  45.8*  24.5*   ISTATAPO2  65  74  76   ISTATATCO2  25  27  22   EDCKIDT1LQJ  89*  94  97   ISTATARTHCO3  23.4  25.1*  21.5   ISTATARTBE  -3  -1  -1   ISTATTEMP  36.5 C  38.2 C  38.0 C   ISTATFIO2  40  40  30   ISTATSPEC  Arterial  Arterial  Arterial   ISTATAPHTC  7.286*  7.331*  7.535*   AEERDGOQ4ND  62*  80  82     Recent Labs      12/25/18   0510   BNPBTYPENAT  374*     Recent Labs      12/24/18   0440  12/25/18   0510  12/26/18   0450   SODIUM  138  140  138   POTASSIUM  4.0  3.4*  3.5*   CHLORIDE  105  106  110   CO2  23  25  21   BUN  33*  31*  30*   CREATININE  0.54  0.73  0.66   CALCIUM  9.4  9.2  9.0     Recent Labs      12/24/18   0440  12/25/18   0510  12/26/18   0450   ALTSGPT  85*   --    --    ASTSGOT  45   --    --    ALKPHOSPHAT  60   --    --    TBILIRUBIN  0.6   --    --    PREALBUMIN   --   <3.0*   --    GLUCOSE  99  127*  122*     Recent Labs      12/24/18   0440  12/25/18   0510  12/26/18   0450   WBC  9.1  13.4*  12.1*   NEUTSPOLYS  33.00*  60.50  77.70*   LYMPHOCYTES  7.80*  12.30*  13.40*   MONOCYTES  7.00  7.00  5.30   EOSINOPHILS  1.70  2.60  0.90    BASOPHILS  0.00  0.90  0.90   ASTSGOT  45   --    --    ALTSGPT  85*   --    --    ALKPHOSPHAT  60   --    --    TBILIRUBIN  0.6   --    --      Recent Labs      12/24/18   0440  12/25/18   0510  12/26/18   0450   RBC  3.03*  2.75*  2.32*   HEMOGLOBIN  8.4*  7.6*  6.4*   HEMATOCRIT  27.1*  24.0*  20.3*   PLATELETCT  270  293  281   APTT  59.0*  75.7*  53.4*       Imaging  X-Ray:  I have personally reviewed the images and compared with prior images.    Assessment/Plan  Respiratory failure with hypoxia (HCC)   Assessment & Plan    Acute hypoxic respiratory failure secondary to cardio-pulmonary arrest possibly complicated by aspiration pneumonia.  Antibiotics for 7 days  Daily SBT, did not tolerate SBT today with increased high blood pressure and respiratory distress  Failed extubation 4 days ago with excessive secretions  Continue daily chest x-ray and ABG  HOB greater than 30 degrees  Oral care, PPI for GI prophylaxis.  Extubation attempt was held today because the patient has anemia.  Also because cardiology would prefer to keep him intubation until cardiac catheter is performed probably tomorrow.       Ventricular fibrillation (HCC)- (present on admission)   Assessment & Plan    Primary arrhythmia per EMS status post defibrillation  Continuous telemetry with initiation of amiodarone should it recur  Optimize electrolytes  Coronary evaluation pending  Echo showed hypertrophic cardiomyopathy  Cardiology service following  Tentative cardiac catheterization tomorrow  Of heparin drips today             Cardiac arrest (HCC)- (present on admission)   Assessment & Plan    Unknown etiology status post return of spontaneous circulation with  Probably secondary to hypertrophic cardiomyopathy  Plan for defibrillator when he is more stable prolonged downtime  Supportive care  Stat echocardiography, moderate to severe mitral regurgitation  Cardiology consultation, the patient will need cardiac catheterization when he is more  stable       Lactic acidosis- (present on admission)   Assessment & Plan    Secondary to shock/cardiac arrest  Clinically resoloved         Anoxic brain injury (HCC)- (present on admission)   Assessment & Plan    Remarkable recovery after prolonged cardiac arrest  Following commands moving all extremities  Difficult to assess long-term cognitive effects of his cardiac arrest..      Elevated liver enzymes   Assessment & Plan    Likely secondary to shock liver  Improving       Closed fracture of multiple ribs of both sides- (present on admission)   Assessment & Plan    Secondary to CPR  Analgesia as needed  This probably will make extubation and the patient from mechanical ventilation a little bit more difficult.  The patient already failed one extubation trial.     Aspiration pneumonia  Continue antibiotics  Patient remains febrile after few days of Unasyn for aspiration pneumonia   BAL now grew staph aureus   I am going to start the patient on vancomycin   Also we will check procrastinating     Anemia, no clear blood loss  Status post transfusion of 1 packed red blood cells  GI consult for need for upper and lower endoscopy looking for source of bleeding.  Patient may need to have stent placement and prolonged treatment of Plavix and aspirin (GI consult was requested by cardiology)    VTE:  Heparin  Ulcer: PPI  Lines: Central Line  Ongoing indication addressed    I have performed a physical exam and reviewed and updated ROS and Plan today (12/26/2018). In review of yesterday's note (12/25/2018), there are no changes except as documented above.     Discussed patient condition and risk of morbidity and/or mortality with Hospitalist, Family, RN, RT, Pharmacy, Charge nurse / hot rounds and cardiology  The patient remains critically ill.  Critical care time = 40  minutes in directly providing and coordinating critical care and extensive data review.  No time overlap and excludes procedures.

## 2018-12-27 ENCOUNTER — APPOINTMENT (OUTPATIENT)
Dept: RADIOLOGY | Facility: MEDICAL CENTER | Age: 58
DRG: 224 | End: 2018-12-27
Attending: INTERNAL MEDICINE
Payer: COMMERCIAL

## 2018-12-27 LAB
ACTION RANGE TRIGGERED IACRT: NO
ANION GAP SERPL CALC-SCNC: 8 MMOL/L (ref 0–11.9)
APTT PPP: 34.5 SEC (ref 24.7–36)
BASE EXCESS BLDA CALC-SCNC: -5 MMOL/L (ref -4–3)
BASOPHILS # BLD AUTO: 0.5 % (ref 0–1.8)
BASOPHILS # BLD: 0.05 K/UL (ref 0–0.12)
BODY TEMPERATURE: ABNORMAL DEGREES
BUN SERPL-MCNC: 30 MG/DL (ref 8–22)
CALCIUM SERPL-MCNC: 8.5 MG/DL (ref 8.5–10.5)
CHLORIDE SERPL-SCNC: 112 MMOL/L (ref 96–112)
CO2 BLDA-SCNC: 20 MMOL/L (ref 20–33)
CO2 SERPL-SCNC: 21 MMOL/L (ref 20–33)
CREAT SERPL-MCNC: 0.56 MG/DL (ref 0.5–1.4)
EOSINOPHIL # BLD AUTO: 0.24 K/UL (ref 0–0.51)
EOSINOPHIL NFR BLD: 2.3 % (ref 0–6.9)
ERYTHROCYTE [DISTWIDTH] IN BLOOD BY AUTOMATED COUNT: 49.4 FL (ref 35.9–50)
GLUCOSE SERPL-MCNC: 115 MG/DL (ref 65–99)
HCO3 BLDA-SCNC: 19.1 MMOL/L (ref 17–25)
HCT VFR BLD AUTO: 22.3 % (ref 42–52)
HGB BLD-MCNC: 7 G/DL (ref 14–18)
HGB BLD-MCNC: 7.4 G/DL (ref 14–18)
HGB BLD-MCNC: 8 G/DL (ref 14–18)
HOROWITZ INDEX BLDA+IHG-RTO: 165 MM[HG]
IMM GRANULOCYTES # BLD AUTO: 0.98 K/UL (ref 0–0.11)
IMM GRANULOCYTES NFR BLD AUTO: 9.5 % (ref 0–0.9)
INST. QUALIFIED PATIENT IIQPT: YES
LYMPHOCYTES # BLD AUTO: 1.28 K/UL (ref 1–4.8)
LYMPHOCYTES NFR BLD: 12.4 % (ref 22–41)
MCH RBC QN AUTO: 28 PG (ref 27–33)
MCHC RBC AUTO-ENTMCNC: 31.4 G/DL (ref 33.7–35.3)
MCV RBC AUTO: 89.2 FL (ref 81.4–97.8)
MONOCYTES # BLD AUTO: 0.66 K/UL (ref 0–0.85)
MONOCYTES NFR BLD AUTO: 6.4 % (ref 0–13.4)
NEUTROPHILS # BLD AUTO: 7.09 K/UL (ref 1.82–7.42)
NEUTROPHILS NFR BLD: 68.9 % (ref 44–72)
NRBC # BLD AUTO: 0.18 K/UL
NRBC BLD-RTO: 1.7 /100 WBC
O2/TOTAL GAS SETTING VFR VENT: 40 %
PCO2 BLDA: 30.6 MMHG (ref 26–37)
PCO2 TEMP ADJ BLDA: 30.6 MMHG (ref 26–37)
PH BLDA: 7.41 [PH] (ref 7.4–7.5)
PH TEMP ADJ BLDA: 7.41 [PH] (ref 7.4–7.5)
PLATELET # BLD AUTO: 245 K/UL (ref 164–446)
PMV BLD AUTO: 9.7 FL (ref 9–12.9)
PO2 BLDA: 66 MMHG (ref 64–87)
PO2 TEMP ADJ BLDA: 66 MMHG (ref 64–87)
POTASSIUM SERPL-SCNC: 3.7 MMOL/L (ref 3.6–5.5)
RBC # BLD AUTO: 2.5 M/UL (ref 4.7–6.1)
SAO2 % BLDA: 93 % (ref 93–99)
SODIUM SERPL-SCNC: 141 MMOL/L (ref 135–145)
SPECIMEN DRAWN FROM PATIENT: ABNORMAL
WBC # BLD AUTO: 10.3 K/UL (ref 4.8–10.8)

## 2018-12-27 PROCEDURE — 85018 HEMOGLOBIN: CPT

## 2018-12-27 PROCEDURE — 700111 HCHG RX REV CODE 636 W/ 250 OVERRIDE (IP)

## 2018-12-27 PROCEDURE — A9270 NON-COVERED ITEM OR SERVICE: HCPCS | Performed by: HOSPITALIST

## 2018-12-27 PROCEDURE — 85025 COMPLETE CBC W/AUTO DIFF WBC: CPT

## 2018-12-27 PROCEDURE — 71045 X-RAY EXAM CHEST 1 VIEW: CPT

## 2018-12-27 PROCEDURE — A9270 NON-COVERED ITEM OR SERVICE: HCPCS | Performed by: INTERNAL MEDICINE

## 2018-12-27 PROCEDURE — 99232 SBSQ HOSP IP/OBS MODERATE 35: CPT | Performed by: INTERNAL MEDICINE

## 2018-12-27 PROCEDURE — 700105 HCHG RX REV CODE 258: Performed by: INTERNAL MEDICINE

## 2018-12-27 PROCEDURE — 700111 HCHG RX REV CODE 636 W/ 250 OVERRIDE (IP): Performed by: HOSPITALIST

## 2018-12-27 PROCEDURE — 700105 HCHG RX REV CODE 258: Performed by: HOSPITALIST

## 2018-12-27 PROCEDURE — 85730 THROMBOPLASTIN TIME PARTIAL: CPT

## 2018-12-27 PROCEDURE — 80048 BASIC METABOLIC PNL TOTAL CA: CPT

## 2018-12-27 PROCEDURE — 700102 HCHG RX REV CODE 250 W/ 637 OVERRIDE(OP): Performed by: INTERNAL MEDICINE

## 2018-12-27 PROCEDURE — 700111 HCHG RX REV CODE 636 W/ 250 OVERRIDE (IP): Performed by: INTERNAL MEDICINE

## 2018-12-27 PROCEDURE — 99233 SBSQ HOSP IP/OBS HIGH 50: CPT | Performed by: HOSPITALIST

## 2018-12-27 PROCEDURE — 700102 HCHG RX REV CODE 250 W/ 637 OVERRIDE(OP): Performed by: HOSPITALIST

## 2018-12-27 PROCEDURE — 36600 WITHDRAWAL OF ARTERIAL BLOOD: CPT

## 2018-12-27 PROCEDURE — 94003 VENT MGMT INPAT SUBQ DAY: CPT

## 2018-12-27 PROCEDURE — C9113 INJ PANTOPRAZOLE SODIUM, VIA: HCPCS | Performed by: INTERNAL MEDICINE

## 2018-12-27 PROCEDURE — 94150 VITAL CAPACITY TEST: CPT

## 2018-12-27 PROCEDURE — 82803 BLOOD GASES ANY COMBINATION: CPT

## 2018-12-27 PROCEDURE — 770022 HCHG ROOM/CARE - ICU (200)

## 2018-12-27 PROCEDURE — 99291 CRITICAL CARE FIRST HOUR: CPT | Performed by: INTERNAL MEDICINE

## 2018-12-27 RX ORDER — FUROSEMIDE 10 MG/ML
20 INJECTION INTRAMUSCULAR; INTRAVENOUS ONCE
Status: COMPLETED | OUTPATIENT
Start: 2018-12-27 | End: 2018-12-27

## 2018-12-27 RX ORDER — SODIUM CHLORIDE 9 MG/ML
500 INJECTION, SOLUTION INTRAVENOUS
Status: DISPENSED | OUTPATIENT
Start: 2018-12-27 | End: 2018-12-27

## 2018-12-27 RX ORDER — MIDAZOLAM HYDROCHLORIDE 1 MG/ML
.5-2 INJECTION INTRAMUSCULAR; INTRAVENOUS PRN
Status: ACTIVE | OUTPATIENT
Start: 2018-12-27 | End: 2018-12-27

## 2018-12-27 RX ADMIN — FUROSEMIDE 20 MG: 10 INJECTION, SOLUTION INTRAMUSCULAR; INTRAVENOUS at 12:16

## 2018-12-27 RX ADMIN — SODIUM CHLORIDE 8 MG/HR: 9 INJECTION, SOLUTION INTRAVENOUS at 03:20

## 2018-12-27 RX ADMIN — PROPOFOL 25 MCG/KG/MIN: 10 INJECTION, EMULSION INTRAVENOUS at 12:32

## 2018-12-27 RX ADMIN — PROPOFOL 30 MCG/KG/MIN: 10 INJECTION, EMULSION INTRAVENOUS at 06:02

## 2018-12-27 RX ADMIN — Medication 50 MCG/HR: at 23:13

## 2018-12-27 RX ADMIN — AMPICILLIN SODIUM AND SULBACTAM SODIUM 3 G: 2; 1 INJECTION, POWDER, FOR SOLUTION INTRAMUSCULAR; INTRAVENOUS at 23:50

## 2018-12-27 RX ADMIN — PROPOFOL 25 MCG/KG/MIN: 10 INJECTION, EMULSION INTRAVENOUS at 19:19

## 2018-12-27 RX ADMIN — ATORVASTATIN CALCIUM 80 MG: 80 TABLET, FILM COATED ORAL at 16:58

## 2018-12-27 RX ADMIN — SODIUM CHLORIDE 8 MG/HR: 9 INJECTION, SOLUTION INTRAVENOUS at 23:02

## 2018-12-27 RX ADMIN — FENTANYL CITRATE 200 MCG: 50 INJECTION, SOLUTION INTRAMUSCULAR; INTRAVENOUS at 02:00

## 2018-12-27 RX ADMIN — AMPICILLIN SODIUM AND SULBACTAM SODIUM 3 G: 2; 1 INJECTION, POWDER, FOR SOLUTION INTRAMUSCULAR; INTRAVENOUS at 12:15

## 2018-12-27 RX ADMIN — AMPICILLIN SODIUM AND SULBACTAM SODIUM 3 G: 2; 1 INJECTION, POWDER, FOR SOLUTION INTRAMUSCULAR; INTRAVENOUS at 05:27

## 2018-12-27 RX ADMIN — POTASSIUM BICARBONATE 50 MEQ: 25 TABLET, EFFERVESCENT ORAL at 12:16

## 2018-12-27 RX ADMIN — SODIUM CHLORIDE 8 MG/HR: 9 INJECTION, SOLUTION INTRAVENOUS at 12:36

## 2018-12-27 RX ADMIN — FUROSEMIDE 20 MG: 10 INJECTION, SOLUTION INTRAMUSCULAR; INTRAVENOUS at 15:55

## 2018-12-27 RX ADMIN — ASPIRIN 81 MG 324 MG: 81 TABLET ORAL at 05:27

## 2018-12-27 RX ADMIN — AMPICILLIN SODIUM AND SULBACTAM SODIUM 3 G: 2; 1 INJECTION, POWDER, FOR SOLUTION INTRAMUSCULAR; INTRAVENOUS at 16:58

## 2018-12-27 ASSESSMENT — COGNITIVE AND FUNCTIONAL STATUS - GENERAL
MOBILITY SCORE: 6
PERSONAL GROOMING: TOTAL
MOVING FROM LYING ON BACK TO SITTING ON SIDE OF FLAT BED: UNABLE
MOVING TO AND FROM BED TO CHAIR: UNABLE
SUGGESTED CMS G CODE MODIFIER DAILY ACTIVITY: CN
EATING MEALS: TOTAL
HELP NEEDED FOR BATHING: TOTAL
CLIMB 3 TO 5 STEPS WITH RAILING: TOTAL
DRESSING REGULAR UPPER BODY CLOTHING: TOTAL
DRESSING REGULAR LOWER BODY CLOTHING: TOTAL
DAILY ACTIVITIY SCORE: 6
STANDING UP FROM CHAIR USING ARMS: TOTAL
TURNING FROM BACK TO SIDE WHILE IN FLAT BAD: UNABLE
TOILETING: TOTAL
WALKING IN HOSPITAL ROOM: TOTAL
SUGGESTED CMS G CODE MODIFIER MOBILITY: CN

## 2018-12-27 ASSESSMENT — PULMONARY FUNCTION TESTS
FVC: .71
FVC: .64

## 2018-12-27 NOTE — CARE PLAN
Problem: Venous Thromboembolism (VTW)/Deep Vein Thrombosis (DVT) Prevention:  Goal: Patient will participate in Venous Thrombosis (VTE)/Deep Vein Thrombosis (DVT)Prevention Measures    Intervention: Ensure patient wears graduated elastic stockings (VIKASH hose) and/or SCDs, if ordered, when in bed or chair (Remove at least once per shift for skin check)  SCDs in place while patient in bed       Problem: Urinary Elimination:  Goal: Ability to reestablish a normal urinary elimination pattern will improve    Intervention: Evaluate need to continue indwelling urinary catheter  Catheter needed for accurate I/Os due to critical illness

## 2018-12-27 NOTE — PROGRESS NOTES
Gastroenterology Progress Note     Author: Kai Sal   Date & Time Created: 12/27/2018 12:00 PM    Chief Complaint:  Anemia    Interval History:  Stable overnight. No gross bleeding.   Unable to contact wife to obtain informed consent.     Review of Systems:  Review of Systems   Unable to perform ROS: Intubated       Physical Exam:  Physical Exam   Constitutional: He is sedated and intubated.   Eyes: No scleral icterus.   Cardiovascular: Normal rate and regular rhythm.    Pulmonary/Chest: Effort normal and breath sounds normal. He is intubated.   Abdominal: Soft. Bowel sounds are normal. He exhibits distension. There is tenderness. There is no guarding.   Skin: Skin is warm.   Nursing note and vitals reviewed.      Labs:  Recent Labs      12/25/18   0516  12/26/18   0456  12/27/18   0419   ISTATAPH  7.348*  7.551*  7.405   ISTATAPCO2  45.8*  24.5*  30.6   ISTATAPO2  74  76  66   ISTATATCO2  27  22  20   [id number]  94  97  93   ISTATARTHCO3  25.1*  21.5  19.1   ISTATARTBE  -1  -1  -5*   ISTATTEMP  38.2 C  38.0 C  37.0 C   ISTATFIO2  40  30  40   ISTATSPEC  Arterial  Arterial  Arterial   ISTATAPHTC  7.331*  7.535*  7.405   [id number]  80  82  66     Recent Labs      12/25/18   0510   BNPBTYPENAT  374*     Recent Labs      12/25/18   0510  12/26/18   0450  12/27/18   0350   SODIUM  140  138  141   POTASSIUM  3.4*  3.5*  3.7   CHLORIDE  106  110  112   CO2  25  21  21   BUN  31*  30*  30*   CREATININE  0.73  0.66  0.56   CALCIUM  9.2  9.0  8.5     Recent Labs      12/25/18   0510  12/26/18   0450  12/27/18   0350   PREALBUMIN  <3.0*   --    --    GLUCOSE  127*  122*  115*     Recent Labs      12/25/18   0510  12/26/18   0450  12/26/18   1130  12/27/18   0350  12/27/18   1109   RBC  2.75*  2.32*   --   2.50*   --    HEMOGLOBIN  7.6*  6.4*  7.8*  7.0*  7.4*   HEMATOCRIT  24.0*  20.3*  24.4*  22.3*   --    PLATELETCT  293  281   --   245   --    APTT  75.7*  53.4*   --   34.5   --    IRON   --   <10*   --     --    --    [facility]   --   281   --    --    --      Recent Labs      18   0510  18   0450  18   0350   WBC  13.4*  12.1*  10.3   NEUTSPOLYS  60.50  77.70*  68.90   LYMPHOCYTES  12.30*  13.40*  12.40*   MONOCYTES  7.00  5.30  6.40   EOSINOPHILS  2.60  0.90  2.30   BASOPHILS  0.90  0.90  0.50     Hemodynamics:  Temp (24hrs), Av.1 °C (98.7 °F), Min:36.9 °C (98.4 °F), Max:37.3 °C (99.1 °F)  Temperature: 37 °C (98.6 °F), Monitored Temp: 37.2 °C (99 °F)  Pulse  Av.8  Min: 42  Max: 121Heart Rate (Monitored): (!) 58  NIBP: 135/67    Respiratory:  Brooks Vent Mode: Spont, Rate (breaths/min): 22, PEEP/CPAP: 8, FiO2: 40, P Peak (PIP): 18, P MEAN: 13 Respiration: 19, Pulse Oximetry: 98 %     Work Of Breathing / Effort: Vented  RUL Breath Sounds: Clear, RML Breath Sounds: Clear, RLL Breath Sounds: Diminished, ELDON Breath Sounds: Clear, LLL Breath Sounds: Diminished  Fluids:    Intake/Output Summary (Last 24 hours) at 18 1200  Last data filed at 18 1000   Gross per 24 hour   Intake          1753.44 ml   Output             1645 ml   Net           108.44 ml     Weight: 86.3 kg (190 lb 4.1 oz)  GI/Nutrition:  Orders Placed This Encounter   Procedures   • Diet NPO     Standing Status:   Standing     Number of Occurrences:   1     Order Specific Question:   Type:     Answer:   Now [1]     Order Specific Question:   Restrict to:     Answer:   Strict [1]     Medical Decision Making, by Problem:  Active Hospital Problems    Diagnosis   • Respiratory failure with hypoxia    • Cardiac arrest    • Ventricular fibrillation   • Lactic acidosis [E87.2]   • Iron deficiency anemia: Possible component GI bleed.    • Aspiration pneumonia   • Anoxic brain injury    • Elevated liver enzymes: Improving.       Plan:  Await wife to obtain consent.  Tentative EGD tomorrow.     Quality-Core Measures   Reviewed items::  Radiology images reviewed and Labs reviewed

## 2018-12-27 NOTE — PROGRESS NOTES
Critical Care Progress Note    Date of admission  12/19/2018    Chief Complaint  58 y.o. male admitted 12/19/2018 s/p cardiopulmonary arrest with prolonged CPR in the field  Hospital Course  TTM started in ED and continued in ICU    Interval Problem Updates  Past 24 hours  Hemoglobin dropped to 7   + 4.9 fluid balance since admission  Afebrile overnight T-max was 37.3 Celsius  On Unasyn for aspiration pneumonia day for 5  Chest x-ray showed increased bilateral interstitial marking  Urine output 2 L L for the last 24 hours  BAL grew staph aureus, MSSA  Pending EGD for evaluation of peptic ulcer disease  Review of Systems  Review of Systems   Unable to perform ROS: Intubated        Vital Signs for last 24 hours   Temp:  [36.9 °C (98.4 °F)-37.3 °C (99.1 °F)] 37 °C (98.6 °F)  Pulse:  [48-74] 53  Resp:  [16-26] 22    Hemodynamic parameters for last 24 hours       Respiratory  Brooks Vent Mode: APVCMV  Rate (breaths/min): 22  Vt Target (mL): 400  PEEP/CPAP: 8  FiO2: 40  P Support: 5  P MEAN: 13  Length of Weaning Trial (Hours): 1 hour  Control VTE (exp VT): 269    Physical Exam   Physical Exam   Constitutional: He appears well-developed and well-nourished. He is intubated.   HENT:   Head: Normocephalic and atraumatic.   Eyes: Pupils are equal, round, and reactive to light. EOM are normal.   Neck: Normal range of motion.   Cardiovascular: Normal rate and regular rhythm.    Pulmonary/Chest: Effort normal and breath sounds normal. No accessory muscle usage. He is intubated. No respiratory distress.   's home scattered rhonchi no wheezing   Abdominal: Soft. Bowel sounds are normal.   Musculoskeletal: Normal range of motion.   Neurological:   Intubated, slightly sedated follow commands move all extremities   Skin: Skin is warm and dry.   Psychiatric: He has a normal mood and affect.       Medications  Current Facility-Administered Medications   Medication Dose Route Frequency Provider Last Rate Last Dose   • norepinephrine  (LEVOPHED) 8 mg in  mL Infusion  0-30 mcg/min Intravenous Continuous Aleshia Uribe M.D.   Stopped at 12/27/18 1115   • pantoprazole (PROTONIX) 80 mg in  mL Infusion  8 mg/hr Intravenous Continuous Kai Sal D.O. 25 mL/hr at 12/27/18 1236 8 mg/hr at 12/27/18 1236   • metoprolol (LOPRESSOR) tablet 12.5 mg  12.5 mg Feeding Tube TWICE DAILY Aleshia Uribe M.D.   Stopped at 12/27/18 0600   • acetaminophen (TYLENOL) tablet 650 mg  650 mg Feeding Tube Q6HRS PRN Aleshia Uribe M.D.       • ampicillin/sulbactam (UNASYN) 3 g in  mL IVPB  3 g Intravenous Q6HRS Amadou Salomon D.O.   Stopped at 12/27/18 1245   • Respiratory Care per Protocol   Nebulization Continuous RT Bradley Flores M.D.       • MD Alert...ICU Electrolyte Replacement per Pharmacy   Other pharmacy to dose Bradley Flores M.D.       • aspirin (ASA) tablet 325 mg  325 mg Feeding Tube DAILY Yaron Magallanes M.D.   325 mg at 12/26/18 0504    Or   • aspirin (ASA) chewable tab 324 mg  324 mg Per NG Tube DAILY Yaron Magallanes M.D.   324 mg at 12/27/18 0527    Or   • aspirin (ASA) suppository 300 mg  300 mg Rectal DAILY Yaron Magallanes M.D.       • atorvastatin (LIPITOR) tablet 80 mg  80 mg Per NG Tube Q EVENING Yaron Magallanes M.D.   80 mg at 12/26/18 1706   • senna-docusate (PERICOLACE or SENOKOT S) 8.6-50 MG per tablet 2 Tab  2 Tab Feeding Tube BID Yaron Magallanes M.D.   Stopped at 12/25/18 1800    And   • polyethylene glycol/lytes (MIRALAX) PACKET 1 Packet  1 Packet Feeding Tube QDAY PRN Yaron Magallanes M.D.   1 Packet at 12/23/18 1136    And   • magnesium hydroxide (MILK OF MAGNESIA) suspension 30 mL  30 mL Feeding Tube QDAY PRN Yaron Magallanes M.D.   30 mL at 12/24/18 1228    And   • bisacodyl (DULCOLAX) suppository 10 mg  10 mg Rectal QDAY PRN Yaron Magallanes M.D.       • dextrose 50% (D50W) injection 25-50 mL  12.5-25 g Intravenous PRN Jeremy M Gonda, M.D.       • fentanyl 50 mcg/mL infusion   Intravenous Continuous Bradley Flores M.D. 1.5 mL/hr at  12/27/18 0914 75 mcg/hr at 12/27/18 0914   • fentaNYL (SUBLIMAZE) injection 50 mcg  50 mcg Intravenous Q30 MIN PRN Bradley Flores M.D.        Or   • fentaNYL (SUBLIMAZE) injection 100 mcg  100 mcg Intravenous Q30 MIN PRN Bradley Flores M.D.   100 mcg at 12/26/18 1115    Or   • fentaNYL (SUBLIMAZE) injection 200 mcg  200 mcg Intravenous Q30 MIN PRN Bradley Flores M.D.   200 mcg at 12/27/18 0200   • propofol (DIPRIVAN) injection  0-80 mcg/kg/min Intravenous Continuous Bradley Flores M.D. 12.2 mL/hr at 12/27/18 1232 25 mcg/kg/min at 12/27/18 1232       Fluids    Intake/Output Summary (Last 24 hours) at 12/27/18 1348  Last data filed at 12/27/18 1200   Gross per 24 hour   Intake          1881.01 ml   Output             1795 ml   Net            86.01 ml       Laboratory  Recent Labs      12/25/18   0516  12/26/18   0456  12/27/18   0419   ISTATAPH  7.348*  7.551*  7.405   ISTATAPCO2  45.8*  24.5*  30.6   ISTATAPO2  74  76  66   ISTATATCO2  27  22  20   LGYVJAU9BEB  94  97  93   ISTATARTHCO3  25.1*  21.5  19.1   ISTATARTBE  -1  -1  -5*   ISTATTEMP  38.2 C  38.0 C  37.0 C   ISTATFIO2  40  30  40   ISTATSPEC  Arterial  Arterial  Arterial   ISTATAPHTC  7.331*  7.535*  7.405   WEVYDNZW1CJ  80  82  66     Recent Labs      12/25/18   0510   BNPBTYPENAT  374*     Recent Labs      12/25/18   0510  12/26/18   0450  12/27/18   0350   SODIUM  140  138  141   POTASSIUM  3.4*  3.5*  3.7   CHLORIDE  106  110  112   CO2  25  21  21   BUN  31*  30*  30*   CREATININE  0.73  0.66  0.56   CALCIUM  9.2  9.0  8.5     Recent Labs      12/25/18   0510  12/26/18   0450  12/27/18   0350   PREALBUMIN  <3.0*   --    --    GLUCOSE  127*  122*  115*     Recent Labs      12/25/18   0510  12/26/18   0450  12/27/18   0350   WBC  13.4*  12.1*  10.3   NEUTSPOLYS  60.50  77.70*  68.90   LYMPHOCYTES  12.30*  13.40*  12.40*   MONOCYTES  7.00  5.30  6.40   EOSINOPHILS  2.60  0.90  2.30   BASOPHILS  0.90  0.90  0.50     Recent Labs      12/25/18    0510  12/26/18   0450  12/26/18   1130  12/27/18   0350  12/27/18   1109   RBC  2.75*  2.32*   --   2.50*   --    HEMOGLOBIN  7.6*  6.4*  7.8*  7.0*  7.4*   HEMATOCRIT  24.0*  20.3*  24.4*  22.3*   --    PLATELETCT  293  281   --   245   --    APTT  75.7*  53.4*   --   34.5   --    IRON   --   <10*   --    --    --    TOTIRONBC   --   281   --    --    --        Imaging  X-Ray:  I have personally reviewed the images and compared with prior images.    Assessment/Plan  Respiratory failure with hypoxia (HCC)   Assessment & Plan    Acute hypoxic respiratory failure secondary to cardio-pulmonary arrest possibly complicated by aspiration pneumonia.  Antibiotics for 7 days  Daily SBT,   Failed extubation 5days ago with excessive secretions  Continue daily chest x-ray and ABG  HOB greater than 30 degrees  Oral care, PPI for GI prophylaxis.  Chest x-ray today showed increase bilateral interstitial marking probably secondary to fluid overload.  Given 1 dose of Lasix 20 IV P.  We will try to extubate after EGD       Ventricular fibrillation (HCC)- (present on admission)   Assessment & Plan    Primary arrhythmia per EMS status post defibrillation  Continuous telemetry with initiation of amiodarone should it recur  Optimize electrolytes  Echo showed hypertrophic cardiomyopathy  Cardiology service following             Cardiac arrest (HCC)- (present on admission)   Assessment & Plan    Unknown etiology status post return of spontaneous circulation with  Probably secondary to hypertrophic cardiomyopathy  Plan for defibrillator when he is more stable    Lactic acidosis- (present on admission)   Assessment & Plan    Secondary to shock/cardiac arrest  Clinically resoloved         Anoxic brain injury (HCC)- (present on admission)   Assessment & Plan    Remarkable recovery after prolonged cardiac arrest  Following commands moving all extremities  Difficult to assess long-term cognitive effects of his cardiac arrest..      Elevated  liver enzymes   Assessment & Plan    Likely secondary to shock liver  Improving       Closed fracture of multiple ribs of both sides- (present on admission)   Assessment & Plan    Secondary to CPR  Analgesia as needed  This probably will make extubation and the patient from mechanical ventilation a little bit more difficult.  The patient already failed one extubation trial.     Aspiration pneumonia  Continue antibiotics      Anemia, probably acute blood loss from GI tract.  Today's hemoglobin was 7 after transfusion of 1 unit yesterday 12/26/2018.  We will transfuse another unit today.  We will follow-up on findings of EGD today.      VTE:  Heparin  Ulcer: PPI  Lines: Central Line  Ongoing indication addressed    I have performed a physical exam and reviewed and updated ROS and Plan today (12/27/2018). In review of yesterday's note (12/26/2018), there are no changes except as documented above.     Discussed patient condition and risk of morbidity and/or mortality with Hospitalist, Family, RN, RT, Pharmacy, Charge nurse / hot rounds and cardiology  The patient remains critically ill.  Critical care time = 35  minutes in directly providing and coordinating critical care and extensive data review.  No time overlap and excludes procedures.

## 2018-12-27 NOTE — PROGRESS NOTES
12 Hour Chart Check    MS: .12/.08/.34  Sinus Julian to Sinus Rhythm 50s to 90s.    2 RN Skin Check

## 2018-12-27 NOTE — CARE PLAN
Problem: Respiratory:  Goal: Respiratory status will improve  Collaboration w/ RT. Pt suctioned Q2h and PRN. Continuous O2 monitoring in place. Oral hygiene performed     Problem: Mobility  Goal: Risk for activity intolerance will decrease  Pt mobilized to edge of bed for 10 min. Pt able to participate and support self

## 2018-12-27 NOTE — PROGRESS NOTES
· 2 RN skin check complete.   · Devices in place hsu, THAIS's, cortrak, restraints.  · Skin assessed under devices.  · The following interventions in place q2 turn, waffle mattress

## 2018-12-27 NOTE — CARE PLAN
Problem: Skin Integrity  Goal: Risk for impaired skin integrity will decrease  Outcome: PROGRESSING AS EXPECTED  Assessed patient for signs and symptoms of skin breakdown frequently. Provided Q2H turns, waffle cushion in place, heels floated on pillows.    Problem: Mobility  Goal: Risk for activity intolerance will decrease  Outcome: PROGRESSING AS EXPECTED  Unsafe to mobilize today, patient becoming extremely agitated with decreased sedation, strains against vent causing his face to turn purple.

## 2018-12-27 NOTE — PROGRESS NOTES
Hospital Medicine Daily Progress Note    Date of Service  12/27/2018    Chief Complaint  58 y.o. male admitted 12/19/2018 with cardiac arrest    Hospital Course    Mr Nicholas has a history of depression and hypertension, he was in his normal state of health but was noted to be unresponsive with agonal breathing.  His son started CPR.  Initial rhythm was ventricular fibrillation, he required multiple rounds of CPR, he was intubated in the field and brought to the emergency room.  Hypothermia protocol was initiated he was admitted to the ICU.  Patient was extubated on 12/21/2018, he required reintubation later that day.      Interval Problem Update  Indicates that he doesn't want restraints and wants ETT out, beyond that he is unable to provide interval history as he is intubated and sedated with propofol drip and fentanyl drip, gets very agitated when sedation is turned down  Tube feeds paused for EGD planned today  Increased O2 requirements overnight, up to 40% FiO2, PEEP 8  In SR/sinus amisha, SBP 90's-130's    Consultants/Specialty  Critical Care, I discussed the patient's condition with Dr. Uribe this morning on ICU Rounds  Gastroenterology  Cardiology    Code Status  Full Code    Disposition  ICU    Review of Systems  Review of Systems   Unable to perform ROS: Intubated        Physical Exam  Temp:  [36.9 °C (98.4 °F)-37.5 °C (99.5 °F)] 37 °C (98.6 °F)  Pulse:  [48-74] 48  Resp:  [16-26] 22    Physical Exam   Constitutional: He appears well-developed and well-nourished.   HENT:   Head: Normocephalic and atraumatic.   Eyes: Conjunctivae are normal. Right eye exhibits no discharge. Left eye exhibits no discharge. No scleral icterus.   Neck: Normal range of motion. Neck supple. No thyromegaly present.   Cardiovascular: Normal rate, regular rhythm and intact distal pulses.    No murmur heard.  Pulmonary/Chest:   Equal chest rise and fall  Mechanical breath sounds bilaterally  No overt wheezing   Abdominal: Soft.  Bowel sounds are normal. He exhibits no distension. There is no tenderness.   Musculoskeletal: Normal range of motion. He exhibits no edema.   Neurological: He is alert.   Intubated, alert  Moves all ext to command with good strength   Skin: Skin is warm and dry. He is not diaphoretic.       Fluids    Intake/Output Summary (Last 24 hours) at 12/27/18 1046  Last data filed at 12/27/18 1000   Gross per 24 hour   Intake          1869.31 ml   Output             1795 ml   Net            74.31 ml       Laboratory  Recent Labs      12/25/18   0510  12/26/18   0450  12/26/18   1130  12/27/18   0350   WBC  13.4*  12.1*   --   10.3   RBC  2.75*  2.32*   --   2.50*   HEMOGLOBIN  7.6*  6.4*  7.8*  7.0*   HEMATOCRIT  24.0*  20.3*  24.4*  22.3*   MCV  87.3  87.5   --   89.2   MCH  27.6  27.6   --   28.0   MCHC  31.7*  31.5*   --   31.4*   RDW  47.7  48.7   --   49.4   PLATELETCT  293  281   --   245   MPV  10.5  10.7   --   9.7     Recent Labs      12/25/18   0510  12/26/18   0450  12/27/18   0350   SODIUM  140  138  141   POTASSIUM  3.4*  3.5*  3.7   CHLORIDE  106  110  112   CO2  25  21  21   GLUCOSE  127*  122*  115*   BUN  31*  30*  30*   CREATININE  0.73  0.66  0.56   CALCIUM  9.2  9.0  8.5     Recent Labs      12/25/18   0510  12/26/18   0450  12/27/18   0350   APTT  75.7*  53.4*  34.5     Recent Labs      12/25/18   0510   BNPBTYPENAT  374*           Imaging  DX-CHEST-PORTABLE (1 VIEW)   Final Result      1.  Worsening in perihilar opacities is suggestive of worsening edema. Infection could have a similar appearance.      2.  No significant change in left basilar opacification.      3.  Stable cardiomegaly      DX-CHEST-PORTABLE (1 VIEW)   Final Result      Slight improvement in lung aeration.      DX-CHEST-PORTABLE (1 VIEW)   Final Result         1. Lines and tubes as above, with ETT at the level of thoracic inlet. Recommend advancing 1-2 cm.   2. Persistent interstitial edema. No large pleural effusions.   3. Stable  right perihilar atelectasis versus consolidation. Retrocardiac opacity has cleared.      DX-CHEST-PORTABLE (1 VIEW)   Final Result         1. Lines and tubes as above.   2. Hypoinflation with persistent right perihilar atelectasis versus consolidation.   3. Retrocardiac atelectasis versus consolidation. No large pleural effusions.      XS-OYPLHEY-7 VIEW   Final Result         No specific finding to suggest small bowel obstruction.      EC-ECHOCARDIOGRAM LTD W/O CONT   Final Result      DX-ABDOMEN FOR TUBE PLACEMENT   Final Result      Feeding tube placement with the tip projecting over the distal stomach or proximal duodenum      DX-CHEST-PORTABLE (1 VIEW)   Final Result      1.  Stable cardiomegaly      2.  Worsening pulmonary vascular congestion and interstitial edema.      3.  Bibasilar opacities may be related to atelectasis. Developing pneumonia could have a similar appearance.      EC-ECHOCARDIOGRAM COMPLETE W/O CONT   Final Result      DX-CHEST-LIMITED (1 VIEW)   Final Result         1.  No acute cardiopulmonary disease.   2.  Right internal jugular central line terminates in the right atrium, could be withdrawn 3 cm.   3.  Cardiomegaly      CT-CTA CHEST PULMONARY ARTERY W/ RECONS   Final Result         1.  No large central pulmonary embolus is appreciated, evaluation of the subsegmental branches is essentially nondiagnostic due to motion artifacts. Additional imaging would be required for definitive exclusion of small distal pulmonary emboli.   2.  Hazy groundglass pulmonary opacities, predominantly on the right, appearance suggests atypical edema or infiltrates.   3.  Anterior bilateral second through sixth rib fractures   4.  Cardiomegaly   5.  Left nephrolithiasis   6.  Atherosclerosis and atherosclerotic coronary artery disease.      CT-HEAD W/O   Final Result         1.  No acute intracranial abnormality.   2.  Atherosclerosis.      DX-CHEST-PORTABLE (1 VIEW)   Final Result         1.  No acute  cardiopulmonary disease.   2.  Cardiomegaly           Assessment/Plan  Ventricular fibrillation (HCC)- (present on admission)   Assessment & Plan    Now in   Cardiac catheterization planned, may need intervention, may need AICD  Continue metoprolol     Acute respiratory failure with hypoxia (HCC)- (present on admission)   Assessment & Plan    Patient had to be reintubated on 12/21/2018  Mechanical ventilation as per critical care  Respiratory failure worse today with increasing oxygen requirements.  Diuresis       Cardiac arrest (HCC)- (present on admission)   Assessment & Plan    CTA negative for PE, ischemic workup to be determined by cardiology  Planning for cardiac catheterization after GI workup  Cardiology consulting  Appears to have made compete neurologic recovery  Heparin drip stopped for anemia requiring transfusion         Lactic acidosis- (present on admission)   Assessment & Plan    Resolved     Hypokalemia   Assessment & Plan    Replace     Metabolic acidosis- (present on admission)   Assessment & Plan    Resolved with fluid resuscitation     Aspiration pneumonia (HCC)   Assessment & Plan    Unasyn     Fever   Assessment & Plan    Tylenol and cooling blanket as needed       Elevated liver enzymes- (present on admission)   Assessment & Plan    Likely related to cardiac arrest     Hyperphosphatemia- (present on admission)   Assessment & Plan    Monitor     Iron deficiency anemia- (present on admission)   Assessment & Plan    Acute on chronic, the patient has iron deficiency suggesting possible chronic GI loss  EGD today  Follow serial hemoglobin levels, transfuse to maintain a hemoglobin of 7     Leukocytosis- (present on admission)   Assessment & Plan    WBC in normal range     Closed fracture of multiple ribs of both sides- (present on admission)   Assessment & Plan    Secondary to CPR  Pain management          VTE prophylaxis: SCD's

## 2018-12-27 NOTE — CONSULTS
DATE OF SERVICE:  12/26/2018    GASTROINTESTINAL CONSULTATION    REQUESTING PHYSICIAN:  Johan Morales MD    REASON FOR CONSULTATION:  Anemia.    HISTORY OF PRESENT ILLNESS:  This is a 58-year-old male who was brought to the   emergency room by ambulance on 12/19 after being found unresponsive by his   family.  CPR was started by the family.  On EMS evaluation, he was found to be   in ventricular fibrillation and was treated medically and with 4 electric   shocks.  He was hospitalized and placed on mechanical ventilation.  He was   eventually extubated on the 21st, but was reintubated.  Over the past several   days, they noted a decrease in his hematocrit without gross signs of bleeding.    No additional history is obtainable as the patient is intubated and family is   not present.  On review of his chart, there is no significant GI history   noted.    His outpatient medications do not show use of aspirin or nonsteroidal   anti-inflammatory medications.    PAST MEDICAL HISTORY:  Hypertension and anxiety.    PAST SURGICAL HISTORY:  None known.    OUTPATIENT MEDICATIONS:  Zoloft.    ALLERGIES:  None.    SOCIAL HISTORY:  No tobacco or alcohol per the chart.    FAMILY HISTORY:  Unknown.    PHYSICAL EXAMINATION:  VITAL SIGNS:  Temperature is 37.5, pulse 57, respirations by ventilator, BP is   116/69.  GENERAL APPEARANCE:  Obese, now sedated, intubated male.  HEENT:  Sclerae are anicteric.  Endotracheal tube is in place.  NECK:  Supple, without adenopathy.  HEART:  Regular rate and rhythm.  LUNGS:  Clear to auscultation.  ABDOMEN:  Obese, soft with positive bowel sounds.  EXTREMITIES:  Warm and dry without clubbing, cyanosis or edema.  SKIN:  Shows no rashes.    LABORATORY DATA:  White count is 12, hematocrit 20, MCV 87, and platelet count   281,000.  Sodium 138, potassium 3.5, chloride 110, bicarb 21, BUN 30,   creatinine 0.6, AST 45, ALT 85, alkaline phosphatase 60, and bilirubin 0.6.    Serum iron is less than 10.   Fecal occult blood test is positive.  INR is 1.2.    ASSESSMENT AND PLAN:  1.  A 58-year-old male with anemia.  He has had a significant drop in   hematocrit during his hospitalization without gross bleeding, but with   guaiac-positive stools.  His baseline hematocrit on admission was low   consistent with a normocytic anemia.  His elevated BUN and creatinine raises   possibility of upper gastrointestinal bleeding source.  Plan, I would initiate   empiric therapy with IV proton pump inhibitor.  We will proceed with upper   endoscopy tomorrow while he is on mechanical ventilation.  Informed consent   will need to be obtained by the family.  2.  Status post ventricular fibrillation cardiac arrest.  3.  Respiratory failure, on mechanical ventilation.  4.  Elevated liver enzymes, likely sequelae of cardiac arrest, improving.  5.  Hypertrophic cardiomyopathy.  Plan, cardiology is planning to perform   coronary angiography once his possible GI bleeding is assessed, they concur   with proceeding with endoscopic evaluation.       ____________________________________     DESHAWN GUERIN DO    P[facility] / N[facility]    DD:  12/26/2018 16:02:58  DT:  12/26/2018 20:19:28    D#:  [id number]  Job#:  [id number]

## 2018-12-27 NOTE — CARE PLAN
Problem: Ventilation Defect:  Goal: Ability to achieve and maintain unassisted ventilation or tolerate decreased levels of ventilator support  Outcome: PROGRESSING AS EXPECTED  Adult Ventilation Update    Total Vent Days: 7    Patient Lines/Drains/Airways Status    Active Airway     Name: Placement date: Placement time: Site: Days:    Airway ETT Oral 8.0 12/21/18   1620   Oral   5              In the last 24 hours, the patient tolerated SBT for 1 hour and 30 minutes on settings of 5/8.    #FVC / Vital Capacity (liters) : 0.71 (12/27/18 1456)  NIF (cm H2O) : -23 (12/27/18 1456)  Rapid Shallow Breathing Index (RR/VT): 56 (12/27/18 1456)  Plateau Pressure (Q Shift): 17 (12/27/18 0645)  Static Compliance (ml / cm H2O): 98 (12/27/18 1456)    Patient failed trials because of Barriers to Wean: Other (Comments) (per md order) (12/22/18 1502)  Barriers to SBT Weaning Trial Stopped due to:: Pt weaned for 1 hour and returned to rest settings per protocol (12/27/18 1456)    Cough: Productive (12/27/18 1456)  Sputum Amount: Moderate (12/27/18 1456)  Sputum Color: Tan (12/27/18 1456)  Sputum Consistency: Thick (12/27/18 1456)    Mobility  Level of Mobility: Level IV (12/27/18 1400)  Activity Performed: Edge of bed;Stand (12/27/18 1400)  Time Activity Tolerated: 10 min (12/27/18 1400)  Distance Per Occurrence (ft.): 0 feet (12/27/18 1400)  # of Times Distance was Traveled: 0 (12/27/18 1400)  Assistance / Tolerance: Assistance of Two or More (12/27/18 1400)  Pt Calls for Assistance: No (12/27/18 1400)  Staff Present for Mobilization: RN;RT (12/27/18 1400)  Gait: Unable to Ambulate (12/27/18 1400)  Assistive Devices: Hand held assist (12/27/18 1400)  Reason Not Mobilized: Unstable condition (12/26/18 1600)  Mobilization Comments: Very Agitated with decreased sedation attempting to self extubate (12/26/18 1600)    Events/Summary/Plan: SBT for 30 minutes per MD order (12/27/18 4565)

## 2018-12-27 NOTE — CARE PLAN
Problem: Ventilation Defect:  Goal: Ability to achieve and maintain unassisted ventilation or tolerate decreased levels of ventilator support  Outcome: PROGRESSING AS EXPECTED  Adult Ventilation Update    Total Vent Days: 6    Patient Lines/Drains/Airways Status    Active Airway     Name: Placement date: Placement time: Site: Days:    Airway ETT Oral 8.0 12/21/18 1620   Oral   5              In the last 24 hours, the patient tolerated SBT for 1 hour x 2 on settings of 5/8.    #FVC / Vital Capacity (liters) :  (pt not following commands due to increased sedation) (12/26/18 1626)  NIF (cm H2O) :  (pt not following commands due to increased sedation) (12/26/18 1626)  Rapid Shallow Breathing Index (RR/VT): 68 (12/26/18 1626)  Plateau Pressure (Q Shift):  (pt spont) (12/26/18 0615)  Static Compliance (ml / cm H2O): 66 (12/26/18 1626)    Patient failed trials because of Barriers to Wean: Other (Comments) (per md order) (12/22/18 1502)  Barriers to SBT Weaning Trial Stopped due to:: Pt weaned for 1 hour and returned to rest settings per protocol (12/26/18 1626)  Length of Weaning Trial Length of Weaning Trial (Hours): 1 hour (12/26/18 1626)    Cough: Productive (12/26/18 1141)  Sputum Amount: Small (12/26/18 1200)  Sputum Color: Tan (12/26/18 1200)  Sputum Consistency: Thick (12/26/18 1200)    Mobility  Level of Mobility: Level II (12/26/18 0400)  Activity Performed: Edge of bed;Stand (12/26/18 0400)  Time Activity Tolerated: 20 min (EOB 15 min, stand 5 min) (12/26/18 0400)  Distance Per Occurrence (ft.): 0 feet (12/23/18 1400)  # of Times Distance was Traveled: 0 (12/23/18 1400)  Assistance / Tolerance: Assistance of Two or More (12/26/18 0400)  Pt Calls for Assistance: No (12/26/18 0400)  Staff Present for Mobilization: RN (x2) (12/26/18 0400)  Gait: Unable to Ambulate (12/23/18 1400)  Assistive Devices: Hand held assist (12/25/18 1600)  Reason Not Mobilized: Unstable condition;Other (comment) (Pt not able to safely  follow directions at this time) (12/23/18 2000)  Mobilization Comments: Hypothermia protocol (12/21/18 0000)    Events/Summary/Plan: SBT (12/26/18 4288)

## 2018-12-28 ENCOUNTER — APPOINTMENT (OUTPATIENT)
Dept: RADIOLOGY | Facility: MEDICAL CENTER | Age: 58
DRG: 224 | End: 2018-12-28
Attending: INTERNAL MEDICINE
Payer: COMMERCIAL

## 2018-12-28 LAB
ACTION RANGE TRIGGERED IACRT: NO
ANION GAP SERPL CALC-SCNC: 9 MMOL/L (ref 0–11.9)
ANISOCYTOSIS BLD QL SMEAR: ABNORMAL
APTT PPP: 34.2 SEC (ref 24.7–36)
BASE EXCESS BLDA CALC-SCNC: -3 MMOL/L (ref -4–3)
BASOPHILS # BLD AUTO: 1.7 % (ref 0–1.8)
BASOPHILS # BLD: 0.21 K/UL (ref 0–0.12)
BODY TEMPERATURE: ABNORMAL DEGREES
BUN SERPL-MCNC: 25 MG/DL (ref 8–22)
CALCIUM SERPL-MCNC: 8.5 MG/DL (ref 8.5–10.5)
CHLORIDE SERPL-SCNC: 110 MMOL/L (ref 96–112)
CO2 BLDA-SCNC: 21 MMOL/L (ref 20–33)
CO2 SERPL-SCNC: 20 MMOL/L (ref 20–33)
CREAT SERPL-MCNC: 0.65 MG/DL (ref 0.5–1.4)
EOSINOPHIL # BLD AUTO: 0.23 K/UL (ref 0–0.51)
EOSINOPHIL NFR BLD: 1.8 % (ref 0–6.9)
ERYTHROCYTE [DISTWIDTH] IN BLOOD BY AUTOMATED COUNT: 50.1 FL (ref 35.9–50)
GLUCOSE SERPL-MCNC: 110 MG/DL (ref 65–99)
HCO3 BLDA-SCNC: 20.1 MMOL/L (ref 17–25)
HCT VFR BLD AUTO: 24 % (ref 42–52)
HGB BLD-MCNC: 7.4 G/DL (ref 14–18)
HOROWITZ INDEX BLDA+IHG-RTO: 140 MM[HG]
INST. QUALIFIED PATIENT IIQPT: YES
LYMPHOCYTES # BLD AUTO: 0.77 K/UL (ref 1–4.8)
LYMPHOCYTES NFR BLD: 6.1 % (ref 22–41)
MACROCYTES BLD QL SMEAR: ABNORMAL
MANUAL DIFF BLD: NORMAL
MCH RBC QN AUTO: 27.7 PG (ref 27–33)
MCHC RBC AUTO-ENTMCNC: 30.8 G/DL (ref 33.7–35.3)
MCV RBC AUTO: 89.9 FL (ref 81.4–97.8)
MICROCYTES BLD QL SMEAR: ABNORMAL
MONOCYTES # BLD AUTO: 0.33 K/UL (ref 0–0.85)
MONOCYTES NFR BLD AUTO: 2.6 % (ref 0–13.4)
MORPHOLOGY BLD-IMP: NORMAL
NEUTROPHILS # BLD AUTO: 11.06 K/UL (ref 1.82–7.42)
NEUTROPHILS NFR BLD: 86.1 % (ref 44–72)
NEUTS BAND NFR BLD MANUAL: 1.7 % (ref 0–10)
NRBC # BLD AUTO: 0.13 K/UL
NRBC BLD-RTO: 1 /100 WBC
O2/TOTAL GAS SETTING VFR VENT: 40 %
PCO2 BLDA: 28.6 MMHG (ref 26–37)
PCO2 TEMP ADJ BLDA: 29.3 MMHG (ref 26–37)
PH BLDA: 7.46 [PH] (ref 7.4–7.5)
PH TEMP ADJ BLDA: 7.45 [PH] (ref 7.4–7.5)
PLATELET # BLD AUTO: 303 K/UL (ref 164–446)
PLATELET BLD QL SMEAR: NORMAL
PMV BLD AUTO: 10.2 FL (ref 9–12.9)
PO2 BLDA: 56 MMHG (ref 64–87)
PO2 TEMP ADJ BLDA: 58 MMHG (ref 64–87)
POLYCHROMASIA BLD QL SMEAR: NORMAL
POTASSIUM SERPL-SCNC: 3.8 MMOL/L (ref 3.6–5.5)
RBC # BLD AUTO: 2.67 M/UL (ref 4.7–6.1)
RBC BLD AUTO: PRESENT
SAO2 % BLDA: 91 % (ref 93–99)
SODIUM SERPL-SCNC: 139 MMOL/L (ref 135–145)
SPECIMEN DRAWN FROM PATIENT: ABNORMAL
WBC # BLD AUTO: 12.6 K/UL (ref 4.8–10.8)

## 2018-12-28 PROCEDURE — 700111 HCHG RX REV CODE 636 W/ 250 OVERRIDE (IP)

## 2018-12-28 PROCEDURE — 700102 HCHG RX REV CODE 250 W/ 637 OVERRIDE(OP): Performed by: INTERNAL MEDICINE

## 2018-12-28 PROCEDURE — B240ZZ3 ULTRASONOGRAPHY OF SINGLE CORONARY ARTERY, INTRAVASCULAR: ICD-10-PCS | Performed by: INTERNAL MEDICINE

## 2018-12-28 PROCEDURE — 700117 HCHG RX CONTRAST REV CODE 255: Performed by: INTERNAL MEDICINE

## 2018-12-28 PROCEDURE — A9270 NON-COVERED ITEM OR SERVICE: HCPCS | Performed by: INTERNAL MEDICINE

## 2018-12-28 PROCEDURE — 71045 X-RAY EXAM CHEST 1 VIEW: CPT

## 2018-12-28 PROCEDURE — 94003 VENT MGMT INPAT SUBQ DAY: CPT

## 2018-12-28 PROCEDURE — 99152 MOD SED SAME PHYS/QHP 5/>YRS: CPT

## 2018-12-28 PROCEDURE — A9270 NON-COVERED ITEM OR SERVICE: HCPCS | Performed by: HOSPITALIST

## 2018-12-28 PROCEDURE — 160048 HCHG OR STATISTICAL LEVEL 1-5: Performed by: INTERNAL MEDICINE

## 2018-12-28 PROCEDURE — 99223 1ST HOSP IP/OBS HIGH 75: CPT | Performed by: INTERNAL MEDICINE

## 2018-12-28 PROCEDURE — C1894 INTRO/SHEATH, NON-LASER: HCPCS

## 2018-12-28 PROCEDURE — C9113 INJ PANTOPRAZOLE SODIUM, VIA: HCPCS | Performed by: INTERNAL MEDICINE

## 2018-12-28 PROCEDURE — 99291 CRITICAL CARE FIRST HOUR: CPT | Performed by: INTERNAL MEDICINE

## 2018-12-28 PROCEDURE — 360979 HCHG DIAGNOSTIC CATH

## 2018-12-28 PROCEDURE — 304952 HCHG R 2 PADS

## 2018-12-28 PROCEDURE — 700111 HCHG RX REV CODE 636 W/ 250 OVERRIDE (IP): Performed by: INTERNAL MEDICINE

## 2018-12-28 PROCEDURE — C1753 CATH, INTRAVAS ULTRASOUND: HCPCS

## 2018-12-28 PROCEDURE — 85730 THROMBOPLASTIN TIME PARTIAL: CPT

## 2018-12-28 PROCEDURE — 85007 BL SMEAR W/DIFF WBC COUNT: CPT

## 2018-12-28 PROCEDURE — 700101 HCHG RX REV CODE 250

## 2018-12-28 PROCEDURE — 307093 HCHG TR BAND RADIAL

## 2018-12-28 PROCEDURE — 99153 MOD SED SAME PHYS/QHP EA: CPT

## 2018-12-28 PROCEDURE — 99233 SBSQ HOSP IP/OBS HIGH 50: CPT | Performed by: HOSPITALIST

## 2018-12-28 PROCEDURE — 770022 HCHG ROOM/CARE - ICU (200)

## 2018-12-28 PROCEDURE — 85027 COMPLETE CBC AUTOMATED: CPT

## 2018-12-28 PROCEDURE — 82803 BLOOD GASES ANY COMBINATION: CPT

## 2018-12-28 PROCEDURE — C1887 CATHETER, GUIDING: HCPCS

## 2018-12-28 PROCEDURE — 80048 BASIC METABOLIC PNL TOTAL CA: CPT

## 2018-12-28 PROCEDURE — 4A023N7 MEASUREMENT OF CARDIAC SAMPLING AND PRESSURE, LEFT HEART, PERCUTANEOUS APPROACH: ICD-10-PCS | Performed by: INTERNAL MEDICINE

## 2018-12-28 PROCEDURE — 93458 L HRT ARTERY/VENTRICLE ANGIO: CPT | Mod: 26 | Performed by: INTERNAL MEDICINE

## 2018-12-28 PROCEDURE — 93458 L HRT ARTERY/VENTRICLE ANGIO: CPT

## 2018-12-28 PROCEDURE — 160203 HCHG ENDO MINUTES - 1ST 30 MINS LEVEL 4: Performed by: INTERNAL MEDICINE

## 2018-12-28 PROCEDURE — C1769 GUIDE WIRE: HCPCS

## 2018-12-28 PROCEDURE — B2111ZZ FLUOROSCOPY OF MULTIPLE CORONARY ARTERIES USING LOW OSMOLAR CONTRAST: ICD-10-PCS | Performed by: INTERNAL MEDICINE

## 2018-12-28 PROCEDURE — 700111 HCHG RX REV CODE 636 W/ 250 OVERRIDE (IP): Performed by: HOSPITALIST

## 2018-12-28 PROCEDURE — 99152 MOD SED SAME PHYS/QHP 5/>YRS: CPT | Performed by: INTERNAL MEDICINE

## 2018-12-28 PROCEDURE — 700105 HCHG RX REV CODE 258: Performed by: INTERNAL MEDICINE

## 2018-12-28 PROCEDURE — 0DJ08ZZ INSPECTION OF UPPER INTESTINAL TRACT, VIA NATURAL OR ARTIFICIAL OPENING ENDOSCOPIC: ICD-10-PCS | Performed by: INTERNAL MEDICINE

## 2018-12-28 PROCEDURE — 700105 HCHG RX REV CODE 258: Performed by: HOSPITALIST

## 2018-12-28 PROCEDURE — 94150 VITAL CAPACITY TEST: CPT

## 2018-12-28 PROCEDURE — 92978 ENDOLUMINL IVUS OCT C 1ST: CPT

## 2018-12-28 PROCEDURE — 700102 HCHG RX REV CODE 250 W/ 637 OVERRIDE(OP): Performed by: HOSPITALIST

## 2018-12-28 PROCEDURE — 92978 ENDOLUMINL IVUS OCT C 1ST: CPT | Mod: 26,LM | Performed by: INTERNAL MEDICINE

## 2018-12-28 PROCEDURE — 36600 WITHDRAWAL OF ARTERIAL BLOOD: CPT

## 2018-12-28 RX ORDER — MIDAZOLAM HYDROCHLORIDE 1 MG/ML
INJECTION INTRAMUSCULAR; INTRAVENOUS
Status: COMPLETED
Start: 2018-12-28 | End: 2018-12-28

## 2018-12-28 RX ORDER — HEPARIN SODIUM,PORCINE 1000/ML
VIAL (ML) INJECTION
Status: COMPLETED
Start: 2018-12-28 | End: 2018-12-28

## 2018-12-28 RX ORDER — MIDAZOLAM HYDROCHLORIDE 1 MG/ML
.5-2 INJECTION INTRAMUSCULAR; INTRAVENOUS PRN
Status: ACTIVE | OUTPATIENT
Start: 2018-12-28 | End: 2018-12-28

## 2018-12-28 RX ORDER — SODIUM CHLORIDE 9 MG/ML
500 INJECTION, SOLUTION INTRAVENOUS
Status: ACTIVE | OUTPATIENT
Start: 2018-12-28 | End: 2018-12-28

## 2018-12-28 RX ORDER — LIDOCAINE HYDROCHLORIDE 20 MG/ML
INJECTION, SOLUTION INFILTRATION; PERINEURAL
Status: COMPLETED
Start: 2018-12-28 | End: 2018-12-28

## 2018-12-28 RX ORDER — MIDAZOLAM HYDROCHLORIDE 1 MG/ML
INJECTION INTRAMUSCULAR; INTRAVENOUS
Status: DISCONTINUED | OUTPATIENT
Start: 2018-12-28 | End: 2018-12-28 | Stop reason: HOSPADM

## 2018-12-28 RX ORDER — VERAPAMIL HYDROCHLORIDE 2.5 MG/ML
INJECTION, SOLUTION INTRAVENOUS
Status: COMPLETED
Start: 2018-12-28 | End: 2018-12-28

## 2018-12-28 RX ORDER — FUROSEMIDE 10 MG/ML
40 INJECTION INTRAMUSCULAR; INTRAVENOUS ONCE
Status: COMPLETED | OUTPATIENT
Start: 2018-12-28 | End: 2018-12-28

## 2018-12-28 RX ADMIN — PROPOFOL 25 MCG/KG/MIN: 10 INJECTION, EMULSION INTRAVENOUS at 23:54

## 2018-12-28 RX ADMIN — MIDAZOLAM HYDROCHLORIDE 2 MG: 1 INJECTION, SOLUTION INTRAMUSCULAR; INTRAVENOUS at 11:13

## 2018-12-28 RX ADMIN — FENTANYL CITRATE 100 MCG: 50 INJECTION, SOLUTION INTRAMUSCULAR; INTRAVENOUS at 11:13

## 2018-12-28 RX ADMIN — FENTANYL CITRATE 200 MCG: 50 INJECTION, SOLUTION INTRAMUSCULAR; INTRAVENOUS at 01:50

## 2018-12-28 RX ADMIN — ATORVASTATIN CALCIUM 80 MG: 80 TABLET, FILM COATED ORAL at 17:30

## 2018-12-28 RX ADMIN — AMPICILLIN SODIUM AND SULBACTAM SODIUM 3 G: 2; 1 INJECTION, POWDER, FOR SOLUTION INTRAMUSCULAR; INTRAVENOUS at 05:30

## 2018-12-28 RX ADMIN — FUROSEMIDE 40 MG: 10 INJECTION, SOLUTION INTRAVENOUS at 17:30

## 2018-12-28 RX ADMIN — PROPOFOL 25 MCG/KG/MIN: 10 INJECTION, EMULSION INTRAVENOUS at 10:05

## 2018-12-28 RX ADMIN — ASPIRIN 81 MG 324 MG: 81 TABLET ORAL at 05:29

## 2018-12-28 RX ADMIN — PROPOFOL 25 MCG/KG/MIN: 10 INJECTION, EMULSION INTRAVENOUS at 16:18

## 2018-12-28 RX ADMIN — VERAPAMIL HYDROCHLORIDE 2.5 MG: 2.5 INJECTION INTRAVENOUS at 11:13

## 2018-12-28 RX ADMIN — MIDAZOLAM HYDROCHLORIDE 1 MG: 1 INJECTION, SOLUTION INTRAMUSCULAR; INTRAVENOUS at 11:27

## 2018-12-28 RX ADMIN — NITROGLYCERIN 10 ML: 20 INJECTION INTRAVENOUS at 11:12

## 2018-12-28 RX ADMIN — METOPROLOL TARTRATE 12.5 MG: 25 TABLET, FILM COATED ORAL at 17:30

## 2018-12-28 RX ADMIN — HEPARIN SODIUM: 1000 INJECTION, SOLUTION INTRAVENOUS; SUBCUTANEOUS at 11:13

## 2018-12-28 RX ADMIN — STANDARDIZED SENNA CONCENTRATE AND DOCUSATE SODIUM 2 TABLET: 8.6; 5 TABLET, FILM COATED ORAL at 17:30

## 2018-12-28 RX ADMIN — SODIUM CHLORIDE 8 MG/HR: 9 INJECTION, SOLUTION INTRAVENOUS at 10:13

## 2018-12-28 RX ADMIN — SODIUM CHLORIDE 8 MG/HR: 9 INJECTION, SOLUTION INTRAVENOUS at 21:14

## 2018-12-28 RX ADMIN — IOHEXOL 82 ML: 350 INJECTION, SOLUTION INTRAVENOUS at 11:32

## 2018-12-28 RX ADMIN — POTASSIUM BICARBONATE 25 MEQ: 25 TABLET, EFFERVESCENT ORAL at 17:34

## 2018-12-28 RX ADMIN — FUROSEMIDE 40 MG: 10 INJECTION, SOLUTION INTRAVENOUS at 08:23

## 2018-12-28 RX ADMIN — LIDOCAINE HYDROCHLORIDE: 20 INJECTION, SOLUTION INFILTRATION; PERINEURAL at 11:13

## 2018-12-28 RX ADMIN — STANDARDIZED SENNA CONCENTRATE AND DOCUSATE SODIUM 2 TABLET: 8.6; 5 TABLET, FILM COATED ORAL at 05:29

## 2018-12-28 RX ADMIN — HEPARIN SODIUM 2000 UNITS: 200 INJECTION, SOLUTION INTRAVENOUS at 11:20

## 2018-12-28 RX ADMIN — PROPOFOL 25 MCG/KG/MIN: 10 INJECTION, EMULSION INTRAVENOUS at 02:36

## 2018-12-28 ASSESSMENT — PULMONARY FUNCTION TESTS: FVC: .85

## 2018-12-28 NOTE — CARE PLAN
Problem: Infection  Goal: Will remain free from infection  Central line dressing not intact- changed w/ sterile field. Hand hygiene performed before entering and when exiting room. Hubs scrubbed prior to accessing. Oral hygiene and suctioning performed Q2h and PRN     Problem: Mobility  Goal: Risk for activity intolerance will decrease  Pt mobilized to edge of bed and stood. Tolerated well

## 2018-12-28 NOTE — PROCEDURES
Cardiac Catheterization report    12/28/2018  11:37 AM    Referring MD:     Indication for procedure: Cardiac arrest    Procedure:  · Coronary arteriograms  · Left heart catheterization  · IVUS of left main    Complications:  None.    EBL: <10 CC    Specimens: None    Procedure:  The risks and benefits of cardiac catheterization and possible intervention were explained to the patient including death, heart attack, stroke, and emergency surgery.  The patient verbalized understanding and wished to proceed.  The patient was brought to the cardiac catheterization laboratory in the fasting state and prepped and draped in the usual sterile fashion.  The right wrist was locally anesthetized with lidocaine and the right radial artery was cannulated with 5/6-[place] equipment and standard radial cocktail was given.  Coronary angiography was performed using JR 4 and JL 3.5  diagnostic catheters in the usual fashion, results mentioned below.  JR 4 catheter was used to cross the aortic valve to perform  left heart catheterization.    1.  Left main coronary artery: possible stenosis angiographically.  2.  Left anterior descending artery:  Luminal irregulrities. LAD gives 2 diagonal branches, which have no significant disease  3.  Left circumflex coronary artery:  Luminal irregularities. Gives one large marginal branch, which has 30% stenosis at origin. Distally LCX supplies left posterolateral and left PDA, which have no significant disease.   4.  Right coronary artery:  Luminal irregularities, small non-dominant vessel.    5.  Left ventricular end diastolic pressure:  39 mmHg. 60 mm Hg gradient across the aortic valve.    Given possible stenosis of left main, IVUS of left main was performed. IVUS showed Oval shape left main ostium with no significant stenosis. MLA more than 15 mm2.    Once all the views were obtained, all wires and catheters were removed from the patient without difficulty.  A Vasc-Band was placed  over the right radial artery and the radial artery sheath was removed without difficulty.      Complications:  There was no evidence of hematoma or other complications.  The patient was transferred to the recovery area in stable condition.     Impression:  Non-obstructive mild coronary artery disease.  No significant plaque in left main by IVUS.    Recommendations:  Guideline directed medical therapy   Cardiovascular Risk factor modification    Sedation time:  I supervised moderate sedation over a trained independent nursing staff.  Sedation start time11:06, stop time 11:29      IJEOMA Subramanian  [facility]

## 2018-12-28 NOTE — CONSULTS
EP Consult Note    DOS: 12/28/2018    Consulting physician: Moustapha Hoff    Chief complaint/Reason for consult: VF arrest/HCM    HPI:  Patient is a 57 yo WM history of depression and HTN. He was apparently in his usual state of health until evening of 12/19. He was found unresponsive by family. EMS was called. He was found to be in VF. Underwent CPR and defibrillation x 3 and loaded with amiodarone after multiple rounds of CPR. Had a period of asystole/PEA as well. He was subsequently revived to have ROSC and brought to [facility] and started on hypothermic protocol. On echo he was found to have significant LVH out of proportion to level of HTN and SIVAKUMAR as well with outflow tract gradient of >60 mmHg consistent with what is likely HCM. He remains intubated and while here has had unexplained anemia. Underwent EGD earlier today showing gastic ulceration likely from HG tube and gastritis. Also underwent cardiac catheterization showing on non-obstructive disease. Rhythm here as been stable but markedly sinus amisha with small amount of B-blocker. EP consulted for consideration of secondary prevention ICD.    ROS (+ highlighted in red):  Patient intubated unable to perform ROS    PMhx:  Depression  HTN    Past Surgical History:   Procedure Laterality Date   • GASTROSCOPY-ENDO N/A 12/28/2018    Procedure: GASTROSCOPY-ENDO;  Surgeon: Rojas Warren D.O.;  Location: [facility];  Service: Gastroenterology       Social History     Social History   • Marital status:      Spouse name: N/A   • Number of children: N/A   • Years of education: N/A     Occupational History   • Not on file.     Social History Main Topics   • Smoking status: Never Smoker   • Smokeless tobacco: Not on file   • Alcohol use No   • Drug use: No   • Sexual activity: Not on file     Other Topics Concern   • Not on file     Social History Narrative   • No narrative on file       No family history on file.    No Known Allergies    Current  Facility-Administered Medications   Medication Dose Route Frequency Provider Last Rate Last Dose   • potassium bicarbonate (KLYTE) effervescent tablet 25 mEq  25 mEq Feeding Tube Once Johan Morales M.D.   Stopped at 12/28/18 1005   • NS (BOLUS) infusion 500 mL  500 mL Intravenous Once PRN Rojas Warren D.O.       • midazolam (VERSED) injection 0.5-2 mg  0.5-2 mg Intravenous PRN Rojas Warren D.O.       • fentaNYL (SUBLIMAZE) injection 12.5-50 mcg  12.5-50 mcg Intravenous PRN Rojas Warren D.O.       • norepinephrine (LEVOPHED) 8 mg in  mL Infusion  0-30 mcg/min Intravenous Continuous Aleshia Uribe M.D.   Stopped at 12/27/18 1115   • pantoprazole (PROTONIX) 80 mg in  mL Infusion  8 mg/hr Intravenous Continuous Kai Sal D.OKatya 25 mL/hr at 12/28/18 1013 8 mg/hr at 12/28/18 1013   • metoprolol (LOPRESSOR) tablet 12.5 mg  12.5 mg Feeding Tube TWICE DAILY Aleshia Uribe M.D.   Stopped at 12/27/18 0600   • acetaminophen (TYLENOL) tablet 650 mg  650 mg Feeding Tube Q6HRS PRN Aleshia Uribe M.D.       • Respiratory Care per Protocol   Nebulization Continuous RT Bradley Flores M.D.       • MD Alert...ICU Electrolyte Replacement per Pharmacy   Other pharmacy to dose Bradley Flores M.D.       • aspirin (ASA) tablet 325 mg  325 mg Feeding Tube DAILY Yaron Magallanes M.D.   325 mg at 12/26/18 0504    Or   • aspirin (ASA) chewable tab 324 mg  324 mg Per NG Tube DAILY Yaron Magallanes M.D.   324 mg at 12/28/18 0529    Or   • aspirin (ASA) suppository 300 mg  300 mg Rectal DAILY Yaron Magallanes M.D.       • atorvastatin (LIPITOR) tablet 80 mg  80 mg Per NG Tube Q EVENING Yaron Magallanes M.D.   80 mg at 12/27/18 1658   • senna-docusate (PERICOLACE or SENOKOT S) 8.6-50 MG per tablet 2 Tab  2 Tab Feeding Tube BID Yaron Magallanes M.D.   2 Tab at 12/28/18 0529    And   • polyethylene glycol/lytes (MIRALAX) PACKET 1 Packet  1 Packet Feeding Tube QDAY PRN Yaron Magallanes M.D.   1 Packet at 12/23/18 1136    And   • magnesium hydroxide  (MILK OF MAGNESIA) suspension 30 mL  30 mL Feeding Tube QDAY PRN Yaron Magallanes M.D.   30 mL at 12/24/18 1228    And   • bisacodyl (DULCOLAX) suppository 10 mg  10 mg Rectal QDAY PRN Yaron Magallanes M.D.       • dextrose 50% (D50W) injection 25-50 mL  12.5-25 g Intravenous PRN Jeremy M Gonda, M.D.       • fentanyl 50 mcg/mL infusion   Intravenous Continuous Bradley Flores M.D. 1 mL/hr at 12/27/18 2313 50 mcg/hr at 12/27/18 2313   • fentaNYL (SUBLIMAZE) injection 50 mcg  50 mcg Intravenous Q30 MIN PRN Bradley Flores M.D.        Or   • fentaNYL (SUBLIMAZE) injection 100 mcg  100 mcg Intravenous Q30 MIN PRN Bradley Flores M.D.   100 mcg at 12/26/18 1115    Or   • fentaNYL (SUBLIMAZE) injection 200 mcg  200 mcg Intravenous Q30 MIN PRN Bradley Flores M.D.   200 mcg at 12/28/18 0150   • propofol (DIPRIVAN) injection  0-80 mcg/kg/min Intravenous Continuous Bradley Flores M.D. 12.2 mL/hr at 12/28/18 1005 25 mcg/kg/min at 12/28/18 1005       Physical Exam:  Vitals:    12/28/18 1200 12/28/18 1205 12/28/18 1300 12/28/18 1336   BP:       Pulse: 67  (!) 54    Resp: 18  (!) 22    Temp:       TempSrc:       SpO2: 94% 99% 94% 97%   Weight:       Height:         General appearance: NAD, intubated, ill appearing  Eyes: anicteric sclerae, moist conjunctivae; no lid-lag; PERRLA  HENT: Atraumatic; oropharynx clear with moist mucous membranes and no mucosal ulcerations; normal hard and soft palate  Neck: Trachea midline; FROM, supple, no thyromegaly or lymphadenopathy  Lungs: CTA, with normal respiratory effort and no intercostal retractions  CV: RRR, systolic flow murmur, no JVD   Abdomen: Soft, non-tender; no masses or HSM  Extremities: 1+ bilateral peripheral edema, warm  Skin: Normal temperature, turgor and texture; no rash, ulcers or subcutaneous nodules  Psych: Appropriate affect, alert and oriented to person, place and time    Data:  Labs reviewed:  Hgb mid 7s    Prior echo/stress results reviewed:  Limited  echocardiogram performed for valvular evaluation.  Systolic anterior motion of the mitral valve is noted.  Moderate to   severe mitral regurgitation is noted.   There is concern for possible mobile echodensity adherent to the   anterior mitral valve leaflet tip.  Transesophageal echocardiogram can   be considered versus reevaluating with a repeat echocardiogram in a   couple of days.    Left ventricular outflow tract peak gradient of 64 mmHg suggestive of   left ventricular outflow tract obstruction.  Compared to prior study performed 3 days ago, mitral regurgitation and   LV outflow tract obstruction is new.    Prior cath results reviewed:  Non-obstructive disease    CXR interpreted by me:  WNL    EKG interpreted by me:   Sinus amisha, marked LVH    Impression/Plan:  1)VF arrest  2)HCM  3)HTN  4)GI bleed  5)Anemia  6)Sinus amisha    -He will require a secondary prevention ICD  -I spoke with him regarding this, risks/benefits/alternatives were explained, and even though he was intubated he was able to express understanding and agree to proceeding with procedure  -Given his need to require BB for HCM going forward and his marked sinus amisha, will likely benefit from atrial pacing, I do not think subcutaneous will be best option for him  -Will plan for this as early as tomorrow AM  -Stop TFs at midnight    Tracey Mendiola MD

## 2018-12-28 NOTE — PROCEDURES
DATE OF SERVICE:  12/28/2018    PROCEDURE:  Esophagogastroduodenoscopy.    INDICATION FOR PROCEDURE:  Positive Hemoccult and anemia.    POSTOPERATIVE DIAGNOSES:  Gastric ulceration and gastritis.    NG TUBE:  Specifically, a Dobbhoff in place in the duodenum distally.    ANESTHESIA:  I directly observed the endoscopy nurse giving conscious   sedation.  The patient was given Versed 2 mg IV.  Start time of procedure   12:07 and end time was 12:16.    DESCRIPTION OF PROCEDURE:  After informed consent and appropriate sedation,   the patient was placed in appropriate position and adult gastroscope was   advanced through the oropharynx into the esophagus through to the second   portion of the duodenum.  The Dobbhoff tube was seen in place, extending past   the second portion of the duodenum.  The scope was then gently withdrawn,   taking care to prevent the Dobbhoff from removing; however, it was   unavoidable.  The scope was withdrawn back into the stomach.  The gastric   antrum showed a circular ulceration consistent with a suction roz from an NG   tube.  The ulcer is healing up and no stigmata of current bleeding.  No   endoscopic therapy required.  The body of the stomach was unremarkable.  On   retroflexion, the gastric cardia and fundus did show some nonspecific   gastritis; however, no other bleeding source.  The stomach was decompressed.    The scope was withdrawn back into the esophagus.  GE junction and esophagus   were unremarkable.  Inadvertently, the Dobbhoff tube was removed during the   time of scope withdrawal.  The bowel was decompressed.  There were no   immediate postop complications.    RECOMMENDATIONS:  1.  Replace Dobbhoff feeding tube.  Recommend avoiding suction into the   stomach.  2.  Okay to resume feeds.  3.  Recommend continued IV PPI.  Recommend b.i.d. dosing and upon discharge,   oral b.i.d. for 8 weeks.  4.  Watch serial H and H.  5.  Consider colonoscopy once the patient is stable if  anemia persists.  6.  Call with any questions or concerns.  We will sign off for now.       ____________________________________     DO KIKA Steele    DD:  12/28/2018 12:23:43  DT:  12/28/2018 13:16:17    D#:  [id number]  Job#:  [id number]

## 2018-12-28 NOTE — PROGRESS NOTES
· 2 RN skin check complete.   · Devices in place hsu, THAIS's, cortrak, restraints.  · Skin assessed under devices, intact.   · The following interventions in place q2 turn, waffle mattress

## 2018-12-28 NOTE — PROGRESS NOTES
Pt to cath lab with 2 RNs, RT transporting.  Pt aware of procedure and agrees.  Consent in chart from yesterday, signed by wife.  Report to cath lab staff and care transferred to them.

## 2018-12-28 NOTE — PALLIATIVE CARE
Palliative Care follow-up  PC RN placed TC to pt's spouse, Marianne re: setting up a consult time to discuss GOC, code status, and advance care planning. Marianne is coming to the hospital this am. PC RN to meet with Marianne at 12:00 pm today.       Updated: BS RN, Palliative    Plan: PC RN to meet with pt's spouse, Marianne, at 12:00 pm on 12/28.     Thank you for allowing Palliative Care to participate in this patient's care. Please feel free to call [phone] with any questions or concerns.

## 2018-12-28 NOTE — PROGRESS NOTES
Monitor Summary:     Rhythm: Sinus Bradycardia- Sinus Rhythm   Ectopy: Occasional PACs  and PVCs  Rate: 50s-70s  Measurements: 0.14/0.10/0.40

## 2018-12-28 NOTE — PROGRESS NOTES
[facility] Medicine Daily Progress Note    Date of Service  12/28/2018    Chief Complaint  58 y.o. male admitted 12/19/2018 with cardiac arrest    Hospital Course    Mr Nicholas has a history of depression and hypertension, he was in his normal state of health but was noted to be unresponsive with agonal breathing.  His son started CPR.  Initial rhythm was ventricular fibrillation, he required multiple rounds of CPR, he was intubated in the field and brought to the emergency room.  Hypothermia protocol was initiated he was admitted to the ICU.  Patient was extubated on 12/21/2018, he required reintubation later that day.      Interval Problem Update  Catheterization EGD planned for today  Patient intubated, he writes notes, not in pain, ETT uncomfortable, wants to go home, beyond that he is unable to provide interval history as he is intubated  SBT for more than 1 hour this morning    Consultants/Specialty  Critical Care, I discussed the patient's condition with Dr. Uribe this morning on ICU Rounds  Gastroenterology  Cardiology    Code Status  Full Code    Disposition  ICU    Review of Systems  Review of Systems   Unable to perform ROS: Intubated        Physical Exam  Pulse:  [50-71] 53  Resp:  [16-23] 22    Physical Exam   Constitutional: He appears well-developed and well-nourished.   HENT:   Head: Normocephalic and atraumatic.   Eyes: Conjunctivae are normal. Right eye exhibits no discharge. Left eye exhibits no discharge. No scleral icterus.   Neck: Normal range of motion. Neck supple. No thyromegaly present.   Cardiovascular: Normal rate, regular rhythm and intact distal pulses.    No murmur heard.  Pulmonary/Chest:   Equal chest rise and fall  Mechanical breath sounds bilaterally  No overt wheezing   Abdominal: Soft. Bowel sounds are normal. He exhibits no distension. There is no tenderness.   Musculoskeletal: Normal range of motion. He exhibits no edema.   Neurological: He is alert.   Intubated, alert  Moves  all ext to command with good strength   Skin: Skin is warm and dry. He is not diaphoretic.       Fluids    Intake/Output Summary (Last 24 hours) at 12/28/18 1051  Last data filed at 12/28/18 1000   Gross per 24 hour   Intake          1188.17 ml   Output             4650 ml   Net         -3461.83 ml       Laboratory  Recent Labs      12/26/18   0450  12/26/18   1130  12/27/18   0350  12/27/18   1109  12/27/18   1702  12/28/18   0345   WBC  12.1*   --   10.3   --    --   12.6*   RBC  2.32*   --   2.50*   --    --   2.67*   HEMOGLOBIN  6.4*  7.8*  7.0*  7.4*  8.0*  7.4*   HEMATOCRIT  20.3*  24.4*  22.3*   --    --   24.0*   MCV  87.5   --   89.2   --    --   89.9   MCH  27.6   --   28.0   --    --   27.7   MCHC  31.5*   --   31.4*   --    --   30.8*   RDW  48.7   --   49.4   --    --   50.1*   PLATELETCT  281   --   245   --    --   303   MPV  10.7   --   9.7   --    --   10.2     Recent Labs      12/26/18   0450  12/27/18   0350  12/28/18   0345   SODIUM  138  141  139   POTASSIUM  3.5*  3.7  3.8   CHLORIDE  110  112  110   CO2  21  21  20   GLUCOSE  122*  115*  110*   BUN  30*  30*  25*   CREATININE  0.66  0.56  0.65   CALCIUM  9.0  8.5  8.5     Recent Labs      12/26/18   0450  12/27/18   0350  12/28/18   0345   APTT  53.4*  34.5  34.2               Imaging  DX-ABDOMEN FOR TUBE PLACEMENT   Final Result      Enteric tube tip projects over the stomach.      DX-CHEST-PORTABLE (1 VIEW)   Final Result         1. No significant interval change.      DX-CHEST-PORTABLE (1 VIEW)   Final Result      1.  Worsening in perihilar opacities is suggestive of worsening edema. Infection could have a similar appearance.      2.  No significant change in left basilar opacification.      3.  Stable cardiomegaly      DX-CHEST-PORTABLE (1 VIEW)   Final Result      Slight improvement in lung aeration.      DX-CHEST-PORTABLE (1 VIEW)   Final Result         1. Lines and tubes as above, with ETT at the level of thoracic inlet. Recommend  advancing 1-2 cm.   2. Persistent interstitial edema. No large pleural effusions.   3. Stable right perihilar atelectasis versus consolidation. Retrocardiac opacity has cleared.      DX-CHEST-PORTABLE (1 VIEW)   Final Result         1. Lines and tubes as above.   2. Hypoinflation with persistent right perihilar atelectasis versus consolidation.   3. Retrocardiac atelectasis versus consolidation. No large pleural effusions.      [id number] VIEW   Final Result         No specific finding to suggest small bowel obstruction.      EC-ECHOCARDIOGRAM LTD W/O CONT   Final Result      DX-ABDOMEN FOR TUBE PLACEMENT   Final Result      Feeding tube placement with the tip projecting over the distal stomach or proximal duodenum      DX-CHEST-PORTABLE (1 VIEW)   Final Result      1.  Stable cardiomegaly      2.  Worsening pulmonary vascular congestion and interstitial edema.      3.  Bibasilar opacities may be related to atelectasis. Developing pneumonia could have a similar appearance.      EC-ECHOCARDIOGRAM COMPLETE W/O CONT   Final Result      DX-CHEST-LIMITED (1 VIEW)   Final Result         1.  No acute cardiopulmonary disease.   2.  Right internal jugular central line terminates in the right atrium, could be withdrawn 3 cm.   3.  Cardiomegaly      CT-CTA CHEST PULMONARY ARTERY W/ RECONS   Final Result         1.  No large central pulmonary embolus is appreciated, evaluation of the subsegmental branches is essentially nondiagnostic due to motion artifacts. Additional imaging would be required for definitive exclusion of small distal pulmonary emboli.   2.  Hazy groundglass pulmonary opacities, predominantly on the right, appearance suggests atypical edema or infiltrates.   3.  Anterior bilateral second through sixth rib fractures   4.  Cardiomegaly   5.  Left nephrolithiasis   6.  Atherosclerosis and atherosclerotic coronary artery disease.      CT-HEAD W/O   Final Result         1.  No acute intracranial abnormality.   2.   Atherosclerosis.      DX-CHEST-PORTABLE (1 VIEW)   Final Result         1.  No acute cardiopulmonary disease.   2.  Cardiomegaly           Assessment/Plan  Ventricular fibrillation (HCC)- (present on admission)   Assessment & Plan    Now in   Cardiac catheterization planned, may need intervention, may need AICD  Continue metoprolol     Acute respiratory failure with hypoxia (HCC)- (present on admission)   Assessment & Plan    Patient had to be reintubated on 12/21/2018  Mechanical ventilation as per critical care  Likely to extubate after procedures today       Cardiac arrest (HCC)- (present on admission)   Assessment & Plan    CTA negative for PE, cardiac catheterization today   He appears to have made compete neurologic recovery  Heparin drip stopped for anemia requiring transfusion         Lactic acidosis- (present on admission)   Assessment & Plan    Resolved     Hypokalemia   Assessment & Plan    Replace     Metabolic acidosis- (present on admission)   Assessment & Plan    Resolved with fluid resuscitation     Aspiration pneumonia (HCC)   Assessment & Plan    Unasyn     Fever   Assessment & Plan    Tylenol and cooling blanket as needed       Elevated liver enzymes- (present on admission)   Assessment & Plan    Likely related to cardiac arrest     Hyperphosphatemia- (present on admission)   Assessment & Plan    Monitor     Iron deficiency anemia- (present on admission)   Assessment & Plan    Acute on chronic, the patient has iron deficiency suggesting possible chronic GI loss  EGD today  Follow serial hemoglobin levels, transfuse to maintain a hemoglobin of 7     Leukocytosis- (present on admission)   Assessment & Plan    WBC in normal range     Closed fracture of multiple ribs of both sides- (present on admission)   Assessment & Plan    Secondary to CPR  Pain management          VTE prophylaxis: SCD's

## 2018-12-28 NOTE — PROGRESS NOTES
· 2 RN skin check complete.   · Devices in place Leena hsu hollister.  · Skin assessed under devices.  · The following interventions in place q2 hr turn, waffle mattress.

## 2018-12-28 NOTE — CARE PLAN
Problem: Safety  Goal: Will remain free from falls  Outcome: PROGRESSING AS EXPECTED  Bed in locked and in low position, lower bed rails raised, bed alarm in use, call light within reach, treaded slipper socks on patient and patient room near nursing station. See further documentation on flow sheet.          Problem: Skin Integrity  Goal: Risk for impaired skin integrity will decrease  Outcome: PROGRESSING AS EXPECTED  Assess patient's skin at the beginning of the shift. Turn patient every two hours and monitor under all medical devices throughout entire shift. Maintain a clean, dry linen environment. Float heals and elbows. Provide appropriate wound prevention interventions as they apply. Implement pertinent wound protocols and document them. Please see wound flow sheet for further details.

## 2018-12-28 NOTE — PROGRESS NOTES
Cardiology Follow Up Progress Note    Date of Service  12/28/2018    Attending Physician  Johan Morales M.D.    Chief Complaint   OOHA    HPI  Patient is a 58 years old male with history of hypertension and anxiety but no known prior cardiac issue.  [facility]  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  He was brought in by EMS. The history was obtained from his family.     Reportedly he was in state of usual health earlier today.  This evening he was found unresponsive with agonal breathing by his family around 9 PM.  He was last seen normal around 8:30 PM. EMS was summoned.  On their arrival reportedly was in ventricular fibrillation.  He received defibrillation and amiodarone and reportedly went into asystole then torsade de pointes.  On arrival to emergency room he was unresponsive.  Initial electrocardiogram showed junctional rhythm with wide QRS complex with repolarization abnormality and appearance of ST elevation in the anterolateral precordial leads.  However subsequent electrocardiogram show improvement of the QRS duration and disappearance of ST elevation.  Initial arterial blood gas showed severe acidemia pH of 7.08 with lactic acid of over 11.  Reportedly he is taking Zoloft for anxiety. It is unclear what he takes for his hypertension  Interim Events  Awake remains intubated.  H&H still low   Cath today.  Review of Systems  Review of Systems   Unable to perform ROS: Intubated   Respiratory:        On ventilator   Neurological:        Responding appropriately to questions with shaking of his head.       Vital signs in last 24 hours  Pulse:  [50-71] 53  Resp:  [16-23] 22    Physical Exam  Physical Exam   Constitutional: He appears well-developed and well-nourished.   HENT:   Head: Normocephalic and atraumatic.   Eyes: Pupils are equal, round, and reactive to light.   Neck: Normal range of motion. Neck supple. No thyromegaly present.   Cardiovascular: Normal rate and regular  rhythm.  Exam reveals no gallop and no friction rub.    No murmur heard.  Pulmonary/Chest: Effort normal and breath sounds normal. No respiratory distress. He has no wheezes. He has no rales.   Abdominal: Soft. Bowel sounds are normal. He exhibits no distension. There is no tenderness. There is no guarding.   Musculoskeletal: Normal range of motion. He exhibits no edema.   Neurological: He is alert.   Shakes head yes and no appropriately to questions.   Skin: Skin is warm and dry.   Psychiatric: He has a normal mood and affect.       Lab Review  Lab Results   Component Value Date/Time    WBC 12.6 (H) 12/28/2018 03:45 AM    RBC 2.67 (L) 12/28/2018 03:45 AM    HEMOGLOBIN 7.4 (L) 12/28/2018 03:45 AM    HEMATOCRIT 24.0 (L) 12/28/2018 03:45 AM    MCV 89.9 12/28/2018 03:45 AM    MCH 27.7 12/28/2018 03:45 AM    MCHC 30.8 (L) 12/28/2018 03:45 AM    MPV 10.2 12/28/2018 03:45 AM      Lab Results   Component Value Date/Time    SODIUM 139 12/28/2018 03:45 AM    POTASSIUM 3.8 12/28/2018 03:45 AM    CHLORIDE 110 12/28/2018 03:45 AM    CO2 20 12/28/2018 03:45 AM    GLUCOSE 110 (H) 12/28/2018 03:45 AM    BUN 25 (H) 12/28/2018 03:45 AM    CREATININE 0.65 12/28/2018 03:45 AM      Lab Results   Component Value Date/Time    ASTSGOT 45 12/24/2018 04:40 AM    ALTSGPT 85 (H) 12/24/2018 04:40 AM     Lab Results   Component Value Date/Time    TROPONINI 6.83 (H) 12/20/2018 05:15 AM             Cardiac Imaging and Procedures Review  EKG:  My personal interpretation of the EKG dated 12/28/18  SINUS BRADYCARDIA   LVH WITH SECONDARY REPOLARIZATION ABNORMALITY   REPOL ABNRM, PROBABLE ISCHEMIA, ANT-LAT LEADS     Echocardiogram:   CONCLUSIONS  Mildly reduced left ventricular systolic function.  Left ventricular ejection fraction is visually estimated to be 45%.  Severe concentric left ventricular hypertrophy, consider infiltrative   disease.  Indeterminate diastolic function.  Moderate mitral regurgitation.  Moderate tricuspid regurgitation.  Right  ventricular systolic pressure is estimated to be 50  mmHg.    Cardiac Catheterization:  Pending    Imaging  Chest X-Ray:    FINDINGS:  Tubes and lines: ET tube, feeding tube and right central venous catheter are redemonstrated.  Diffuse interstitial prominence. Hazy opacity in the left lung base.  Layering left pleural effusion. No pneumothorax.  Stable cardiopericardial silhouette.    Assessment/Plan  1. OOHA  2. HCM EF 45%   3. Bradycardia  4. Need programmable Dual chamber ICD for programming with HCM and bradycardia.  5.GIB  6. WBC 12.6  7. Remains on ventilator, but awake and responds appropriately.  Anticipate device implantation tomorrow with Dr Mendiola.  Thank you for allowing me to participate in the care of this patient.      Please contact me with any questions.    HELLEN Currie.   Cardiologist, [facility]  [phone]

## 2018-12-28 NOTE — DIETARY
Nutrition support weekly update:  Day 8 of admit.  Amadou Nicholas is a 58 y.o. male with admitting DX of Cardiac arrest.    Tube feeding initiated on 12/12.  Cortrak in distal stomach or proximal duodenum.   Current TF orders: Peptamen Intense VHP, goal rate 50 ml/hr to provide 1200 kcal, 110 grams protein, and 1008 ml free water per day.  TF last @ goal 0400 on 12/27 per chart. TF was @ 25 ml/hr until 0800 this morning, then turned off for cath lab.   Pt back from cath lab and may go to EGD today. (Note: TF turned off yesterday for EGD.)    Assessment:  Weight 86.5 kg with BMI 33.78   lb (BMI 31.89)  +1.6 L fluid per I/O    Estimated needs:  Kcal:  REE per MSJ = 1582 kcal/day; RMR per PSU (vent L/min 5, T max/24 hours 37.9) = 1789 kcal/day.  Metabolic cart study completed this week showing REE = 4770 kcal/day (strong study per RQ 0.75 and Covar 3%). 65-70% REE per obesity = 2666-9046 kcal/day (36-39 kcal/kg). Would consider this significant overfeeding, especially for an obese pt (BMI >30).  Average of 2 equations and metabolic cart study = 2714 kcal/day; 65-70% = 5551-6611 kcal/day (20-22 kcal/kg).   Protein:  2.0 grams protein/kg IBW (56.4 kg) = 113 grams/day; 1.2-1.5 grams/kg = 104-130 grams/day.    Evaluation:   1. Pt remains intubated, vent day 8. May extubate today per Rounds.  2. Propofol @ 25 mcg/kg/min = 12.2 ml/hr = 322 kcal/day.  3. Pre-albumin <3.0, CRP 28.66. Omega-3 FAs would be beneficial.  4. BM 12/26.  5. Current TF is underfeeding kcal per re-estimated needs. Will adjust TF to provide more kcal.  6. Will change to omega-3 FA containing TF formula.     Recommendations/Plan:  1. Change TF to Impact Peptide 1.5 and advance to goal rate 50 ml/hr to provide 1800 kcal, 113 grams protein, and 924 ml free water per day.    RD following.

## 2018-12-28 NOTE — PROGRESS NOTES
Pt returned from cath lab via bed with 2 RNs and RT transporting.  Report received from cath lab RNJulianna, pt tolerated procedure without incident.  Endoscopy team at bedside to perform procedure upon pt return to unit, pt aware of this procedure and nods in agreement, consent signed by wife this morning.

## 2018-12-28 NOTE — PALLIATIVE CARE
Palliative Care follow-up  PC RN met with pt's spouse, Marianne, to discuss GOC, advance care planning, and code status. PC RN introduced self and the role of palliative care. Marianne reports that pt lives with his spouse, Marianne, and their 2 adult children in [place], [place].  Marianne reports that pt has been reasonably healthy up until this hospital admission. [facility]. Marianne reports that pt works full-time at a [employer] and uses no DME.     Pt is currently on a ventilator. PC RN explored Marianne's understanding of pt's overall health as well as hospital course. Marianne able to describe pt's health history, the reason for pt's hospitalization, and hospital course. Pt is currently in the cardiac cath lab for a left heart cath with an EGD to follow in pt's hospital room. Pt is a full code. When discussing code status, Marianne deferred to her  to discuss as pt and Marianne have never discussed code status or CPR. Pt has no advance directive in place. Marianne unaware of pt ever executing an advance directive. Marianne requesting that this PC RN meet with pt and pt's/Marianne's son to discuss code status and advance care planning. PC RN provided Marianne with PC business card. Marianne to call this PC RN either later today or tomorrow when pt's son is at the bedside. PC RN provided emotional support, active listening, and validation of feelings during consult. All questions were answered in full.     Updated: Dr. Morales, BS RN, Palliative    Plan: PC RN to meet with pt, spouse, Marianne, and pt's son, to discuss code status and ACP.     Thank you for allowing Palliative Care to participate in this patient's care. Please feel free to call [phone] with any questions or concerns.

## 2018-12-28 NOTE — PROGRESS NOTES
[facility] Cardiology Follow-up Consult Note    Date of note:    12/27/2018      Consulting Physician: Johan Morales M.D.    Patient ID:  Name:   Amadou Nicholas     YOB: 1960  Age:   58 y.o.  male   MRN:   [id number]    Chief Complaint   Patient presents with   • Cardiac Arrest     witnessed        ID: 58-year-old man who presented with out of hospital cardiac arrest (said to have been V. fib arrest) found to have hypertrophic obstructive cardiomyopathy and started on metoprolol for that (peak LVOT gradient 64 mmHg).  Angiography and defibrillator are pending recovery from respiratory failure.    Interim Events:  EGD and heart catheterization scheduled for today, but delayed related to inability to consent his wife      ROS  No NV, No Bleeding, No dizziness   All other review of systems reviewed and negative.    Past medical, surgical, social, and family history reviewed and unchanged from admission except as noted in assessment and plan.    Medications: Reviewed in MAR  Current Facility-Administered Medications   Medication Dose Frequency Provider Last Rate Last Dose   • norepinephrine (LEVOPHED) 8 mg in  mL Infusion  0-30 mcg/min Continuous Aleshia Uribe M.D.   Stopped at 12/27/18 1115   • pantoprazole (PROTONIX) 80 mg in  mL Infusion  8 mg/hr Continuous ADAM CarrilloOKatya 25 mL/hr at 12/27/18 1236 8 mg/hr at 12/27/18 1236   • metoprolol (LOPRESSOR) tablet 12.5 mg  12.5 mg TWICE DAILY Aleshia Uribe M.D.   Stopped at 12/27/18 0600   • acetaminophen (TYLENOL) tablet 650 mg  650 mg Q6HRS PRN Aleshia Uribe M.D.       • ampicillin/sulbactam (UNASYN) 3 g in  mL IVPB  3 g Q6HRS ADAM FordOKatya 200 mL/hr at 12/27/18 1658 3 g at 12/27/18 1658   • Respiratory Care per Protocol   Continuous RT Bradley Flores M.D.       • MD Alert...ICU Electrolyte Replacement per Pharmacy   pharmacy to dose Bradley Flores M.D.       • aspirin (ASA) tablet 325 mg  325 mg DAILY Yaron Magallanes M.D.   " 325 mg at 12/26/18 0504    Or   • aspirin (ASA) chewable tab 324 mg  324 mg DAILY Yaron Magallanes M.D.   324 mg at 12/27/18 0527    Or   • aspirin (ASA) suppository 300 mg  300 mg DAILY Yaron Magallanes M.D.       • atorvastatin (LIPITOR) tablet 80 mg  80 mg Q EVENING Yaron Magallanes M.D.   80 mg at 12/27/18 1658   • senna-docusate (PERICOLACE or SENOKOT S) 8.6-50 MG per tablet 2 Tab  2 Tab BID Yaron Magallanes M.D.   Stopped at 12/25/18 1800    And   • polyethylene glycol/lytes (MIRALAX) PACKET 1 Packet  1 Packet QDAY PRN Yaron Magallanes M.D.   1 Packet at 12/23/18 1136    And   • magnesium hydroxide (MILK OF MAGNESIA) suspension 30 mL  30 mL QDAY PRN Yaron Magallanes M.D.   30 mL at 12/24/18 1228    And   • bisacodyl (DULCOLAX) suppository 10 mg  10 mg QDAY PRN Yaron Magallanes M.D.       • dextrose 50% (D50W) injection 25-50 mL  12.5-25 g PRN Jeremy M Gonda, M.D.       • fentanyl 50 mcg/mL infusion   Continuous Bradley Flores M.D. 1 mL/hr at 12/27/18 1520 50 mcg/hr at 12/27/18 1520   • fentaNYL (SUBLIMAZE) injection 50 mcg  50 mcg Q30 MIN PRN Bradley Flores M.D.        Or   • fentaNYL (SUBLIMAZE) injection 100 mcg  100 mcg Q30 MIN PRN Bradley Flores M.D.   100 mcg at 12/26/18 1115    Or   • fentaNYL (SUBLIMAZE) injection 200 mcg  200 mcg Q30 MIN PRN Bradley Flores M.D.   200 mcg at 12/27/18 0200   • propofol (DIPRIVAN) injection  0-80 mcg/kg/min Continuous Bradley Flores M.D. 12.2 mL/hr at 12/27/18 1232 25 mcg/kg/min at 12/27/18 1232     Last reviewed on 12/20/2018  5:05 PM by Mae Jane, T  No Known Allergies    Physical Exam  Body mass index is 33.71 kg/m². Blood pressure 148/74, pulse (!) 56, temperature 37 °C (98.6 °F), temperature source Ramon, resp. rate 20, height 1.6 m (5' 2.99\"), weight 86.3 kg (190 lb 4.1 oz), SpO2 95 %. Vitals:    12/27/18 1400 12/27/18 1456 12/27/18 1500 12/27/18 1600   BP:       Pulse: (!) 54  62 (!) 56   Resp: (!) 23  19 20   Temp:       TempSrc:       SpO2: 94% 100% 100% 95%   Weight: "       Height:        Oxygen Therapy:  Pulse Oximetry: 95 %, FiO2%: 40 %, O2 Delivery: Ventilator    General: Intubated, middle-aged man, but responsive to questions  Eyes: nl conjunctiva  ENT: OP clear  Neck: no JVD   Lungs: Coarse, intubated  Heart: RRR, grade 3 systolic ejection murmur, no rubs or gallops,   EXT 1+ edema bilateral lower extremities. 2+ pedal pulses. no cyanosis  Abdomen: soft, non tender, non distended,  Neurological: Deferred, intubated  Psychiatric: Deferred, intubated  Skin: Warm extremities    Labs (personally reviewed and notable for):   Recent Results (from the past 24 hour(s))   Basic Metabolic Panel (BMP)    Collection Time: 12/27/18  3:50 AM   Result Value Ref Range    Sodium 141 135 - 145 mmol/L    Potassium 3.7 3.6 - 5.5 mmol/L    Chloride 112 96 - 112 mmol/L    Co2 21 20 - 33 mmol/L    Glucose 115 (H) 65 - 99 mg/dL    Bun 30 (H) 8 - 22 mg/dL    Creatinine 0.56 0.50 - 1.40 mg/dL    Calcium 8.5 8.5 - 10.5 mg/dL    Anion Gap 8.0 0.0 - 11.9   CBC with Differential    Collection Time: 12/27/18  3:50 AM   Result Value Ref Range    WBC 10.3 4.8 - 10.8 K/uL    RBC 2.50 (L) 4.70 - 6.10 M/uL    Hemoglobin 7.0 (L) 14.0 - 18.0 g/dL    Hematocrit 22.3 (L) 42.0 - 52.0 %    MCV 89.2 81.4 - 97.8 fL    MCH 28.0 27.0 - 33.0 pg    MCHC 31.4 (L) 33.7 - 35.3 g/dL    RDW 49.4 35.9 - 50.0 fL    Platelet Count 245 164 - 446 K/uL    MPV 9.7 9.0 - 12.9 fL    Nucleated RBC 1.70 /100 WBC    NRBC (Absolute) 0.18 K/uL    Neutrophils-Polys 68.90 44.00 - 72.00 %    Lymphocytes 12.40 (L) 22.00 - 41.00 %    Monocytes 6.40 0.00 - 13.40 %    Eosinophils 2.30 0.00 - 6.90 %    Basophils 0.50 0.00 - 1.80 %    Immature Granulocytes 9.50 (H) 0.00 - 0.90 %    Neutrophils (Absolute) 7.09 1.82 - 7.42 K/uL    Lymphs (Absolute) 1.28 1.00 - 4.80 K/uL    Monos (Absolute) 0.66 0.00 - 0.85 K/uL    Eos (Absolute) 0.24 0.00 - 0.51 K/uL    Baso (Absolute) 0.05 0.00 - 0.12 K/uL    Immature Granulocytes (abs) 0.98 (H) 0.00 - 0.11 K/uL    APTT    Collection Time: 12/27/18  3:50 AM   Result Value Ref Range    APTT 34.5 24.7 - 36.0 sec   ESTIMATED GFR    Collection Time: 12/27/18  3:50 AM   Result Value Ref Range    GFR If African American >60 >60 mL/min/1.73 m 2    GFR If Non African American >60 >60 mL/min/1.73 m 2   ISTAT ARTERIAL BLOOD GAS    Collection Time: 12/27/18  4:19 AM   Result Value Ref Range    Ph 7.405 7.400 - 7.500    Pco2 30.6 26.0 - 37.0 mmHg    Po2 66 64 - 87 mmHg    Tco2 20 20 - 33 mmol/L    S02 93 93 - 99 %    Hco3 19.1 17.0 - 25.0 mmol/L    BE -5 (L) -4 - 3 mmol/L    Body Temp 37.0 C degrees    O2 Therapy 40 %    iPF Ratio 165     Ph Temp Daphne 7.405 7.400 - 7.500    Pco2 Temp Co 30.6 26.0 - 37.0 mmHg    Po2 Temp Cor 66 64 - 87 mmHg    Specimen Arterial     Action Range Triggered NO     Inst. Qualified Patient YES    HGB    Collection Time: 12/27/18 11:09 AM   Result Value Ref Range    Hemoglobin 7.4 (L) 14.0 - 18.0 g/dL   HGB    Collection Time: 12/27/18  5:02 PM   Result Value Ref Range    Hemoglobin 8.0 (L) 14.0 - 18.0 g/dL       Cardiac Imaging and Procedures Review:    EKG and telemetry tracings personally reviewed      Impression and Medical Decision Making:  Active Problems:    Cardiac arrest (HCC) POA: Yes    Acute respiratory failure with hypoxia (HCC) POA: Yes    Ventricular fibrillation (HCC) POA: Yes    Respiratory failure with hypoxia (HCC) POA: Yes      Overview:           Metabolic acidosis POA: Yes    Hypokalemia POA: Unknown    Coagulopathy (HCC) POA: Yes    Lactic acidosis POA: Yes    Closed fracture of multiple ribs of both sides POA: Yes    Leukocytosis POA: Yes    Iron deficiency anemia POA: Yes    Hyperphosphatemia POA: Yes    Elevated liver enzymes POA: Yes    Fever POA: No    Aspiration pneumonia (HCC) POA: No  Resolved Problems:    * No resolved hospital problems. *      Ventricular fibrillation arrest: Plan for defibrillator implantation.  I discussed his case with electrophysiology who does feel that he  requires cardiac catheterization and prefers an implantable defibrillator.  I consented his wife are both today, and we will plan for those as early as tomorrow.      I personally discussed his case with  Dr Johan Morales M.D.    No future appointments.    Thank you for allowing me to participate in the care of this patient.  Please call or text via [employer] if clarification would be useful.    Moustapha Hoff MD  Cardiologist, [facility] [place] -

## 2018-12-29 ENCOUNTER — APPOINTMENT (OUTPATIENT)
Dept: RADIOLOGY | Facility: MEDICAL CENTER | Age: 58
DRG: 224 | End: 2018-12-29
Attending: INTERNAL MEDICINE
Payer: COMMERCIAL

## 2018-12-29 LAB
ACTION RANGE TRIGGERED IACRT: NO
ANION GAP SERPL CALC-SCNC: 9 MMOL/L (ref 0–11.9)
BASE EXCESS BLDA CALC-SCNC: -3 MMOL/L (ref -4–3)
BASOPHILS # BLD AUTO: 0.5 % (ref 0–1.8)
BASOPHILS # BLD: 0.05 K/UL (ref 0–0.12)
BODY TEMPERATURE: ABNORMAL DEGREES
BUN SERPL-MCNC: 27 MG/DL (ref 8–22)
CALCIUM SERPL-MCNC: 8.8 MG/DL (ref 8.5–10.5)
CHLORIDE SERPL-SCNC: 106 MMOL/L (ref 96–112)
CO2 BLDA-SCNC: 21 MMOL/L (ref 20–33)
CO2 SERPL-SCNC: 24 MMOL/L (ref 20–33)
CREAT SERPL-MCNC: 0.68 MG/DL (ref 0.5–1.4)
EOSINOPHIL # BLD AUTO: 0.24 K/UL (ref 0–0.51)
EOSINOPHIL NFR BLD: 2.4 % (ref 0–6.9)
ERYTHROCYTE [DISTWIDTH] IN BLOOD BY AUTOMATED COUNT: 49.5 FL (ref 35.9–50)
GLUCOSE SERPL-MCNC: 107 MG/DL (ref 65–99)
HCO3 BLDA-SCNC: 20.2 MMOL/L (ref 17–25)
HCT VFR BLD AUTO: 33.4 % (ref 42–52)
HGB BLD-MCNC: 10.5 G/DL (ref 14–18)
HOROWITZ INDEX BLDA+IHG-RTO: 150 MM[HG]
IMM GRANULOCYTES # BLD AUTO: 0.47 K/UL (ref 0–0.11)
IMM GRANULOCYTES NFR BLD AUTO: 4.8 % (ref 0–0.9)
INST. QUALIFIED PATIENT IIQPT: YES
LYMPHOCYTES # BLD AUTO: 1.51 K/UL (ref 1–4.8)
LYMPHOCYTES NFR BLD: 15.4 % (ref 22–41)
MCH RBC QN AUTO: 28.2 PG (ref 27–33)
MCHC RBC AUTO-ENTMCNC: 31.8 G/DL (ref 33.7–35.3)
MCV RBC AUTO: 88.5 FL (ref 81.4–97.8)
MONOCYTES # BLD AUTO: 0.43 K/UL (ref 0–0.85)
MONOCYTES NFR BLD AUTO: 4.4 % (ref 0–13.4)
NEUTROPHILS # BLD AUTO: 7.12 K/UL (ref 1.82–7.42)
NEUTROPHILS NFR BLD: 72.5 % (ref 44–72)
NRBC # BLD AUTO: 0.07 K/UL
NRBC BLD-RTO: 0.7 /100 WBC
O2/TOTAL GAS SETTING VFR VENT: 40 %
PCO2 BLDA: 26.8 MMHG (ref 26–37)
PCO2 TEMP ADJ BLDA: 27.4 MMHG (ref 26–37)
PH BLDA: 7.49 [PH] (ref 7.4–7.5)
PH TEMP ADJ BLDA: 7.48 [PH] (ref 7.4–7.5)
PLATELET # BLD AUTO: 346 K/UL (ref 164–446)
PMV BLD AUTO: 10.1 FL (ref 9–12.9)
PO2 BLDA: 60 MMHG (ref 64–87)
PO2 TEMP ADJ BLDA: 62 MMHG (ref 64–87)
POTASSIUM SERPL-SCNC: 3.7 MMOL/L (ref 3.6–5.5)
RBC # BLD AUTO: 3.73 M/UL (ref 4.7–6.1)
SAO2 % BLDA: 93 % (ref 93–99)
SODIUM SERPL-SCNC: 139 MMOL/L (ref 135–145)
SPECIMEN DRAWN FROM PATIENT: ABNORMAL
WBC # BLD AUTO: 9.8 K/UL (ref 4.8–10.8)

## 2018-12-29 PROCEDURE — 94667 MNPJ CHEST WALL 1ST: CPT

## 2018-12-29 PROCEDURE — 36600 WITHDRAWAL OF ARTERIAL BLOOD: CPT

## 2018-12-29 PROCEDURE — A9270 NON-COVERED ITEM OR SERVICE: HCPCS | Performed by: INTERNAL MEDICINE

## 2018-12-29 PROCEDURE — 700111 HCHG RX REV CODE 636 W/ 250 OVERRIDE (IP): Performed by: INTERNAL MEDICINE

## 2018-12-29 PROCEDURE — 80048 BASIC METABOLIC PNL TOTAL CA: CPT

## 2018-12-29 PROCEDURE — 700102 HCHG RX REV CODE 250 W/ 637 OVERRIDE(OP): Performed by: HOSPITALIST

## 2018-12-29 PROCEDURE — 700102 HCHG RX REV CODE 250 W/ 637 OVERRIDE(OP): Performed by: INTERNAL MEDICINE

## 2018-12-29 PROCEDURE — 94150 VITAL CAPACITY TEST: CPT

## 2018-12-29 PROCEDURE — C9113 INJ PANTOPRAZOLE SODIUM, VIA: HCPCS | Performed by: INTERNAL MEDICINE

## 2018-12-29 PROCEDURE — A9270 NON-COVERED ITEM OR SERVICE: HCPCS | Performed by: HOSPITALIST

## 2018-12-29 PROCEDURE — 71045 X-RAY EXAM CHEST 1 VIEW: CPT

## 2018-12-29 PROCEDURE — 700105 HCHG RX REV CODE 258: Performed by: INTERNAL MEDICINE

## 2018-12-29 PROCEDURE — 99233 SBSQ HOSP IP/OBS HIGH 50: CPT | Performed by: HOSPITALIST

## 2018-12-29 PROCEDURE — 82803 BLOOD GASES ANY COMBINATION: CPT

## 2018-12-29 PROCEDURE — 99291 CRITICAL CARE FIRST HOUR: CPT | Performed by: INTERNAL MEDICINE

## 2018-12-29 PROCEDURE — 94669 MECHANICAL CHEST WALL OSCILL: CPT

## 2018-12-29 PROCEDURE — 770022 HCHG ROOM/CARE - ICU (200)

## 2018-12-29 PROCEDURE — 85025 COMPLETE CBC W/AUTO DIFF WBC: CPT

## 2018-12-29 PROCEDURE — 94003 VENT MGMT INPAT SUBQ DAY: CPT

## 2018-12-29 RX ORDER — POTASSIUM CHLORIDE 14.9 MG/ML
20 INJECTION INTRAVENOUS ONCE
Status: DISCONTINUED | OUTPATIENT
Start: 2018-12-29 | End: 2018-12-29

## 2018-12-29 RX ADMIN — ATORVASTATIN CALCIUM 80 MG: 80 TABLET, FILM COATED ORAL at 17:21

## 2018-12-29 RX ADMIN — SODIUM CHLORIDE 8 MG/HR: 9 INJECTION, SOLUTION INTRAVENOUS at 18:36

## 2018-12-29 RX ADMIN — POTASSIUM BICARBONATE 25 MEQ: 25 TABLET, EFFERVESCENT ORAL at 10:20

## 2018-12-29 RX ADMIN — PROPOFOL 10 MCG/KG/MIN: 10 INJECTION, EMULSION INTRAVENOUS at 11:55

## 2018-12-29 RX ADMIN — METOPROLOL TARTRATE 12.5 MG: 25 TABLET, FILM COATED ORAL at 17:21

## 2018-12-29 RX ADMIN — STANDARDIZED SENNA CONCENTRATE AND DOCUSATE SODIUM 2 TABLET: 8.6; 5 TABLET, FILM COATED ORAL at 17:21

## 2018-12-29 RX ADMIN — POTASSIUM BICARBONATE 25 MEQ: 25 TABLET, EFFERVESCENT ORAL at 14:10

## 2018-12-29 RX ADMIN — Medication 25 MCG/HR: at 18:36

## 2018-12-29 RX ADMIN — SODIUM CHLORIDE 8 MG/HR: 9 INJECTION, SOLUTION INTRAVENOUS at 07:42

## 2018-12-29 RX ADMIN — METOPROLOL TARTRATE 12.5 MG: 25 TABLET, FILM COATED ORAL at 10:20

## 2018-12-29 ASSESSMENT — COPD QUESTIONNAIRES
DO YOU EVER COUGH UP ANY MUCUS OR PHLEGM?: NO/ONLY WITH OCCASIONAL COLDS OR INFECTIONS
COPD SCREENING SCORE: 1
HAVE YOU SMOKED AT LEAST 100 CIGARETTES IN YOUR ENTIRE LIFE: NO/DON'T KNOW
DURING THE PAST 4 WEEKS HOW MUCH DID YOU FEEL SHORT OF BREATH: NONE/LITTLE OF THE TIME

## 2018-12-29 ASSESSMENT — PAIN SCALES - GENERAL
PAINLEVEL_OUTOF10: 0
PAINLEVEL_OUTOF10: 0

## 2018-12-29 ASSESSMENT — PULMONARY FUNCTION TESTS
FVC: 900
FVC: 999

## 2018-12-29 ASSESSMENT — LIFESTYLE VARIABLES: EVER_SMOKED: NEVER

## 2018-12-29 NOTE — PROGRESS NOTES
Critical Care Progress Note    Date of admission  12/19/2018    Chief Complaint  58 y.o. male admitted 12/19/2018 s/p cardiopulmonary arrest with prolonged CPR in the field  Hospital Course  TTM started in ED and continued in ICU    Interval Problem Updates  Past 24 hours  Hemoglobin 7.4  150  fluid balance since admission  Afebrile overnight T-max was 37.9 Celsius  On Unasyn for aspiration pneumonia day for 5  Chest x-ray showed some improvement in interstitial lung marking  Urine output 4  L for the last 24 hours  BAL grew staph aureus, MSSA  Pending EGD for evaluation of peptic ulcer disease  Status post coronary angiography.  No intervention was done.  Patient not have significant coronary artery disease  Status post EGD.  Small ulcer thought to be secondary to NG tube irritation  No active bleeding  Plans for insertion of subcutaneous defibrillator and 12/29/2018  Weaning parameters done afternoon after the patient had the above procedures done.  We are not satisfactory for safe extubation.  Review of Systems  Review of Systems   Unable to perform ROS: Intubated        Vital Signs for last 24 hours   Pulse:  [50-72] 72  Resp:  [16-23] 19    Hemodynamic parameters for last 24 hours       Respiratory  Brooks Vent Mode: Spont  Rate (breaths/min): 22  Vt Target (mL): 40  PEEP/CPAP: 8  FiO2: 40  P Support: 5  P MEAN: 12  Control VTE (exp VT): 418    Physical Exam   Physical Exam   Constitutional: He appears well-developed and well-nourished. He is intubated.   HENT:   Head: Normocephalic and atraumatic.   Eyes: Pupils are equal, round, and reactive to light. EOM are normal.   Neck: Normal range of motion.   Cardiovascular: Normal rate and regular rhythm.    Pulmonary/Chest: Effort normal and breath sounds normal. No accessory muscle usage. He is intubated. No respiratory distress.   's home scattered rhonchi no wheezing   Abdominal: Soft. Bowel sounds are normal.   Musculoskeletal: Normal range of motion.    Neurological:   Intubated, slightly sedated follow commands move all extremities   Skin: Skin is warm and dry.   Psychiatric: He has a normal mood and affect.       Medications  Current Facility-Administered Medications   Medication Dose Route Frequency Provider Last Rate Last Dose   • potassium bicarbonate (KLYTE) effervescent tablet 25 mEq  25 mEq Feeding Tube Once Johan Morales M.D.   Stopped at 12/28/18 1005   • norepinephrine (LEVOPHED) 8 mg in  mL Infusion  0-30 mcg/min Intravenous Continuous Aleshia Uribe M.D.   Stopped at 12/27/18 1115   • pantoprazole (PROTONIX) 80 mg in  mL Infusion  8 mg/hr Intravenous Continuous Kai Sal D.OKatya 25 mL/hr at 12/28/18 1013 8 mg/hr at 12/28/18 1013   • metoprolol (LOPRESSOR) tablet 12.5 mg  12.5 mg Feeding Tube TWICE DAILY Aleshia Uribe M.D.   Stopped at 12/27/18 0600   • acetaminophen (TYLENOL) tablet 650 mg  650 mg Feeding Tube Q6HRS PRN Aleshia Uribe M.D.       • Respiratory Care per Protocol   Nebulization Continuous RT Bradley Flores M.D.       • MD Alert...ICU Electrolyte Replacement per Pharmacy   Other pharmacy to dose Bradley Flores M.D.       • aspirin (ASA) tablet 325 mg  325 mg Feeding Tube DAILY Yaron Magallanes M.D.   325 mg at 12/26/18 0504    Or   • aspirin (ASA) chewable tab 324 mg  324 mg Per NG Tube DAILY Yaron Magallanes M.D.   324 mg at 12/28/18 0529    Or   • aspirin (ASA) suppository 300 mg  300 mg Rectal DAILY Yaron Magallanes M.D.       • atorvastatin (LIPITOR) tablet 80 mg  80 mg Per NG Tube Q EVENING Yaron Magallanes M.D.   80 mg at 12/27/18 1658   • senna-docusate (PERICOLACE or SENOKOT S) 8.6-50 MG per tablet 2 Tab  2 Tab Feeding Tube BID Yaron Magallanes M.D.   2 Tab at 12/28/18 0529    And   • polyethylene glycol/lytes (MIRALAX) PACKET 1 Packet  1 Packet Feeding Tube QDAY PRN Yaron Magallanes M.D.   1 Packet at 12/23/18 1136    And   • magnesium hydroxide (MILK OF MAGNESIA) suspension 30 mL  30 mL Feeding Tube QDAY PRN Yaron Magallanes M.D.    30 mL at 12/24/18 1228    And   • bisacodyl (DULCOLAX) suppository 10 mg  10 mg Rectal QDAY PRN Yaron Magallanes M.D.       • dextrose 50% (D50W) injection 25-50 mL  12.5-25 g Intravenous PRN Jeremy M Gonda, M.D.       • fentanyl 50 mcg/mL infusion   Intravenous Continuous Bradley Flores M.D. 1 mL/hr at 12/27/18 2313 50 mcg/hr at 12/27/18 2313   • fentaNYL (SUBLIMAZE) injection 50 mcg  50 mcg Intravenous Q30 MIN PRN Bradley Flores M.D.        Or   • fentaNYL (SUBLIMAZE) injection 100 mcg  100 mcg Intravenous Q30 MIN PRN Bradley Flores M.D.   100 mcg at 12/26/18 1115    Or   • fentaNYL (SUBLIMAZE) injection 200 mcg  200 mcg Intravenous Q30 MIN PRN Bradley Flores M.D.   200 mcg at 12/28/18 0150   • propofol (DIPRIVAN) injection  0-80 mcg/kg/min Intravenous Continuous Bradley Flores M.D. 12.2 mL/hr at 12/28/18 1618 25 mcg/kg/min at 12/28/18 1618       Fluids    Intake/Output Summary (Last 24 hours) at 12/28/18 1633  Last data filed at 12/28/18 1400   Gross per 24 hour   Intake           1165.4 ml   Output             4025 ml   Net          -2859.6 ml       Laboratory  Recent Labs      12/26/18   0456  12/27/18   0419  12/28/18   0407   ISTATAPH  7.551*  7.405  7.455   ISTATAPCO2  24.5*  30.6  28.6   ISTATAPO2  76  66  56*   ISTATATCO2  22  20  21   PEZTUWP6KGY  97  93  91*   ISTATARTHCO3  21.5  19.1  20.1   ISTATARTBE  -1  -5*  -3   ISTATTEMP  38.0 C  37.0 C  37.5 C   ISTATFIO2  30  40  40   ISTATSPEC  Arterial  Arterial  Arterial   ISTATAPHTC  7.535*  7.405  7.447   BBTEULDE6GH  82  66  58*         Recent Labs      12/26/18   0450  12/27/18   0350  12/28/18   0345   SODIUM  138  141  139   POTASSIUM  3.5*  3.7  3.8   CHLORIDE  110  112  110   CO2  21  21  20   BUN  30*  30*  25*   CREATININE  0.66  0.56  0.65   CALCIUM  9.0  8.5  8.5     Recent Labs      12/26/18   0450  12/27/18   0350  12/28/18   0345   GLUCOSE  122*  115*  110*     Recent Labs      12/26/18   0450  12/27/18   0350  12/28/18   0345    WBC  12.1*  10.3  12.6*   NEUTSPOLYS  77.70*  68.90  86.10*   LYMPHOCYTES  13.40*  12.40*  6.10*   MONOCYTES  5.30  6.40  2.60   EOSINOPHILS  0.90  2.30  1.80   BASOPHILS  0.90  0.50  1.70     Recent Labs      12/26/18   0450  12/26/18   1130  12/27/18   0350  12/27/18   1109  12/27/18   1702  12/28/18   0345   RBC  2.32*   --   2.50*   --    --   2.67*   HEMOGLOBIN  6.4*  7.8*  7.0*  7.4*  8.0*  7.4*   HEMATOCRIT  20.3*  24.4*  22.3*   --    --   24.0*   PLATELETCT  281   --   245   --    --   303   APTT  53.4*   --   34.5   --    --   34.2   IRON  <10*   --    --    --    --    --    TOTIRONBC  281   --    --    --    --    --        Imaging  X-Ray:  I have personally reviewed the images and compared with prior images.    Assessment/Plan  Respiratory failure with hypoxia (HCC)   Assessment & Plan    Acute hypoxic respiratory failure secondary to cardio-pulmonary arrest possibly complicated by aspiration pneumonia.  Antibiotics for 7 days  Daily SBT,   Failed extubation 6days ago with excessive secretions  Continue daily chest x-ray and ABG  HOB greater than 30 degrees  Oral care, PPI for GI prophylaxis.  Chest x-ray today showed increase bilateral interstitial marking probably secondary to fluid overload.  Given 1 dose of Lasix 40 IV P.  This afternoon 12/28/2018, the patient weaning parameters were not satisfactory for safe extubation.  SBT and extubation tomorrow       Ventricular fibrillation (HCC)- (present on admission)   Assessment & Plan    Primary arrhythmia per EMS status post defibrillation  Continuous telemetry with initiation of amiodarone should it recur  Optimize electrolytes  Echo showed hypertrophic cardiomyopathy  Cardiology service following  For defibrillator in a.m.  Coronary angiogram did not show significant coronary artery disease.     Cardiac arrest (HCC)- (present on admission)   Assessment & Plan    Unknown etiology status post return of spontaneous circulation with  Probably secondary  to hypertrophic cardiomyopathy  Plan for defibrillator when he is more stable    Lactic acidosis- (present on admission)   Assessment & Plan    Secondary to shock/cardiac arrest  Clinically resoloved         Anoxic brain injury (HCC)- (present on admission)   Assessment & Plan    Remarkable recovery after prolonged cardiac arrest  Following commands moving all extremities  Difficult to assess long-term cognitive effects of his cardiac arrest..      Elevated liver enzymes   Assessment & Plan    Likely secondary to shock liver  Improving       Closed fracture of multiple ribs of both sides- (present on admission)   Assessment & Plan    Secondary to CPR  Analgesia as needed  This probably will make extubation and the patient from mechanical ventilation a little bit more difficult.  The patient already failed one extubation trial.     Aspiration pneumonia  Continue antibiotics      Anemia, probably acute blood loss from GI tract.  And hemodilution from IV hydration  Today's hemoglobin was 7.4 after transfusion of 1 unit on 12/26/2018.  We will follow-up on findings of EGD today.      VTE:  Heparin  Ulcer: PPI  Lines: Central Line  Ongoing indication addressed    I have performed a physical exam and reviewed and updated ROS and Plan today (12/28/2018). In review of yesterday's note (12/27/2018), there are no changes except as documented above.     Discussed patient condition and risk of morbidity and/or mortality with Hospitalist, Family, RN, RT, Pharmacy, Charge nurse / hot rounds and cardiology  The patient remains critically ill.  Critical care time = 35  minutes in directly providing and coordinating critical care and extensive data review.  No time overlap and excludes procedures.

## 2018-12-29 NOTE — CARE PLAN
Problem: Safety  Goal: Will remain free from falls  Outcome: PROGRESSING AS EXPECTED  Bed in locked and in low position, lower bed rails raised, bed alarm in use, call light within reach, treaded slipper socks on patient and patient room near nursing station. See flow sheet for further details.    Patient communicates and demonstrates understanding that a nurse must be present during mobilization while patient in the ICU and how to use the call light when staff assistance is required.       Problem: Skin Integrity  Goal: Risk for impaired skin integrity will decrease  Outcome: PROGRESSING AS EXPECTED  Assess patient's skin at the beginning of the shift. Turn patient every two hours and monitor under all medical devices throughout entire shift. Maintain a clean, dry linen environment. Float heals and elbows. Provide appropriate wound prevention interventions as they apply. Implement pertinent wound protocols and document them. Please see flow sheet for further details.

## 2018-12-29 NOTE — PROGRESS NOTES
RN notified by cardiology APRN that patient would go to cath lab for AICD placement at ~0830.    At 1515 this RN notified by cath lab RN that the procedure will be delayed until Monday. Dr. Uribe notified. Patient placed on spont. Plans to extubate today.

## 2018-12-29 NOTE — CARE PLAN
Problem: Skin Integrity  Goal: Risk for impaired skin integrity will decrease  Outcome: PROGRESSING AS EXPECTED      Problem: Safety - Medical Restraint  Goal: Remains free of injury from restraints (Restraint for Interference with Medical Device)  INTERVENTIONS:  1. Determine that other, less restrictive measures have been tried or would not be effective before applying the restraint  2. Evaluate the patient's condition at the time of restraint application  3. Inform patient/family regarding the reason for restraint  4. Q2H: Monitor safety, psychosocial status, comfort, nutrition and hydration     Outcome: MET Date Met: 12/28/18  Verified patient's need for restraints due to pulling lines and tubes, performed q2h ROM, comfort, psychosocial status and safety.

## 2018-12-29 NOTE — PROGRESS NOTES
Hospital Medicine Daily Progress Note    Date of Service  12/29/2018    Chief Complaint  58 y.o. male admitted 12/19/2018 with cardiac arrest    Hospital Course    Mr Nicholas has a history of depression and hypertension, he was in his normal state of health but was noted to be unresponsive with agonal breathing.  His son started CPR.  Initial rhythm was ventricular fibrillation, he required multiple rounds of CPR, he was intubated in the field and brought to the emergency room.  Hypothermia protocol was initiated he was admitted to the ICU.  Patient was extubated on 12/21/2018, he required reintubation later that day.      Interval Problem Update  Cardiac cath yesterday without significant CAD  EGD with small ulcer  He is fully alert and oriented, follows commands with all ext  Indicates that EDP uncomfortable, does not like restraints, denies chest pain, beyond that he is unable to provide interval history review of systems as he is intubated  Some increased secretions this morning  Protonix drip, no blood in stool, tube feed    Consultants/Specialty  Critical Care, I discussed the patient's condition with Dr. Uribe this morning on ICU Rounds  Gastroenterology  Cardiology    Code Status  Full Code    Disposition  ICU    Review of Systems  Review of Systems   Unable to perform ROS: Intubated        Physical Exam  Temp:  [37.4 °C (99.3 °F)-38 °C (100.4 °F)] 37.4 °C (99.3 °F)  Pulse:  [54-72] 64  Resp:  [14-22] 22    Physical Exam   Constitutional: He appears well-developed and well-nourished.   HENT:   Head: Normocephalic and atraumatic.   Eyes: Conjunctivae are normal. Right eye exhibits no discharge. Left eye exhibits no discharge. No scleral icterus.   Neck: Normal range of motion. Neck supple. No thyromegaly present.   Cardiovascular: Normal rate, regular rhythm and intact distal pulses.    No murmur heard.  Pulmonary/Chest:   Equal chest rise and fall  Mechanical breath sounds bilaterally  No overt wheezing    Abdominal: Soft. Bowel sounds are normal. He exhibits no distension. There is no tenderness.   Musculoskeletal: Normal range of motion. He exhibits no edema.   Neurological: He is alert.   Intubated, alert  Moves all ext to command with good strength   Skin: Skin is warm and dry. He is not diaphoretic.       Fluids    Intake/Output Summary (Last 24 hours) at 12/29/18 1101  Last data filed at 12/29/18 1000   Gross per 24 hour   Intake           1246.3 ml   Output             2875 ml   Net          -1628.7 ml       Laboratory  Recent Labs      12/27/18   0350   12/27/18   1702  12/28/18   0345  12/29/18   0500   WBC  10.3   --    --   12.6*  9.8   RBC  2.50*   --    --   2.67*  3.73*   HEMOGLOBIN  7.0*   < >  8.0*  7.4*  10.5*   HEMATOCRIT  22.3*   --    --   24.0*  33.4*   MCV  89.2   --    --   89.9  88.5   MCH  28.0   --    --   27.7  28.2   MCHC  31.4*   --    --   30.8*  31.8*   RDW  49.4   --    --   50.1*  49.5   PLATELETCT  245   --    --   303  346   MPV  9.7   --    --   10.2  10.1    < > = values in this interval not displayed.     Recent Labs      12/27/18   0350  12/28/18   0345  12/29/18   0500   SODIUM  141  139  139   POTASSIUM  3.7  3.8  3.7   CHLORIDE  112  110  106   CO2  21  20  24   GLUCOSE  115*  110*  107*   BUN  30*  25*  27*   CREATININE  0.56  0.65  0.68   CALCIUM  8.5  8.5  8.8     Recent Labs      12/27/18   0350  12/28/18   0345   APTT  34.5  34.2               Imaging  DX-CHEST-PORTABLE (1 VIEW)   Final Result         1. No significant interval change.      DX-ABDOMEN FOR TUBE PLACEMENT   Final Result      Enteric tube tip projects over the stomach.      DX-CHEST-PORTABLE (1 VIEW)   Final Result         1. No significant interval change.      DX-CHEST-PORTABLE (1 VIEW)   Final Result      1.  Worsening in perihilar opacities is suggestive of worsening edema. Infection could have a similar appearance.      2.  No significant change in left basilar opacification.      3.  Stable  cardiomegaly      DX-CHEST-PORTABLE (1 VIEW)   Final Result      Slight improvement in lung aeration.      DX-CHEST-PORTABLE (1 VIEW)   Final Result         1. Lines and tubes as above, with ETT at the level of thoracic inlet. Recommend advancing 1-2 cm.   2. Persistent interstitial edema. No large pleural effusions.   3. Stable right perihilar atelectasis versus consolidation. Retrocardiac opacity has cleared.      DX-CHEST-PORTABLE (1 VIEW)   Final Result         1. Lines and tubes as above.   2. Hypoinflation with persistent right perihilar atelectasis versus consolidation.   3. Retrocardiac atelectasis versus consolidation. No large pleural effusions.      IL-EUMBAMK-2 VIEW   Final Result         No specific finding to suggest small bowel obstruction.      EC-ECHOCARDIOGRAM LTD W/O CONT   Final Result      DX-ABDOMEN FOR TUBE PLACEMENT   Final Result      Feeding tube placement with the tip projecting over the distal stomach or proximal duodenum      DX-CHEST-PORTABLE (1 VIEW)   Final Result      1.  Stable cardiomegaly      2.  Worsening pulmonary vascular congestion and interstitial edema.      3.  Bibasilar opacities may be related to atelectasis. Developing pneumonia could have a similar appearance.      EC-ECHOCARDIOGRAM COMPLETE W/O CONT   Final Result      DX-CHEST-LIMITED (1 VIEW)   Final Result         1.  No acute cardiopulmonary disease.   2.  Right internal jugular central line terminates in the right atrium, could be withdrawn 3 cm.   3.  Cardiomegaly      CT-CTA CHEST PULMONARY ARTERY W/ RECONS   Final Result         1.  No large central pulmonary embolus is appreciated, evaluation of the subsegmental branches is essentially nondiagnostic due to motion artifacts. Additional imaging would be required for definitive exclusion of small distal pulmonary emboli.   2.  Hazy groundglass pulmonary opacities, predominantly on the right, appearance suggests atypical edema or infiltrates.   3.  Anterior  bilateral second through sixth rib fractures   4.  Cardiomegaly   5.  Left nephrolithiasis   6.  Atherosclerosis and atherosclerotic coronary artery disease.      CT-HEAD W/O   Final Result         1.  No acute intracranial abnormality.   2.  Atherosclerosis.      DX-CHEST-PORTABLE (1 VIEW)   Final Result         1.  No acute cardiopulmonary disease.   2.  Cardiomegaly           Assessment/Plan  Ventricular fibrillation (HCC)- (present on admission)   Assessment & Plan    Now in SR  AICD today  Continue metoprolol     Acute respiratory failure with hypoxia (HCC)- (present on admission)   Assessment & Plan    Patient had to be reintubated on 12/21/2018  Mechanical ventilation as per critical care  Likely extubation today after pacemaker       Cardiac arrest (HCC)- (present on admission)   Assessment & Plan    CTA negative for PE  Cardiac cath without significant findings  He appears to have made compete neurologic recovery         Lactic acidosis- (present on admission)   Assessment & Plan    Resolved     Hypokalemia   Assessment & Plan    Replace     Metabolic acidosis- (present on admission)   Assessment & Plan    Resolved with fluid resuscitation     Aspiration pneumonia (HCC)   Assessment & Plan    Unasyn     Fever   Assessment & Plan    Tylenol and cooling blanket as needed       Elevated liver enzymes- (present on admission)   Assessment & Plan    Likely related to cardiac arrest     Hyperphosphatemia- (present on admission)   Assessment & Plan    Monitor     Iron deficiency anemia- (present on admission)   Assessment & Plan    Acute on chronic  He has a small gastric ulcer, continue PPI  Needs outpatient colonoscopy     Leukocytosis- (present on admission)   Assessment & Plan    WBC in normal range     Closed fracture of multiple ribs of both sides- (present on admission)   Assessment & Plan    Secondary to CPR  Pain management          VTE prophylaxis: SCD's

## 2018-12-29 NOTE — PROGRESS NOTES
Critical Care Progress Note    Date of admission  12/19/2018    Chief Complaint  58 y.o. male admitted 12/19/2018 s/p cardiopulmonary arrest with prolonged CPR in the field  Hospital Course  TTM started in ED and continued in ICU    Interval Problem Updates  Past 24 hours  Hemoglobin 10.4  -300 all fluid balance since admission  Afebrile overnight T-max was 37.5 Celsius  Chest x-ray showed some improvement in interstitial lung marking, right pleural effusion  BAL grew staph aureus, MSSA  Pending EGD for evaluation of peptic ulcer disease  Status post coronary angiography.  No intervention was done.  Patient not have significant coronary artery disease  Status post EGD.  Small ulcer thought to be secondary to NG tube irritation  No active bleeding  Plans for insertion of subcutaneous defibrillator and 12/29/2018  Weaning parameters done afternoon after the patient had the above procedures done.  We are not satisfactory for safe extubation.  Review of Systems  Review of Systems   Unable to perform ROS: Intubated        Vital Signs for last 24 hours   Temp:  [37.4 °C (99.3 °F)-38 °C (100.4 °F)] 37.4 °C (99.3 °F)  Pulse:  [52-72] 63  Resp:  [17-22] 22    Hemodynamic parameters for last 24 hours       Respiratory  Brooks Vent Mode: APVCMV  Rate (breaths/min): 22  Vt Target (mL): 400  PEEP/CPAP: 8  FiO2: 40  P Support: 5  P MEAN: 9.7  Length of Weaning Trial (Hours): 1  Control VTE (exp VT): 398    Physical Exam   Physical Exam   Constitutional: He appears well-developed and well-nourished. He is intubated.   HENT:   Head: Normocephalic and atraumatic.   Eyes: Pupils are equal, round, and reactive to light. EOM are normal.   Neck: Normal range of motion.   Cardiovascular: Normal rate and regular rhythm.    Pulmonary/Chest: Effort normal and breath sounds normal. No accessory muscle usage. He is intubated. No respiratory distress.   's home scattered rhonchi no wheezing   Abdominal: Soft. Bowel sounds are normal.    Musculoskeletal: Normal range of motion.   Neurological:   Intubated, slightly sedated follow commands move all extremities   Skin: Skin is warm and dry.   Psychiatric: He has a normal mood and affect.       Medications  Current Facility-Administered Medications   Medication Dose Route Frequency Provider Last Rate Last Dose   • potassium bicarbonate (KLYTE) effervescent tablet 25 mEq  25 mEq Feeding Tube Once Johan Morales M.D.       • norepinephrine (LEVOPHED) 8 mg in  mL Infusion  0-30 mcg/min Intravenous Continuous Aleshia Uribe M.D.   Stopped at 12/27/18 1115   • pantoprazole (PROTONIX) 80 mg in  mL Infusion  8 mg/hr Intravenous Continuous Kai Sal D.O. 25 mL/hr at 12/29/18 0742 8 mg/hr at 12/29/18 0742   • metoprolol (LOPRESSOR) tablet 12.5 mg  12.5 mg Feeding Tube TWICE DAILY Aleshia Uribe M.D.   Stopped at 12/29/18 0600   • acetaminophen (TYLENOL) tablet 650 mg  650 mg Feeding Tube Q6HRS PRN Aleshia Uribe M.D.       • Respiratory Care per Protocol   Nebulization Continuous RT Bradley Flores M.D.       • MD Alert...ICU Electrolyte Replacement per Pharmacy   Other pharmacy to dose Bradley Flores M.D.       • aspirin (ASA) tablet 325 mg  325 mg Feeding Tube DAILY Yaron Magallanes M.D.   Stopped at 12/29/18 0600    Or   • aspirin (ASA) chewable tab 324 mg  324 mg Per NG Tube DAILY Yaron Magallanes M.D.   324 mg at 12/28/18 0529    Or   • aspirin (ASA) suppository 300 mg  300 mg Rectal DAILY Yaron Magallanes M.D.       • atorvastatin (LIPITOR) tablet 80 mg  80 mg Per NG Tube Q EVENING Yaorn Magallanes M.D.   80 mg at 12/28/18 1730   • senna-docusate (PERICOLACE or SENOKOT S) 8.6-50 MG per tablet 2 Tab  2 Tab Feeding Tube BID Yaron Magallanes M.D.   Stopped at 12/29/18 0600    And   • polyethylene glycol/lytes (MIRALAX) PACKET 1 Packet  1 Packet Feeding Tube QDAY PRN Yaron Magallanes M.D.   1 Packet at 12/23/18 1136    And   • magnesium hydroxide (MILK OF MAGNESIA) suspension 30 mL  30 mL Feeding Tube  QDAY PRN Yaron Magallanes M.D.   30 mL at 12/24/18 1228    And   • bisacodyl (DULCOLAX) suppository 10 mg  10 mg Rectal QDAY PRN Yaron Magallanes M.D.       • dextrose 50% (D50W) injection 25-50 mL  12.5-25 g Intravenous PRN Jeremy M Gonda, M.D.       • fentanyl 50 mcg/mL infusion   Intravenous Continuous Bradley Flores M.D. 1 mL/hr at 12/28/18 2035 50 mcg/hr at 12/28/18 2035   • fentaNYL (SUBLIMAZE) injection 50 mcg  50 mcg Intravenous Q30 MIN PRN Bradley Flores M.D.        Or   • fentaNYL (SUBLIMAZE) injection 100 mcg  100 mcg Intravenous Q30 MIN PRN Bradley Flores M.D.   100 mcg at 12/26/18 1115    Or   • fentaNYL (SUBLIMAZE) injection 200 mcg  200 mcg Intravenous Q30 MIN PRN Bradley Flores M.D.   200 mcg at 12/28/18 0150   • propofol (DIPRIVAN) injection  0-80 mcg/kg/min Intravenous Continuous Bradley Flores M.D. 4.9 mL/hr at 12/29/18 0756 10 mcg/kg/min at 12/29/18 0756       Fluids    Intake/Output Summary (Last 24 hours) at 12/29/18 0907  Last data filed at 12/29/18 0400   Gross per 24 hour   Intake             1204 ml   Output             4225 ml   Net            -3021 ml       Laboratory  Recent Labs      12/27/18   0419  12/28/18   0407  12/29/18   0415   ISTATAPH  7.405  7.455  7.485   ISTATAPCO2  30.6  28.6  26.8   ISTATAPO2  66  56*  60*   ISTATATCO2  20  21  21   UAHZQHI3ORX  93  91*  93   ISTATARTHCO3  19.1  20.1  20.2   ISTATARTBE  -5*  -3  -3   ISTATTEMP  37.0 C  37.5 C  37.5 C   ISTATFIO2  40  40  40   ISTATSPEC  Arterial  Arterial  Arterial   ISTATAPHTC  7.405  7.447  7.478   XAECCTUH8DR  66  58*  62*         Recent Labs      12/27/18   0350  12/28/18   0345  12/29/18   0500   SODIUM  141  139  139   POTASSIUM  3.7  3.8  3.7   CHLORIDE  112  110  106   CO2  21  20  24   BUN  30*  25*  27*   CREATININE  0.56  0.65  0.68   CALCIUM  8.5  8.5  8.8     Recent Labs      12/27/18   0350  12/28/18   0345  12/29/18   0500   GLUCOSE  115*  110*  107*     Recent Labs      12/27/18   0350  12/28/18    0345  12/29/18   0500   WBC  10.3  12.6*  9.8   NEUTSPOLYS  68.90  86.10*  72.50*   LYMPHOCYTES  12.40*  6.10*  15.40*   MONOCYTES  6.40  2.60  4.40   EOSINOPHILS  2.30  1.80  2.40   BASOPHILS  0.50  1.70  0.50     Recent Labs      12/27/18   0350   12/27/18   1702  12/28/18   0345  12/29/18   0500   RBC  2.50*   --    --   2.67*  3.73*   HEMOGLOBIN  7.0*   < >  8.0*  7.4*  10.5*   HEMATOCRIT  22.3*   --    --   24.0*  33.4*   PLATELETCT  245   --    --   303  346   APTT  34.5   --    --   34.2   --     < > = values in this interval not displayed.       Imaging  X-Ray:  I have personally reviewed the images and compared with prior images.    Assessment/Plan  Respiratory failure with hypoxia (HCC)   Assessment & Plan    Acute hypoxic respiratory failure secondary to cardio-pulmonary arrest possibly complicated by aspiration pneumonia.    Daily SBT,   Failed extubation about a week ago with excessive secretions  Continue daily chest x-ray and ABG  HOB greater than 30 degrees  Oral care, PPI for GI prophylaxis.  Chest x-ray today showed increase bilateral interstitial marking probably secondary to fluid overload.  Diuresed well over the last 2 days  We will try to extubate today after defibrillator insertion by cardiology.         Ventricular fibrillation (HCC)- (present on admission)   Assessment & Plan    Primary arrhythmia per EMS status post defibrillation  Continuous telemetry with initiation of amiodarone should it recur  Optimize electrolytes  Echo showed hypertrophic cardiomyopathy  Cardiology service following  For defibrillator today.  Coronary angiogram did not show significant coronary artery disease.     Cardiac arrest (HCC)- (present on admission)   Assessment & Plan    Unknown etiology status post return of spontaneous circulation with  Probably secondary to hypertrophic cardiomyopathy  Plan for defibrillator when he is more stable    Lactic acidosis- (present on admission)   Assessment & Plan     Secondary to shock/cardiac arrest  Clinically resoloved         Anoxic brain injury (HCC)- (present on admission)   Assessment & Plan    Remarkable recovery after prolonged cardiac arrest  Following commands moving all extremities  Difficult to assess long-term cognitive effects of his cardiac arrest..      Elevated liver enzymes   Assessment & Plan    Likely secondary to shock liver  Improving       Closed fracture of multiple ribs of both sides- (present on admission)   Assessment & Plan    Secondary to CPR  Analgesia as needed  This probably will make extubation and the patient from mechanical ventilation a little bit more difficult.  The patient already failed one extubation trial.     Aspiration pneumonia  Continue antibiotics      Anemia, probably acute blood loss from GI tract.  And hemodilution from IV hydration  Today's hemoglobin was10.4 after diuresis  Probably there is no active bleeding at this point after heparin drips stopped.    VTE:  Heparin  Ulcer: PPI  Lines: Central Line  Ongoing indication addressed    I have performed a physical exam and reviewed and updated ROS and Plan today (12/29/2018). In review of yesterday's note (12/28/2018), there are no changes except as documented above.     Discussed patient condition and risk of morbidity and/or mortality with Hospitalist, Family, RN, RT, Pharmacy, Charge nurse / hot rounds and cardiology  The patient remains critically ill.  Critical care time = 32  minutes in directly providing and coordinating critical care and extensive data review.  No time overlap and excludes procedures.

## 2018-12-30 ENCOUNTER — APPOINTMENT (OUTPATIENT)
Dept: RADIOLOGY | Facility: MEDICAL CENTER | Age: 58
DRG: 224 | End: 2018-12-30
Attending: INTERNAL MEDICINE
Payer: COMMERCIAL

## 2018-12-30 LAB
ANION GAP SERPL CALC-SCNC: 9 MMOL/L (ref 0–11.9)
BASOPHILS # BLD AUTO: 0.4 % (ref 0–1.8)
BASOPHILS # BLD: 0.06 K/UL (ref 0–0.12)
BUN SERPL-MCNC: 20 MG/DL (ref 8–22)
CALCIUM SERPL-MCNC: 8.9 MG/DL (ref 8.5–10.5)
CHLORIDE SERPL-SCNC: 106 MMOL/L (ref 96–112)
CO2 SERPL-SCNC: 22 MMOL/L (ref 20–33)
CREAT SERPL-MCNC: 0.53 MG/DL (ref 0.5–1.4)
EOSINOPHIL # BLD AUTO: 0.26 K/UL (ref 0–0.51)
EOSINOPHIL NFR BLD: 1.6 % (ref 0–6.9)
ERYTHROCYTE [DISTWIDTH] IN BLOOD BY AUTOMATED COUNT: 49.8 FL (ref 35.9–50)
GLUCOSE BLD-MCNC: 127 MG/DL (ref 65–99)
GLUCOSE SERPL-MCNC: 122 MG/DL (ref 65–99)
HCT VFR BLD AUTO: 31.2 % (ref 42–52)
HGB BLD-MCNC: 9.8 G/DL (ref 14–18)
IMM GRANULOCYTES # BLD AUTO: 0.41 K/UL (ref 0–0.11)
IMM GRANULOCYTES NFR BLD AUTO: 2.5 % (ref 0–0.9)
LACTATE BLD-SCNC: 1.1 MMOL/L (ref 0.5–2)
LYMPHOCYTES # BLD AUTO: 1.49 K/UL (ref 1–4.8)
LYMPHOCYTES NFR BLD: 9 % (ref 22–41)
MCH RBC QN AUTO: 28.1 PG (ref 27–33)
MCHC RBC AUTO-ENTMCNC: 31.4 G/DL (ref 33.7–35.3)
MCV RBC AUTO: 89.4 FL (ref 81.4–97.8)
MONOCYTES # BLD AUTO: 0.5 K/UL (ref 0–0.85)
MONOCYTES NFR BLD AUTO: 3 % (ref 0–13.4)
NEUTROPHILS # BLD AUTO: 13.81 K/UL (ref 1.82–7.42)
NEUTROPHILS NFR BLD: 83.5 % (ref 44–72)
NRBC # BLD AUTO: 0 K/UL
NRBC BLD-RTO: 0 /100 WBC
PLATELET # BLD AUTO: 426 K/UL (ref 164–446)
PMV BLD AUTO: 9.8 FL (ref 9–12.9)
POTASSIUM SERPL-SCNC: 3.8 MMOL/L (ref 3.6–5.5)
PROCALCITONIN SERPL-MCNC: 0.12 NG/ML
RBC # BLD AUTO: 3.49 M/UL (ref 4.7–6.1)
SODIUM SERPL-SCNC: 137 MMOL/L (ref 135–145)
WBC # BLD AUTO: 16.5 K/UL (ref 4.8–10.8)

## 2018-12-30 PROCEDURE — A9270 NON-COVERED ITEM OR SERVICE: HCPCS | Performed by: HOSPITALIST

## 2018-12-30 PROCEDURE — 84145 PROCALCITONIN (PCT): CPT

## 2018-12-30 PROCEDURE — 700102 HCHG RX REV CODE 250 W/ 637 OVERRIDE(OP): Performed by: INTERNAL MEDICINE

## 2018-12-30 PROCEDURE — 92610 EVALUATE SWALLOWING FUNCTION: CPT

## 2018-12-30 PROCEDURE — 81001 URINALYSIS AUTO W/SCOPE: CPT

## 2018-12-30 PROCEDURE — A9270 NON-COVERED ITEM OR SERVICE: HCPCS | Performed by: INTERNAL MEDICINE

## 2018-12-30 PROCEDURE — 700105 HCHG RX REV CODE 258: Performed by: INTERNAL MEDICINE

## 2018-12-30 PROCEDURE — 94668 MNPJ CHEST WALL SBSQ: CPT

## 2018-12-30 PROCEDURE — 80048 BASIC METABOLIC PNL TOTAL CA: CPT

## 2018-12-30 PROCEDURE — 700102 HCHG RX REV CODE 250 W/ 637 OVERRIDE(OP): Performed by: HOSPITALIST

## 2018-12-30 PROCEDURE — 99233 SBSQ HOSP IP/OBS HIGH 50: CPT | Performed by: HOSPITALIST

## 2018-12-30 PROCEDURE — 700111 HCHG RX REV CODE 636 W/ 250 OVERRIDE (IP): Performed by: INTERNAL MEDICINE

## 2018-12-30 PROCEDURE — 87040 BLOOD CULTURE FOR BACTERIA: CPT | Mod: 91

## 2018-12-30 PROCEDURE — 99232 SBSQ HOSP IP/OBS MODERATE 35: CPT | Performed by: INTERNAL MEDICINE

## 2018-12-30 PROCEDURE — C9113 INJ PANTOPRAZOLE SODIUM, VIA: HCPCS | Performed by: INTERNAL MEDICINE

## 2018-12-30 PROCEDURE — 85025 COMPLETE CBC W/AUTO DIFF WBC: CPT

## 2018-12-30 PROCEDURE — G8997 SWALLOW GOAL STATUS: HCPCS | Mod: CI

## 2018-12-30 PROCEDURE — G8996 SWALLOW CURRENT STATUS: HCPCS | Mod: CL

## 2018-12-30 PROCEDURE — 99291 CRITICAL CARE FIRST HOUR: CPT | Performed by: INTERNAL MEDICINE

## 2018-12-30 PROCEDURE — 83605 ASSAY OF LACTIC ACID: CPT

## 2018-12-30 PROCEDURE — 770022 HCHG ROOM/CARE - ICU (200)

## 2018-12-30 PROCEDURE — 82962 GLUCOSE BLOOD TEST: CPT

## 2018-12-30 PROCEDURE — 94669 MECHANICAL CHEST WALL OSCILL: CPT

## 2018-12-30 PROCEDURE — 71045 X-RAY EXAM CHEST 1 VIEW: CPT

## 2018-12-30 RX ORDER — FUROSEMIDE 10 MG/ML
40 INJECTION INTRAMUSCULAR; INTRAVENOUS
Status: DISCONTINUED | OUTPATIENT
Start: 2018-12-30 | End: 2018-12-31

## 2018-12-30 RX ORDER — OXYCODONE HYDROCHLORIDE 5 MG/1
5 TABLET ORAL EVERY 4 HOURS PRN
Status: DISCONTINUED | OUTPATIENT
Start: 2018-12-30 | End: 2019-01-10 | Stop reason: HOSPADM

## 2018-12-30 RX ORDER — SODIUM CHLORIDE FOR INHALATION 3 %
3 VIAL, NEBULIZER (ML) INHALATION
Status: COMPLETED | OUTPATIENT
Start: 2018-12-30 | End: 2018-12-31

## 2018-12-30 RX ORDER — OMEPRAZOLE 20 MG/1
20 CAPSULE, DELAYED RELEASE ORAL EVERY 12 HOURS
Status: DISCONTINUED | OUTPATIENT
Start: 2018-12-30 | End: 2018-12-30

## 2018-12-30 RX ORDER — FUROSEMIDE 10 MG/ML
40 INJECTION INTRAMUSCULAR; INTRAVENOUS ONCE
Status: COMPLETED | OUTPATIENT
Start: 2018-12-31 | End: 2018-12-31

## 2018-12-30 RX ADMIN — POTASSIUM BICARBONATE 50 MEQ: 25 TABLET, EFFERVESCENT ORAL at 16:53

## 2018-12-30 RX ADMIN — METOPROLOL TARTRATE 12.5 MG: 25 TABLET, FILM COATED ORAL at 16:53

## 2018-12-30 RX ADMIN — FUROSEMIDE 40 MG: 10 INJECTION, SOLUTION INTRAMUSCULAR; INTRAVENOUS at 12:40

## 2018-12-30 RX ADMIN — ACETAMINOPHEN 650 MG: 325 TABLET, FILM COATED ORAL at 17:39

## 2018-12-30 RX ADMIN — OMEPRAZOLE 40 MG: 20 CAPSULE, DELAYED RELEASE ORAL at 16:53

## 2018-12-30 RX ADMIN — POTASSIUM BICARBONATE 25 MEQ: 25 TABLET, EFFERVESCENT ORAL at 05:46

## 2018-12-30 RX ADMIN — ATORVASTATIN CALCIUM 80 MG: 80 TABLET, FILM COATED ORAL at 16:53

## 2018-12-30 RX ADMIN — METOPROLOL TARTRATE 12.5 MG: 25 TABLET, FILM COATED ORAL at 05:46

## 2018-12-30 RX ADMIN — OXYCODONE HYDROCHLORIDE 5 MG: 5 TABLET ORAL at 12:40

## 2018-12-30 RX ADMIN — ASPIRIN 81 MG 324 MG: 81 TABLET ORAL at 05:46

## 2018-12-30 RX ADMIN — OXYCODONE HYDROCHLORIDE 5 MG: 5 TABLET ORAL at 16:53

## 2018-12-30 RX ADMIN — POTASSIUM BICARBONATE 25 MEQ: 25 TABLET, EFFERVESCENT ORAL at 08:05

## 2018-12-30 RX ADMIN — SODIUM CHLORIDE 8 MG/HR: 9 INJECTION, SOLUTION INTRAVENOUS at 05:22

## 2018-12-30 ASSESSMENT — ENCOUNTER SYMPTOMS
DIZZINESS: 0
PALPITATIONS: 0
ORTHOPNEA: 0
SHORTNESS OF BREATH: 0
VOMITING: 0
COUGH: 1
SPUTUM PRODUCTION: 0
NAUSEA: 0
HEMOPTYSIS: 0
WHEEZING: 0
CHILLS: 0

## 2018-12-30 ASSESSMENT — PAIN SCALES - GENERAL
PAINLEVEL_OUTOF10: 0
PAINLEVEL_OUTOF10: 3
PAINLEVEL_OUTOF10: 0

## 2018-12-30 NOTE — PROGRESS NOTES
Patient extubated by RT per order. Propofol stopped prior to extubation. Put on 4L oxygen via nasal canula. Patient instructed not to talk, will assess swallow eval later. Patient and family educated about nothing by mouth and reason for Cortrak until swallow eval completed.

## 2018-12-30 NOTE — PROGRESS NOTES
[facility] Medicine Daily Progress Note    Date of Service  12/30/2018    Chief Complaint  58 y.o. male admitted 12/19/2018 with cardiac arrest    Hospital Course    Mr Nicholas has a history of depression and hypertension, he was in his normal state of health but was noted to be unresponsive with agonal breathing.  His son started CPR.  Initial rhythm was ventricular fibrillation, he required multiple rounds of CPR, he was intubated in the field and brought to the emergency room.  Hypothermia protocol was initiated he was admitted to the ICU.  Patient was extubated on 12/21/2018, he required reintubation later that day.      Interval Problem Update  Extubated yesterday  He is awake, appropriate, oriented  On fentanyl drip, complains of rib pain  On O2 by nasal cannula, doesn't feel short of breath   over last 24 hours, I/O charted and -311ml since admission    Consultants/Specialty  Critical Care, I discussed the patient's condition with Dr. Uribe this morning on ICU Rounds  Gastroenterology  Cardiology    Code Status  Full Code    Disposition  ICU    Review of Systems  Review of Systems   Constitutional: Positive for malaise/fatigue. Negative for chills.   Respiratory: Positive for cough. Negative for hemoptysis, sputum production, shortness of breath and wheezing.    Cardiovascular: Positive for chest pain. Negative for palpitations, orthopnea and leg swelling.   Gastrointestinal: Negative for nausea and vomiting.   Skin: Negative for itching and rash.   Neurological: Negative for dizziness.   All other systems reviewed and are negative.       Physical Exam  Temp:  [36.1 °C (96.9 °F)] 36.1 °C (96.9 °F)  Pulse:  [57-80] 80  Resp:  [18-29] 24  BP: (133)/(50) 133/50    Physical Exam   Constitutional: He is oriented to person, place, and time. He appears well-developed and well-nourished.   HENT:   Head: Normocephalic and atraumatic.   Eyes: Conjunctivae are normal. Right eye exhibits no discharge. Left eye  exhibits no discharge. No scleral icterus.   Neck: Normal range of motion. Neck supple. No thyromegaly present.   Cardiovascular: Normal rate, regular rhythm and intact distal pulses.    No murmur heard.  Pulmonary/Chest: Effort normal. No respiratory distress. He has no wheezes. He has rales.   Abdominal: Soft. Bowel sounds are normal. He exhibits no distension. There is no tenderness.   Musculoskeletal: Normal range of motion. He exhibits no edema.   Neurological: He is alert and oriented to person, place, and time. No cranial nerve deficit.   Skin: Skin is warm and dry. He is not diaphoretic.   Psychiatric: He has a normal mood and affect. His behavior is normal.       Fluids    Intake/Output Summary (Last 24 hours) at 12/30/18 1105  Last data filed at 12/30/18 1000   Gross per 24 hour   Intake          1021.73 ml   Output             1100 ml   Net           -78.27 ml       Laboratory  Recent Labs      12/28/18   0345  12/29/18   0500  12/30/18   0440   WBC  12.6*  9.8  16.5*   RBC  2.67*  3.73*  3.49*   HEMOGLOBIN  7.4*  10.5*  9.8*   HEMATOCRIT  24.0*  33.4*  31.2*   MCV  89.9  88.5  89.4   MCH  27.7  28.2  28.1   MCHC  30.8*  31.8*  31.4*   RDW  50.1*  49.5  49.8   PLATELETCT  303  346  426   MPV  10.2  10.1  9.8     Recent Labs      12/28/18   0345  12/29/18   0500  12/30/18   0440   SODIUM  139  139  137   POTASSIUM  3.8  3.7  3.8   CHLORIDE  110  106  106   CO2  20  24  22   GLUCOSE  110*  107*  122*   BUN  25*  27*  20   CREATININE  0.65  0.68  0.53   CALCIUM  8.5  8.8  8.9     Recent Labs      12/28/18   0345   APTT  34.2               Imaging  DX-CHEST-PORTABLE (1 VIEW)   Final Result         1. Interval extubation. No other significant interval change.         DX-CHEST-PORTABLE (1 VIEW)   Final Result         1. No significant interval change.      DX-ABDOMEN FOR TUBE PLACEMENT   Final Result      Enteric tube tip projects over the stomach.      DX-CHEST-PORTABLE (1 VIEW)   Final Result         1. No  significant interval change.      DX-CHEST-PORTABLE (1 VIEW)   Final Result      1.  Worsening in perihilar opacities is suggestive of worsening edema. Infection could have a similar appearance.      2.  No significant change in left basilar opacification.      3.  Stable cardiomegaly      DX-CHEST-PORTABLE (1 VIEW)   Final Result      Slight improvement in lung aeration.      DX-CHEST-PORTABLE (1 VIEW)   Final Result         1. Lines and tubes as above, with ETT at the level of thoracic inlet. Recommend advancing 1-2 cm.   2. Persistent interstitial edema. No large pleural effusions.   3. Stable right perihilar atelectasis versus consolidation. Retrocardiac opacity has cleared.      DX-CHEST-PORTABLE (1 VIEW)   Final Result         1. Lines and tubes as above.   2. Hypoinflation with persistent right perihilar atelectasis versus consolidation.   3. Retrocardiac atelectasis versus consolidation. No large pleural effusions.      [id number] VIEW   Final Result         No specific finding to suggest small bowel obstruction.      EC-ECHOCARDIOGRAM LTD W/O CONT   Final Result      DX-ABDOMEN FOR TUBE PLACEMENT   Final Result      Feeding tube placement with the tip projecting over the distal stomach or proximal duodenum      DX-CHEST-PORTABLE (1 VIEW)   Final Result      1.  Stable cardiomegaly      2.  Worsening pulmonary vascular congestion and interstitial edema.      3.  Bibasilar opacities may be related to atelectasis. Developing pneumonia could have a similar appearance.      EC-ECHOCARDIOGRAM COMPLETE W/O CONT   Final Result      DX-CHEST-LIMITED (1 VIEW)   Final Result         1.  No acute cardiopulmonary disease.   2.  Right internal jugular central line terminates in the right atrium, could be withdrawn 3 cm.   3.  Cardiomegaly      CT-CTA CHEST PULMONARY ARTERY W/ RECONS   Final Result         1.  No large central pulmonary embolus is appreciated, evaluation of the subsegmental branches is essentially  nondiagnostic due to motion artifacts. Additional imaging would be required for definitive exclusion of small distal pulmonary emboli.   2.  Hazy groundglass pulmonary opacities, predominantly on the right, appearance suggests atypical edema or infiltrates.   3.  Anterior bilateral second through sixth rib fractures   4.  Cardiomegaly   5.  Left nephrolithiasis   6.  Atherosclerosis and atherosclerotic coronary artery disease.      CT-HEAD W/O   Final Result         1.  No acute intracranial abnormality.   2.  Atherosclerosis.      DX-CHEST-PORTABLE (1 VIEW)   Final Result         1.  No acute cardiopulmonary disease.   2.  Cardiomegaly           Assessment/Plan  Ventricular fibrillation (HCC)- (present on admission)   Assessment & Plan    Now in   AICD rescheduled for Monday     Acute respiratory failure with hypoxia (HCC)- (present on admission)   Assessment & Plan    Patient had to be reintubated on 12/21/2018  Extubated on 12/29/18  Optimized volume  Supplemental O2     Cardiac arrest (HCC)- (present on admission)   Assessment & Plan    CTA negative for PE  Cardiac cath without significant findings  Echo consistent with LV outflow obstruction  He appears to have made compete neurologic recovery  AIC tomorrow  Until then, the patient will be monitored continuously on telemetry in the ICU         Lactic acidosis- (present on admission)   Assessment & Plan    Resolved     Hypokalemia   Assessment & Plan    Replace     Metabolic acidosis- (present on admission)   Assessment & Plan    Resolved with fluid resuscitation     Aspiration pneumonia (HCC)   Assessment & Plan    Unasyn     Fever   Assessment & Plan    Tylenol and cooling blanket as needed       Elevated liver enzymes- (present on admission)   Assessment & Plan    Likely related to cardiac arrest     Hyperphosphatemia- (present on admission)   Assessment & Plan    Monitor     Iron deficiency anemia- (present on admission)   Assessment & Plan    Acute on  chronic  He has a small gastric ulcer, continue PPI  Needs outpatient colonoscopy     Leukocytosis- (present on admission)   Assessment & Plan    WBC in normal range     Closed fracture of multiple ribs of both sides- (present on admission)   Assessment & Plan    Secondary to CPR  Pain management          VTE prophylaxis: SCD's

## 2018-12-30 NOTE — CARE PLAN
Problem: Safety  Goal: Will remain free from injury  Outcome: PROGRESSING AS EXPECTED  Patient oriented to room, surroundings and use of call light, bed alarm on, hourly rounding in place.     Problem: Mobility  Goal: Risk for activity intolerance will decrease  Outcome: PROGRESSING AS EXPECTED  Patient ambulated around the room, marched in place, sat in chair during the morning, ROM performed.

## 2018-12-30 NOTE — PROGRESS NOTES
Monitor summary-sinus bradycardia, sinus rhythm, rates 50-70.  No ectopy noted.  AZ=0.14/QRS=0.10/QT=0.36

## 2018-12-30 NOTE — PALLIATIVE CARE
Palliative Care follow-up  PC RN met with pt at pt's bedside. Pt filled out an Advance Directive - Statement of Desires 2 3, and 5. Pt wishes to be kept comfortable if in an irreversible coma/brain death/terminal condition in which all treatment options have been exhausted. AD to be notarized/signed by pt on 12/30/18. Pt wishes to remain a full code.       Updated: ADRIAN Neely RN, Palliative    Plan: TBD.    Thank you for allowing Palliative Care to participate in this patient's care. Please feel free to call [phone] with any questions or concerns.

## 2018-12-30 NOTE — THERAPY
"  Speech Language Therapy Clinical Swallow Evaluation completed.  Functional Status: Pt seen on this date for clinical swallow evaluation. Pt  A&Ox4 and agreeable to evaluation. Pt speaking with whispered voice and voice gravely when asked to phonate; pt reporting voice not at baseline. During the oral motor evaluation, right tongue deviation noted and intermittent wet cough noted prior to PO. PO trials of single ice chips, NTL via teaspoon and cup sip, purees, and thin liquids via teaspoon were provided to pt and wet coughing and intermittent wet voice noted with all consitencies, which is concerning for aspiration. Increased WOB noted intermittently with trials and pt completing effortful swallow with purees, however, no c/o of globus. Upon palpation, laryngeal elevation noted to be weak and swallow trigger was timely. As pt recently extubated, weak voice, and s/sx of aspiration noted with all consistencies, recommend that pt continue with NPO with cortrak for nutrition. Dysphagia and role of SLP education provided to pt and he verbalized understanding. SLP to follow for dysphagia therapy.    Recommendations - Diet:  Continuation of NPO with cortrak                          Strategies: to be determined                          Medication Administration:  Via cortrak  Plan of Care: Will benefit from Speech Therapy 3 times per week  Post-Acute Therapy: Recommend inpatient transitional care services for continued speech therapy services.        See \"Rehab Therapy-Acute\" Patient Summary Report for complete documentation.   "

## 2018-12-30 NOTE — PROGRESS NOTES
Critical Care Progress Note    Date of admission  12/19/2018    Chief Complaint  58 y.o. male admitted 12/19/2018 s/p cardiopulmonary arrest with prolonged CPR in the field  Hospital Course  TTM started in ED and continued in ICU    Interval Problem Updates  Past 24 hours  Extubated yesterday, tolerated extubation well   chest x-ray today showed some improvement on interstitial marking.  Continue to have moderate amount of secretions with some cough.  Very weak  Hemoglobin 10.4  -50 all fluid balance since admission  Afebrile overnight T-max was 37.5 Celsius  Chest x-ray showed some improvement in interstitial lung marking, right pleural effusion  BAL grew staph aureus, MSSA  Status post coronary angiography.  No intervention was done.  Patient not have significant coronary artery disease  Status post EGD.  Small ulcer thought to be secondary to NG tube irritation  No active bleeding  Plans for insertion of subcutaneous defibrillator, this probably will be done tomorrow    Review of Systems  Review of Systems   Unable to perform ROS: Other (Patient continued to be very weak with weak voice)        Vital Signs for last 24 hours   Temp:  [36.1 °C (96.9 °F)] 36.1 °C (96.9 °F)  Pulse:  [57-80] 80  Resp:  [18-29] 24  BP: (133)/(50) 133/50    Hemodynamic parameters for last 24 hours       Respiratory  Brooks Vent Mode: Vent Standby  Rate (breaths/min): 22  Vt Target (mL): 400  PEEP/CPAP: 8  FiO2: 30  P Support: 5  P MEAN: 12  Control VTE (exp VT): 428    Physical Exam   Physical Exam   Constitutional: He is oriented to person, place, and time. He appears well-developed and well-nourished. He is intubated.   HENT:   Head: Normocephalic and atraumatic.   Eyes: Pupils are equal, round, and reactive to light. EOM are normal.   Neck: Normal range of motion.   Cardiovascular: Normal rate and regular rhythm.    Pulmonary/Chest: Effort normal. No accessory muscle usage. He is intubated. No respiratory distress. He has no  wheezes. He has rales.   Abdominal: Soft. Bowel sounds are normal.   Musculoskeletal: Normal range of motion.   Neurological: He is alert and oriented to person, place, and time. No cranial nerve deficit. Coordination normal.   Skin: Skin is warm and dry.   Psychiatric: He has a normal mood and affect.       Medications  Current Facility-Administered Medications   Medication Dose Route Frequency Provider Last Rate Last Dose   • furosemide (LASIX) injection 40 mg  40 mg Intravenous Q DAY Moustapha Hoff M.D.   40 mg at 12/30/18 1240   • oxyCODONE immediate-release (ROXICODONE) tablet 5 mg  5 mg Oral Q4HRS PRN Johan Morales M.D.   5 mg at 12/30/18 1240   • potassium bicarbonate (KLYTE) effervescent tablet 50 mEq  50 mEq Feeding Tube BID Johan Morales M.D.       • omeprazole 2 mg/mL in sodium bicarbonate (PRILOSEC) oral susp 40 mg  40 mg Feeding Tube Q12HRS Johan Morales M.D.       • metoprolol (LOPRESSOR) tablet 12.5 mg  12.5 mg Feeding Tube TWICE DAILY Aleshia Uribe M.D.   12.5 mg at 12/30/18 0546   • acetaminophen (TYLENOL) tablet 650 mg  650 mg Feeding Tube Q6HRS PRN Aleshia Uribe M.D.       • Respiratory Care per Protocol   Nebulization Continuous RT Bradley Flores M.D.       • MD Alert...ICU Electrolyte Replacement per Pharmacy   Other pharmacy to dose Bradley Flores M.D.       • aspirin (ASA) tablet 325 mg  325 mg Feeding Tube DAILY Yaron Magallanes M.D.   Stopped at 12/29/18 0600    Or   • aspirin (ASA) chewable tab 324 mg  324 mg Per NG Tube DAILY Yaron Magallanes M.D.   324 mg at 12/30/18 0546    Or   • aspirin (ASA) suppository 300 mg  300 mg Rectal DAILY Yaron Magallanes M.D.       • atorvastatin (LIPITOR) tablet 80 mg  80 mg Per NG Tube Q EVENING Yaron Magallanes M.D.   80 mg at 12/29/18 1721   • senna-docusate (PERICOLACE or SENOKOT S) 8.6-50 MG per tablet 2 Tab  2 Tab Feeding Tube BID Yaron Magallanes M.D.   Stopped at 12/30/18 0600    And   • polyethylene glycol/lytes (MIRALAX) PACKET 1 Packet  1 Packet Feeding  Tube QDAY PRROGERIO Magallanes M.D.   1 Packet at 12/23/18 1136    And   • magnesium hydroxide (MILK OF MAGNESIA) suspension 30 mL  30 mL Feeding Tube QDAY PRROGERIO Magallanes M.D.   30 mL at 12/24/18 1228    And   • bisacodyl (DULCOLAX) suppository 10 mg  10 mg Rectal QDAY PRN Yaron Magallanes M.D.       • dextrose 50% (D50W) injection 25-50 mL  12.5-25 g Intravenous PRN Jeremy M Gonda, M.D.           Fluids    Intake/Output Summary (Last 24 hours) at 12/30/18 1248  Last data filed at 12/30/18 1000   Gross per 24 hour   Intake           959.93 ml   Output              850 ml   Net           109.93 ml       Laboratory  Recent Labs      12/28/18   0407  12/29/18   0415   ISTATAPH  7.455  7.485   ISTATAPCO2  28.6  26.8   ISTATAPO2  56*  60*   ISTATATCO2  21  21   [id number]  91*  93   ISTATARTHCO3  20.1  20.2   ISTATARTBE  -3  -3   ISTATTEMP  37.5 C  37.5 C   ISTATFIO2  40  40   ISTATSPEC  Arterial  Arterial   ISTATAPHTC  7.447  7.478   [id number]  58*  62*         Recent Labs      12/28/18   0345  12/29/18   0500  12/30/18   0440   SODIUM  139  139  137   POTASSIUM  3.8  3.7  3.8   CHLORIDE  110  106  106   CO2  20  24  22   BUN  25*  27*  20   CREATININE  0.65  0.68  0.53   CALCIUM  8.5  8.8  8.9     Recent Labs      12/28/18   0345  12/29/18   0500  12/30/18   0440   GLUCOSE  110*  107*  122*     Recent Labs      12/28/18   0345  12/29/18   0500  12/30/18   0440   WBC  12.6*  9.8  16.5*   NEUTSPOLYS  86.10*  72.50*  83.50*   LYMPHOCYTES  6.10*  15.40*  9.00*   MONOCYTES  2.60  4.40  3.00   EOSINOPHILS  1.80  2.40  1.60   BASOPHILS  1.70  0.50  0.40     Recent Labs      12/28/18   0345  12/29/18   0500  12/30/18   0440   RBC  2.67*  3.73*  3.49*   HEMOGLOBIN  7.4*  10.5*  9.8*   HEMATOCRIT  24.0*  33.4*  31.2*   PLATELETCT  303  346  426   APTT  34.2   --    --        Imaging  X-Ray:  I have personally reviewed the images and compared with prior images.    Assessment/Plan  Respiratory failure with hypoxia (HCC)   Assessment  & Plan    Acute hypoxic respiratory failure secondary to cardio-pulmonary arrest possibly complicated by aspiration pneumonia.    Failed extubation about a week ago with excessive secretions  Extubated yesterday.  Tolerated extubation well..  Chest x-ray today showed some improvement in the interstitial marking    diuresed well over the last 2 days           Ventricular fibrillation (HCC)- (present on admission)   Assessment & Plan    Primary arrhythmia per EMS status post defibrillation  Continuous telemetry with initiation of amiodarone should it recur  Optimize electrolytes  Echo showed hypertrophic cardiomyopathy  Cardiology service following  For defibrillator prior to discharge.  Coronary angiogram did not show significant coronary artery disease.     Cardiac arrest (HCC)- (present on admission)   Assessment & Plan      Probably secondary to hypertrophic cardiomyopathy  Plan for defibrillator when he is more stable    Lactic acidosis- (present on admission)   Assessment & Plan    Secondary to shock/cardiac arrest  Clinically resoloved         Anoxic brain injury (HCC)- (present on admission)   Assessment & Plan    Remarkable recovery after prolonged cardiac arrest  Following commands moving all extremities  Difficult to assess long-term cognitive effects of his cardiac arrest..      Elevated liver enzymes   Assessment & Plan    Likely secondary to shock liver  Improving       Closed fracture of multiple ribs of both sides- (present on admission)   Assessment & Plan    Secondary to CPR  Analgesia as needed  This probably will make extubation and the patient from mechanical ventilation a little bit more difficult.  The patient already failed one extubation trial.     Aspiration pneumonia  Continue antibiotics      Anemia, probably acute blood loss from GI tract.  And hemodilution from IV hydration  Today's hemoglobin was 9.8   Probably there is no active bleeding at this point after heparin drips  stopped.    VTE:  Heparin and Contraindicated (GI bleed, plan for defibrillator tomorrow)  Ulcer: PPI  Lines: Central Line  Ongoing indication addressed    I have performed a physical exam and reviewed and updated ROS and Plan today (12/30/2018). In review of yesterday's note (12/29/2018), there are no changes except as documented above.     Discussed patient condition and risk of morbidity and/or mortality with Hospitalist, Family, RN, RT, Pharmacy, Charge nurse / hot rounds and cardiology  The patient remains critically ill.  Critical care time = 34 minutes in directly providing and coordinating critical care and extensive data review.  No time overlap and excludes procedures.

## 2018-12-30 NOTE — PROGRESS NOTES
[facility] Cardiology Follow-up Consult Note    Date of note:    12/30/2018      Consulting Physician: Johan Morales M.D.    Patient ID:  Name:   Amadou Nicholas     YOB: 1960  Age:   58 y.o.  male   MRN:   [id number]    Chief Complaint   Patient presents with   • Cardiac Arrest     witnessed        ID: 58-year-old man with hypertrophic cardiomyopathy and outflow tract gradient complicated by ventricular fibrillation arrest for which he is admitted.  He had respiratory failure with prolonged need for mechanical ventilation, and upper GI bleed that appears to have been related to NG tube suctioning found on endoscopy.  He had cardiac catheterization showing no obstructive coronary disease, and is pending defibrillator implantation.    Interim Events:  Defibrillator implantation postponed related to several emergent cardiac catheterizations for acute coronary syndrome yesterday.        ROS  No NV, No Bleeding, No dizziness   All other review of systems reviewed and negative.    Past medical, surgical, social, and family history reviewed and unchanged from admission except as noted in assessment and plan.    Medications: Reviewed in MAR  Current Facility-Administered Medications   Medication Dose Frequency Provider Last Rate Last Dose   • furosemide (LASIX) injection 40 mg  40 mg Q DAY Moustapha Hoff M.D.   40 mg at 12/30/18 1240   • oxyCODONE immediate-release (ROXICODONE) tablet 5 mg  5 mg Q4HRS PRN Johan Morales M.D.   5 mg at 12/30/18 1240   • potassium bicarbonate (KLYTE) effervescent tablet 50 mEq  50 mEq BID Johan Morales M.D.       • omeprazole 2 mg/mL in sodium bicarbonate (PRILOSEC) oral susp 40 mg  40 mg Q12HRS Johan Morales M.D.       • metoprolol (LOPRESSOR) tablet 12.5 mg  12.5 mg TWICE DAILY Aleshia Uribe M.D.   12.5 mg at 12/30/18 0546   • acetaminophen (TYLENOL) tablet 650 mg  650 mg Q6HRS PRN Aleshia Uribe M.D.       • Respiratory Care per Protocol   Continuous RT Bradley Flores M.D.    "    • MD Alert...ICU Electrolyte Replacement per Pharmacy   pharmacy to dose Bradley Flores M.D.       • aspirin (ASA) tablet 325 mg  325 mg DAILY Yaron Magallanes M.D.   Stopped at 12/29/18 0600    Or   • aspirin (ASA) chewable tab 324 mg  324 mg DAILY Yaron Magallanes M.D.   324 mg at 12/30/18 0546    Or   • aspirin (ASA) suppository 300 mg  300 mg DAILY Yaron Magallanes M.D.       • atorvastatin (LIPITOR) tablet 80 mg  80 mg Q EVENING Yaron Magallanes M.D.   80 mg at 12/29/18 1721   • senna-docusate (PERICOLACE or SENOKOT S) 8.6-50 MG per tablet 2 Tab  2 Tab BID Yaron Magallanes M.D.   Stopped at 12/30/18 0600    And   • polyethylene glycol/lytes (MIRALAX) PACKET 1 Packet  1 Packet QDAY PRN Yaron Magallanes M.D.   1 Packet at 12/23/18 1136    And   • magnesium hydroxide (MILK OF MAGNESIA) suspension 30 mL  30 mL QDAY PRN Yaron Magallanes M.D.   30 mL at 12/24/18 1228    And   • bisacodyl (DULCOLAX) suppository 10 mg  10 mg QDAY PRN Yaron Magallanes M.D.       • dextrose 50% (D50W) injection 25-50 mL  12.5-25 g PRN Jeremy M Gonda, M.D.         Last reviewed on 12/20/2018  5:05 PM by Mae Jane PhT  No Known Allergies    Physical Exam  Body mass index is 29.29 kg/m². Blood pressure 133/50, pulse 80, temperature 36.1 °C (96.9 °F), temperature source Temporal, resp. rate (!) 24, height 1.6 m (5' 3\"), weight 75 kg (165 lb 5.5 oz), SpO2 97 %. Vitals:    12/30/18 0900 12/30/18 0905 12/30/18 1000 12/30/18 1018   BP:       Pulse: 74   80   Resp: (!) 23   (!) 24   Temp:       TempSrc:       SpO2: 96%  97% 97%   Weight:  75 kg (165 lb 5.5 oz)     Height:        Oxygen Therapy:  Pulse Oximetry: 97 %, O2 (LPM): 3, O2 Delivery: Silicone Nasal Cannula    General: Somnolent, slightly confused, middle-aged appearing man in the ICU  Eyes: nl conjunctiva  ENT: OP clear, NG tube noted  Neck: no JVD.  Right IJ central line again noted.  Lungs: normal respiratory effort, CTAB  Heart: RRR, grade 3/6 blowing systolic murmur at left anterior axillary line, and " grade 3/6 systolic ejection murmur at left upper sternal border, no rubs or gallops,   EXT 2+ edema bilateral lower extremities.  2+ pedal pulses. no cyanosis  Abdomen: soft, non tender, non distended,  Neurological: Somnolent, opens eyes to commands, answers questions though seems a bit confused.  Full exam deferred  Psychiatric: Exam deferred  Skin: Warm extremities    Labs (personally reviewed and notable for):   Recent Results (from the past 24 hour(s))   Basic Metabolic Panel (BMP)    Collection Time: 12/30/18  4:40 AM   Result Value Ref Range    Sodium 137 135 - 145 mmol/L    Potassium 3.8 3.6 - 5.5 mmol/L    Chloride 106 96 - 112 mmol/L    Co2 22 20 - 33 mmol/L    Glucose 122 (H) 65 - 99 mg/dL    Bun 20 8 - 22 mg/dL    Creatinine 0.53 0.50 - 1.40 mg/dL    Calcium 8.9 8.5 - 10.5 mg/dL    Anion Gap 9.0 0.0 - 11.9   CBC with Differential    Collection Time: 12/30/18  4:40 AM   Result Value Ref Range    WBC 16.5 (H) 4.8 - 10.8 K/uL    RBC 3.49 (L) 4.70 - 6.10 M/uL    Hemoglobin 9.8 (L) 14.0 - 18.0 g/dL    Hematocrit 31.2 (L) 42.0 - 52.0 %    MCV 89.4 81.4 - 97.8 fL    MCH 28.1 27.0 - 33.0 pg    MCHC 31.4 (L) 33.7 - 35.3 g/dL    RDW 49.8 35.9 - 50.0 fL    Platelet Count 426 164 - 446 K/uL    MPV 9.8 9.0 - 12.9 fL    Neutrophils-Polys 83.50 (H) 44.00 - 72.00 %    Lymphocytes 9.00 (L) 22.00 - 41.00 %    Monocytes 3.00 0.00 - 13.40 %    Eosinophils 1.60 0.00 - 6.90 %    Basophils 0.40 0.00 - 1.80 %    Immature Granulocytes 2.50 (H) 0.00 - 0.90 %    Nucleated RBC 0.00 /100 WBC    Neutrophils (Absolute) 13.81 (H) 1.82 - 7.42 K/uL    Lymphs (Absolute) 1.49 1.00 - 4.80 K/uL    Monos (Absolute) 0.50 0.00 - 0.85 K/uL    Eos (Absolute) 0.26 0.00 - 0.51 K/uL    Baso (Absolute) 0.06 0.00 - 0.12 K/uL    Immature Granulocytes (abs) 0.41 (H) 0.00 - 0.11 K/uL    NRBC (Absolute) 0.00 K/uL   ESTIMATED GFR    Collection Time: 12/30/18  4:40 AM   Result Value Ref Range    GFR If African American >60 >60 mL/min/1.73 m 2    GFR If Non  African American >60 >60 mL/min/1.73 m 2       Cardiac Imaging and Procedures Review:    EKG and telemetry tracings personally reviewed    Impression and Medical Decision Making:  Active Problems:    Cardiac arrest (HCC) POA: Yes    Acute respiratory failure with hypoxia (HCC) POA: Yes    Ventricular fibrillation (HCC) POA: Yes    Respiratory failure with hypoxia (HCC) POA: Yes      Overview:           Metabolic acidosis POA: Yes    Hypokalemia POA: Unknown    Coagulopathy (HCC) POA: Yes    Lactic acidosis POA: Yes    Closed fracture of multiple ribs of both sides POA: Yes    Leukocytosis POA: Yes    Iron deficiency anemia POA: Yes    Hyperphosphatemia POA: Yes    Elevated liver enzymes POA: Yes    Fever POA: No    Aspiration pneumonia (HCC) POA: No  Resolved Problems:    * No resolved hospital problems. *      Ventricular fibrillation arrest: Pending likely subcutaneous defibrillator tomorrow.  N.p.o. after midnight for that.    Hypertrophic cardiomyopathy: Continue Toprol    Volume overload: Continue diuresis, Lasix increased to 40 mg IV daily    I personally discussed his case with  Dr Johan Morales M.D.      Thank you for allowing me to participate in the care of this patient.  Please call or text via IfOnly if clarification would be useful.    Moustapha Hoff MD  Cardiologist, [facility] -

## 2018-12-31 ENCOUNTER — APPOINTMENT (OUTPATIENT)
Dept: RADIOLOGY | Facility: MEDICAL CENTER | Age: 58
DRG: 224 | End: 2018-12-31
Attending: HOSPITALIST
Payer: COMMERCIAL

## 2018-12-31 PROBLEM — J96.91 RESPIRATORY FAILURE WITH HYPOXIA (HCC): Status: RESOLVED | Noted: 2018-12-22 | Resolved: 2018-12-31

## 2018-12-31 PROBLEM — A41.9 SEPSIS (HCC): Status: ACTIVE | Noted: 2018-12-31

## 2018-12-31 LAB
ALBUMIN SERPL BCP-MCNC: 3.3 G/DL (ref 3.2–4.9)
ALBUMIN SERPL BCP-MCNC: 3.5 G/DL (ref 3.2–4.9)
ALBUMIN/GLOB SERPL: 0.7 G/DL
ALBUMIN/GLOB SERPL: 0.7 G/DL
ALP SERPL-CCNC: 83 U/L (ref 30–99)
ALP SERPL-CCNC: 90 U/L (ref 30–99)
ALT SERPL-CCNC: 34 U/L (ref 2–50)
ALT SERPL-CCNC: 35 U/L (ref 2–50)
ANION GAP SERPL CALC-SCNC: 11 MMOL/L (ref 0–11.9)
ANION GAP SERPL CALC-SCNC: 12 MMOL/L (ref 0–11.9)
APPEARANCE UR: ABNORMAL
AST SERPL-CCNC: 26 U/L (ref 12–45)
AST SERPL-CCNC: 36 U/L (ref 12–45)
BACTERIA #/AREA URNS HPF: ABNORMAL /HPF
BASOPHILS # BLD AUTO: 0.4 % (ref 0–1.8)
BASOPHILS # BLD: 0.15 K/UL (ref 0–0.12)
BILIRUB SERPL-MCNC: 0.6 MG/DL (ref 0.1–1.5)
BILIRUB SERPL-MCNC: 0.6 MG/DL (ref 0.1–1.5)
BILIRUB UR QL STRIP.AUTO: ABNORMAL
BUN SERPL-MCNC: 30 MG/DL (ref 8–22)
BUN SERPL-MCNC: 34 MG/DL (ref 8–22)
CALCIUM SERPL-MCNC: 9.8 MG/DL (ref 8.5–10.5)
CALCIUM SERPL-MCNC: 9.9 MG/DL (ref 8.5–10.5)
CHLORIDE SERPL-SCNC: 103 MMOL/L (ref 96–112)
CHLORIDE SERPL-SCNC: 105 MMOL/L (ref 96–112)
CO2 SERPL-SCNC: 23 MMOL/L (ref 20–33)
CO2 SERPL-SCNC: 25 MMOL/L (ref 20–33)
COLOR UR: ABNORMAL
CREAT SERPL-MCNC: 0.6 MG/DL (ref 0.5–1.4)
CREAT SERPL-MCNC: 0.74 MG/DL (ref 0.5–1.4)
EOSINOPHIL # BLD AUTO: 0.11 K/UL (ref 0–0.51)
EOSINOPHIL NFR BLD: 0.3 % (ref 0–6.9)
EPI CELLS #/AREA URNS HPF: NEGATIVE /HPF
ERYTHROCYTE [DISTWIDTH] IN BLOOD BY AUTOMATED COUNT: 49.1 FL (ref 35.9–50)
GLOBULIN SER CALC-MCNC: 4.7 G/DL (ref 1.9–3.5)
GLOBULIN SER CALC-MCNC: 4.9 G/DL (ref 1.9–3.5)
GLUCOSE SERPL-MCNC: 127 MG/DL (ref 65–99)
GLUCOSE SERPL-MCNC: 169 MG/DL (ref 65–99)
GLUCOSE UR STRIP.AUTO-MCNC: NEGATIVE MG/DL
GRAM STN SPEC: NORMAL
HCT VFR BLD AUTO: 36.9 % (ref 42–52)
HGB BLD-MCNC: 11.7 G/DL (ref 14–18)
IMM GRANULOCYTES # BLD AUTO: 0.71 K/UL (ref 0–0.11)
IMM GRANULOCYTES NFR BLD AUTO: 1.8 % (ref 0–0.9)
KETONES UR STRIP.AUTO-MCNC: ABNORMAL MG/DL
LACTATE BLD-SCNC: 1.2 MMOL/L (ref 0.5–2)
LACTATE BLD-SCNC: 1.4 MMOL/L (ref 0.5–2)
LACTATE BLD-SCNC: 2.1 MMOL/L (ref 0.5–2)
LEUKOCYTE ESTERASE UR QL STRIP.AUTO: ABNORMAL
LYMPHOCYTES # BLD AUTO: 2.07 K/UL (ref 1–4.8)
LYMPHOCYTES NFR BLD: 5.2 % (ref 22–41)
MAGNESIUM SERPL-MCNC: 2.1 MG/DL (ref 1.5–2.5)
MCH RBC QN AUTO: 28 PG (ref 27–33)
MCHC RBC AUTO-ENTMCNC: 31.7 G/DL (ref 33.7–35.3)
MCV RBC AUTO: 88.3 FL (ref 81.4–97.8)
MICRO URNS: ABNORMAL
MONOCYTES # BLD AUTO: 0.95 K/UL (ref 0–0.85)
MONOCYTES NFR BLD AUTO: 2.4 % (ref 0–13.4)
MRSA DNA SPEC QL NAA+PROBE: NORMAL
NEUTROPHILS # BLD AUTO: 36.14 K/UL (ref 1.82–7.42)
NEUTROPHILS NFR BLD: 89.9 % (ref 44–72)
NITRITE UR QL STRIP.AUTO: POSITIVE
NRBC # BLD AUTO: 0.02 K/UL
NRBC BLD-RTO: 0 /100 WBC
PH UR STRIP.AUTO: 6.5 [PH]
PHOSPHATE SERPL-MCNC: 4.1 MG/DL (ref 2.5–4.5)
PLATELET # BLD AUTO: 604 K/UL (ref 164–446)
PMV BLD AUTO: 9.8 FL (ref 9–12.9)
POTASSIUM SERPL-SCNC: 4.1 MMOL/L (ref 3.6–5.5)
POTASSIUM SERPL-SCNC: 4.9 MMOL/L (ref 3.6–5.5)
PROT SERPL-MCNC: 8 G/DL (ref 6–8.2)
PROT SERPL-MCNC: 8.4 G/DL (ref 6–8.2)
PROT UR QL STRIP: 100 MG/DL
RBC # BLD AUTO: 4.18 M/UL (ref 4.7–6.1)
RBC # URNS HPF: >150 /HPF
RBC UR QL AUTO: ABNORMAL
SIGNIFICANT IND 70042: NORMAL
SIGNIFICANT IND 70042: NORMAL
SITE SITE: NORMAL
SITE SITE: NORMAL
SODIUM SERPL-SCNC: 139 MMOL/L (ref 135–145)
SODIUM SERPL-SCNC: 140 MMOL/L (ref 135–145)
SOURCE SOURCE: NORMAL
SOURCE SOURCE: NORMAL
SP GR UR STRIP.AUTO: 1.02
UROBILINOGEN UR STRIP.AUTO-MCNC: 0.2 MG/DL
WBC # BLD AUTO: 40.2 K/UL (ref 4.8–10.8)
WBC #/AREA URNS HPF: ABNORMAL /HPF

## 2018-12-31 PROCEDURE — 99233 SBSQ HOSP IP/OBS HIGH 50: CPT | Performed by: HOSPITALIST

## 2018-12-31 PROCEDURE — 80053 COMPREHEN METABOLIC PANEL: CPT

## 2018-12-31 PROCEDURE — A9270 NON-COVERED ITEM OR SERVICE: HCPCS | Performed by: HOSPITALIST

## 2018-12-31 PROCEDURE — 87186 SC STD MICRODIL/AGAR DIL: CPT

## 2018-12-31 PROCEDURE — 87205 SMEAR GRAM STAIN: CPT

## 2018-12-31 PROCEDURE — 700102 HCHG RX REV CODE 250 W/ 637 OVERRIDE(OP): Performed by: INTERNAL MEDICINE

## 2018-12-31 PROCEDURE — 700111 HCHG RX REV CODE 636 W/ 250 OVERRIDE (IP): Performed by: INTERNAL MEDICINE

## 2018-12-31 PROCEDURE — 700105 HCHG RX REV CODE 258

## 2018-12-31 PROCEDURE — A9270 NON-COVERED ITEM OR SERVICE: HCPCS | Performed by: INTERNAL MEDICINE

## 2018-12-31 PROCEDURE — 99233 SBSQ HOSP IP/OBS HIGH 50: CPT | Performed by: INTERNAL MEDICINE

## 2018-12-31 PROCEDURE — 94668 MNPJ CHEST WALL SBSQ: CPT

## 2018-12-31 PROCEDURE — 700101 HCHG RX REV CODE 250: Performed by: INTERNAL MEDICINE

## 2018-12-31 PROCEDURE — 99291 CRITICAL CARE FIRST HOUR: CPT | Performed by: INTERNAL MEDICINE

## 2018-12-31 PROCEDURE — 83605 ASSAY OF LACTIC ACID: CPT | Mod: 91

## 2018-12-31 PROCEDURE — 85025 COMPLETE CBC W/AUTO DIFF WBC: CPT

## 2018-12-31 PROCEDURE — 84100 ASSAY OF PHOSPHORUS: CPT

## 2018-12-31 PROCEDURE — 83735 ASSAY OF MAGNESIUM: CPT

## 2018-12-31 PROCEDURE — 700102 HCHG RX REV CODE 250 W/ 637 OVERRIDE(OP): Performed by: HOSPITALIST

## 2018-12-31 PROCEDURE — 700105 HCHG RX REV CODE 258: Performed by: HOSPITALIST

## 2018-12-31 PROCEDURE — 94669 MECHANICAL CHEST WALL OSCILL: CPT

## 2018-12-31 PROCEDURE — 87077 CULTURE AEROBIC IDENTIFY: CPT | Mod: 91

## 2018-12-31 PROCEDURE — 87070 CULTURE OTHR SPECIMN AEROBIC: CPT

## 2018-12-31 PROCEDURE — 770022 HCHG ROOM/CARE - ICU (200)

## 2018-12-31 PROCEDURE — 700105 HCHG RX REV CODE 258: Performed by: INTERNAL MEDICINE

## 2018-12-31 PROCEDURE — 87641 MR-STAPH DNA AMP PROBE: CPT

## 2018-12-31 PROCEDURE — 94640 AIRWAY INHALATION TREATMENT: CPT

## 2018-12-31 PROCEDURE — 700111 HCHG RX REV CODE 636 W/ 250 OVERRIDE (IP): Performed by: HOSPITALIST

## 2018-12-31 RX ORDER — SODIUM CHLORIDE 9 MG/ML
30 INJECTION, SOLUTION INTRAVENOUS
Status: COMPLETED | OUTPATIENT
Start: 2018-12-31 | End: 2018-12-31

## 2018-12-31 RX ORDER — SODIUM CHLORIDE 9 MG/ML
INJECTION, SOLUTION INTRAVENOUS
Status: COMPLETED
Start: 2018-12-31 | End: 2018-12-31

## 2018-12-31 RX ORDER — SODIUM CHLORIDE, SODIUM LACTATE, POTASSIUM CHLORIDE, CALCIUM CHLORIDE 600; 310; 30; 20 MG/100ML; MG/100ML; MG/100ML; MG/100ML
500 INJECTION, SOLUTION INTRAVENOUS ONCE
Status: COMPLETED | OUTPATIENT
Start: 2018-12-31 | End: 2018-12-31

## 2018-12-31 RX ORDER — SODIUM CHLORIDE 9 MG/ML
500 INJECTION, SOLUTION INTRAVENOUS
Status: COMPLETED | OUTPATIENT
Start: 2018-12-31 | End: 2018-12-31

## 2018-12-31 RX ADMIN — ATORVASTATIN CALCIUM 80 MG: 80 TABLET, FILM COATED ORAL at 17:33

## 2018-12-31 RX ADMIN — ASPIRIN 81 MG 324 MG: 81 TABLET ORAL at 06:18

## 2018-12-31 RX ADMIN — FUROSEMIDE 40 MG: 10 INJECTION, SOLUTION INTRAMUSCULAR; INTRAVENOUS at 00:10

## 2018-12-31 RX ADMIN — CEFEPIME 2 G: 2 INJECTION, POWDER, FOR SOLUTION INTRAVENOUS at 17:34

## 2018-12-31 RX ADMIN — SODIUM CHLORIDE: 9 INJECTION, SOLUTION INTRAVENOUS at 14:45

## 2018-12-31 RX ADMIN — METOPROLOL TARTRATE 12.5 MG: 25 TABLET, FILM COATED ORAL at 06:18

## 2018-12-31 RX ADMIN — ACETAMINOPHEN 650 MG: 325 TABLET, FILM COATED ORAL at 00:57

## 2018-12-31 RX ADMIN — SODIUM CHLORIDE SOLN NEBU 3% 3 ML: 3 NEBU SOLN at 00:17

## 2018-12-31 RX ADMIN — CEFEPIME 2 G: 2 INJECTION, POWDER, FOR SOLUTION INTRAVENOUS at 08:02

## 2018-12-31 RX ADMIN — SODIUM CHLORIDE 500 ML: 9 INJECTION, SOLUTION INTRAVENOUS at 10:02

## 2018-12-31 RX ADMIN — OMEPRAZOLE 40 MG: 20 CAPSULE, DELAYED RELEASE ORAL at 06:18

## 2018-12-31 RX ADMIN — FUROSEMIDE 40 MG: 10 INJECTION, SOLUTION INTRAMUSCULAR; INTRAVENOUS at 06:20

## 2018-12-31 RX ADMIN — OMEPRAZOLE 40 MG: 20 CAPSULE, DELAYED RELEASE ORAL at 17:33

## 2018-12-31 RX ADMIN — POTASSIUM BICARBONATE 50 MEQ: 25 TABLET, EFFERVESCENT ORAL at 06:18

## 2018-12-31 RX ADMIN — SODIUM CHLORIDE, POTASSIUM CHLORIDE, SODIUM LACTATE AND CALCIUM CHLORIDE 500 ML: 600; 310; 30; 20 INJECTION, SOLUTION INTRAVENOUS at 17:35

## 2018-12-31 RX ADMIN — STANDARDIZED SENNA CONCENTRATE AND DOCUSATE SODIUM 2 TABLET: 8.6; 5 TABLET, FILM COATED ORAL at 17:34

## 2018-12-31 RX ADMIN — SODIUM CHLORIDE 2250 ML: 9 INJECTION, SOLUTION INTRAVENOUS at 10:01

## 2018-12-31 RX ADMIN — POTASSIUM BICARBONATE 50 MEQ: 25 TABLET, EFFERVESCENT ORAL at 17:33

## 2018-12-31 ASSESSMENT — PAIN SCALES - GENERAL
PAINLEVEL_OUTOF10: 0

## 2018-12-31 ASSESSMENT — ENCOUNTER SYMPTOMS
SHORTNESS OF BREATH: 0
BLURRED VISION: 0
ORTHOPNEA: 0
PALPITATIONS: 0
BLOOD IN STOOL: 0
WEAKNESS: 1
CHILLS: 0
DEPRESSION: 0
HEARTBURN: 0
COUGH: 1
DIZZINESS: 0
NAUSEA: 0
HEMOPTYSIS: 0
FEVER: 1
SORE THROAT: 0
VOMITING: 0
WHEEZING: 0
SPUTUM PRODUCTION: 0
EYE DISCHARGE: 0
NERVOUS/ANXIOUS: 0
POLYDIPSIA: 0
DIAPHORESIS: 1
FOCAL WEAKNESS: 0
CLAUDICATION: 0
BACK PAIN: 1
MYALGIAS: 0
HEADACHES: 0

## 2018-12-31 NOTE — CARE PLAN
Problem: Fluid Volume:  Goal: Hemodynamic stability will improve  Outcome: PROGRESSING AS EXPECTED    Intervention: Assess hemodynamic parameters  MAP > 65       Problem: Knowledge Deficit:  Goal: Patient's knowledge of the prescribed therapeutic regimen will improve  Outcome: PROGRESSING AS EXPECTED  Patient understands need for fluids, hsu removal, SBP monitoring, antibiotic treatment

## 2018-12-31 NOTE — ASSESSMENT & PLAN NOTE
This is severe sepsis with the following associated acute organ dysfunction(s): acute kidney failure, metabolic/septic encephalopathy.  12/31/2018  Source = likely urinary + pulmonary --> improving    Continue cefepime times 7 days  S/p Sepsis protocol, euvolemic

## 2018-12-31 NOTE — PROGRESS NOTES
"I examined the patient 12/31/2018 2:03 PM  Vital Signs:/50   Pulse (!) 128   Temp 37.2 °C (99 °F) (Temporal)   Resp (!) 35   Ht 1.6 m (5' 3\")   Wt 75 kg (165 lb 5.5 oz)   SpO2 90%   BMI 29.29 kg/m²   Cardiac examination significant for Tachycardia  Pulmonary examination significant for Bilateral rhonchi  Capillary refill is brisk  Peripheral Pulse is 2+   Skin is normal         "

## 2018-12-31 NOTE — PROGRESS NOTES
Cortrak Placement    Tube Team verified patient name and medical record number prior to tube placement.  Cortrak tube placed at 65 cm in right nare.  Per Cortrak picture, tube appears to be in the stomach.  Nursing Instructions: Awaiting KUB to confirm placement before use for medications or feeding. Once placement confirmed, flush tube with 30 ml of water, and then remove and save stylet, in patient medication drawer.

## 2018-12-31 NOTE — PALLIATIVE CARE
Patient's AD completed, notarized and scanned into Epic.  The original & 3 copies placed on the patient's chart to go with him on discharge.  RN notified.

## 2018-12-31 NOTE — PROGRESS NOTES
"Patient suddenly diaphoretic and stating that he is \"cold and clammy\", temporal temp reading 99, hsu temp connected and read 100.5, tylenol given, BC, UA, Procalcitonin and lactic acid ordered by MD.   "

## 2018-12-31 NOTE — PROGRESS NOTES
AICD cancelled today due to febrile 100f and WBC 40.5 from 16.5. Recommend treatment of acute leukocytosis prior to AICD placement.

## 2018-12-31 NOTE — ASSESSMENT & PLAN NOTE
Status post appropriate antibiotic therapy  Aspiration precautions, SLP  Recurrence of pneumonia with pansensitive pseudomonal 12/31 -continue cefepime times 7 days

## 2018-12-31 NOTE — PROGRESS NOTES
Critical Care Progress Note    Date of admission  12/19/2018    Chief Complaint  58 y.o. male admitted 12/19/2018 s/p cardiopulmonary arrest with prolonged CPR in the field    Hospital Course  TTM started in ED and continued in ICU    Interval Problem Updates  Past 24 hours   - worsening sinus tach   - increased WBC and febrile with Tm 38.5, abn UA (hsu changed), borderline hypotension --> sepsis    - increased plt count   - bump in BUN/crt   - planned AICD on hold given fevers   - increased lactic acid this morning    Review of Systems  Review of Systems   Constitutional: Positive for diaphoresis, fever and malaise/fatigue. Negative for chills.   HENT: Negative for congestion and sore throat.    Eyes: Negative for blurred vision and discharge.   Respiratory: Positive for cough. Negative for sputum production, shortness of breath and wheezing.    Cardiovascular: Negative for chest pain, palpitations, claudication and leg swelling.   Gastrointestinal: Negative for blood in stool, heartburn, nausea and vomiting.   Genitourinary: Negative for dysuria and frequency.   Musculoskeletal: Positive for back pain. Negative for myalgias.   Skin: Negative for rash.   Neurological: Positive for weakness. Negative for dizziness, focal weakness and headaches.   Endo/Heme/Allergies: Negative for polydipsia.   Psychiatric/Behavioral: Negative for depression. The patient is not nervous/anxious.    All other systems reviewed and are negative.       Vital Signs for last 24 hours   Temp:  [36.1 °C (96.9 °F)-37.2 °C (99 °F)] 37.2 °C (99 °F)  Pulse:  [] 112  Resp:  [21-38] 38   Blood pressure 92/69    Hemodynamic parameters for last 24 hours       Respiratory   SaO2 91-95% on 4 lpm n/c, IS 1000mL    Physical Exam   Physical Exam   Constitutional: He appears well-developed. He has a sickly appearance. No distress.   Generalized weakness and chronically ill appearing   HENT:   Head: Normocephalic and atraumatic.   Nose: Nose normal.    Mouth/Throat: Oropharynx is clear and moist.   Eyes: Pupils are equal, round, and reactive to light. Conjunctivae are normal. Right eye exhibits no discharge. Left eye exhibits no discharge. No scleral icterus.   Neck: Neck supple. No JVD present. No tracheal deviation present.   RIJ CVL without surrounding erythema   Cardiovascular: Regular rhythm.   Occasional extrasystoles are present. Tachycardia present.  PMI is displaced.  Exam reveals distant heart sounds.    No murmur heard.  Pulses:       Radial pulses are 3+ on the right side, and 3+ on the left side.   Bounding pulses   Pulmonary/Chest: No accessory muscle usage. Tachypnea noted. No respiratory distress. He has no decreased breath sounds. He has no wheezes. He has rhonchi in the right lower field and the left lower field. He has rales in the right lower field.   Abdominal: Soft. Bowel sounds are normal. He exhibits distension. There is no tenderness. There is no rebound.   Genitourinary: Penis normal.   Genitourinary Comments: Ramon cath removed   Musculoskeletal: He exhibits no edema or tenderness.   Lymphadenopathy:     He has no cervical adenopathy.   Neurological: He is alert. No cranial nerve deficit. He exhibits normal muscle tone.   Odd affect, oriented to person and place but memory deficits and generalized weakness noted   Skin: Skin is warm. No rash noted. He is diaphoretic. No pallor.   Psychiatric: His affect is blunt. He is slowed. Thought content is delusional. Cognition and memory are impaired. He expresses impulsivity.   Quiet, rapid voice   Nursing note and vitals reviewed.      Medications  Current Facility-Administered Medications   Medication Dose Route Frequency Provider Last Rate Last Dose   • furosemide (LASIX) injection 40 mg  40 mg Intravenous Q DAY Moustapha Hoff M.D.   40 mg at 12/31/18 0620   • oxyCODONE immediate-release (ROXICODONE) tablet 5 mg  5 mg Oral Q4HRS PRN Johan Morales M.D.   5 mg at 12/30/18 1653   • potassium  bicarbonate (KLYTE) effervescent tablet 50 mEq  50 mEq Feeding Tube BID Johan Morales M.D.   50 mEq at 12/31/18 0618   • omeprazole 2 mg/mL in sodium bicarbonate (PRILOSEC) oral susp 40 mg  40 mg Feeding Tube Q12HRS Johan Morales M.D.   40 mg at 12/31/18 0618   • metoprolol (LOPRESSOR) tablet 12.5 mg  12.5 mg Feeding Tube TWICE DAILY Aleshia Uribe M.D.   12.5 mg at 12/31/18 0618   • acetaminophen (TYLENOL) tablet 650 mg  650 mg Feeding Tube Q6HRS PRN Aleshia Uribe M.D.   650 mg at 12/31/18 0057   • Respiratory Care per Protocol   Nebulization Continuous RT Bradley Flores M.D.       • MD Alert...ICU Electrolyte Replacement per Pharmacy   Other pharmacy to dose Bradley Flores M.D.       • aspirin (ASA) tablet 325 mg  325 mg Feeding Tube DAILY Yaron Magallanes M.D.   Stopped at 12/29/18 0600    Or   • aspirin (ASA) chewable tab 324 mg  324 mg Per NG Tube DAILY Yaron Magallanes M.D.   324 mg at 12/31/18 0618    Or   • aspirin (ASA) suppository 300 mg  300 mg Rectal DAILY Yaron Magallanes M.D.       • atorvastatin (LIPITOR) tablet 80 mg  80 mg Per NG Tube Q EVENING Yaron Magallanes M.D.   80 mg at 12/30/18 1653   • senna-docusate (PERICOLACE or SENOKOT S) 8.6-50 MG per tablet 2 Tab  2 Tab Feeding Tube BID Yaron Magallanes M.D.   Stopped at 12/30/18 0600    And   • polyethylene glycol/lytes (MIRALAX) PACKET 1 Packet  1 Packet Feeding Tube QDAY PRN Yaron Magallanes M.D.   1 Packet at 12/23/18 1136    And   • magnesium hydroxide (MILK OF MAGNESIA) suspension 30 mL  30 mL Feeding Tube QDAY PRN Yaron Magallanes M.D.   30 mL at 12/24/18 1228    And   • bisacodyl (DULCOLAX) suppository 10 mg  10 mg Rectal QDAY PRN Yaron Magallanes M.D.       • dextrose 50% (D50W) injection 25-50 mL  12.5-25 g Intravenous PRN Jeremy M Gonda, M.D.           Fluids    Intake/Output Summary (Last 24 hours) at 12/31/18 0733  Last data filed at 12/31/18 0600   Gross per 24 hour   Intake              853 ml   Output             3320 ml   Net            -2467 ml        Laboratory  Recent Labs      12/29/18   0415   ISTATAPH  7.485   ISTATAPCO2  26.8   ISTATAPO2  60*   ISTATATCO2  21   [id number]  93   ISTATARTHCO3  20.2   ISTATARTBE  -3   ISTATTEMP  37.5 C   ISTATFIO2  40   ISTATSPEC  Arterial   ISTATAPHTC  7.478   [id number]  62*         Recent Labs      12/29/18   0500  12/30/18   0440  12/31/18   0405   SODIUM  139  137  140   POTASSIUM  3.7  3.8  4.1   CHLORIDE  106  106  105   CO2  24  22  23   BUN  27*  20  30*   CREATININE  0.68  0.53  0.60   MAGNESIUM   --    --   2.1   PHOSPHORUS   --    --   4.1   CALCIUM  8.8  8.9  9.9     Recent Labs      12/29/18   0500  12/30/18   0440  12/31/18   0405   ALTSGPT   --    --   34   ASTSGOT   --    --   26   ALKPHOSPHAT   --    --   83   TBILIRUBIN   --    --   0.6   GLUCOSE  107*  122*  127*     Recent Labs      12/29/18   0500  12/30/18   0440  12/31/18   0405   WBC  9.8  16.5*  40.2*   NEUTSPOLYS  72.50*  83.50*  89.90*   LYMPHOCYTES  15.40*  9.00*  5.20*   MONOCYTES  4.40  3.00  2.40   EOSINOPHILS  2.40  1.60  0.30   BASOPHILS  0.50  0.40  0.40   ASTSGOT   --    --   26   ALTSGPT   --    --   34   ALKPHOSPHAT   --    --   83   TBILIRUBIN   --    --   0.6     Recent Labs      12/29/18   0500  12/30/18   0440  12/31/18   0405   RBC  3.73*  3.49*  4.18*   HEMOGLOBIN  10.5*  9.8*  11.7*   HEMATOCRIT  33.4*  31.2*  36.9*   PLATELETCT  346  426  604*       Imaging  X-Ray:  I have personally reviewed the images and compared with prior images. and My impression is: slight improvement in pulm edema with retrocardiac opacification, enlarged cardiac silhouette, FT and R IJ CVL in place    Assessment/Plan  Sepsis (HCC)   Assessment & Plan    This is severe sepsis with the following associated acute organ dysfunction(s): acute kidney failure, metabolic/septic encephalopathy.   Source = likely urinary, query pulmonary  Start broad-spectrum antibiotics, follow-up on cultures  Sepsis protocol with 30 mils per kilogram of  crystalloid  Monitor lactic acid level, urine output, vital signs closely  Norepinephrine infusion to maintain mean arterial pressure greater than 65     Ventricular fibrillation (HCC)- (present on admission)   Assessment & Plan    Plans for AICD on hold currently given fevers  Optimize electrolytes     Acute respiratory failure with hypoxia (HCC)- (present on admission)   Assessment & Plan    Intubated in field 12/19 -12/29  Encourage incentive spirometry, out of bed to chair  Mobilization  RT/O2 protocols       Cardiac arrest (HCC)- (present on admission)   Assessment & Plan    Unknown etiology status post return of spontaneous circulation with prolonged downtime  Continue supportive care  Therapies, cognitive evaluation  Optimize electrolytes and continuous telemetry     Aspiration pneumonia (HCC)   Assessment & Plan    Status post antibiotic     Fever   Assessment & Plan    Recurrent 12/31  Cultures, antibiotics     Lactic acidosis- (present on admission)   Assessment & Plan    Worsening  Fluid resuscitation and monitor     Coagulopathy (HCC)- (present on admission)   Assessment & Plan    Resolved     Elevated liver enzymes- (present on admission)   Assessment & Plan    Likely secondary to shock liver  Avoid hepatotoxins     Hyperphosphatemia- (present on admission)   Assessment & Plan    Resolved     Hypokalemia   Assessment & Plan    Repletion and monitor daily     Iron deficiency anemia- (present on admission)   Assessment & Plan    Daily CBC  Conservative transfusion strategy     Leukocytosis- (present on admission)   Assessment & Plan    Worsening today in the setting of recurrent infection  Monitor with initiation of antibiotics and source control     Closed fracture of multiple ribs of both sides- (present on admission)   Assessment & Plan    Secondary to CPR  Analgesia as needed       Metabolic acidosis- (present on admission)   Assessment & Plan    Improved  Monitor     FULL CODE    VTE:   Contraindicated - recent GIB   Ulcer: PPI  Lines: Central Line  Ongoing indication addressed    I have performed a physical exam and reviewed and updated ROS and Plan today (12/31/2018). In review of yesterday's note (12/30/2018), there are no changes except as documented above.     Patient is critically ill today with recurrent sepsis requiring my active management with antibiotics, IV fluid resuscitation, monitoring perfusion with high risk for deterioration. High risk of deterioration and worsening vital organ dysfunction and death without the above critical care interventions.    Discussed patient condition and risk of morbidity and/or mortality with Hospitalist, RN, RT, Pharmacy, , Charge nurse / hot rounds, Patient and cardiology  The patient remains critically ill.  Critical care time = 31 minutes in directly providing and coordinating critical care and extensive data review.  No time overlap and excludes procedures.

## 2018-12-31 NOTE — PROGRESS NOTES
Cardiology Follow Up Progress Note    Date of Service  12/31/2018    Attending Physician  Johan Morales M.D.    Chief Complaint     OOHA    HPI  Amadou Nicholas is a 58 y.o. male who lives with his wife in an apt in [place],  anxiety/depression & HTN admitted 12/19/2018 with witnessed VF/OOHA, intubated, ROSC prior to arrival.  Echo 12/20 with severe LVH/?infiltrative/significant MR with HOCM.  Seen by EP 12/28 - awaiting SQ ICD for secondary prevention.      Unfortunately, significant GIB   Also - intolerant of increasing AVN blockers with bradycardia      Interim Events    Concern for sepsis, WCC quite high overnight    Review of Systems  Review of Systems   Unable to perform ROS: Acuity of condition       Vital signs in last 24 hours  Temp:  [36.1 °C (97 °F)-37.2 °C (99 °F)] 37.2 °C (99 °F)  Pulse:  [] 112  Resp:  [22-38] 38    Physical Exam  Physical Exam   Constitutional:   OOb in chair, alert, mumbling - but appropiate   HENT:   Head: Normocephalic and atraumatic.   Eyes: Pupils are equal, round, and reactive to light. EOM are normal. No scleral icterus.   Neck: No JVD present. No thyromegaly present.   Cardiovascular: Exam reveals no friction rub.    Murmur (3/6 sm across precordium) heard.  Sinus tach   Pulmonary/Chest: Effort normal. No respiratory distress. He has no wheezes. He has rales. He exhibits no tenderness.   Abdominal: Soft. Bowel sounds are normal. He exhibits no distension.   Musculoskeletal: Normal range of motion. He exhibits no edema or tenderness.   Neurological: He exhibits normal muscle tone. Coordination normal.   Skin: Skin is dry. No rash noted.   Cool, dp 1/2 bilat   Psychiatric: He has a normal mood and affect.   Vitals reviewed.      Lab Review  Lab Results   Component Value Date/Time    WBC 40.2 (HH) 12/31/2018 04:05 AM    RBC 4.18 (L) 12/31/2018 04:05 AM    HEMOGLOBIN 11.7 (L) 12/31/2018 04:05 AM    HEMATOCRIT 36.9 (L) 12/31/2018 04:05 AM    MCV 88.3 12/31/2018 04:05 AM    MCH  "28.0 12/31/2018 04:05 AM    MCHC 31.7 (L) 12/31/2018 04:05 AM    MPV 9.8 12/31/2018 04:05 AM      Lab Results   Component Value Date/Time    SODIUM 140 12/31/2018 04:05 AM    POTASSIUM 4.1 12/31/2018 04:05 AM    CHLORIDE 105 12/31/2018 04:05 AM    CO2 23 12/31/2018 04:05 AM    GLUCOSE 127 (H) 12/31/2018 04:05 AM    BUN 30 (H) 12/31/2018 04:05 AM    CREATININE 0.60 12/31/2018 04:05 AM      Lab Results   Component Value Date/Time    ASTSGOT 26 12/31/2018 04:05 AM    ALTSGPT 34 12/31/2018 04:05 AM     Lab Results   Component Value Date/Time    TROPONINI 6.83 (H) 12/20/2018 05:15 AM             Cardiac Imaging and Procedures Review  EKG:  My personal interpretation of the EKG dated 12/22 is sinus amisha with LVH    Echocardiogram:  12/20 and 12/23 - improvement/normalized LV function, EF 60, severe MR with an obstructive component    Imaging  Chest X-Ray:  Stable bilat infiltrates 12/30      Assessment/Plan    -OOHA, PEA/TdP  -HOCM, \"new\" diagnosis  -MR  -GIB, post transfusion (thought traumatic from NGT)  -Increasing WCC, concern for sepsis    OOHA  Planning ICD for primary prevention  Appreciate EP support  Hold off for today with ?sepsis    HOCM  Intolerant of higher dose AVN blockers, on low dose metop  Long term plan may be intervention, but not in an acute scenario    Sepsis  I reviewed echoes again - low threshold for BETZAIDA if septic picture    D/w ICU MD and hospitalist and RN  Thank you for allowing me to participate in the care of this patient.    -  Please contact me with any questions.    Essence Hoff M.D.   Cardiologist, [facility]  [phone]      "

## 2018-12-31 NOTE — PROGRESS NOTES
[facility] Medicine Daily Progress Note    Date of Service  12/31/2018    Chief Complaint  58 y.o. male admitted 12/19/2018 with cardiac arrest    Hospital Course    Mr Nicholas has a history of depression and hypertension, he was in his normal state of health but was noted to be unresponsive with agonal breathing.  His son started CPR.  Initial rhythm was ventricular fibrillation, he required multiple rounds of CPR, he was intubated in the field and brought to the emergency room.  Hypothermia protocol was initiated he was admitted to the ICU.  Patient was extubated on 12/21/2018, he required reintubation later that day.      Interval Problem Update  Tachycardic, febrile overnight  On nasal cannula, he feels short of breath, coughing a lot  Febrile to 38.1, endorses malaise and chills  No chest pain, no palpitations    Consultants/Specialty  Critical Care, I discussed the patient's condition with Dr. Gonda on ICU rounds this morning  Gastroenterology  Cardiology    Code Status  Full Code    Disposition  ICU    Review of Systems  Review of Systems   Constitutional: Positive for malaise/fatigue. Negative for chills.   Respiratory: Positive for cough. Negative for hemoptysis, sputum production, shortness of breath and wheezing.    Cardiovascular: Positive for chest pain. Negative for palpitations, orthopnea and leg swelling.   Gastrointestinal: Negative for nausea and vomiting.   Skin: Negative for itching and rash.   Neurological: Negative for dizziness.   All other systems reviewed and are negative.       Physical Exam  Temp:  [36.1 °C (97 °F)-37.2 °C (99 °F)] 37.2 °C (99 °F)  Pulse:  [] 128  Resp:  [22-38] 35    Physical Exam   Constitutional: He is oriented to person, place, and time. He appears well-developed and well-nourished.   HENT:   Head: Normocephalic and atraumatic.   Eyes: Conjunctivae are normal. Right eye exhibits no discharge. Left eye exhibits no discharge. No scleral icterus.   Neck: Normal range  of motion. Neck supple. No thyromegaly present.   Cardiovascular: Normal rate, regular rhythm and intact distal pulses.    No murmur heard.  Pulmonary/Chest: Effort normal. No respiratory distress. He has no wheezes. He has rales.   Abdominal: Soft. Bowel sounds are normal. He exhibits no distension. There is no tenderness.   Musculoskeletal: Normal range of motion. He exhibits no edema.   Neurological: He is alert and oriented to person, place, and time. No cranial nerve deficit.   Skin: Skin is warm and dry. He is not diaphoretic.   Psychiatric: He has a normal mood and affect. His behavior is normal.       Fluids    Intake/Output Summary (Last 24 hours) at 12/31/18 1139  Last data filed at 12/31/18 1000   Gross per 24 hour   Intake              576 ml   Output             3245 ml   Net            -2669 ml       Laboratory  Recent Labs      12/29/18   0500  12/30/18   0440  12/31/18   0405   WBC  9.8  16.5*  40.2*   RBC  3.73*  3.49*  4.18*   HEMOGLOBIN  10.5*  9.8*  11.7*   HEMATOCRIT  33.4*  31.2*  36.9*   MCV  88.5  89.4  88.3   MCH  28.2  28.1  28.0   MCHC  31.8*  31.4*  31.7*   RDW  49.5  49.8  49.1   PLATELETCT  346  426  604*   MPV  10.1  9.8  9.8     Recent Labs      12/30/18   0440  12/31/18   0405  12/31/18   0940   SODIUM  137  140  139   POTASSIUM  3.8  4.1  4.9   CHLORIDE  106  105  103   CO2  22  23  25   GLUCOSE  122*  127*  169*   BUN  20  30*  34*   CREATININE  0.53  0.60  0.74   CALCIUM  8.9  9.9  9.8                   Imaging  DX-CHEST-PORTABLE (1 VIEW)   Final Result         1.  Ill-defined opacifications in each lung have increased to a minimal degree compared to the prior radiograph.  This could indicate worsening of pulmonary edema or inflammation.      DX-CHEST-PORTABLE (1 VIEW)   Final Result         1. Interval extubation. No other significant interval change.         DX-CHEST-PORTABLE (1 VIEW)   Final Result         1. No significant interval change.      DX-ABDOMEN FOR TUBE PLACEMENT    Final Result      Enteric tube tip projects over the stomach.      DX-CHEST-PORTABLE (1 VIEW)   Final Result         1. No significant interval change.      DX-CHEST-PORTABLE (1 VIEW)   Final Result      1.  Worsening in perihilar opacities is suggestive of worsening edema. Infection could have a similar appearance.      2.  No significant change in left basilar opacification.      3.  Stable cardiomegaly      DX-CHEST-PORTABLE (1 VIEW)   Final Result      Slight improvement in lung aeration.      DX-CHEST-PORTABLE (1 VIEW)   Final Result         1. Lines and tubes as above, with ETT at the level of thoracic inlet. Recommend advancing 1-2 cm.   2. Persistent interstitial edema. No large pleural effusions.   3. Stable right perihilar atelectasis versus consolidation. Retrocardiac opacity has cleared.      DX-CHEST-PORTABLE (1 VIEW)   Final Result         1. Lines and tubes as above.   2. Hypoinflation with persistent right perihilar atelectasis versus consolidation.   3. Retrocardiac atelectasis versus consolidation. No large pleural effusions.      [id number] VIEW   Final Result         No specific finding to suggest small bowel obstruction.      EC-ECHOCARDIOGRAM LTD W/O CONT   Final Result      DX-ABDOMEN FOR TUBE PLACEMENT   Final Result      Feeding tube placement with the tip projecting over the distal stomach or proximal duodenum      DX-CHEST-PORTABLE (1 VIEW)   Final Result      1.  Stable cardiomegaly      2.  Worsening pulmonary vascular congestion and interstitial edema.      3.  Bibasilar opacities may be related to atelectasis. Developing pneumonia could have a similar appearance.      EC-ECHOCARDIOGRAM COMPLETE W/O CONT   Final Result      DX-CHEST-LIMITED (1 VIEW)   Final Result         1.  No acute cardiopulmonary disease.   2.  Right internal jugular central line terminates in the right atrium, could be withdrawn 3 cm.   3.  Cardiomegaly      CT-CTA CHEST PULMONARY ARTERY W/ RECONS   Final  Result         1.  No large central pulmonary embolus is appreciated, evaluation of the subsegmental branches is essentially nondiagnostic due to motion artifacts. Additional imaging would be required for definitive exclusion of small distal pulmonary emboli.   2.  Hazy groundglass pulmonary opacities, predominantly on the right, appearance suggests atypical edema or infiltrates.   3.  Anterior bilateral second through sixth rib fractures   4.  Cardiomegaly   5.  Left nephrolithiasis   6.  Atherosclerosis and atherosclerotic coronary artery disease.      CT-HEAD W/O   Final Result         1.  No acute intracranial abnormality.   2.  Atherosclerosis.      DX-CHEST-PORTABLE (1 VIEW)   Final Result         1.  No acute cardiopulmonary disease.   2.  Cardiomegaly           Assessment/Plan  Sepsis (HCC)   Assessment & Plan    This is severe sepsis with the following associated acute organ dysfunction(s): acute respiratory failure.   Patient has developed septic picture  Pneumonia versus urinary source  Broad-spectrum cefepime  Sepsis protocol     Ventricular fibrillation (HCC)- (present on admission)   Assessment & Plan    Now in SR  Plan for AICD pending treatment of infection     Acute respiratory failure with hypoxia (HCC)- (present on admission)   Assessment & Plan    Patient had to be reintubated on 12/21/2018  Extubated on 12/29/18  Optimized volume  Supplemental O2     Cardiac arrest (HCC)- (present on admission)   Assessment & Plan    CTA negative for PE  Cardiac cath without significant findings  Echo consistent with LV outflow obstruction  He appears to have made compete neurologic recovery  ICD device after treatment for sepsis  Until then, the patient will be monitored continuously on telemetry in the ICU         Aspiration pneumonia (HCC)   Assessment & Plan    Escalate antibiotics to cefepime     Fever   Assessment & Plan    Tylenol and cooling blanket as needed       Lactic acidosis- (present on admission)    Assessment & Plan    Treat sepsis     Elevated liver enzymes- (present on admission)   Assessment & Plan    Likely related to cardiac arrest     Hyperphosphatemia- (present on admission)   Assessment & Plan    Monitor     Hypokalemia   Assessment & Plan    Replace     Iron deficiency anemia- (present on admission)   Assessment & Plan    Acute on chronic  He has a small gastric ulcer, continue PPI  Needs outpatient colonoscopy     Leukocytosis- (present on admission)   Assessment & Plan    WBC in normal range     Closed fracture of multiple ribs of both sides- (present on admission)   Assessment & Plan    Secondary to CPR  Pain management     Metabolic acidosis- (present on admission)   Assessment & Plan    Resolved with fluid resuscitation          VTE prophylaxis: SCD's

## 2019-01-01 ENCOUNTER — APPOINTMENT (OUTPATIENT)
Dept: RADIOLOGY | Facility: MEDICAL CENTER | Age: 59
DRG: 224 | End: 2019-01-01
Attending: INTERNAL MEDICINE
Payer: COMMERCIAL

## 2019-01-01 PROBLEM — N30.01 ACUTE CYSTITIS WITH HEMATURIA: Status: ACTIVE | Noted: 2019-01-01

## 2019-01-01 PROBLEM — D68.9 COAGULOPATHY (HCC): Status: RESOLVED | Noted: 2018-12-20 | Resolved: 2019-01-01

## 2019-01-01 PROBLEM — E83.39 HYPERPHOSPHATEMIA: Status: RESOLVED | Noted: 2018-12-20 | Resolved: 2019-01-01

## 2019-01-01 PROBLEM — E87.20 METABOLIC ACIDOSIS: Status: RESOLVED | Noted: 2018-12-20 | Resolved: 2019-01-01

## 2019-01-01 PROBLEM — I47.10 SVT (SUPRAVENTRICULAR TACHYCARDIA) (HCC): Status: ACTIVE | Noted: 2019-01-01

## 2019-01-01 PROBLEM — K29.01 GASTROINTESTINAL HEMORRHAGE ASSOCIATED WITH ACUTE GASTRITIS: Status: ACTIVE | Noted: 2019-01-01

## 2019-01-01 LAB
ANION GAP SERPL CALC-SCNC: 8 MMOL/L (ref 0–11.9)
BASOPHILS # BLD AUTO: 0.5 % (ref 0–1.8)
BASOPHILS # BLD: 0.17 K/UL (ref 0–0.12)
BUN SERPL-MCNC: 32 MG/DL (ref 8–22)
CALCIUM SERPL-MCNC: 9.7 MG/DL (ref 8.5–10.5)
CHLORIDE SERPL-SCNC: 110 MMOL/L (ref 96–112)
CO2 SERPL-SCNC: 25 MMOL/L (ref 20–33)
CREAT SERPL-MCNC: 0.46 MG/DL (ref 0.5–1.4)
EKG IMPRESSION: NORMAL
EOSINOPHIL # BLD AUTO: 0.2 K/UL (ref 0–0.51)
EOSINOPHIL NFR BLD: 0.6 % (ref 0–6.9)
ERYTHROCYTE [DISTWIDTH] IN BLOOD BY AUTOMATED COUNT: 51.8 FL (ref 35.9–50)
GLUCOSE SERPL-MCNC: 172 MG/DL (ref 65–99)
HCT VFR BLD AUTO: 33.1 % (ref 42–52)
HGB BLD-MCNC: 10.1 G/DL (ref 14–18)
IMM GRANULOCYTES # BLD AUTO: 0.51 K/UL (ref 0–0.11)
IMM GRANULOCYTES NFR BLD AUTO: 1.5 % (ref 0–0.9)
LACTATE BLD-SCNC: 1.1 MMOL/L (ref 0.5–2)
LYMPHOCYTES # BLD AUTO: 2 K/UL (ref 1–4.8)
LYMPHOCYTES NFR BLD: 5.8 % (ref 22–41)
MAGNESIUM SERPL-MCNC: 2.1 MG/DL (ref 1.5–2.5)
MCH RBC QN AUTO: 27.6 PG (ref 27–33)
MCHC RBC AUTO-ENTMCNC: 30.5 G/DL (ref 33.7–35.3)
MCV RBC AUTO: 90.4 FL (ref 81.4–97.8)
MONOCYTES # BLD AUTO: 0.9 K/UL (ref 0–0.85)
MONOCYTES NFR BLD AUTO: 2.6 % (ref 0–13.4)
NEUTROPHILS # BLD AUTO: 30.69 K/UL (ref 1.82–7.42)
NEUTROPHILS NFR BLD: 89 % (ref 44–72)
NRBC # BLD AUTO: 0 K/UL
NRBC BLD-RTO: 0 /100 WBC
PLATELET # BLD AUTO: 603 K/UL (ref 164–446)
PMV BLD AUTO: 9.8 FL (ref 9–12.9)
POTASSIUM SERPL-SCNC: 4.6 MMOL/L (ref 3.6–5.5)
RBC # BLD AUTO: 3.66 M/UL (ref 4.7–6.1)
SODIUM SERPL-SCNC: 143 MMOL/L (ref 135–145)
T4 FREE SERPL-MCNC: 1.11 NG/DL (ref 0.53–1.43)
WBC # BLD AUTO: 34.5 K/UL (ref 4.8–10.8)

## 2019-01-01 PROCEDURE — 700102 HCHG RX REV CODE 250 W/ 637 OVERRIDE(OP): Performed by: HOSPITALIST

## 2019-01-01 PROCEDURE — 99233 SBSQ HOSP IP/OBS HIGH 50: CPT | Performed by: INTERNAL MEDICINE

## 2019-01-01 PROCEDURE — 770022 HCHG ROOM/CARE - ICU (200)

## 2019-01-01 PROCEDURE — 700102 HCHG RX REV CODE 250 W/ 637 OVERRIDE(OP): Performed by: INTERNAL MEDICINE

## 2019-01-01 PROCEDURE — 85025 COMPLETE CBC W/AUTO DIFF WBC: CPT

## 2019-01-01 PROCEDURE — 93005 ELECTROCARDIOGRAM TRACING: CPT | Performed by: HOSPITALIST

## 2019-01-01 PROCEDURE — A9270 NON-COVERED ITEM OR SERVICE: HCPCS | Performed by: INTERNAL MEDICINE

## 2019-01-01 PROCEDURE — 83735 ASSAY OF MAGNESIUM: CPT

## 2019-01-01 PROCEDURE — A9270 NON-COVERED ITEM OR SERVICE: HCPCS | Performed by: HOSPITALIST

## 2019-01-01 PROCEDURE — 94669 MECHANICAL CHEST WALL OSCILL: CPT

## 2019-01-01 PROCEDURE — 700111 HCHG RX REV CODE 636 W/ 250 OVERRIDE (IP): Performed by: HOSPITALIST

## 2019-01-01 PROCEDURE — 83605 ASSAY OF LACTIC ACID: CPT

## 2019-01-01 PROCEDURE — 700105 HCHG RX REV CODE 258: Performed by: HOSPITALIST

## 2019-01-01 PROCEDURE — 80048 BASIC METABOLIC PNL TOTAL CA: CPT

## 2019-01-01 PROCEDURE — 93010 ELECTROCARDIOGRAM REPORT: CPT | Performed by: INTERNAL MEDICINE

## 2019-01-01 PROCEDURE — 84439 ASSAY OF FREE THYROXINE: CPT

## 2019-01-01 PROCEDURE — 94668 MNPJ CHEST WALL SBSQ: CPT

## 2019-01-01 PROCEDURE — 99291 CRITICAL CARE FIRST HOUR: CPT | Performed by: HOSPITALIST

## 2019-01-01 PROCEDURE — 700111 HCHG RX REV CODE 636 W/ 250 OVERRIDE (IP): Performed by: INTERNAL MEDICINE

## 2019-01-01 RX ORDER — ADENOSINE 3 MG/ML
6 INJECTION, SOLUTION INTRAVENOUS ONCE
Status: COMPLETED | OUTPATIENT
Start: 2019-01-01 | End: 2019-01-01

## 2019-01-01 RX ADMIN — ENOXAPARIN SODIUM 40 MG: 100 INJECTION SUBCUTANEOUS at 17:27

## 2019-01-01 RX ADMIN — ATORVASTATIN CALCIUM 80 MG: 80 TABLET, FILM COATED ORAL at 17:29

## 2019-01-01 RX ADMIN — ASPIRIN 81 MG 324 MG: 81 TABLET ORAL at 05:36

## 2019-01-01 RX ADMIN — CEFEPIME 2 G: 2 INJECTION, POWDER, FOR SOLUTION INTRAVENOUS at 17:29

## 2019-01-01 RX ADMIN — POTASSIUM BICARBONATE 50 MEQ: 25 TABLET, EFFERVESCENT ORAL at 05:37

## 2019-01-01 RX ADMIN — OMEPRAZOLE 40 MG: 20 CAPSULE, DELAYED RELEASE ORAL at 17:29

## 2019-01-01 RX ADMIN — OMEPRAZOLE 40 MG: 20 CAPSULE, DELAYED RELEASE ORAL at 05:36

## 2019-01-01 RX ADMIN — CEFEPIME 2 G: 2 INJECTION, POWDER, FOR SOLUTION INTRAVENOUS at 05:37

## 2019-01-01 RX ADMIN — ADENOSINE 6 MG: 3 INJECTION, SOLUTION INTRAVENOUS at 12:10

## 2019-01-01 ASSESSMENT — ENCOUNTER SYMPTOMS
HEARTBURN: 0
COUGH: 1
ADENOPATHY: 0
ABDOMINAL PAIN: 0
HEADACHES: 0
SHORTNESS OF BREATH: 0
MYALGIAS: 0
WEAKNESS: 1
ACTIVITY CHANGE: 0
HALLUCINATIONS: 0
FEVER: 0
CONSTIPATION: 0
EYE DISCHARGE: 0
CHOKING: 0
APNEA: 0
WHEEZING: 0
APPETITE CHANGE: 0
NERVOUS/ANXIOUS: 0
ABDOMINAL DISTENTION: 0
PHOTOPHOBIA: 0
EYE ITCHING: 0
BACK PAIN: 0
COLOR CHANGE: 0
SPUTUM PRODUCTION: 0
ARTHRALGIAS: 0
DIZZINESS: 0
WEAKNESS: 0
AGITATION: 0
DEPRESSION: 0
BRUISES/BLEEDS EASILY: 0

## 2019-01-01 ASSESSMENT — PAIN SCALES - GENERAL
PAINLEVEL_OUTOF10: 0

## 2019-01-01 NOTE — PROGRESS NOTES
Monitor check called at 12:10 with a monitor rhythm showing what appeared to be SVT with a rate of 210. BP 92/50's.  Patient is awake and talking and denies any discomfort. Patient attempted to do vagal maneuvers which were un successful. Dr. ruff at the bedside. defibrillation pads placed on the paitnent. VO to give 6 mg adenosine now.    Patient converted into ST with a rate of 120's    EKG pending    Paged cardiology to update.

## 2019-01-01 NOTE — PROGRESS NOTES
Critical Care Progress Note    Date of admission  12/19/2018    Chief Complaint  58 y.o. male admitted 12/19/2018 s/p cardiopulmonary arrest with prolonged CPR in the field    Hospital Course  TTM started in ED and continued in ICU    Interval Problem Updates  Past 24 hours   - improving WBC and fevers on cefepime   - remains tachycardic   - AAOx3-4   - SLP pending   - voiding without difficulty after hsu removed    Review of Systems  Review of Systems   Constitutional: Negative for fever and malaise/fatigue.   HENT: Negative for hearing loss.    Eyes: Negative for photophobia.   Respiratory: Positive for cough. Negative for sputum production and shortness of breath.    Cardiovascular: Negative for chest pain and leg swelling.   Gastrointestinal: Negative for constipation and heartburn.   Genitourinary: Negative for frequency and hematuria.   Musculoskeletal: Negative for back pain and myalgias.   Skin: Negative for itching.   Neurological: Positive for weakness. Negative for dizziness and headaches.   Endo/Heme/Allergies: Negative for environmental allergies.   Psychiatric/Behavioral: Negative for depression and hallucinations.   All other systems reviewed and are negative.       Vital Signs for last 24 hours   Temp:  [36.2 °C (97.1 °F)-37.2 °C (99 °F)] 36.8 °C (98.2 °F)  Pulse:  [107-132] 132  Resp:  [17-39] 27   Blood pressure 104/65    Hemodynamic parameters for last 24 hours  CVP:  [-6 MM HG-87 MM HG] 2 MM HG    Respiratory   SaO2 % on 4-->7 lpm n/c, IS 750mL/PEP    Physical Exam   Physical Exam   Constitutional: He is oriented to person, place, and time. He appears well-developed. He has a sickly appearance.   Generalized weakness and chronically ill appearing, up in chair   HENT:   Head: Normocephalic and atraumatic.   Right Ear: External ear normal.   Left Ear: External ear normal.   Nasal coretrak in place   Eyes: Pupils are equal, round, and reactive to light. Conjunctivae are normal. No scleral  icterus.   Neck: Neck supple. No thyromegaly present.   RIJ CVL without surrounding erythema   Cardiovascular: Regular rhythm and intact distal pulses.   Occasional extrasystoles are present. Tachycardia present.  PMI is displaced.  Exam reveals distant heart sounds. Exam reveals no decreased pulses.    No murmur heard.  Brisk cap refill   Pulmonary/Chest: No accessory muscle usage or stridor. No tachypnea. No respiratory distress. He has no decreased breath sounds. He has no wheezes. He has rhonchi in the right lower field and the left lower field. He has no rales.   Abdominal: Soft. Bowel sounds are normal. He exhibits distension. There is no tenderness. There is no guarding.   Genitourinary: Penis normal.   Musculoskeletal: He exhibits no edema or deformity.   Neurological: He is alert and oriented to person, place, and time. No cranial nerve deficit. He exhibits normal muscle tone.   Soft voice, intermittent confusion and hallucinations are slightly improved today   Skin: Skin is warm and dry. He is not diaphoretic.   Psychiatric: His speech is normal and behavior is normal. Judgment normal. His affect is blunt. Thought content is delusional (Occasionally). He exhibits abnormal recent memory.   Nursing note and vitals reviewed.      Medications  Current Facility-Administered Medications   Medication Dose Route Frequency Provider Last Rate Last Dose   • cefepime (MAXIPIME) 2 g in  mL IVPB  2 g Intravenous Q12HRS Johan Morales M.D.   Stopped at 01/01/19 0621   • norepinephrine (LEVOPHED) 8 mg in  mL Infusion  0.5-30 mcg/min Intravenous Continuous Jeremy M Gonda, M.D.   Stopped at 12/31/18 0945    And   • vasopressin (VASOSTRICT) 20 Units in  mL Infusion  0.03 Units/min Intravenous Continuous Jeremy M Gonda, M.D.   Stopped at 12/31/18 0945   • oxyCODONE immediate-release (ROXICODONE) tablet 5 mg  5 mg Oral Q4HRS PRN Johan Morales M.D.   5 mg at 12/30/18 3   • potassium bicarbonate (KLYTE)  effervescent tablet 50 mEq  50 mEq Feeding Tube BID Johan Morales M.D.   50 mEq at 01/01/19 0537   • omeprazole 2 mg/mL in sodium bicarbonate (PRILOSEC) oral susp 40 mg  40 mg Feeding Tube Q12HRS Johan Morales M.D.   40 mg at 01/01/19 0536   • acetaminophen (TYLENOL) tablet 650 mg  650 mg Feeding Tube Q6HRS PRN Aleshia Uribe M.D.   650 mg at 12/31/18 0057   • Respiratory Care per Protocol   Nebulization Continuous RT Bradley Flores M.D.       • MD Alert...ICU Electrolyte Replacement per Pharmacy   Other pharmacy to dose Bradley Flores M.D.       • aspirin (ASA) tablet 325 mg  325 mg Feeding Tube DAILY Yaron Magallanes M.D.   Stopped at 12/29/18 0600    Or   • aspirin (ASA) chewable tab 324 mg  324 mg Per NG Tube DAILY Yaron Magallanes M.D.   324 mg at 01/01/19 0536    Or   • aspirin (ASA) suppository 300 mg  300 mg Rectal DAILY Yaron Magallanes M.D.       • atorvastatin (LIPITOR) tablet 80 mg  80 mg Per NG Tube Q EVENING Yaron Magallanes M.D.   80 mg at 12/31/18 1733   • senna-docusate (PERICOLACE or SENOKOT S) 8.6-50 MG per tablet 2 Tab  2 Tab Feeding Tube BID Yaron Magallanes M.D.   Stopped at 01/01/19 0600    And   • polyethylene glycol/lytes (MIRALAX) PACKET 1 Packet  1 Packet Feeding Tube QDAY PRN Yaron Magallanes M.D.   1 Packet at 12/23/18 1136    And   • magnesium hydroxide (MILK OF MAGNESIA) suspension 30 mL  30 mL Feeding Tube QDAY PRN Yaron Magallanes M.D.   30 mL at 12/24/18 1228    And   • bisacodyl (DULCOLAX) suppository 10 mg  10 mg Rectal QDAY PRN Yaron Magallanes M.D.       • dextrose 50% (D50W) injection 25-50 mL  12.5-25 g Intravenous PRN Jeremy M Gonda, M.D.           Fluids    Intake/Output Summary (Last 24 hours) at 01/01/19 0857  Last data filed at 01/01/19 0621   Gross per 24 hour   Intake          2896.35 ml   Output             1200 ml   Net          1696.35 ml       Laboratory          Recent Labs      12/31/18   0405  12/31/18   0940  01/01/19   0545   SODIUM  140  139  143   POTASSIUM  4.1  4.9  4.6   CHLORIDE   105  103  110   CO2  23  25  25   BUN  30*  34*  32*   CREATININE  0.60  0.74  0.46*   MAGNESIUM  2.1   --    --    PHOSPHORUS  4.1   --    --    CALCIUM  9.9  9.8  9.7     Recent Labs      12/31/18   0405  12/31/18   0940  01/01/19   0545   ALTSGPT  34  35   --    ASTSGOT  26  36   --    ALKPHOSPHAT  83  90   --    TBILIRUBIN  0.6  0.6   --    GLUCOSE  127*  169*  172*     Recent Labs      12/30/18   0440  12/31/18   0405  12/31/18   0940  01/01/19   0545   WBC  16.5*  40.2*   --   34.5*   NEUTSPOLYS  83.50*  89.90*   --   89.00*   LYMPHOCYTES  9.00*  5.20*   --   5.80*   MONOCYTES  3.00  2.40   --   2.60   EOSINOPHILS  1.60  0.30   --   0.60   BASOPHILS  0.40  0.40   --   0.50   ASTSGOT   --   26  36   --    ALTSGPT   --   34  35   --    ALKPHOSPHAT   --   83  90   --    TBILIRUBIN   --   0.6  0.6   --      Recent Labs      12/30/18 0440 12/31/18 0405  01/01/19   0545   RBC  3.49*  4.18*  3.66*   HEMOGLOBIN  9.8*  11.7*  10.1*   HEMATOCRIT  31.2*  36.9*  33.1*   PLATELETCT  426  604*  603*       Imaging  X-Ray:  No film today    Assessment/Plan  Sepsis (HCC)   Assessment & Plan    This is severe sepsis with the following associated acute organ dysfunction(s): acute kidney failure, metabolic/septic encephalopathy.  12/31/2018  Source = likely urinary, query pulmonary  Continue cefepime times 7 days  S/p Sepsis protocol with aggressive fluid resuscitation  Antipyretics as needed  Caden-Synephrine infusion to maintain mean arterial pressure greater than 65  Check CVP for potential need for additional fluids with goal 8-10     Ventricular fibrillation (HCC)- (present on admission)   Assessment & Plan    Plans for AICD on hold currently given fevers  Optimize electrolytes     Acute respiratory failure with hypoxia (HCC)- (present on admission)   Assessment & Plan    Intubated in field 12/19 -12/29  Encourage incentive spirometry, out of bed to chair  Mobilization  RT/O2 protocols targeting SaO2 greater than  92%  Repeat chest x-ray in the morning       Cardiac arrest (HCC)- (present on admission)   Assessment & Plan    Unknown etiology s/p ROSC with prolonged downtime  Continue supportive care  Therapies, cognitive evaluation  Optimize electrolytes and continuous telemetry     Gastrointestinal hemorrhage associated with acute gastritis   Assessment & Plan    EGD 12/28 showing gastric ulcer and gastritis  PPI  Resume DVT prophylaxis and monitor hemoglobin closely     Acute cystitis with hematuria   Assessment & Plan    Continue cefepime 5-7 days  Ramon catheter removed     Aspiration pneumonia (HCC)   Assessment & Plan    Status post appropriate antibiotic therapy  Aspiration precautions, SLP     Fever   Assessment & Plan    Improving  Monitor with as needed antipyretics       Lactic acidosis- (present on admission)   Assessment & Plan    Resolved with fluid resuscitation       Elevated liver enzymes- (present on admission)   Assessment & Plan    Likely secondary to shock liver  Avoid hepatotoxins     Hypokalemia   Assessment & Plan    Repleted, monitor daily     Iron deficiency anemia- (present on admission)   Assessment & Plan    Daily CBC  Conservative transfusion strategy     Leukocytosis- (present on admission)   Assessment & Plan    Improving  Daily CBC     Closed fracture of multiple ribs of both sides- (present on admission)   Assessment & Plan    Secondary to CPR  Analgesia as needed  Encourage incentive spirometry       FULL CODE    VTE:  Contraindicated -we will resume today and monitor closely  Ulcer: PPI  Lines: Central Line  Ongoing indication addressed    I have performed a physical exam and reviewed and updated ROS and Plan today (1/1/2019). In review of yesterday's note (12/31/2018), there are no changes except as documented above.     Discussed patient condition and risk of morbidity and/or mortality with Hospitalist, RN, RT, Therapies, Pharmacy, Charge nurse / hot rounds and Patient

## 2019-01-01 NOTE — PROGRESS NOTES
Cardiology Follow Up Progress Note    Date of Service  1/1/2019    Attending Physician  Johan Morales M.D.    Chief Complaint     OOHA    HPI  Amadou Nicholas is a 58 y.o. male who lives with his wife in an apt in [place],  anxiety/depression & HTN admitted 12/19/2018 with witnessed VF/OOHA, intubated, ROSC prior to arrival.  Echo 12/20 with severe LVH/?infiltrative/significant MR with HOCM.  Seen by EP 12/28 - awaiting SQ ICD for secondary prevention.      Unfortunately, significant GIB   Also - intolerant of increasing AVN blockers with bradycardia  White count shot up yesterday with fever, slightly down today getting aggressive antibiotic treatment    Interim Events  No more fevers    Review of Systems  Review of Systems   Constitutional: Negative for activity change and appetite change.   HENT: Negative for congestion and dental problem.    Eyes: Negative for discharge and itching.   Respiratory: Negative for apnea, choking, shortness of breath and wheezing.    Cardiovascular: Negative for chest pain and leg swelling.   Gastrointestinal: Negative for abdominal distention and abdominal pain.   Endocrine: Negative for cold intolerance and heat intolerance.   Musculoskeletal: Negative for arthralgias and back pain.   Skin: Negative for color change and pallor.   Allergic/Immunologic: Negative for environmental allergies and food allergies.   Neurological: Negative for dizziness and weakness.   Hematological: Negative for adenopathy. Does not bruise/bleed easily.   Psychiatric/Behavioral: Negative for agitation and suicidal ideas. The patient is not nervous/anxious.    All other systems reviewed and are negative.      Vital signs in last 24 hours  Temp:  [36.2 °C (97.1 °F)-37.2 °C (99 °F)] 36.6 °C (97.9 °F)  Pulse:  [107-132] 117  Resp:  [17-39] 35    Physical Exam  Physical Exam   Constitutional:   OOb in chair, alert, more focused answers questions appropriately   HENT:   Head: Normocephalic and atraumatic.   Core  track in nares   Eyes: Pupils are equal, round, and reactive to light. EOM are normal. No scleral icterus.   Neck: No JVD present. No thyromegaly present.   Cardiovascular: Exam reveals no friction rub.    Murmur (3/6 sm across precordium) heard.  Sinus tach   Pulmonary/Chest: Effort normal. No respiratory distress. He has no wheezes. He has rales. He exhibits no tenderness.   Abdominal: Soft. Bowel sounds are normal. He exhibits no distension.   Musculoskeletal: Normal range of motion. He exhibits no edema or tenderness.   Lymphadenopathy:     He has no cervical adenopathy.   Neurological: He is alert. He exhibits normal muscle tone. Coordination normal.   Skin: Skin is dry. No rash noted.   Cool, dp 1/2 bilat   Psychiatric: He has a normal mood and affect.   Vitals reviewed.      Lab Review  Lab Results   Component Value Date/Time    WBC 34.5 (HH) 01/01/2019 05:45 AM    RBC 3.66 (L) 01/01/2019 05:45 AM    HEMOGLOBIN 10.1 (L) 01/01/2019 05:45 AM    HEMATOCRIT 33.1 (L) 01/01/2019 05:45 AM    MCV 90.4 01/01/2019 05:45 AM    MCH 27.6 01/01/2019 05:45 AM    MCHC 30.5 (L) 01/01/2019 05:45 AM    MPV 9.8 01/01/2019 05:45 AM      Lab Results   Component Value Date/Time    SODIUM 143 01/01/2019 05:45 AM    POTASSIUM 4.6 01/01/2019 05:45 AM    CHLORIDE 110 01/01/2019 05:45 AM    CO2 25 01/01/2019 05:45 AM    GLUCOSE 172 (H) 01/01/2019 05:45 AM    BUN 32 (H) 01/01/2019 05:45 AM    CREATININE 0.46 (L) 01/01/2019 05:45 AM      Lab Results   Component Value Date/Time    ASTSGOT 36 12/31/2018 09:40 AM    ALTSGPT 35 12/31/2018 09:40 AM     Lab Results   Component Value Date/Time    TROPONINI 6.83 (H) 12/20/2018 05:15 AM             Cardiac Imaging and Procedures Review  EKG:  My personal interpretation of the EKG dated 12/22 is sinus amisha with LVH    Echocardiogram:  12/20 and 12/23 - improvement/normalized LV function, EF 60, severe MR with an obstructive component    Imaging  Chest X-Ray:  Stable bilat infiltrates  "12/30      Assessment/Plan    -OOHA, PEA/TdP  -HOCM, \"new\" diagnosis  -MR  -GIB, post transfusion (thought traumatic from NGT)  -Increasing WCC, concern for sepsis    OOHA   Planning ICD for primary prevention  Appreciate EP support  Hold off again for today with concerns for infection.  Will put on standby for when more stable    HOCM  Intolerant of higher dose AVN blockers, on low dose metop  Long term plan may be intervention in terms of myomectomy, but not in an acute scenario    Sepsis  I reviewed echoes - low threshold for BETZAIDA if septic picture; improved but would not hesitate if blood culture still positive or recurrent shock    Mitral regurgitation  Due to obstruction but also looks like some intrinsic valve dysfunction  Follow with maximal medical therapy      D/w  RN  Thank you for allowing me to participate in the care of this patient.  Please contact me with any questions.    Essence Hoff M.D.   Cardiologist, [facility]  [phone]      "

## 2019-01-01 NOTE — PROGRESS NOTES
12 HR chart check and monitor review complete.     Monitor Review:    ST: 114-120    .14/.08/.30

## 2019-01-01 NOTE — ASSESSMENT & PLAN NOTE
This is severe sepsis with the following associated acute organ dysfunction(s): acute respiratory failure.   Source - Pneumonia

## 2019-01-01 NOTE — PROGRESS NOTES
[facility] Medicine Daily Progress Note    Date of Service  1/1/2019    Chief Complaint  58 y.o. male admitted 12/19/2018 with out of hospital VFib arrest    Hospital Course     Mr Nicholas has a history of depression and hypertension, he was in his normal state of health but was noted to be unresponsive with agonal breathing.  His son started CPR.  Initial rhythm was ventricular fibrillation, he required multiple rounds of CPR, he was intubated in the field and brought to the emergency room.  Hypothermia protocol was initiated he was admitted to the ICU.  Patient was extubated on 12/21/2018, he required reintubation later that day and then extubated again 12/29        Interval Problem Update  ROS limited by SVT rate 208  Sinus 100-120s overnight  SBP   On 7 litres mask  Voiding no hsu    Consultants/Specialty  Cardiology  pulmonology    Code Status  full    Disposition  Cont in ICU    Critical care time 32mins treating SVT with HR >200, giving adenosine and chemically cardioverting    Review of Systems  Review of Systems   Unable to perform ROS: Other        Physical Exam  Temp:  [36.2 °C (97.1 °F)-37.2 °C (99 °F)] 36.6 °C (97.8 °F)  Pulse:  [107-132] 111  Resp:  [17-39] 30    Physical Exam   Constitutional: He is oriented to person, place, and time. He appears well-developed and well-nourished. No distress.   HENT:   Head: Normocephalic and atraumatic.   Eyes: Conjunctivae are normal.   Neck: No JVD present.   Cardiovascular: Regular rhythm.  Tachycardia present.  Exam reveals no gallop.    No murmur heard.  SVT on monitor   Pulmonary/Chest: Effort normal. No stridor. No respiratory distress. He has no wheezes. He has no rales.   Abdominal: Soft. There is no tenderness. There is no rebound and no guarding.   Musculoskeletal: He exhibits edema.   Neurological: He is oriented to person, place, and time.   Skin: Skin is warm and dry. No rash noted. He is not diaphoretic.   Psychiatric: He has a normal mood and  affect. Thought content normal.   Nursing note and vitals reviewed.      Fluids    Intake/Output Summary (Last 24 hours) at 01/01/19 0501  Last data filed at 12/31/18 2100   Gross per 24 hour   Intake          2921.35 ml   Output             1245 ml   Net          1676.35 ml       Laboratory  Recent Labs      12/30/18   0440  12/31/18   0405   WBC  16.5*  40.2*   RBC  3.49*  4.18*   HEMOGLOBIN  9.8*  11.7*   HEMATOCRIT  31.2*  36.9*   MCV  89.4  88.3   MCH  28.1  28.0   MCHC  31.4*  31.7*   RDW  49.8  49.1   PLATELETCT  426  604*   MPV  9.8  9.8     Recent Labs      12/30/18   0440  12/31/18   0405  12/31/18   0940   SODIUM  137  140  139   POTASSIUM  3.8  4.1  4.9   CHLORIDE  106  105  103   CO2  22  23  25   GLUCOSE  122*  127*  169*   BUN  20  30*  34*   CREATININE  0.53  0.60  0.74   CALCIUM  8.9  9.9  9.8                   Imaging  DX-ABDOMEN FOR TUBE PLACEMENT   Final Result         Feeding tube with tip projecting over the expected area of the stomach.      DX-CHEST-PORTABLE (1 VIEW)   Final Result         1.  Ill-defined opacifications in each lung have increased to a minimal degree compared to the prior radiograph.  This could indicate worsening of pulmonary edema or inflammation.      DX-ABDOMEN FOR TUBE PLACEMENT   Final Result      Enteric tube tip projects over the stomach.      [id number] VIEW   Final Result         No specific finding to suggest small bowel obstruction.      EC-ECHOCARDIOGRAM LTD W/O CONT   Final Result      DX-ABDOMEN FOR TUBE PLACEMENT   Final Result      Feeding tube placement with the tip projecting over the distal stomach or proximal duodenum      DX-CHEST-PORTABLE (1 VIEW)   Final Result      1.  Stable cardiomegaly      2.  Worsening pulmonary vascular congestion and interstitial edema.      3.  Bibasilar opacities may be related to atelectasis. Developing pneumonia could have a similar appearance.      EC-ECHOCARDIOGRAM COMPLETE W/O CONT   Final Result      DX-CHEST-LIMITED  (1 VIEW)   Final Result         1.  No acute cardiopulmonary disease.   2.  Right internal jugular central line terminates in the right atrium, could be withdrawn 3 cm.   3.  Cardiomegaly      CT-CTA CHEST PULMONARY ARTERY W/ RECONS   Final Result         1.  No large central pulmonary embolus is appreciated, evaluation of the subsegmental branches is essentially nondiagnostic due to motion artifacts. Additional imaging would be required for definitive exclusion of small distal pulmonary emboli.   2.  Hazy groundglass pulmonary opacities, predominantly on the right, appearance suggests atypical edema or infiltrates.   3.  Anterior bilateral second through sixth rib fractures   4.  Cardiomegaly   5.  Left nephrolithiasis   6.  Atherosclerosis and atherosclerotic coronary artery disease.      CT-HEAD W/O   Final Result         1.  No acute intracranial abnormality.   2.  Atherosclerosis.      DX-CHEST-PORTABLE (1 VIEW)   Final Result         1.  No acute cardiopulmonary disease.   2.  Cardiomegaly           Assessment/Plan  Sepsis (HCC)   Assessment & Plan    This is severe sepsis with the following associated acute organ dysfunction(s): acute respiratory failure.   Patient has developed septic picture  Pneumonia versus urinary source  Broad-spectrum cefepime  Sepsis protocol     Ventricular fibrillation (HCC)- (present on admission)   Assessment & Plan    Now in SR  Plan for AICD pending treatment of infection     Acute respiratory failure with hypoxia (HCC)- (present on admission)   Assessment & Plan    Patient had to be reintubated on 12/21/2018  Extubated on 12/29/18  Optimized volume  Supplemental O2     Cardiac arrest (HCC)- (present on admission)   Assessment & Plan    CTA negative for PE  Cardiac cath without significant findings  Echo consistent with LV outflow obstruction  He appears to have made compete neurologic recovery  ICD device after treatment for sepsis  Until then, the patient will be monitored  continuously on telemetry in the ICU         Aspiration pneumonia (HCC)   Assessment & Plan    Escalate antibiotics to cefepime     Fever   Assessment & Plan    Tylenol and cooling blanket as needed       Lactic acidosis- (present on admission)   Assessment & Plan    Treat sepsis     Elevated liver enzymes- (present on admission)   Assessment & Plan    Likely related to cardiac arrest     Hyperphosphatemia- (present on admission)   Assessment & Plan    Monitor     Hypokalemia   Assessment & Plan    Replace     Iron deficiency anemia- (present on admission)   Assessment & Plan    Acute on chronic  He has a small gastric ulcer, continue PPI  Needs outpatient colonoscopy     Leukocytosis- (present on admission)   Assessment & Plan    WBC in normal range     Closed fracture of multiple ribs of both sides- (present on admission)   Assessment & Plan    Secondary to CPR  Pain management     Metabolic acidosis- (present on admission)   Assessment & Plan    Resolved with fluid resuscitation          VTE prophylaxis: enoxaparin

## 2019-01-02 ENCOUNTER — APPOINTMENT (OUTPATIENT)
Dept: RADIOLOGY | Facility: MEDICAL CENTER | Age: 59
DRG: 224 | End: 2019-01-02
Attending: INTERNAL MEDICINE
Payer: COMMERCIAL

## 2019-01-02 PROBLEM — R50.9 FEVER: Status: RESOLVED | Noted: 2018-12-24 | Resolved: 2019-01-02

## 2019-01-02 PROBLEM — D69.6 THROMBOCYTOPENIA (HCC): Status: ACTIVE | Noted: 2019-01-02

## 2019-01-02 PROBLEM — E87.0 HYPERNATREMIA: Status: ACTIVE | Noted: 2019-01-02

## 2019-01-02 PROBLEM — E87.20 LACTIC ACIDOSIS: Status: RESOLVED | Noted: 2018-12-20 | Resolved: 2019-01-02

## 2019-01-02 LAB
ANION GAP SERPL CALC-SCNC: 8 MMOL/L (ref 0–11.9)
BACTERIA SPEC RESP CULT: ABNORMAL
BASOPHILS # BLD AUTO: 0.6 % (ref 0–1.8)
BASOPHILS # BLD: 0.13 K/UL (ref 0–0.12)
BUN SERPL-MCNC: 36 MG/DL (ref 8–22)
CALCIUM SERPL-MCNC: 9.2 MG/DL (ref 8.5–10.5)
CHLORIDE SERPL-SCNC: 111 MMOL/L (ref 96–112)
CO2 SERPL-SCNC: 28 MMOL/L (ref 20–33)
CREAT SERPL-MCNC: 0.51 MG/DL (ref 0.5–1.4)
EOSINOPHIL # BLD AUTO: 0.39 K/UL (ref 0–0.51)
EOSINOPHIL NFR BLD: 1.8 % (ref 0–6.9)
ERYTHROCYTE [DISTWIDTH] IN BLOOD BY AUTOMATED COUNT: 54.1 FL (ref 35.9–50)
GLUCOSE SERPL-MCNC: 142 MG/DL (ref 65–99)
GRAM STN SPEC: ABNORMAL
HCT VFR BLD AUTO: 30 % (ref 42–52)
HGB BLD-MCNC: 9 G/DL (ref 14–18)
IMM GRANULOCYTES # BLD AUTO: 0.11 K/UL (ref 0–0.11)
IMM GRANULOCYTES NFR BLD AUTO: 0.5 % (ref 0–0.9)
LYMPHOCYTES # BLD AUTO: 2 K/UL (ref 1–4.8)
LYMPHOCYTES NFR BLD: 9.1 % (ref 22–41)
MCH RBC QN AUTO: 27.7 PG (ref 27–33)
MCHC RBC AUTO-ENTMCNC: 30 G/DL (ref 33.7–35.3)
MCV RBC AUTO: 92.3 FL (ref 81.4–97.8)
MONOCYTES # BLD AUTO: 0.67 K/UL (ref 0–0.85)
MONOCYTES NFR BLD AUTO: 3.1 % (ref 0–13.4)
NEUTROPHILS # BLD AUTO: 18.65 K/UL (ref 1.82–7.42)
NEUTROPHILS NFR BLD: 84.9 % (ref 44–72)
NRBC # BLD AUTO: 0 K/UL
NRBC BLD-RTO: 0 /100 WBC
PLATELET # BLD AUTO: 595 K/UL (ref 164–446)
PMV BLD AUTO: 9.8 FL (ref 9–12.9)
POTASSIUM SERPL-SCNC: 4.6 MMOL/L (ref 3.6–5.5)
RBC # BLD AUTO: 3.25 M/UL (ref 4.7–6.1)
SIGNIFICANT IND 70042: ABNORMAL
SITE SITE: ABNORMAL
SODIUM SERPL-SCNC: 147 MMOL/L (ref 135–145)
SOURCE SOURCE: ABNORMAL
TSH SERPL DL<=0.005 MIU/L-ACNC: 1.08 UIU/ML (ref 0.38–5.33)
WBC # BLD AUTO: 22 K/UL (ref 4.8–10.8)

## 2019-01-02 PROCEDURE — 99233 SBSQ HOSP IP/OBS HIGH 50: CPT | Performed by: HOSPITALIST

## 2019-01-02 PROCEDURE — 700105 HCHG RX REV CODE 258: Performed by: HOSPITALIST

## 2019-01-02 PROCEDURE — A9270 NON-COVERED ITEM OR SERVICE: HCPCS | Performed by: HOSPITALIST

## 2019-01-02 PROCEDURE — 94669 MECHANICAL CHEST WALL OSCILL: CPT

## 2019-01-02 PROCEDURE — 92526 ORAL FUNCTION THERAPY: CPT

## 2019-01-02 PROCEDURE — 94668 MNPJ CHEST WALL SBSQ: CPT

## 2019-01-02 PROCEDURE — 700111 HCHG RX REV CODE 636 W/ 250 OVERRIDE (IP): Performed by: HOSPITALIST

## 2019-01-02 PROCEDURE — 99233 SBSQ HOSP IP/OBS HIGH 50: CPT | Performed by: INTERNAL MEDICINE

## 2019-01-02 PROCEDURE — 80048 BASIC METABOLIC PNL TOTAL CA: CPT

## 2019-01-02 PROCEDURE — 700111 HCHG RX REV CODE 636 W/ 250 OVERRIDE (IP): Performed by: INTERNAL MEDICINE

## 2019-01-02 PROCEDURE — 700102 HCHG RX REV CODE 250 W/ 637 OVERRIDE(OP): Performed by: HOSPITALIST

## 2019-01-02 PROCEDURE — A9270 NON-COVERED ITEM OR SERVICE: HCPCS | Performed by: INTERNAL MEDICINE

## 2019-01-02 PROCEDURE — 71045 X-RAY EXAM CHEST 1 VIEW: CPT

## 2019-01-02 PROCEDURE — 94640 AIRWAY INHALATION TREATMENT: CPT

## 2019-01-02 PROCEDURE — 700102 HCHG RX REV CODE 250 W/ 637 OVERRIDE(OP): Performed by: INTERNAL MEDICINE

## 2019-01-02 PROCEDURE — 700105 HCHG RX REV CODE 258: Performed by: INTERNAL MEDICINE

## 2019-01-02 PROCEDURE — 99231 SBSQ HOSP IP/OBS SF/LOW 25: CPT | Performed by: NURSE PRACTITIONER

## 2019-01-02 PROCEDURE — 85025 COMPLETE CBC W/AUTO DIFF WBC: CPT

## 2019-01-02 PROCEDURE — 84443 ASSAY THYROID STIM HORMONE: CPT

## 2019-01-02 PROCEDURE — 770022 HCHG ROOM/CARE - ICU (200)

## 2019-01-02 RX ORDER — FUROSEMIDE 10 MG/ML
20 INJECTION INTRAMUSCULAR; INTRAVENOUS EVERY 12 HOURS
Status: DISCONTINUED | OUTPATIENT
Start: 2019-01-02 | End: 2019-01-10 | Stop reason: HOSPADM

## 2019-01-02 RX ADMIN — CEFEPIME 2 G: 2 INJECTION, POWDER, FOR SOLUTION INTRAVENOUS at 04:55

## 2019-01-02 RX ADMIN — OMEPRAZOLE 40 MG: 20 CAPSULE, DELAYED RELEASE ORAL at 18:16

## 2019-01-02 RX ADMIN — STANDARDIZED SENNA CONCENTRATE AND DOCUSATE SODIUM 2 TABLET: 8.6; 5 TABLET, FILM COATED ORAL at 18:16

## 2019-01-02 RX ADMIN — CEFEPIME 2 G: 2 INJECTION, POWDER, FOR SOLUTION INTRAVENOUS at 18:15

## 2019-01-02 RX ADMIN — ATORVASTATIN CALCIUM 80 MG: 80 TABLET, FILM COATED ORAL at 18:16

## 2019-01-02 RX ADMIN — ENOXAPARIN SODIUM 40 MG: 100 INJECTION SUBCUTANEOUS at 12:04

## 2019-01-02 RX ADMIN — FUROSEMIDE 20 MG: 10 INJECTION, SOLUTION INTRAMUSCULAR; INTRAVENOUS at 21:42

## 2019-01-02 RX ADMIN — OMEPRAZOLE 40 MG: 20 CAPSULE, DELAYED RELEASE ORAL at 04:55

## 2019-01-02 RX ADMIN — ASPIRIN 325 MG: 325 TABLET ORAL at 04:55

## 2019-01-02 RX ADMIN — POTASSIUM BICARBONATE 25 MEQ: 25 TABLET, EFFERVESCENT ORAL at 04:55

## 2019-01-02 ASSESSMENT — ENCOUNTER SYMPTOMS
DIARRHEA: 0
NAUSEA: 0
WHEEZING: 0
SORE THROAT: 1
VOMITING: 0
FEVER: 0
CHILLS: 0
DIZZINESS: 0
COUGH: 0
SHORTNESS OF BREATH: 1
WEIGHT LOSS: 0
SINUS PAIN: 0
HEADACHES: 0
DOUBLE VISION: 0
SPUTUM PRODUCTION: 1
COUGH: 1
HEARTBURN: 0
SHORTNESS OF BREATH: 0
MYALGIAS: 0
BACK PAIN: 0
TINGLING: 0
INSOMNIA: 0
FATIGUE: 1
LOSS OF CONSCIOUSNESS: 0
WEAKNESS: 1
ABDOMINAL PAIN: 0

## 2019-01-02 ASSESSMENT — PAIN SCALES - GENERAL
PAINLEVEL_OUTOF10: 0

## 2019-01-02 NOTE — PROGRESS NOTES
[facility] Medicine Daily Progress Note    Date of Service  1/2/2019    Chief Complaint  58 y.o. male admitted 12/19/2018 with out of hospital VFib arrest    Hospital Course     Mr Nicholas has a history of depression and hypertension, he was in his normal state of health but was noted to be unresponsive with agonal breathing.  His son started CPR.  Initial rhythm was ventricular fibrillation, he required multiple rounds of CPR, he was intubated in the field and brought to the emergency room.  Hypothermia protocol was initiated he was admitted to the ICU.  Patient was extubated on 12/21/2018, he required reintubation later that day and then extubated again 12/29        Interval Problem Update  No complaints today.  Some SOB when he exerts himself, legs feeling stronger but still not normal.      SVT yesterday requiring adenosine  Sinus 100-120s overnight  SBP   AFebrile  TFs goal  On 7 litres mask  Voiding no hsu  Cefepime D3 for Pseudomonas    Consultants/Specialty  Cardiology  pulmonology    Code Status  full    Disposition  Cont in ICU    Critical care time 32mins treating SVT with HR >200, giving adenosine and chemically cardioverting    Review of Systems  Review of Systems   Constitutional: Negative for chills and fever.   Respiratory: Negative for cough and shortness of breath.    Cardiovascular: Negative for chest pain.   Gastrointestinal: Negative for abdominal pain, diarrhea, nausea and vomiting.   Musculoskeletal: Negative for back pain.   Skin: Negative for rash.   Neurological: Positive for weakness. Negative for dizziness, loss of consciousness and headaches.        Physical Exam  Temp:  [36.6 °C (97.9 °F)-37 °C (98.6 °F)] 36.9 °C (98.5 °F)  Pulse:  [] 110  Resp:  [22-41] 30    Physical Exam   Constitutional: He is oriented to person, place, and time. He appears well-developed and well-nourished. No distress.   HENT:   Head: Normocephalic and atraumatic.   Eyes: Conjunctivae are normal. No  scleral icterus.   Neck: Neck supple. No JVD present.   Cardiovascular: Regular rhythm.  Tachycardia present.  Exam reveals no gallop.    No murmur heard.  SVT on monitor   Pulmonary/Chest: Effort normal. No stridor. No respiratory distress. He has no wheezes. He has no rales.   Abdominal: Soft. There is no tenderness. There is no rebound and no guarding.   Musculoskeletal: He exhibits edema.   Neurological: He is oriented to person, place, and time.   Skin: Skin is warm and dry. No rash noted. He is not diaphoretic.   Psychiatric: He has a normal mood and affect. Thought content normal.   Nursing note and vitals reviewed.      Fluids    Intake/Output Summary (Last 24 hours) at 01/02/19 0423  Last data filed at 01/02/19 0200   Gross per 24 hour   Intake             1360 ml   Output             1150 ml   Net              210 ml       Laboratory  Recent Labs      12/30/18   0440  12/31/18   0405  01/01/19   0545   WBC  16.5*  40.2*  34.5*   RBC  3.49*  4.18*  3.66*   HEMOGLOBIN  9.8*  11.7*  10.1*   HEMATOCRIT  31.2*  36.9*  33.1*   MCV  89.4  88.3  90.4   MCH  28.1  28.0  27.6   MCHC  31.4*  31.7*  30.5*   RDW  49.8  49.1  51.8*   PLATELETCT  426  604*  603*   MPV  9.8  9.8  9.8     Recent Labs      12/31/18   0405  12/31/18   0940  01/01/19   0545   SODIUM  140  139  143   POTASSIUM  4.1  4.9  4.6   CHLORIDE  105  103  110   CO2  23  25  25   GLUCOSE  127*  169*  172*   BUN  30*  34*  32*   CREATININE  0.60  0.74  0.46*   CALCIUM  9.9  9.8  9.7                   Imaging  DX-ABDOMEN FOR TUBE PLACEMENT   Final Result         Feeding tube with tip projecting over the expected area of the stomach.      DX-CHEST-PORTABLE (1 VIEW)   Final Result         1.  Ill-defined opacifications in each lung have increased to a minimal degree compared to the prior radiograph.  This could indicate worsening of pulmonary edema or inflammation.      DX-ABDOMEN FOR TUBE PLACEMENT   Final Result      Enteric tube tip projects over the  stomach.      [id number] VIEW   Final Result         No specific finding to suggest small bowel obstruction.      EC-ECHOCARDIOGRAM LTD W/O CONT   Final Result      DX-ABDOMEN FOR TUBE PLACEMENT   Final Result      Feeding tube placement with the tip projecting over the distal stomach or proximal duodenum      DX-CHEST-PORTABLE (1 VIEW)   Final Result      1.  Stable cardiomegaly      2.  Worsening pulmonary vascular congestion and interstitial edema.      3.  Bibasilar opacities may be related to atelectasis. Developing pneumonia could have a similar appearance.      EC-ECHOCARDIOGRAM COMPLETE W/O CONT   Final Result      DX-CHEST-LIMITED (1 VIEW)   Final Result         1.  No acute cardiopulmonary disease.   2.  Right internal jugular central line terminates in the right atrium, could be withdrawn 3 cm.   3.  Cardiomegaly      CT-CTA CHEST PULMONARY ARTERY W/ RECONS   Final Result         1.  No large central pulmonary embolus is appreciated, evaluation of the subsegmental branches is essentially nondiagnostic due to motion artifacts. Additional imaging would be required for definitive exclusion of small distal pulmonary emboli.   2.  Hazy groundglass pulmonary opacities, predominantly on the right, appearance suggests atypical edema or infiltrates.   3.  Anterior bilateral second through sixth rib fractures   4.  Cardiomegaly   5.  Left nephrolithiasis   6.  Atherosclerosis and atherosclerotic coronary artery disease.      CT-HEAD W/O   Final Result         1.  No acute intracranial abnormality.   2.  Atherosclerosis.      DX-CHEST-PORTABLE (1 VIEW)   Final Result         1.  No acute cardiopulmonary disease.   2.  Cardiomegaly      DX-CHEST-LIMITED (1 VIEW)    (Results Pending)        Assessment/Plan  Sepsis (HCC)   Assessment & Plan    This is severe sepsis with the following associated acute organ dysfunction(s): acute respiratory failure.   Patient has developed septic picture  Pneumonia   Completed unasyn  then sputum culture 12/31 noted to be Pseudomonas: started on Cefepime   Plan 10 days of therapy     Ventricular fibrillation (HCC)- (present on admission)   Assessment & Plan    Now in SR  ICD for tomorrow     Acute respiratory failure with hypoxia (HCC)- (present on admission)   Assessment & Plan    Patient had to be reintubated on 12/21/2018  Extubated on 12/29/18  Optimized volume  Supplemental O2  Mobilize  Follow volume status     Cardiac arrest (HCC)- (present on admission)   Assessment & Plan    CTA negative for PE  Cardiac cath without significant findings  Echo consistent with LV outflow obstruction  Neurologically back to baseline  ICD planned for tomorrow  Until then, the patient will be monitored continuously on telemetry in the ICU         Aspiration pneumonia ([facility])   Assessment & Plan    Escalate antibiotics to cefepime     Fever   Assessment & Plan    Tylenol and cooling blanket as needed       Lactic acidosis- (present on admission)   Assessment & Plan    Treat sepsis     Hypernatremia   Assessment & Plan    Start Free H2O 200ml via core track q8  Repeat Na tomorrow am     SVT (supraventricular tachycardia) ([facility])   Assessment & Plan    With rate to 208  Holding BP's  Pacer pads placed and pt given 6mg Adenosine while on defibrilator monitor  Converted to sinus tach 100 post     Elevated liver enzymes- (present on admission)   Assessment & Plan    Likely related to cardiac arrest     Hypokalemia   Assessment & Plan    Replace     Iron deficiency anemia- (present on admission)   Assessment & Plan    Acute on chronic  He has a small gastric ulcer, continue PPI  Needs outpatient colonoscopy     Leukocytosis- (present on admission)   Assessment & Plan    WBC in normal range     Closed fracture of multiple ribs of both sides- (present on admission)   Assessment & Plan    Secondary to CPR  Pain management          VTE prophylaxis: enoxaparin

## 2019-01-02 NOTE — PROGRESS NOTES
Cardiology Follow Up Progress Note    Date of Service  1/2/2019    Attending Physician  Johan Morales M.D.    Chief Complaint   Cardiac arrest    HPI  Patient is a 58 years old male with history of hypertension and anxiety but no known prior cardiac issue.  [facility]  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  He was brought in by EMS. The history was obtained from his family.     Reportedly he was in state of usual health earlier today.  This evening he was found unresponsive with agonal breathing by his family around 9 PM.  He was last seen normal around 8:30 PM. EMS was summoned.  On their arrival reportedly was in ventricular fibrillation.  He received defibrillation and amiodarone and reportedly went into asystole then torsade de pointes.  On arrival to emergency room he was unresponsive.  Initial electrocardiogram showed junctional rhythm with wide QRS complex with repolarization abnormality and appearance of ST elevation in the anterolateral precordial leads.  However subsequent electrocardiogram show improvement of the QRS duration and disappearance of ST elevation.  Initial arterial blood gas showed severe acidemia pH of 7.08 with lactic acid of over 11.  Reportedly he is taking Zoloft for anxiety. It is unclear what he takes for his hypertension    Interim Events   yesterday 1220 hours adenosine 6mgs IV with resolution  WBC down to 22K  No fever  Rhonchi on exam however, CXR stable and no fever  Sinus tachycardia in patient intolerant of AVN agents due to bradycardia and hypotension.  Sputum culture 12/31/18 positive  Pseudomonas species   Moderate growth   BC from 12/3018 negative    Review of Systems  Review of Systems   Constitutional: Positive for fatigue.   HENT: Positive for sore throat.    Respiratory: Positive for shortness of breath.    Neurological: Positive for weakness.       Vital signs in last 24 hours  Temp:  [36.8 °C (98.2 °F)-37 °C (98.6 °F)] 36.8 °C (98.3  °F)  Pulse:  [] 107  Resp:  [22-41] 28    Physical Exam  Physical Exam   Constitutional: He is oriented to person, place, and time. He appears well-developed and well-nourished.   HENT:   Head: Normocephalic and atraumatic.   Eyes: Pupils are equal, round, and reactive to light.   Neck: Normal range of motion. Neck supple. No thyromegaly present.   Right IJ   Cardiovascular: Regular rhythm.  Tachycardia present.  Exam reveals no gallop and no friction rub.    Murmur heard.  Pulmonary/Chest: Effort normal. No respiratory distress. He has no wheezes. He has rhonchi in the right middle field and the left middle field. He has no rales.   Abdominal: Soft. Bowel sounds are normal. He exhibits no distension. There is no tenderness. There is no guarding.   Musculoskeletal: Normal range of motion. He exhibits no edema.   Neurological: He is alert and oriented to person, place, and time.   Skin: Skin is warm and dry.   Psychiatric: He has a normal mood and affect.       Lab Review  Lab Results   Component Value Date/Time    WBC 22.0 (H) 01/02/2019 05:11 AM    RBC 3.25 (L) 01/02/2019 05:11 AM    HEMOGLOBIN 9.0 (L) 01/02/2019 05:11 AM    HEMATOCRIT 30.0 (L) 01/02/2019 05:11 AM    MCV 92.3 01/02/2019 05:11 AM    MCH 27.7 01/02/2019 05:11 AM    MCHC 30.0 (L) 01/02/2019 05:11 AM    MPV 9.8 01/02/2019 05:11 AM      Lab Results   Component Value Date/Time    SODIUM 147 (H) 01/02/2019 05:11 AM    POTASSIUM 4.6 01/02/2019 05:11 AM    CHLORIDE 111 01/02/2019 05:11 AM    CO2 28 01/02/2019 05:11 AM    GLUCOSE 142 (H) 01/02/2019 05:11 AM    BUN 36 (H) 01/02/2019 05:11 AM    CREATININE 0.51 01/02/2019 05:11 AM      Lab Results   Component Value Date/Time    ASTSGOT 36 12/31/2018 09:40 AM    ALTSGPT 35 12/31/2018 09:40 AM     Lab Results   Component Value Date/Time    TROPONINI 6.83 (H) 12/20/2018 05:15 AM             Cardiac Imaging and Procedures Review  EKG:  My personal interpretation of the EKG dated 1/1/19:  SINUS TACHYCARDIA    LVH WITH SECONDARY REPOLARIZATION ABNORMALITY   Tachycardia continues 1/2/19  Echocardiogram:    Mildly reduced left ventricular systolic function.  Left ventricular ejection fraction is visually estimated to be 45%.  Severe concentric left ventricular hypertrophy, consider infiltrative   disease.  Indeterminate diastolic function.  Moderate mitral regurgitation.  Moderate tricuspid regurgitation.  Right ventricular systolic pressure is estimated to be 50  mmHg.     Cardiac Catheterization:   12/28/18   1.  Left main coronary artery: possible stenosis angiographically.  2.  Left anterior descending artery:  Luminal irregulrities. LAD gives 2 diagonal branches, which have no significant disease  3.  Left circumflex coronary artery:  Luminal irregularities. Gives one large marginal branch, which has 30% stenosis at origin. Distally LCX supplies left posterolateral and left PDA, which have no significant disease.   4.  Right coronary artery:  Luminal irregularities, small non-dominant vessel.    5.  Left ventricular end diastolic pressure:  39 mmHg. 60 mm Hg gradient across the aortic valve.     Imaging  Chest X-Ray:  FINDINGS:    The cardiac silhouette and mediastinal contours are stable.    Left basilar opacification and probable small pleural effusion are unchanged. Bilateral diffuse interstitial opacities are unchanged. No pneumothorax is identified.    Lines of support are unchanged.        Assessment/Plan  1. Cardiac arrest (out of hospital)  2. HOCM  3. Leukocytosis improving  Sputum culture positive 12/31/18  Pseudomonas species   Moderate growth   BC 12/30/18 negative  4. Dual chamber ICD to be implanted once infection risk reduced.  Not SQ ICD as intolerant of any AVN blocking agents with bradycardia and hypotension in setting of HOCM.    Thank you for allowing me to participate in the care of this patient.      Please contact me with any questions.    HELLEN Currie.   Cardiologist, [facility]  [phone]

## 2019-01-02 NOTE — THERAPY
"Speech Language Therapy dysphagia treatment completed.   Functional Status:  Patient was seen for dysphagia re-assess this date per RN request as patient doing better in overall status. Patient was intubated x10 days, extubated and now with weak and breathy vocal quality. Not baseline per patient. Patient on 7L 02 via oxy mask. Patient with upper airway congestion prior to PO trials, patient with weak cough, able to clear some congestion with cough and suction. Patient consumed PO trials of ice, thins, and nectars via tsp. Patient demonstrated delayed coughing with PO trials of thin liquids. Patient with inconsistent throat clearing of NTL. Patient tolerated trials of ice chips. After all PO trials increased upper airway congestion was noted. At this time recommend continue NPO/Cortrak. Pre feeding trials w/ SLP only. Patient would benefit from a FEES prior to full PO progression to further assess oropharyngeal swallow function and R/O silent aspiration. Will defer to primary SLP to complete as patient is deemed appropriate. SLP to continue to follow.   Recommendations: Continue NPO/Cortrak, SLP to follow for dysphagia tx, prefeeding trials. Consider FEES prior to PO intake defer to primary SLP.   Plan of Care: Will benefit from Speech Therapy 3 times per week  Post-Acute Therapy: Discharge to a transitional care facility for continued skilled therapy services.    See \"Rehab Therapy-Acute\" Patient Summary Report for complete documentation.     "

## 2019-01-02 NOTE — CARE PLAN
Problem: Respiratory:  Goal: Respiratory status will improve    Intervention: Administer and titrate oxygen therapy  Pt remains on 7 liters oxy mask. Unable to titrate down.      Problem: Mobility  Goal: Risk for activity intolerance will decrease    Intervention: Encourage patient to increase activity level in collaboration with Interdisciplinary Team  Pt tolerated being in the chair this AM. SBA required.

## 2019-01-02 NOTE — PROGRESS NOTES
Critical Care Progress Note    Date of admission  12/19/2018    Chief Complaint  58 y.o. male admitted 12/19/2018 s/p cardiopulmonary arrest with prolonged CPR in the field    Hospital Course  TTM started in ED and continued in ICU    Interval Problem Updates  Past 24 hours   - run of SVT yesterday requiring chemical cardioversion with 6 mg adenosine, EKG showing rate related ischemia but patient asymptomatic   - AF, improving WBC   - AAOx4, impulsive   - norma TFs at goal   - adequate UOP   - sputum with pseudomonas on cefepime day 3/7   - improving plt count   - Na up to 147    Review of Systems  Review of Systems   Constitutional: Negative for weight loss.   HENT: Negative for sinus pain.    Eyes: Negative for double vision.   Respiratory: Positive for cough and sputum production. Negative for shortness of breath and wheezing.    Cardiovascular: Negative for chest pain.   Gastrointestinal: Negative for heartburn and melena.   Genitourinary: Negative for dysuria.   Musculoskeletal: Negative for myalgias.   Skin: Negative for itching.   Neurological: Negative for tingling.   Endo/Heme/Allergies: Negative for environmental allergies.   Psychiatric/Behavioral: The patient does not have insomnia.    All other systems reviewed and are negative.       Vital Signs for last 24 hours   Temp:  [36.6 °C (97.9 °F)-37 °C (98.6 °F)] 36.8 °C (98.3 °F)  Pulse:  [] 107  Resp:  [22-41] 28   Blood pressure /56-66    Hemodynamic parameters for last 24 hours  CVP:  [5 MM HG-16 MM HG] 14 MM HG    Respiratory   SaO2 % on 7 lpm FM, IS 1000-1250mL/PEP    Physical Exam   Physical Exam   Constitutional: He is oriented to person, place, and time. He appears well-developed. He has a sickly appearance. No distress.   Generalized weakness and chronically ill appearing, up in chair   HENT:   Head: Normocephalic and atraumatic.   Nose: Nose normal.   Mouth/Throat: Oropharynx is clear and moist. No oropharyngeal exudate.   Nasal  coretrak in place   Eyes: Pupils are equal, round, and reactive to light. Conjunctivae and EOM are normal. Right eye exhibits no discharge. Left eye exhibits no discharge.   Neck: No JVD present. No tracheal deviation present.   RIJ CVL without surrounding erythema   Cardiovascular: Regular rhythm and intact distal pulses.   Occasional extrasystoles are present. Tachycardia present.  PMI is displaced.  Exam reveals no distant heart sounds, no friction rub and no decreased pulses.    No murmur heard.  Brisk cap refill   Pulmonary/Chest: No accessory muscle usage. Tachypnea noted. No respiratory distress. He has no decreased breath sounds. He has no wheezes. He has rhonchi in the right middle field, the right lower field, the left middle field and the left lower field. He has no rales.   Abdominal: Soft. Bowel sounds are normal. He exhibits distension. There is no tenderness. There is no rebound.   Genitourinary: Penis normal.   Musculoskeletal: He exhibits no edema or tenderness.   Neurological: He is alert and oriented to person, place, and time. No cranial nerve deficit. He exhibits normal muscle tone.   Impulsive at times   Skin: Skin is warm and dry. No erythema.   Psychiatric: His speech is normal and behavior is normal. His affect is blunt. Thought content is not delusional (Occasionally). He expresses impulsivity. He exhibits abnormal recent memory.   Nursing note and vitals reviewed.      Medications  Current Facility-Administered Medications   Medication Dose Route Frequency Provider Last Rate Last Dose   • potassium bicarbonate (KLYTE) effervescent tablet 25 mEq  25 mEq Feeding Tube DAILY Akhil Guerin D.O.   25 mEq at 01/02/19 0455   • phenylephrine (JACINTO-SYNEPHRINE) 40,000 mcg in  mL Infusion  1-300 mcg/min Intravenous Continuous Akhil Guerin, D.O.   Stopped at 01/01/19 1230   • enoxaparin (LOVENOX) inj 40 mg  40 mg Subcutaneous DAILY Jeremy M Gonda, M.D.   Stopped at 01/02/19  0600   • cefepime (MAXIPIME) 2 g in  mL IVPB  2 g Intravenous Q12HRS Johan Morales M.D.   Stopped at 01/02/19 0525   • oxyCODONE immediate-release (ROXICODONE) tablet 5 mg  5 mg Oral Q4HRS PRN Johan Morales M.D.   5 mg at 12/30/18 1653   • omeprazole 2 mg/mL in sodium bicarbonate (PRILOSEC) oral susp 40 mg  40 mg Feeding Tube Q12HRS Johan Morales M.D.   40 mg at 01/02/19 0455   • acetaminophen (TYLENOL) tablet 650 mg  650 mg Feeding Tube Q6HRS PRN Aleshia Uribe M.D.   650 mg at 12/31/18 0057   • Respiratory Care per Protocol   Nebulization Continuous RT Bradley Flores M.D.       • MD Alert...ICU Electrolyte Replacement per Pharmacy   Other pharmacy to dose Bradley Flores M.D.       • aspirin (ASA) tablet 325 mg  325 mg Feeding Tube DAILY Yaron Magallanes M.D.   325 mg at 01/02/19 0455    Or   • aspirin (ASA) chewable tab 324 mg  324 mg Per NG Tube DAILY Yaron Magallanes M.D.   324 mg at 01/01/19 0536    Or   • aspirin (ASA) suppository 300 mg  300 mg Rectal DAILY Yaron Magallanes M.D.       • atorvastatin (LIPITOR) tablet 80 mg  80 mg Per NG Tube Q EVENING Yaron Magallanes M.D.   80 mg at 01/01/19 1729   • senna-docusate (PERICOLACE or SENOKOT S) 8.6-50 MG per tablet 2 Tab  2 Tab Feeding Tube BID Yaron Magallanes M.D.   Stopped at 01/01/19 0600    And   • polyethylene glycol/lytes (MIRALAX) PACKET 1 Packet  1 Packet Feeding Tube QDAY PRN Yaron Magallanes M.D.   1 Packet at 12/23/18 1136    And   • magnesium hydroxide (MILK OF MAGNESIA) suspension 30 mL  30 mL Feeding Tube QDAY PRN Yaron Magallanes M.D.   30 mL at 12/24/18 1228    And   • bisacodyl (DULCOLAX) suppository 10 mg  10 mg Rectal QDAY PRN Yaron Magallanes M.D.       • dextrose 50% (D50W) injection 25-50 mL  12.5-25 g Intravenous PRN Jeremy M Gonda, M.D.           Fluids    Intake/Output Summary (Last 24 hours) at 01/02/19 0724  Last data filed at 01/02/19 0600   Gross per 24 hour   Intake          1776.67 ml   Output             1000 ml   Net           776.67 ml        Laboratory          Recent Labs      12/31/18 0405 12/31/18   0940  01/01/19   0545  01/02/19   0511   SODIUM  140  139  143  147*   POTASSIUM  4.1  4.9  4.6  4.6   CHLORIDE  105  103  110  111   CO2  23  25  25  28   BUN  30*  34*  32*  36*   CREATININE  0.60  0.74  0.46*  0.51   MAGNESIUM  2.1   --   2.1   --    PHOSPHORUS  4.1   --    --    --    CALCIUM  9.9  9.8  9.7  9.2     Recent Labs      12/31/18 0405 12/31/18 0940  01/01/19   0545  01/02/19   0511   ALTSGPT  34  35   --    --    ASTSGOT  26  36   --    --    ALKPHOSPHAT  83  90   --    --    TBILIRUBIN  0.6  0.6   --    --    GLUCOSE  127*  169*  172*  142*     Recent Labs      12/31/18 0405 12/31/18 0940  01/01/19   0545  01/02/19   0511   WBC  40.2*   --   34.5*  22.0*   NEUTSPOLYS  89.90*   --   89.00*  84.90*   LYMPHOCYTES  5.20*   --   5.80*  9.10*   MONOCYTES  2.40   --   2.60  3.10   EOSINOPHILS  0.30   --   0.60  1.80   BASOPHILS  0.40   --   0.50  0.60   ASTSGOT  26  36   --    --    ALTSGPT  34  35   --    --    ALKPHOSPHAT  83  90   --    --    TBILIRUBIN  0.6  0.6   --    --      Recent Labs      12/31/18 0405 01/01/19   0545  01/02/19   0511   RBC  4.18*  3.66*  3.25*   HEMOGLOBIN  11.7*  10.1*  9.0*   HEMATOCRIT  36.9*  33.1*  30.0*   PLATELETCT  604*  603*  595*       Imaging  X-Ray:  I have personally reviewed the images and compared with prior images. and My impression is: Increasing edema with a large cardiac silhouette, retrocardiac opacification, gastric tube and right IJ central venous catheter in good position    Assessment/Plan  Hypernatremia   Assessment & Plan    Start free water enterally and monitor daily     SVT (supraventricular tachycardia) (HCC)   Assessment & Plan    Status post adenosine cardioversion 1/1  Optimize electrolytes  Beta-blocker after AICD/pacemaker placed     Sepsis ([facility])   Assessment & Plan    This is severe sepsis with the following associated acute organ dysfunction(s): acute kidney  failure, metabolic/septic encephalopathy.  12/31/2018  Source = likely urinary + pulmonary    Continue cefepime times 7 days  S/p Sepsis protocol, euvolemic     Ventricular fibrillation (HCC)- (present on admission)   Assessment & Plan    Plans for AICD hopefully tomorrow now that infection is controlled  Optimize electrolytes     Acute respiratory failure with hypoxia (HCC)- (present on admission)   Assessment & Plan    Intubated in field 12/19 -12/29  Encourage incentive spirometry, out of bed to chair  Mobilization  RT/O2 protocols targeting SaO2 greater than 92%  Resume diuresis       Cardiac arrest (HCC)- (present on admission)   Assessment & Plan    Unknown etiology s/p ROSC with prolonged downtime  Continue supportive care  Therapies, ongoing cognitive evaluation  Optimize electrolytes and continuous telemetry     Gastrointestinal hemorrhage associated with acute gastritis   Assessment & Plan    EGD 12/28 showing gastric ulcer and gastritis  PPI  Resume DVT prophylaxis after AICD placement     Acute cystitis with hematuria   Assessment & Plan    Continue cefepime 7 days  Ramon catheter removed     Aspiration pneumonia (HCC)   Assessment & Plan    Status post appropriate antibiotic therapy  Aspiration precautions, SLP  Recurrence of pneumonia now with pansensitive pseudomonal 12/31 -continue cefepime times 7 days     Thrombocytopenia (HCC)   Assessment & Plan    Monitor for bleeding     Elevated liver enzymes- (present on admission)   Assessment & Plan    Resolved     Hypokalemia   Assessment & Plan    Repleted, monitor daily     Iron deficiency anemia- (present on admission)   Assessment & Plan    Daily CBC  Conservative transfusion strategy, hemoglobin goal greater than 7     Leukocytosis- (present on admission)   Assessment & Plan    Improving     Closed fracture of multiple ribs of both sides- (present on admission)   Assessment & Plan    Secondary to CPR  Analgesia as needed  Encourage incentive  spirometry       FULL CODE    VTE:  On hold pending AICD placement  Ulcer: PPI  Lines: Central Line  Ongoing indication addressed    I have performed a physical exam and reviewed and updated ROS and Plan today (1/2/2019). In review of yesterday's note (1/1/2019), there are no changes except as documented above.     Discussed patient condition and risk of morbidity and/or mortality with RN, RT, Therapies, Pharmacy, Charge nurse / hot rounds, Patient, cardiology and Dr Kaplan

## 2019-01-02 NOTE — PROGRESS NOTES
Monitor Summary: 's-110's with PAC's noted.    Pt also had a 8 minute run of  asymptomatic SVT.    .14/.08/.36

## 2019-01-03 PROBLEM — R74.8 ELEVATED LIVER ENZYMES: Status: RESOLVED | Noted: 2018-12-20 | Resolved: 2019-01-03

## 2019-01-03 LAB
ANION GAP SERPL CALC-SCNC: 7 MMOL/L (ref 0–11.9)
BASOPHILS # BLD AUTO: 0.8 % (ref 0–1.8)
BASOPHILS # BLD: 0.13 K/UL (ref 0–0.12)
BUN SERPL-MCNC: 36 MG/DL (ref 8–22)
CALCIUM SERPL-MCNC: 9.6 MG/DL (ref 8.5–10.5)
CHLORIDE SERPL-SCNC: 108 MMOL/L (ref 96–112)
CO2 SERPL-SCNC: 27 MMOL/L (ref 20–33)
COMMENT 1642: NORMAL
CREAT SERPL-MCNC: 0.47 MG/DL (ref 0.5–1.4)
EOSINOPHIL # BLD AUTO: 0.47 K/UL (ref 0–0.51)
EOSINOPHIL NFR BLD: 2.9 % (ref 0–6.9)
ERYTHROCYTE [DISTWIDTH] IN BLOOD BY AUTOMATED COUNT: 55.1 FL (ref 35.9–50)
GLUCOSE SERPL-MCNC: 125 MG/DL (ref 65–99)
HCT VFR BLD AUTO: 30.2 % (ref 42–52)
HGB BLD-MCNC: 8.8 G/DL (ref 14–18)
HYPOCHROMIA BLD QL SMEAR: ABNORMAL
IMM GRANULOCYTES # BLD AUTO: 0.1 K/UL (ref 0–0.11)
IMM GRANULOCYTES NFR BLD AUTO: 0.6 % (ref 0–0.9)
LG PLATELETS BLD QL SMEAR: NORMAL
LYMPHOCYTES # BLD AUTO: 1.93 K/UL (ref 1–4.8)
LYMPHOCYTES NFR BLD: 12 % (ref 22–41)
MAGNESIUM SERPL-MCNC: 2.1 MG/DL (ref 1.5–2.5)
MCH RBC QN AUTO: 27 PG (ref 27–33)
MCHC RBC AUTO-ENTMCNC: 29.1 G/DL (ref 33.7–35.3)
MCV RBC AUTO: 92.6 FL (ref 81.4–97.8)
MONOCYTES # BLD AUTO: 0.52 K/UL (ref 0–0.85)
MONOCYTES NFR BLD AUTO: 3.2 % (ref 0–13.4)
MORPHOLOGY BLD-IMP: NORMAL
NEUTROPHILS # BLD AUTO: 12.97 K/UL (ref 1.82–7.42)
NEUTROPHILS NFR BLD: 80.5 % (ref 44–72)
NRBC # BLD AUTO: 0 K/UL
NRBC BLD-RTO: 0 /100 WBC
OVALOCYTES BLD QL SMEAR: NORMAL
PHOSPHATE SERPL-MCNC: 3.6 MG/DL (ref 2.5–4.5)
PLATELET # BLD AUTO: 683 K/UL (ref 164–446)
PLATELET BLD QL SMEAR: NORMAL
PMV BLD AUTO: 9.8 FL (ref 9–12.9)
POLYCHROMASIA BLD QL SMEAR: NORMAL
POTASSIUM SERPL-SCNC: 3.7 MMOL/L (ref 3.6–5.5)
RBC # BLD AUTO: 3.26 M/UL (ref 4.7–6.1)
RBC BLD AUTO: PRESENT
SODIUM SERPL-SCNC: 142 MMOL/L (ref 135–145)
WBC # BLD AUTO: 16.1 K/UL (ref 4.8–10.8)

## 2019-01-03 PROCEDURE — 99152 MOD SED SAME PHYS/QHP 5/>YRS: CPT

## 2019-01-03 PROCEDURE — 94669 MECHANICAL CHEST WALL OSCILL: CPT

## 2019-01-03 PROCEDURE — 33249 INSJ/RPLCMT DEFIB W/LEAD(S): CPT | Performed by: INTERNAL MEDICINE

## 2019-01-03 PROCEDURE — 93641 EP EVL 1/2CHMB PAC CVDFB TST: CPT | Mod: 26 | Performed by: INTERNAL MEDICINE

## 2019-01-03 PROCEDURE — 99233 SBSQ HOSP IP/OBS HIGH 50: CPT | Performed by: INTERNAL MEDICINE

## 2019-01-03 PROCEDURE — 84100 ASSAY OF PHOSPHORUS: CPT

## 2019-01-03 PROCEDURE — 700102 HCHG RX REV CODE 250 W/ 637 OVERRIDE(OP): Performed by: HOSPITALIST

## 2019-01-03 PROCEDURE — C1721 AICD, DUAL CHAMBER: HCPCS

## 2019-01-03 PROCEDURE — 700102 HCHG RX REV CODE 250 W/ 637 OVERRIDE(OP): Performed by: INTERNAL MEDICINE

## 2019-01-03 PROCEDURE — 99153 MOD SED SAME PHYS/QHP EA: CPT

## 2019-01-03 PROCEDURE — 85025 COMPLETE CBC W/AUTO DIFF WBC: CPT

## 2019-01-03 PROCEDURE — A9270 NON-COVERED ITEM OR SERVICE: HCPCS | Performed by: HOSPITALIST

## 2019-01-03 PROCEDURE — 304952 HCHG R 2 PADS

## 2019-01-03 PROCEDURE — C1892 INTRO/SHEATH,FIXED,PEEL-AWAY: HCPCS

## 2019-01-03 PROCEDURE — A9270 NON-COVERED ITEM OR SERVICE: HCPCS | Performed by: INTERNAL MEDICINE

## 2019-01-03 PROCEDURE — 700105 HCHG RX REV CODE 258: Performed by: INTERNAL MEDICINE

## 2019-01-03 PROCEDURE — 99152 MOD SED SAME PHYS/QHP 5/>YRS: CPT | Performed by: INTERNAL MEDICINE

## 2019-01-03 PROCEDURE — 700111 HCHG RX REV CODE 636 W/ 250 OVERRIDE (IP)

## 2019-01-03 PROCEDURE — 770022 HCHG ROOM/CARE - ICU (200)

## 2019-01-03 PROCEDURE — 700111 HCHG RX REV CODE 636 W/ 250 OVERRIDE (IP): Performed by: INTERNAL MEDICINE

## 2019-01-03 PROCEDURE — 83735 ASSAY OF MAGNESIUM: CPT

## 2019-01-03 PROCEDURE — 02H63KZ INSERTION OF DEFIBRILLATOR LEAD INTO RIGHT ATRIUM, PERCUTANEOUS APPROACH: ICD-10-PCS | Performed by: INTERNAL MEDICINE

## 2019-01-03 PROCEDURE — C1899 LEAD, PMKR/AICD COMBINATION: HCPCS

## 2019-01-03 PROCEDURE — 304853 HCHG PPM TEST CABLE

## 2019-01-03 PROCEDURE — 93641 EP EVL 1/2CHMB PAC CVDFB TST: CPT

## 2019-01-03 PROCEDURE — C1894 INTRO/SHEATH, NON-LASER: HCPCS

## 2019-01-03 PROCEDURE — C1898 LEAD, PMKR, OTHER THAN TRANS: HCPCS

## 2019-01-03 PROCEDURE — 02HK3KZ INSERTION OF DEFIBRILLATOR LEAD INTO RIGHT VENTRICLE, PERCUTANEOUS APPROACH: ICD-10-PCS | Performed by: INTERNAL MEDICINE

## 2019-01-03 PROCEDURE — 700101 HCHG RX REV CODE 250

## 2019-01-03 PROCEDURE — 80048 BASIC METABOLIC PNL TOTAL CA: CPT

## 2019-01-03 PROCEDURE — 99233 SBSQ HOSP IP/OBS HIGH 50: CPT | Performed by: HOSPITALIST

## 2019-01-03 PROCEDURE — 33249 INSJ/RPLCMT DEFIB W/LEAD(S): CPT

## 2019-01-03 PROCEDURE — 0JH608Z INSERTION OF DEFIBRILLATOR GENERATOR INTO CHEST SUBCUTANEOUS TISSUE AND FASCIA, OPEN APPROACH: ICD-10-PCS | Performed by: INTERNAL MEDICINE

## 2019-01-03 PROCEDURE — 305387 HCHG SUTURES

## 2019-01-03 RX ORDER — BUPIVACAINE HYDROCHLORIDE 2.5 MG/ML
INJECTION, SOLUTION EPIDURAL; INFILTRATION; INTRACAUDAL
Status: COMPLETED
Start: 2019-01-03 | End: 2019-01-03

## 2019-01-03 RX ORDER — LIDOCAINE HYDROCHLORIDE 20 MG/ML
INJECTION, SOLUTION INFILTRATION; PERINEURAL
Status: COMPLETED
Start: 2019-01-03 | End: 2019-01-03

## 2019-01-03 RX ORDER — MIDAZOLAM HYDROCHLORIDE 1 MG/ML
INJECTION INTRAMUSCULAR; INTRAVENOUS
Status: COMPLETED
Start: 2019-01-03 | End: 2019-01-03

## 2019-01-03 RX ADMIN — ACETAMINOPHEN 650 MG: 325 TABLET, FILM COATED ORAL at 21:04

## 2019-01-03 RX ADMIN — VANCOMYCIN 3000 MG: 1 INJECTION, SOLUTION INTRAVENOUS at 08:21

## 2019-01-03 RX ADMIN — OMEPRAZOLE 40 MG: 20 CAPSULE, DELAYED RELEASE ORAL at 05:10

## 2019-01-03 RX ADMIN — CEFEPIME 2 G: 2 INJECTION, POWDER, FOR SOLUTION INTRAVENOUS at 22:50

## 2019-01-03 RX ADMIN — CEFEPIME 2 G: 2 INJECTION, POWDER, FOR SOLUTION INTRAVENOUS at 05:12

## 2019-01-03 RX ADMIN — FENTANYL CITRATE 100 MCG: 50 INJECTION, SOLUTION INTRAMUSCULAR; INTRAVENOUS at 08:58

## 2019-01-03 RX ADMIN — STANDARDIZED SENNA CONCENTRATE AND DOCUSATE SODIUM 2 TABLET: 8.6; 5 TABLET, FILM COATED ORAL at 17:25

## 2019-01-03 RX ADMIN — ATORVASTATIN CALCIUM 80 MG: 80 TABLET, FILM COATED ORAL at 17:25

## 2019-01-03 RX ADMIN — MIDAZOLAM HYDROCHLORIDE 2 MG: 1 INJECTION, SOLUTION INTRAMUSCULAR; INTRAVENOUS at 08:48

## 2019-01-03 RX ADMIN — CEFEPIME 2 G: 2 INJECTION, POWDER, FOR SOLUTION INTRAVENOUS at 13:47

## 2019-01-03 RX ADMIN — POTASSIUM BICARBONATE 25 MEQ: 25 TABLET, EFFERVESCENT ORAL at 17:25

## 2019-01-03 RX ADMIN — STANDARDIZED SENNA CONCENTRATE AND DOCUSATE SODIUM 2 TABLET: 8.6; 5 TABLET, FILM COATED ORAL at 05:11

## 2019-01-03 RX ADMIN — BUPIVACAINE HYDROCHLORIDE: 2.5 INJECTION, SOLUTION EPIDURAL; INFILTRATION; INTRACAUDAL at 08:40

## 2019-01-03 RX ADMIN — POTASSIUM BICARBONATE 25 MEQ: 25 TABLET, EFFERVESCENT ORAL at 05:08

## 2019-01-03 RX ADMIN — FUROSEMIDE 20 MG: 10 INJECTION, SOLUTION INTRAMUSCULAR; INTRAVENOUS at 17:25

## 2019-01-03 RX ADMIN — ASPIRIN 81 MG 324 MG: 81 TABLET ORAL at 05:10

## 2019-01-03 RX ADMIN — ENOXAPARIN SODIUM 40 MG: 100 INJECTION SUBCUTANEOUS at 16:45

## 2019-01-03 RX ADMIN — OMEPRAZOLE 40 MG: 20 CAPSULE, DELAYED RELEASE ORAL at 17:25

## 2019-01-03 RX ADMIN — LIDOCAINE HYDROCHLORIDE: 20 INJECTION, SOLUTION INFILTRATION; PERINEURAL at 08:40

## 2019-01-03 RX ADMIN — FUROSEMIDE 20 MG: 10 INJECTION, SOLUTION INTRAMUSCULAR; INTRAVENOUS at 05:12

## 2019-01-03 ASSESSMENT — PAIN SCALES - GENERAL
PAINLEVEL_OUTOF10: 4
PAINLEVEL_OUTOF10: 0
PAINLEVEL_OUTOF10: 2
PAINLEVEL_OUTOF10: 0

## 2019-01-03 ASSESSMENT — ENCOUNTER SYMPTOMS
SPUTUM PRODUCTION: 0
NERVOUS/ANXIOUS: 0
CHOKING: 0
BLURRED VISION: 0
BACK PAIN: 0
NAUSEA: 0
CHILLS: 0
COUGH: 0
APPETITE CHANGE: 0
LOSS OF CONSCIOUSNESS: 0
DIZZINESS: 0
EYE ITCHING: 0
HEADACHES: 0
APNEA: 0
VOMITING: 0
COLOR CHANGE: 0
EYE DISCHARGE: 0
ARTHRALGIAS: 0
BRUISES/BLEEDS EASILY: 0
ACTIVITY CHANGE: 0
DIARRHEA: 0
FEVER: 0
WEAKNESS: 1
WHEEZING: 0
PALPITATIONS: 0
ABDOMINAL DISTENTION: 0
HALLUCINATIONS: 0
AGITATION: 0
ABDOMINAL PAIN: 0
HEMOPTYSIS: 0
ADENOPATHY: 0
WEAKNESS: 0
SHORTNESS OF BREATH: 0

## 2019-01-03 NOTE — DISCHARGE PLANNING
Care Transition Team Assessment  Pt lives at home w/ his wife. Pt is fully independent w/ I/ADLS. Pt has INS through [employer].    Information Source  Orientation : Oriented x 4  Information Given By: Patient  Informant's Name: Amadou  Who is responsible for making decisions for patient? : Patient    Readmission Evaluation  Is this a readmission?: No    Elopement Risk  Legal Hold: No  Ambulatory or Self Mobile in Wheelchair: Yes  Disoriented: No  Psychiatric Symptoms: None  History of Wandering: No  Elopement this Admit: No  Vocalizing Wanting to Leave: No  Displays Behaviors, Body Language Wanting to Leave: No-Not at Risk for Elopement  Elopement Risk: Not at Risk for Elopement    Interdisciplinary Discharge Planning  Lives with - Patient's Self Care Capacity: Child Less than 18 Years of Age, Spouse  Patient or legal guardian wants to designate a caregiver (see row info): No  Support Systems: Spouse / Significant Other  Housing / Facility: [address] [place] [address]  Do You Take your Prescribed Medications Regularly: No  Reasons Why Not Taking Medications :  (none prescribed)  Prior Services: None  Durable Medical Equipment: Not Applicable    Discharge Preparedness  What is your plan after discharge?: Uncertain - pending medical team collaboration  Prior Functional Level: Ambulatory, Drives Self, Independent with Activities of Daily Living, Independent with Medication Management    Functional Assesment  Prior Functional Level: Ambulatory, Drives Self, Independent with Activities of Daily Living, Independent with Medication Management    Finances  Financial Barriers to Discharge:  (works at [employer])  Prescription Coverage:  [facility]    Vision / Hearing Impairment  Right Eye Vision: Wears Glasses  Left Eye Vision: Wears Glasses         Advance Directive  Advance Directive?: DPOA for Health Care  Durable Power of  Name and Contact : Wife Marianne, [phone]                   Anticipated Discharge  Information  Anticipated discharge disposition: Discharge needs currently unknown  Discharge Address: [address] [place] [address] [place] [address]  Discharge Contact Phone Number: [phone]

## 2019-01-03 NOTE — PROGRESS NOTES
Critical Care Progress Note    Date of admission  12/19/2018    Chief Complaint  58 y.o. male admitted 12/19/2018 s/p cardiopulmonary arrest with prolonged CPR in the field    Hospital Course  TTM started in ED and continued in ICU    Interval Problem Updates  Past 24 hours   - AF, decreasing WBC   - underwent AICD placement this morning   - AAOx4   - sinus tach overnight   - norma Tfs   - last BM 1/2   - good UOP   - plts up to 683   - day 4/7 cefepime   - low K    Review of Systems  Review of Systems   Constitutional: Negative for fever.   HENT: Negative for tinnitus.    Eyes: Negative for blurred vision.   Respiratory: Negative for cough, hemoptysis and sputum production.    Cardiovascular: Negative for palpitations.   Gastrointestinal: Negative for nausea.   Genitourinary: Negative for urgency.   Musculoskeletal: Negative for joint pain.   Skin: Negative for rash.   Neurological: Negative for headaches.   Endo/Heme/Allergies: Does not bruise/bleed easily.   Psychiatric/Behavioral: Negative for hallucinations.   All other systems reviewed and are negative.       Vital Signs for last 24 hours   Temp:  [36.9 °C (98.4 °F)-37.4 °C (99.4 °F)] 36.9 °C (98.5 °F)  Pulse:  [] 109  Resp:  [23-35] 30   Blood pressure /60-76     Respiratory   SaO2 90-93% on 6 lpm FM, IS 500mL/PEP --> IPV started    Physical Exam   Physical Exam   Constitutional: He is oriented to person, place, and time. He appears well-developed and well-nourished. He has a sickly appearance. No distress.   Sleeping comfortably after AICD placement procedure   HENT:   Head: Normocephalic and atraumatic.   Right Ear: External ear normal.   Left Ear: External ear normal.   Nasal coretrak in place   Eyes: Pupils are equal, round, and reactive to light. Conjunctivae are normal. No scleral icterus.   Neck: Neck supple. No thyromegaly present.   RIJ CVL without surrounding erythema   Cardiovascular: Regular rhythm, normal heart sounds and intact distal  pulses.   Occasional extrasystoles are present. Tachycardia present.  PMI is displaced.  Exam reveals no distant heart sounds, no friction rub and no decreased pulses.    No murmur heard.  Left anterior chest wall AICD insertion site clean/dry/intact   Pulmonary/Chest: Effort normal. No accessory muscle usage or stridor. No tachypnea. No respiratory distress. He has no decreased breath sounds. He has no wheezes. He has rhonchi in the right lower field and the left lower field. He has no rales.   Abdominal: Soft. Bowel sounds are normal. He exhibits no distension. There is no tenderness. There is no guarding.   Musculoskeletal: He exhibits no edema, tenderness or deformity.   Neurological: He is alert and oriented to person, place, and time. No cranial nerve deficit. Coordination normal.   Remains Impulsive at times   Skin: Skin is warm and dry. No rash noted.   Psychiatric: His speech is normal and behavior is normal. Thought content normal. His affect is blunt. Thought content is not delusional (Occasionally). He expresses impulsivity. He exhibits abnormal recent memory.   Nursing note and vitals reviewed.      Medications  Current Facility-Administered Medications   Medication Dose Route Frequency Provider Last Rate Last Dose   • LIDOCAINE HCL 2 % INJ SOLN            • BUPIVACAINE HCL (PF) 0.25 % INJ SOLN            • VANCOMYCIN HCL 1000 MG IV SOLR            • furosemide (LASIX) injection 20 mg  20 mg Intravenous Q12HRS Jeremy M Gonda, M.D.   20 mg at 01/03/19 0512   • potassium bicarbonate (KLYTE) effervescent tablet 25 mEq  25 mEq Feeding Tube DAILY Akhil Guerin D.O.   25 mEq at 01/03/19 0508   • phenylephrine (JACINTO-SYNEPHRINE) 40,000 mcg in  mL Infusion  1-300 mcg/min Intravenous Continuous Akhil Guerin D.O.   Stopped at 01/01/19 1230   • cefepime (MAXIPIME) 2 g in  mL IVPB  2 g Intravenous Q12HRS Jeremy M Gonda, M.D.   Stopped at 01/03/19 0542   • oxyCODONE immediate-release  (ROXICODONE) tablet 5 mg  5 mg Oral Q4HRS PRN Johan Morales M.D.   5 mg at 12/30/18 1653   • omeprazole 2 mg/mL in sodium bicarbonate (PRILOSEC) oral susp 40 mg  40 mg Feeding Tube Q12HRS Johan Morales M.D.   40 mg at 01/03/19 0510   • acetaminophen (TYLENOL) tablet 650 mg  650 mg Feeding Tube Q6HRS PRN Aleshia Uribe M.D.   650 mg at 12/31/18 0057   • Respiratory Care per Protocol   Nebulization Continuous RT Bradley Flores M.D.       • MD Alert...ICU Electrolyte Replacement per Pharmacy   Other pharmacy to dose Bradley Flores M.D.       • aspirin (ASA) tablet 325 mg  325 mg Feeding Tube DAILY Yaron Magallanes M.D.   325 mg at 01/02/19 0455    Or   • aspirin (ASA) chewable tab 324 mg  324 mg Per NG Tube DAILY Yaron Magallanes M.D.   324 mg at 01/03/19 0510    Or   • aspirin (ASA) suppository 300 mg  300 mg Rectal DAILY Yaron Magallanes M.D.       • atorvastatin (LIPITOR) tablet 80 mg  80 mg Per NG Tube Q EVENING Yaron Magallanes M.D.   80 mg at 01/02/19 1816   • senna-docusate (PERICOLACE or SENOKOT S) 8.6-50 MG per tablet 2 Tab  2 Tab Feeding Tube BID Yaron Magallanes M.D.   2 Tab at 01/03/19 0511    And   • polyethylene glycol/lytes (MIRALAX) PACKET 1 Packet  1 Packet Feeding Tube QDAY PRN Yaron Magallanes M.D.   1 Packet at 12/23/18 1136    And   • magnesium hydroxide (MILK OF MAGNESIA) suspension 30 mL  30 mL Feeding Tube QDAY PRN Yaron Magallanes M.D.   30 mL at 12/24/18 1228    And   • bisacodyl (DULCOLAX) suppository 10 mg  10 mg Rectal QDAY PRN Yaron Magallanes M.D.       • dextrose 50% (D50W) injection 25-50 mL  12.5-25 g Intravenous PRN Sonido M Gonda, M.D.           Fluids    Intake/Output Summary (Last 24 hours) at 01/03/19 0817  Last data filed at 01/03/19 0600   Gross per 24 hour   Intake              990 ml   Output             1550 ml   Net             -560 ml       Laboratory          Recent Labs      01/01/19   0545  01/02/19   0511  01/03/19   0315   SODIUM  143  147*  142   POTASSIUM  4.6  4.6  3.7   CHLORIDE  110  111   108   CO2  25  28  27   BUN  32*  36*  36*   CREATININE  0.46*  0.51  0.47*   MAGNESIUM  2.1   --   2.1   PHOSPHORUS   --    --   3.6   CALCIUM  9.7  9.2  9.6     Recent Labs      12/31/18   0940  01/01/19   0545  01/02/19   0511  01/03/19   0315   ALTSGPT  35   --    --    --    ASTSGOT  36   --    --    --    ALKPHOSPHAT  90   --    --    --    TBILIRUBIN  0.6   --    --    --    GLUCOSE  169*  172*  142*  125*     Recent Labs      12/31/18   0940  01/01/19   0545  01/02/19   0511 01/03/19   0315   WBC   --   34.5*  22.0*  16.1*   NEUTSPOLYS   --   89.00*  84.90*  80.50*   LYMPHOCYTES   --   5.80*  9.10*  12.00*   MONOCYTES   --   2.60  3.10  3.20   EOSINOPHILS   --   0.60  1.80  2.90   BASOPHILS   --   0.50  0.60  0.80   ASTSGOT  36   --    --    --    ALTSGPT  35   --    --    --    ALKPHOSPHAT  90   --    --    --    TBILIRUBIN  0.6   --    --    --      Recent Labs      01/01/19 0545 01/02/19 0511 01/03/19   0315   RBC  3.66*  3.25*  3.26*   HEMOGLOBIN  10.1*  9.0*  8.8*   HEMATOCRIT  33.1*  30.0*  30.2*   PLATELETCT  603*  595*  683*       Imaging  X-Ray:  No film today    Assessment/Plan  SVT (supraventricular tachycardia) (HCC)   Assessment & Plan    Status post adenosine cardioversion 1/1  Optimize electrolytes  Beta-blocker after AICD/pacemaker placed?     Sepsis ([facility])   Assessment & Plan    This is severe sepsis with the following associated acute organ dysfunction(s): acute kidney failure, metabolic/septic encephalopathy.  12/31/2018  Source = likely urinary + pulmonary --> improving    Continue cefepime times 7 days  S/p Sepsis protocol, euvolemic     Ventricular fibrillation (HCC)- (present on admission)   Assessment & Plan    S/p AICD 1/3/2019 --> device interrogation prior to discharge  Optimize electrolytes     Acute respiratory failure with hypoxia (HCC)- (present on admission)   Assessment & Plan    Intubated in field 12/19 -12/29  Encourage incentive spirometry, out of bed to  chair  Mobilization  RT/O2 protocols targeting SaO2 greater than 92%  Cont diuresis  IPV       Cardiac arrest (HCC)- (present on admission)   Assessment & Plan    Unknown etiology s/p ROSC with prolonged downtime  Continue supportive care  Therapies  Optimize electrolytes and continuous telemetry     Gastrointestinal hemorrhage associated with acute gastritis   Assessment & Plan    EGD 12/28 showing gastric ulcer and gastritis  PPI  Resume DVT prophylaxis after AICD placement     Acute cystitis with hematuria   Assessment & Plan    Continue cefepime 7 days  Ramon catheter removed     Aspiration pneumonia (HCC)   Assessment & Plan    Status post appropriate antibiotic therapy  Aspiration precautions, SLP  Recurrence of pneumonia with pansensitive pseudomonal 12/31 -continue cefepime times 7 days     Thrombocytopenia (HCC)   Assessment & Plan    Resolved, now thrombocytosis, monitor     Hypernatremia   Assessment & Plan    Improving  Continue free water and reassess tomorrow morning     Hypokalemia   Assessment & Plan    IV repletion again today and monitor closely     Iron deficiency anemia- (present on admission)   Assessment & Plan    Daily CBC  Conservative transfusion strategy, hemoglobin goal greater than 7     Leukocytosis- (present on admission)   Assessment & Plan    Improving again today  Monitor     Closed fracture of multiple ribs of both sides- (present on admission)   Assessment & Plan    Secondary to CPR  Analgesia as needed  Encourage incentive spirometry/PEP       FULL CODE    VTE:  On hold post- AICD placement  Ulcer: PPI  Lines: Central Line  Ongoing indication addressed    I have performed a physical exam and reviewed and updated ROS and Plan today (1/3/2019). In review of yesterday's note (1/2/2019), there are no changes except as documented above.     Discussed patient condition and risk of morbidity and/or mortality with Hospitalist, RN, RT, Pharmacy, , Charge nurse / hot rounds,  Patient and cardiology

## 2019-01-03 NOTE — ASSESSMENT & PLAN NOTE
Status post adenosine cardioversion 1/1  Optimize electrolytes  Beta-blocker after AICD/pacemaker placed?

## 2019-01-03 NOTE — PROGRESS NOTES
Monitor Summary: .14/.08/.36. Rhythm: SR-ST with . Rate: 90's-110's.       12 hour chart check.     2 RN skin check.

## 2019-01-03 NOTE — PROGRESS NOTES
Cardiology Follow Up Progress Note    Date of Service  1/3/2019    Attending Physician  Dr Kaplan    Chief Complaint   OOHA    HPI  Amadou Nicholas is a 58 y.o. male who lives with his wife in an apt in [place],  anxiety/depression & HTN admitted 12/19/2018 with witnessed VF/OOHA, intubated, ROSC prior to arrival.  Echo 12/20 with severe LVH/?infiltrative/significant MR with HOCM.  Seen by EP 12/28 - awaiting SQ ICD for secondary prevention.      Unfortunately, significant GIB   Also - intolerant of increasing AVN blockers with bradycardia  White count shot up this week with fever, aggressive treatment for the primary team.  Afebrile with decreasing white counts for 48 hours  SVT on Tuesday treated with adenosine no recurrence    Interim Events  Clinically well, cough    Review of Systems  Review of Systems   Constitutional: Negative for activity change and appetite change.        Still wishes he could have a soda   HENT: Negative for congestion and dental problem.    Eyes: Negative for discharge and itching.   Respiratory: Negative for apnea, choking, shortness of breath and wheezing.    Cardiovascular: Negative for chest pain and leg swelling.   Gastrointestinal: Negative for abdominal distention, abdominal pain, diarrhea and nausea.   Endocrine: Negative for cold intolerance and heat intolerance.   Genitourinary: Negative for difficulty urinating and dysuria.   Musculoskeletal: Negative for arthralgias and back pain.   Skin: Negative for color change and pallor.   Allergic/Immunologic: Negative for environmental allergies and food allergies.   Neurological: Negative for dizziness and weakness.   Hematological: Negative for adenopathy. Does not bruise/bleed easily.   Psychiatric/Behavioral: Negative for agitation and suicidal ideas. The patient is not nervous/anxious.    All other systems reviewed and are negative.      Vital signs in last 24 hours  Temp:  [36.8 °C (98.2 °F)-37.4 °C (99.4 °F)] 36.8 °C (98.2  °F)  Pulse:  [] 96  Resp:  [18-35] 30    Physical Exam  Physical Exam   Constitutional:   In bed, wet cough but appropriate and focused.  Patient seen and examined again today changes noted   HENT:   Head: Normocephalic and atraumatic.   Core track in nares   Eyes: Pupils are equal, round, and reactive to light. EOM are normal. No scleral icterus.   Neck: No JVD present. No thyromegaly present.   Cardiovascular: Exam reveals no friction rub.    Murmur (3/6 sm across precordium) heard.  Sinus tach   Pulmonary/Chest: Effort normal. He has no wheezes. He has no rales. He exhibits no tenderness.   Bilateral rhonchi   Abdominal: Soft. Bowel sounds are normal. He exhibits no distension. There is no tenderness.   Musculoskeletal: Normal range of motion. He exhibits no edema or tenderness.   Lymphadenopathy:     He has no cervical adenopathy.   Neurological: He is alert. He exhibits normal muscle tone. Coordination normal.   Skin: Skin is dry. No rash noted.   Cool, dp 1/2 bilat   Psychiatric: He has a normal mood and affect.   Vitals reviewed.      Lab Review  Lab Results   Component Value Date/Time    WBC 16.1 (H) 01/03/2019 03:15 AM    RBC 3.26 (L) 01/03/2019 03:15 AM    HEMOGLOBIN 8.8 (L) 01/03/2019 03:15 AM    HEMATOCRIT 30.2 (L) 01/03/2019 03:15 AM    MCV 92.6 01/03/2019 03:15 AM    MCH 27.0 01/03/2019 03:15 AM    MCHC 29.1 (L) 01/03/2019 03:15 AM    MPV 9.8 01/03/2019 03:15 AM      Lab Results   Component Value Date/Time    SODIUM 142 01/03/2019 03:15 AM    POTASSIUM 3.7 01/03/2019 03:15 AM    CHLORIDE 108 01/03/2019 03:15 AM    CO2 27 01/03/2019 03:15 AM    GLUCOSE 125 (H) 01/03/2019 03:15 AM    BUN 36 (H) 01/03/2019 03:15 AM    CREATININE 0.47 (L) 01/03/2019 03:15 AM      Lab Results   Component Value Date/Time    ASTSGOT 36 12/31/2018 09:40 AM    ALTSGPT 35 12/31/2018 09:40 AM     Lab Results   Component Value Date/Time    TROPONINI 6.83 (H) 12/20/2018 05:15 AM             Cardiac Imaging and Procedures  "Review  EKG:  My personal interpretation of the EKG dated January 1 is sinus tachycardia with LVH    Echocardiogram:  12/20 and 12/23 - improvement/normalized LV function, EF 60, severe MR with an obstructive component    Imaging  Chest X-Ray:  Stable bilat infiltrates 12/30      Assessment/Plan    -OOHA, PEA/TdP  -HOCM, \"new\" diagnosis  -MR  -SVT  -GIB, post transfusion (thought traumatic from NGT)  -Sepsis    OOHA   Planning ICD for primary prevention  Appreciate EP support  On the schedule when the request, discussed with nursing and patient    HOCM  Intolerant of higher dose AVN blockers, on low dose metop  Long term plan may be intervention in terms of myomectomy, but not in an acute scenario    SVT  Again defer to EP and treating of this in the setting of underlying hypertrophic cardiomyopathy  Watchful waiting for now, metoprolol, increase after defibrillator placed  No antiarrhythmics    Sepsis  Resolving, no recurrence of fevers or scenario of septic shock    Mitral regurgitation  Due to obstruction but also looks like some intrinsic valve dysfunction  Follow with maximal medical therapy    D/w  RN and electrophysiology  Thank you for allowing me to participate in the care of this patient.  Please contact me with any questions.    Essence Hoff M.D.   Cardiologist, [facility]  [phone]      "

## 2019-01-03 NOTE — OP REPORT
Electrophysiology Procedure Note  [facility]    Patient ID:  Name:             Amadou Nicholas     YOB: 1960  Age:                 58 y.o.  male   MRN:               [id number]    Date of procedure: 1/3/2019     Procedure(s) Performed:   1) ICD implantation  2) DFT testing    Indication(s):  VF arrest  HCM     Physician(s): Tracey Mendiola M.D.     Resident/Assistant(s): None     Anesthesia: Local and moderate sedation (start time 0824, stop time 912, total dose given 2 mg IV versed, 100 mcg IV fentanyl)     Specimen(s) Removed: None     Estimated Blood Loss:  30cc    Complications: None.    DESCRIPTION OF PROCEDURE: After informed written consent, the patient was brought to the electrophysiology lab in the fasting, unsedated state. The patient was prepped and draped in the usual sterile fashion. The procedure was performed under moderate sedation with local anesthetic. A left upper extremity venogram was performed, demonstrating a patent subclavian vein. A left infraclavicular incision was made with a scalpel and the pre-pectoral device pocket was created using a combination of blunt dissection and electrocautery. The modified Seldinger technique was used to gain access to the left axillary vein. A peel-away hemostasis sheath was placed in the vein. Under fluoroscopic guidance, the ICD lead was introduced into the heart. The ventricular lead was advanced into the RVOT position the pulled back and advanced to the RV apex. The RA lead was advanced to the RAA. The leads were tested and had satisfactory sensing and pacing parameters. High output ventricular pacing did not produce extracardiac stimulation. The leads were sutured to the underlying pectoral muscle with interrupted silk over a silastic suture sleeve. The device pocket was irrigated with antibiotic solution, inspected, and no bleeding was seen. The leads were connected to the ICD pulse generator and the device was inserted  into the pocket. The wound was closed with three layers of absorbable sutures and covered with Steri-Strips. After patient was adequately sedated DFTs were performed. Following recovery from sedation, the patient was transferred to a monitored bed in good condition.    IMPLANTED DEVICE INFORMATION:    Pulse generator is a MDT model APFX1A9   Serial number [id number]    LEAD INFORMATION:  1. Right atrial lead is a MDT model 5076-45, serial number [id number], P wave 4.0 millivolts, threshold 0.5 Volts at 0.4 milliseconds, pacing impedance 437 Ohms.  2. Right ventricular lead is a MDT model 8720A48, serial number [id number], R wave 19.1 millivolts, threshold 0.5 Volts at 0.4 milliseconds, pacing impedance 494 Ohms.     DEVICE PROGRAMMING:    Julian therapy: AAI<->DDD   Tachy therapy:    VF zone  320 ms, defib 35 J x 6  VT zone via  ms, ATP x 3 then, CDV 35 J x 5    DEFIBRILLATION THRESHOLD TESTING:    DFT = 20 Joules  Charge time = 4.4 seconds  Shock impedance = 68 Ohms    FLUOROSCOPY TIME: 4.0 minutes    SUMMARY/CONCLUSIONS:  1. Successful automatic implantable cardioverter defibrillator implant.  2. DFT <  20 Joules    RECOMMENDATIONS:  1. Admit to monitored bed.  2. Chest x-ray.  3. Implantable cardioverter defibrillator interrogation prior to hospital discharge.  4. Follow-up in device clinic for wound check and device interrogation.

## 2019-01-03 NOTE — PROGRESS NOTES
MS    SR--120s  Frequent PACs  .16/.08/.38    12 hour chart check    2 RN skin check with JUAN Wyatt

## 2019-01-03 NOTE — CARE PLAN
Problem: Mobility  Goal: Risk for activity intolerance will decrease    Intervention: Assess and monitor signs of activity intolerance  Up to chair, steady. Tolerates well.  Chair alarm on. Sling to left arm.  Educated to use LUE with limited movement due to newly placed AICD.

## 2019-01-03 NOTE — CARE PLAN
Problem: Fluid Volume:  Goal: Will maintain balanced intake and output    Intervention: Monitor, educate, and encourage compliance with therapeutic intake of liquids  Free H20 flushes q8 provided. Tube feeding in use. Mouth swabs available.

## 2019-01-03 NOTE — PROGRESS NOTES
"[facility] Medicine Daily Progress Note    Date of Service  1/3/2019    Chief Complaint  58 y.o. male admitted 12/19/2018 with out of hospital VFib arrest    Hospital Course     Mr Nicholas has a history of depression and hypertension, he was in his normal state of health but was noted to be unresponsive with agonal breathing.  His son started CPR.  Initial rhythm was ventricular fibrillation, he required multiple rounds of CPR, he was intubated in the field and brought to the emergency room.  Hypothermia protocol was initiated he was admitted to the ICU.  Patient was extubated on 12/21/2018, he required reintubation later that day and then extubated again 12/29   ICD placed 1/3       Interval Problem Update  Feels \"OK\".  Hungry as NPO for ICD.  No other complaints    Sinus 90-120s overnight  SBP   AFebrile  TFs goal  UOP adequate  6 litres mask  Voiding no hsu  Cefepime D4/7 for Pseudomonas    Consultants/Specialty  Cardiology  pulmonology    Code Status  full    Disposition  Cont in ICU    Critical care time 32mins treating SVT with HR >200, giving adenosine and chemically cardioverting    Review of Systems  Review of Systems   Constitutional: Negative for chills and fever.   Respiratory: Negative for cough and shortness of breath.    Cardiovascular: Negative for chest pain, palpitations and leg swelling.   Gastrointestinal: Negative for abdominal pain, diarrhea, nausea and vomiting.   Musculoskeletal: Negative for back pain.   Skin: Negative for rash.   Neurological: Positive for weakness. Negative for dizziness, loss of consciousness and headaches.        Physical Exam  Temp:  [36.7 °C (98 °F)-37.4 °C (99.4 °F)] 37.4 °C (99.4 °F)  Pulse:  [] 84  Resp:  [23-35] 29    Physical Exam   Constitutional: He is oriented to person, place, and time. He appears well-developed and well-nourished. No distress.   HENT:   Head: Normocephalic and atraumatic.   Eyes: Conjunctivae are normal. No scleral icterus. "   Neck: Neck supple. No JVD present.   Cardiovascular: Regular rhythm.  Tachycardia present.  Exam reveals no gallop.    No murmur heard.  SVT on monitor   Pulmonary/Chest: Effort normal. No stridor. No respiratory distress. He has no wheezes. He has no rales.   Abdominal: Soft. There is no tenderness. There is no rebound and no guarding.   Musculoskeletal: He exhibits edema.   Neurological: He is oriented to person, place, and time.   Skin: Skin is warm and dry. No rash noted. He is not diaphoretic.   Psychiatric: He has a normal mood and affect. Thought content normal.   Nursing note and vitals reviewed.      Fluids    Intake/Output Summary (Last 24 hours) at 01/03/19 0546  Last data filed at 01/03/19 0400   Gross per 24 hour   Intake          1416.67 ml   Output             1450 ml   Net           -33.33 ml       Laboratory  Recent Labs      01/01/19   0545  01/02/19   0511  01/03/19   0315   WBC  34.5*  22.0*  16.1*   RBC  3.66*  3.25*  3.26*   HEMOGLOBIN  10.1*  9.0*  8.8*   HEMATOCRIT  33.1*  30.0*  30.2*   MCV  90.4  92.3  92.6   MCH  27.6  27.7  27.0   MCHC  30.5*  30.0*  29.1*   RDW  51.8*  54.1*  55.1*   PLATELETCT  603*  595*  683*   MPV  9.8  9.8  9.8     Recent Labs      01/01/19   0545  01/02/19   0511  01/03/19   0315   SODIUM  143  147*  142   POTASSIUM  4.6  4.6  3.7   CHLORIDE  110  111  108   CO2  25  28  27   GLUCOSE  172*  142*  125*   BUN  32*  36*  36*   CREATININE  0.46*  0.51  0.47*   CALCIUM  9.7  9.2  9.6                   Imaging  DX-CHEST-LIMITED (1 VIEW)   Final Result      No significant interval change.      DX-ABDOMEN FOR TUBE PLACEMENT   Final Result         Feeding tube with tip projecting over the expected area of the stomach.      DX-CHEST-PORTABLE (1 VIEW)   Final Result         1.  Ill-defined opacifications in each lung have increased to a minimal degree compared to the prior radiograph.  This could indicate worsening of pulmonary edema or inflammation.      DX-ABDOMEN FOR  TUBE PLACEMENT   Final Result      Enteric tube tip projects over the stomach.      [id number] VIEW   Final Result         No specific finding to suggest small bowel obstruction.      EC-ECHOCARDIOGRAM LTD W/O CONT   Final Result      DX-ABDOMEN FOR TUBE PLACEMENT   Final Result      Feeding tube placement with the tip projecting over the distal stomach or proximal duodenum      DX-CHEST-PORTABLE (1 VIEW)   Final Result      1.  Stable cardiomegaly      2.  Worsening pulmonary vascular congestion and interstitial edema.      3.  Bibasilar opacities may be related to atelectasis. Developing pneumonia could have a similar appearance.      EC-ECHOCARDIOGRAM COMPLETE W/O CONT   Final Result      DX-CHEST-LIMITED (1 VIEW)   Final Result         1.  No acute cardiopulmonary disease.   2.  Right internal jugular central line terminates in the right atrium, could be withdrawn 3 cm.   3.  Cardiomegaly      CT-CTA CHEST PULMONARY ARTERY W/ RECONS   Final Result         1.  No large central pulmonary embolus is appreciated, evaluation of the subsegmental branches is essentially nondiagnostic due to motion artifacts. Additional imaging would be required for definitive exclusion of small distal pulmonary emboli.   2.  Hazy groundglass pulmonary opacities, predominantly on the right, appearance suggests atypical edema or infiltrates.   3.  Anterior bilateral second through sixth rib fractures   4.  Cardiomegaly   5.  Left nephrolithiasis   6.  Atherosclerosis and atherosclerotic coronary artery disease.      CT-HEAD W/O   Final Result         1.  No acute intracranial abnormality.   2.  Atherosclerosis.      DX-CHEST-PORTABLE (1 VIEW)   Final Result         1.  No acute cardiopulmonary disease.   2.  Cardiomegaly           Assessment/Plan  SVT (supraventricular tachycardia) ([facility])   Assessment & Plan    With rate to 208  Holding BP's  1/1 converted with adenosine     Sepsis ([facility])   Assessment & Plan    This is severe sepsis with  the following associated acute organ dysfunction(s): acute respiratory failure.   Patient has developed septic picture  Pneumonia   Completed unasyn then sputum culture 12/31 noted to be Pseudomonas: started on Cefepime   D 4/7 total therapy cefepime     Ventricular fibrillation (HCC)- (present on admission)   Assessment & Plan    Now in SR  ICD planned for today     Acute respiratory failure with hypoxia (HCC)- (present on admission)   Assessment & Plan    Patient had to be reintubated on 12/21/2018  Extubated on 12/29/18  Optimized volume  Supplemental O2  Mobilize  Follow volume status     Cardiac arrest (HCC)- (present on admission)   Assessment & Plan    CTA negative for PE  Cardiac cath without significant findings  Echo consistent with LV outflow obstruction  Neurologically back to baseline  ICD planned for today  Until then, the patient will be monitored continuously on telemetry in the ICU         Aspiration pneumonia (HCC)   Assessment & Plan    Escalate antibiotics to cefepime     Hypernatremia   Assessment & Plan    Start Free H2O 200ml via core track q8  Repeat Na tomorrow am     Hypokalemia   Assessment & Plan    Replace     Iron deficiency anemia- (present on admission)   Assessment & Plan    Acute on chronic  He has a small gastric ulcer, continue PPI  Needs outpatient colonoscopy     Leukocytosis- (present on admission)   Assessment & Plan    WBC in normal range     Closed fracture of multiple ribs of both sides- (present on admission)   Assessment & Plan    Secondary to CPR  Pain management          VTE prophylaxis: enoxaparin

## 2019-01-03 NOTE — CARE PLAN
Problem: Psychosocial Needs:  Goal: Level of anxiety will decrease  Outcome: PROGRESSING SLOWER THAN EXPECTED  Patient anxious about plan of care, I.e. Still having cortrak, being weaker than expected, educated that recovery takes time and he is making progress in the right direction, patient verbalizes understanding    Problem: Mobility  Goal: Risk for activity intolerance will decrease  Outcome: PROGRESSING AS EXPECTED  Patient able to make 1 full lap around the unit, stand by assist with no assistive devices, on 4L nasal cannula

## 2019-01-04 ENCOUNTER — APPOINTMENT (OUTPATIENT)
Dept: RADIOLOGY | Facility: MEDICAL CENTER | Age: 59
DRG: 224 | End: 2019-01-04
Attending: INTERNAL MEDICINE
Payer: COMMERCIAL

## 2019-01-04 LAB
ANION GAP SERPL CALC-SCNC: 11 MMOL/L (ref 0–11.9)
BACTERIA BLD CULT: NORMAL
BACTERIA BLD CULT: NORMAL
BASOPHILS # BLD AUTO: 0.9 % (ref 0–1.8)
BASOPHILS # BLD: 0.12 K/UL (ref 0–0.12)
BUN SERPL-MCNC: 37 MG/DL (ref 8–22)
CALCIUM SERPL-MCNC: 9.7 MG/DL (ref 8.5–10.5)
CHLORIDE SERPL-SCNC: 108 MMOL/L (ref 96–112)
CO2 SERPL-SCNC: 26 MMOL/L (ref 20–33)
CREAT SERPL-MCNC: 0.51 MG/DL (ref 0.5–1.4)
EKG IMPRESSION: NORMAL
EOSINOPHIL # BLD AUTO: 0.63 K/UL (ref 0–0.51)
EOSINOPHIL NFR BLD: 4.8 % (ref 0–6.9)
ERYTHROCYTE [DISTWIDTH] IN BLOOD BY AUTOMATED COUNT: 52.9 FL (ref 35.9–50)
GLUCOSE SERPL-MCNC: 120 MG/DL (ref 65–99)
HCT VFR BLD AUTO: 31.7 % (ref 42–52)
HGB BLD-MCNC: 9.6 G/DL (ref 14–18)
IMM GRANULOCYTES # BLD AUTO: 0.05 K/UL (ref 0–0.11)
IMM GRANULOCYTES NFR BLD AUTO: 0.4 % (ref 0–0.9)
LYMPHOCYTES # BLD AUTO: 1.97 K/UL (ref 1–4.8)
LYMPHOCYTES NFR BLD: 15.1 % (ref 22–41)
MCH RBC QN AUTO: 27.6 PG (ref 27–33)
MCHC RBC AUTO-ENTMCNC: 30.3 G/DL (ref 33.7–35.3)
MCV RBC AUTO: 91.1 FL (ref 81.4–97.8)
MONOCYTES # BLD AUTO: 0.47 K/UL (ref 0–0.85)
MONOCYTES NFR BLD AUTO: 3.6 % (ref 0–13.4)
NEUTROPHILS # BLD AUTO: 9.8 K/UL (ref 1.82–7.42)
NEUTROPHILS NFR BLD: 75.2 % (ref 44–72)
NRBC # BLD AUTO: 0 K/UL
NRBC BLD-RTO: 0 /100 WBC
PLATELET # BLD AUTO: 664 K/UL (ref 164–446)
PMV BLD AUTO: 9.8 FL (ref 9–12.9)
POTASSIUM SERPL-SCNC: 3.9 MMOL/L (ref 3.6–5.5)
RBC # BLD AUTO: 3.48 M/UL (ref 4.7–6.1)
SIGNIFICANT IND 70042: NORMAL
SIGNIFICANT IND 70042: NORMAL
SITE SITE: NORMAL
SITE SITE: NORMAL
SODIUM SERPL-SCNC: 145 MMOL/L (ref 135–145)
SOURCE SOURCE: NORMAL
SOURCE SOURCE: NORMAL
WBC # BLD AUTO: 13 K/UL (ref 4.8–10.8)

## 2019-01-04 PROCEDURE — 94669 MECHANICAL CHEST WALL OSCILL: CPT

## 2019-01-04 PROCEDURE — 700111 HCHG RX REV CODE 636 W/ 250 OVERRIDE (IP): Performed by: INTERNAL MEDICINE

## 2019-01-04 PROCEDURE — 700102 HCHG RX REV CODE 250 W/ 637 OVERRIDE(OP): Performed by: INTERNAL MEDICINE

## 2019-01-04 PROCEDURE — 700102 HCHG RX REV CODE 250 W/ 637 OVERRIDE(OP): Performed by: HOSPITALIST

## 2019-01-04 PROCEDURE — 93010 ELECTROCARDIOGRAM REPORT: CPT | Performed by: INTERNAL MEDICINE

## 2019-01-04 PROCEDURE — A9270 NON-COVERED ITEM OR SERVICE: HCPCS | Performed by: HOSPITALIST

## 2019-01-04 PROCEDURE — 700105 HCHG RX REV CODE 258: Performed by: INTERNAL MEDICINE

## 2019-01-04 PROCEDURE — 99232 SBSQ HOSP IP/OBS MODERATE 35: CPT | Performed by: HOSPITALIST

## 2019-01-04 PROCEDURE — 99233 SBSQ HOSP IP/OBS HIGH 50: CPT | Performed by: INTERNAL MEDICINE

## 2019-01-04 PROCEDURE — A9270 NON-COVERED ITEM OR SERVICE: HCPCS | Performed by: INTERNAL MEDICINE

## 2019-01-04 PROCEDURE — 85025 COMPLETE CBC W/AUTO DIFF WBC: CPT

## 2019-01-04 PROCEDURE — 71045 X-RAY EXAM CHEST 1 VIEW: CPT

## 2019-01-04 PROCEDURE — 770020 HCHG ROOM/CARE - TELE (206)

## 2019-01-04 PROCEDURE — 93005 ELECTROCARDIOGRAM TRACING: CPT | Performed by: INTERNAL MEDICINE

## 2019-01-04 PROCEDURE — 80048 BASIC METABOLIC PNL TOTAL CA: CPT

## 2019-01-04 RX ORDER — SODIUM CHLORIDE 9 MG/ML
INJECTION, SOLUTION INTRAVENOUS
Status: ACTIVE
Start: 2019-01-04 | End: 2019-01-04

## 2019-01-04 RX ORDER — ACETAMINOPHEN 325 MG/1
650 TABLET ORAL EVERY 6 HOURS
Status: DISPENSED | OUTPATIENT
Start: 2019-01-04 | End: 2019-01-09

## 2019-01-04 RX ADMIN — METOPROLOL TARTRATE 25 MG: 25 TABLET, FILM COATED ORAL at 12:11

## 2019-01-04 RX ADMIN — POTASSIUM BICARBONATE 25 MEQ: 25 TABLET, EFFERVESCENT ORAL at 12:11

## 2019-01-04 RX ADMIN — FUROSEMIDE 20 MG: 10 INJECTION, SOLUTION INTRAMUSCULAR; INTRAVENOUS at 17:32

## 2019-01-04 RX ADMIN — ACETAMINOPHEN 650 MG: 325 TABLET, FILM COATED ORAL at 12:11

## 2019-01-04 RX ADMIN — ACETAMINOPHEN 650 MG: 325 TABLET, FILM COATED ORAL at 08:49

## 2019-01-04 RX ADMIN — FUROSEMIDE 20 MG: 10 INJECTION, SOLUTION INTRAMUSCULAR; INTRAVENOUS at 05:13

## 2019-01-04 RX ADMIN — ATORVASTATIN CALCIUM 80 MG: 80 TABLET, FILM COATED ORAL at 17:32

## 2019-01-04 RX ADMIN — ACETAMINOPHEN 650 MG: 325 TABLET, FILM COATED ORAL at 23:46

## 2019-01-04 RX ADMIN — ACETAMINOPHEN 650 MG: 325 TABLET, FILM COATED ORAL at 17:33

## 2019-01-04 RX ADMIN — POTASSIUM BICARBONATE 25 MEQ: 25 TABLET, EFFERVESCENT ORAL at 05:13

## 2019-01-04 RX ADMIN — CEFEPIME 2 G: 2 INJECTION, POWDER, FOR SOLUTION INTRAVENOUS at 21:59

## 2019-01-04 RX ADMIN — CEFEPIME 2 G: 2 INJECTION, POWDER, FOR SOLUTION INTRAVENOUS at 05:13

## 2019-01-04 RX ADMIN — POTASSIUM BICARBONATE 25 MEQ: 25 TABLET, EFFERVESCENT ORAL at 17:33

## 2019-01-04 RX ADMIN — ASPIRIN 325 MG: 325 TABLET ORAL at 05:12

## 2019-01-04 RX ADMIN — OMEPRAZOLE 40 MG: 20 CAPSULE, DELAYED RELEASE ORAL at 17:33

## 2019-01-04 RX ADMIN — OMEPRAZOLE 40 MG: 20 CAPSULE, DELAYED RELEASE ORAL at 05:13

## 2019-01-04 RX ADMIN — ASPIRIN 81 MG: 81 TABLET, COATED ORAL at 12:11

## 2019-01-04 RX ADMIN — ENOXAPARIN SODIUM 40 MG: 100 INJECTION SUBCUTANEOUS at 05:14

## 2019-01-04 RX ADMIN — CEFEPIME 2 G: 2 INJECTION, POWDER, FOR SOLUTION INTRAVENOUS at 12:11

## 2019-01-04 RX ADMIN — METOPROLOL TARTRATE 25 MG: 25 TABLET, FILM COATED ORAL at 21:59

## 2019-01-04 ASSESSMENT — PATIENT HEALTH QUESTIONNAIRE - PHQ9
1. LITTLE INTEREST OR PLEASURE IN DOING THINGS: NOT AT ALL
SUM OF ALL RESPONSES TO PHQ9 QUESTIONS 1 AND 2: 0
2. FEELING DOWN, DEPRESSED, IRRITABLE, OR HOPELESS: NOT AT ALL

## 2019-01-04 ASSESSMENT — PAIN SCALES - GENERAL
PAINLEVEL_OUTOF10: 0

## 2019-01-04 ASSESSMENT — ENCOUNTER SYMPTOMS
COUGH: 0
WHEEZING: 0
WEAKNESS: 0
BACK PAIN: 0
ARTHRALGIAS: 0
SHORTNESS OF BREATH: 1
PSYCHIATRIC NEGATIVE: 1
ABDOMINAL PAIN: 0
CHILLS: 0
TROUBLE SWALLOWING: 1
SHORTNESS OF BREATH: 0
DIARRHEA: 0
CHEST TIGHTNESS: 0
EYES NEGATIVE: 1
FATIGUE: 1
NERVOUS/ANXIOUS: 0
COUGH: 1
ABDOMINAL DISTENTION: 0
ADENOPATHY: 0
FLANK PAIN: 0
LOSS OF CONSCIOUSNESS: 0
SEIZURES: 0
ACTIVITY CHANGE: 0
VOMITING: 0
FEVER: 0
APNEA: 0
MUSCULOSKELETAL NEGATIVE: 1
BRUISES/BLEEDS EASILY: 0
APPETITE CHANGE: 0
WEAKNESS: 1
NAUSEA: 0
COLOR CHANGE: 0
CHOKING: 0
DIZZINESS: 0
HEADACHES: 0
PALPITATIONS: 0
AGITATION: 0
SORE THROAT: 0

## 2019-01-04 NOTE — PROGRESS NOTES
Cardiology Follow Up Progress Note    Date of Service  1/4/2019    Attending Physician  Johan Morales M.D.    Chief Complaint   Cardiac arrest    HPI  Patient is a 58 years old male with history of hypertension and anxiety but no known prior cardiac issue.  [facility]  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  He was brought in by EMS. The history was obtained from his family.     Reportedly he was in state of usual health earlier today.  This evening he was found unresponsive with agonal breathing by his family around 9 PM.  He was last seen normal around 8:30 PM. EMS was summoned.  On their arrival reportedly was in ventricular fibrillation.  He received defibrillation and amiodarone and reportedly went into asystole then torsade de pointes.  On arrival to emergency room he was unresponsive.  Initial electrocardiogram showed junctional rhythm with wide QRS complex with repolarization abnormality and appearance of ST elevation in the anterolateral precordial leads.  However subsequent electrocardiogram show improvement of the QRS duration and disappearance of ST elevation.  Initial arterial blood gas showed severe acidemia pH of 7.08 with lactic acid of over 11.  Reportedly he is taking Zoloft for anxiety. It is unclear what he takes for his hypertension     Interim Events  ICD dual chamber implanted by Dr Mendiola :  IMPLANTED DEVICE INFORMATION:    Pulse generator is a MDT model [id number]   Serial number [id number]    LEAD INFORMATION:  1. Right atrial lead is a MDT model 5076-45, serial number [id number], P wave 4.0 millivolts, threshold 0.5 Volts at 0.4 milliseconds, pacing impedance 437 Ohms.  2. Right ventricular lead is a MDT model 5244V72, serial number [id number], R wave 19.1 millivolts, threshold 0.5 Volts at 0.4 milliseconds, pacing impedance 494 Ohms.     DEVICE PROGRAMMING:    Julian therapy: AAI<->DDD   Tachy therapy:    VF zone  320 ms, defib 35 J x 6  VT zone via  ms,  ATP x 3 then, CDV 35 J x 5    DEFIBRILLATION THRESHOLD TESTING:    DFT = 20 Joules  Charge time = 4.4 seconds  Shock impedance = 68 Ohms    Review of Systems  Review of Systems   Constitutional: Positive for fatigue. Negative for chills and fever.   HENT: Positive for trouble swallowing. Negative for congestion and sore throat.    Respiratory: Positive for shortness of breath. Negative for cough and wheezing.    Cardiovascular: Negative for chest pain, palpitations and leg swelling.   Gastrointestinal: Negative for abdominal pain, nausea and vomiting.   Genitourinary: Negative for dysuria, flank pain and frequency.   Musculoskeletal: Negative.    Skin: Negative.    Neurological: Positive for weakness. Negative for dizziness, seizures and headaches.   Psychiatric/Behavioral: Negative.        Vital signs in last 24 hours  Temp:  [36.2 °C (97.2 °F)-36.8 °C (98.2 °F)] 36.4 °C (97.5 °F)  Pulse:  [] 87  Resp:  [17-36] 22    Physical Exam  Physical Exam   Constitutional: He is oriented to person, place, and time. He appears well-developed and well-nourished.   HENT:   Head: Normocephalic and atraumatic.   Eyes: Pupils are equal, round, and reactive to light.   Neck: Normal range of motion. Neck supple. No thyromegaly present.   Cardiovascular: Normal rate and regular rhythm.  Exam reveals no gallop and no friction rub.    Murmur heard.  Pulmonary/Chest: Effort normal and breath sounds normal. No respiratory distress. He has no wheezes. He has no rales.   ICD site is uncomplicated. Tegaderm dressing in place.   Abdominal: Soft. Bowel sounds are normal. He exhibits no distension. There is no tenderness. There is no guarding.   Musculoskeletal: Normal range of motion. He exhibits no edema.   Neurological: He is alert and oriented to person, place, and time.   Skin: Skin is warm and dry.   Psychiatric: He has a normal mood and affect.       Lab Review  Lab Results   Component Value Date/Time    WBC 13.0 (H) 01/04/2019  04:15 AM    RBC 3.48 (L) 01/04/2019 04:15 AM    HEMOGLOBIN 9.6 (L) 01/04/2019 04:15 AM    HEMATOCRIT 31.7 (L) 01/04/2019 04:15 AM    MCV 91.1 01/04/2019 04:15 AM    MCH 27.6 01/04/2019 04:15 AM    MCHC 30.3 (L) 01/04/2019 04:15 AM    MPV 9.8 01/04/2019 04:15 AM      Lab Results   Component Value Date/Time    SODIUM 145 01/04/2019 04:15 AM    POTASSIUM 3.9 01/04/2019 04:15 AM    CHLORIDE 108 01/04/2019 04:15 AM    CO2 26 01/04/2019 04:15 AM    GLUCOSE 120 (H) 01/04/2019 04:15 AM    BUN 37 (H) 01/04/2019 04:15 AM    CREATININE 0.51 01/04/2019 04:15 AM      Lab Results   Component Value Date/Time    ASTSGOT 36 12/31/2018 09:40 AM    ALTSGPT 35 12/31/2018 09:40 AM     Lab Results   Component Value Date/Time    TROPONINI 6.83 (H) 12/20/2018 05:15 AM             Cardiac Imaging and Procedures Review  EKG:  My personal interpretation of the EKG dated 1/4/19  SINUS RHYTHM   PROBABLE LEFT ATRIAL ABNORMALITY   LVH WITH SECONDARY REPOLARIZATION ABNORMALITY   ANTERIOR ST ELEVATION, PROBABLY DUE TO LVH   Echocardiogram:    CONCLUSIONS  Mildly reduced left ventricular systolic function.  Left ventricular ejection fraction is visually estimated to be 45%.  Severe concentric left ventricular hypertrophy, consider infiltrative   disease.  Indeterminate diastolic function.  Moderate mitral regurgitation.  Moderate tricuspid regurgitation.  Right ventricular systolic pressure is estimated to be 50  mmHg.  Cardiac Catheterization:    12/28/18   1.  Left main coronary artery: possible stenosis angiographically.  With IVUS no significant plaque.  2.  Left anterior descending artery:  Luminal irregulrities. LAD gives 2 diagonal branches, which have no significant disease  3.  Left circumflex coronary artery:  Luminal irregularities. Gives one large marginal branch, which has 30% stenosis at origin. Distally LCX supplies left posterolateral and left PDA, which have no significant disease.   4.  Right coronary artery:  Luminal  irregularities, small non-dominant vessel.    5.  Left ventricular end diastolic pressure:  39 mmHg. 60 mm Hg gradient across the aortic valve.  Imaging  Chest X-Ray:    No pneumothorax is seen.   Impression       Stable moderate pulmonary edema    Stable cardiac silhouette enlargement, new AICD in place     This AM CXR no ptx..      Assessment/Plan  1. Cardiac arrest in setting of HCM.  2. Atrial tachycardia  3. MDT dual chamber ICD implanted 1/3/19 per Dr Mendiola.  Device interrogation intact.  CXR this AM pending.  Site uncomplicated. Tegaderm dressing in place.  Will arrange ICD follow up in one week.  Arm restricted activities reviewed.  EPS will sign off.  Thank you for allowing me to participate in the care of this patient.      Please contact me with any questions.    HELLEN Currie.   Cardiologist, [facility]  [phone]

## 2019-01-04 NOTE — DIETARY
Nutrition support weekly update:  Day 15 of admit.  Amadou Nicholas is a 58 y.o. male with admitting DX of Cardiac arrest.     Tube feeding initiated on 12/22.  Current TF regimen via gastric Cortrak: Impact Peptide 1.5 @ goal rate 50 ml/hr, providing 1800 kcal, 113 grams protein, and 924 ml free water per day.     Assessment:  Weight 73.7 kg with BMI 28.78  Weight down 11.2 kg since initial scale weight on 12/20, indicating severe 13% loss in 15 days.   Weight loss is expected with critical illness, extended LOS, and immobility; pt was intubated 12/20-12/29.  -5.0 L fluid per I/O. Pt continues on lasix.     Estimated needs:  REE per MSJ x1.2 = 1744 kcal/day  25-30 kcal/kg = 1426-0460 kcal/day  1.2 grams protein/kg = 88 grams/day     Evaluation:   1. Pt extubated 12/29.  2. TF @ goal.   3. NPO per SLP; last dysphagia treatment 1/2 per notes.  4. Labs and meds reviewed. No weekly pre-albumin/CRP since 12/25.  5. 200 ml free water Q 8 hours.  6. Pt transferred out of CICU today.  7. Will change to standard TF formula per decreased acuity.     Recommendations/Plan:  1. Change TF to Fibersource HN @ 25 ml/hr and advance per protocol to goal rate 65 ml/hr to provide 1872 kcal, 84 grams protein, and 1264 ml free water per day.  2. Fluids per MD.  3. PO diet per SLP.      RD following.

## 2019-01-04 NOTE — PROGRESS NOTES
Patient transferred to T724 bed 1 with RN and CNA on telemetry box. Left message on wife Tarah cell phone on room transfer. VSS. All questions answered

## 2019-01-04 NOTE — PROGRESS NOTES
Patient arrived to unit-[id number]. Transferred by wheelchair. Walked to bed with standby assistance. Received report from Kisha MARTINEZ, Pt assessed, A&Ox4, Vitals stable. No SOB Noted.   Pt updated on plan of care, Call light within reach, personal belongings within reach.  Bed is locked and in lowest position. Tele monitor on and monitor room notified, rhythm is SR 95 with some ST depression. Leads were replaced. Will continue to monitor. Hourly rounding in place.

## 2019-01-04 NOTE — PROGRESS NOTES
"[facility] Medicine Daily Progress Note    Date of Service  1/4/2019    Chief Complaint  58 y.o. male admitted 12/19/2018 with out of hospital VFib arrest    Hospital Course     Mr Nicholas has a history of depression and hypertension, he was in his normal state of health but was noted to be unresponsive with agonal breathing.  His son started CPR.  Initial rhythm was ventricular fibrillation, he required multiple rounds of CPR, he was intubated in the field and brought to the emergency room.  Hypothermia protocol was initiated he was admitted to the ICU.  Patient was extubated on 12/21/2018, he required reintubation later that day and then extubated again 12/29   ICD placed 1/3       Interval Problem Update  Feels \"OK\".  No complaints, unwilling to answer all my questions as is sleepy    Sinus 90-120s overnight  SBP   AFebrile  TFs goal  UOP adequate  6 litres mask  Voiding no hsu  Cefepime D5/7 for Pseudomonas    Consultants/Specialty  Cardiology  pulmonology    Code Status  full    Disposition  Cont in ICU    Critical care time 32mins treating SVT with HR >200, giving adenosine and chemically cardioverting    Review of Systems  Review of Systems   Constitutional: Negative for chills and fever.   Respiratory: Negative for cough and shortness of breath.    Cardiovascular: Negative for chest pain, palpitations and leg swelling.   Gastrointestinal: Negative for abdominal pain, diarrhea, nausea and vomiting.   Musculoskeletal: Negative for back pain.   Skin: Negative for rash.   Neurological: Negative for dizziness, loss of consciousness, weakness and headaches.        Physical Exam  Temp:  [36.2 °C (97.2 °F)-36.9 °C (98.5 °F)] 36.7 °C (98.1 °F)  Pulse:  [] 90  Resp:  [17-36] 26    Physical Exam   Constitutional: He is oriented to person, place, and time. He appears well-developed and well-nourished. No distress.   HENT:   Head: Normocephalic and atraumatic.   Eyes: Conjunctivae are normal. No scleral " icterus.   Neck: Neck supple. No JVD present.   Cardiovascular: Regular rhythm.  Tachycardia present.  Exam reveals no gallop.    No murmur heard.  SVT on monitor   Pulmonary/Chest: Effort normal. No stridor. No respiratory distress. He has no wheezes. He has no rales.   Post procedure dressing L chest noted.     Abdominal: Soft.   Musculoskeletal: He exhibits no edema.   L arm NV intact   Neurological: He is oriented to person, place, and time.   Skin: Skin is warm and dry. No rash noted. He is not diaphoretic.   Psychiatric: He has a normal mood and affect. Thought content normal.   Nursing note and vitals reviewed.      Fluids    Intake/Output Summary (Last 24 hours) at 01/04/19 0333  Last data filed at 01/04/19 0200   Gross per 24 hour   Intake             1730 ml   Output             1875 ml   Net             -145 ml       Laboratory  Recent Labs      01/01/19   0545  01/02/19   0511  01/03/19   0315   WBC  34.5*  22.0*  16.1*   RBC  3.66*  3.25*  3.26*   HEMOGLOBIN  10.1*  9.0*  8.8*   HEMATOCRIT  33.1*  30.0*  30.2*   MCV  90.4  92.3  92.6   MCH  27.6  27.7  27.0   MCHC  30.5*  30.0*  29.1*   RDW  51.8*  54.1*  55.1*   PLATELETCT  603*  595*  683*   MPV  9.8  9.8  9.8     Recent Labs      01/01/19   0545  01/02/19   0511  01/03/19   0315   SODIUM  143  147*  142   POTASSIUM  4.6  4.6  3.7   CHLORIDE  110  111  108   CO2  25  28  27   GLUCOSE  172*  142*  125*   BUN  32*  36*  36*   CREATININE  0.46*  0.51  0.47*   CALCIUM  9.7  9.2  9.6                   Imaging  DX-CHEST-LIMITED (1 VIEW)   Final Result      No significant interval change.      DX-ABDOMEN FOR TUBE PLACEMENT   Final Result         Feeding tube with tip projecting over the expected area of the stomach.      DX-CHEST-PORTABLE (1 VIEW)   Final Result         1.  Ill-defined opacifications in each lung have increased to a minimal degree compared to the prior radiograph.  This could indicate worsening of pulmonary edema or inflammation.       DX-ABDOMEN FOR TUBE PLACEMENT   Final Result      Enteric tube tip projects over the stomach.      [id number] VIEW   Final Result         No specific finding to suggest small bowel obstruction.      EC-ECHOCARDIOGRAM LTD W/O CONT   Final Result      DX-ABDOMEN FOR TUBE PLACEMENT   Final Result      Feeding tube placement with the tip projecting over the distal stomach or proximal duodenum      DX-CHEST-PORTABLE (1 VIEW)   Final Result      1.  Stable cardiomegaly      2.  Worsening pulmonary vascular congestion and interstitial edema.      3.  Bibasilar opacities may be related to atelectasis. Developing pneumonia could have a similar appearance.      EC-ECHOCARDIOGRAM COMPLETE W/O CONT   Final Result      DX-CHEST-LIMITED (1 VIEW)   Final Result         1.  No acute cardiopulmonary disease.   2.  Right internal jugular central line terminates in the right atrium, could be withdrawn 3 cm.   3.  Cardiomegaly      CT-CTA CHEST PULMONARY ARTERY W/ RECONS   Final Result         1.  No large central pulmonary embolus is appreciated, evaluation of the subsegmental branches is essentially nondiagnostic due to motion artifacts. Additional imaging would be required for definitive exclusion of small distal pulmonary emboli.   2.  Hazy groundglass pulmonary opacities, predominantly on the right, appearance suggests atypical edema or infiltrates.   3.  Anterior bilateral second through sixth rib fractures   4.  Cardiomegaly   5.  Left nephrolithiasis   6.  Atherosclerosis and atherosclerotic coronary artery disease.      CT-HEAD W/O   Final Result         1.  No acute intracranial abnormality.   2.  Atherosclerosis.      DX-CHEST-PORTABLE (1 VIEW)   Final Result         1.  No acute cardiopulmonary disease.   2.  Cardiomegaly      DX-CHEST-LIMITED (1 VIEW)    (Results Pending)        Assessment/Plan  SVT (supraventricular tachycardia) (HCC)   Assessment & Plan    With rate to 208  Holding BP's  1/1 converted with  adenosine     Sepsis (HCC)   Assessment & Plan    This is severe sepsis with the following associated acute organ dysfunction(s): acute respiratory failure.   Patient has developed septic picture  Pneumonia   Completed unasyn then sputum culture 12/31 noted to be Pseudomonas: started on Cefepime   D 4/7 total therapy cefepime     Ventricular fibrillation (HCC)- (present on admission)   Assessment & Plan    Now in SR  ICD is on board     Acute respiratory failure with hypoxia (HCC)- (present on admission)   Assessment & Plan    Patient had to be reintubated on 12/21/2018  Extubated on 12/29/18  Optimized volume  Supplemental O2  Mobilize  Follow volume status     Cardiac arrest (HCC)- (present on admission)   Assessment & Plan    CTA negative for PE  Cardiac cath without significant findings  Echo consistent with LV outflow obstruction  Neurologically back to baseline  ICD 1/3    OK to Tele         Aspiration pneumonia (HCC)   Assessment & Plan    Pseudomonas noted on BAL  Plan 7 days of Cefepime     Hypernatremia   Assessment & Plan    Start Free H2O 200ml via core track q8  Repeat Na tomorrow am     Hypokalemia   Assessment & Plan    Replace     Iron deficiency anemia- (present on admission)   Assessment & Plan    Acute on chronic  He has a small gastric ulcer, continue PPI  Needs outpatient colonoscopy     Leukocytosis- (present on admission)   Assessment & Plan    WBC in normal range     Closed fracture of multiple ribs of both sides- (present on admission)   Assessment & Plan    Secondary to CPR  Pain management          VTE prophylaxis: enoxaparin

## 2019-01-04 NOTE — CARE PLAN
Problem: Urinary Elimination:  Goal: Ability to reestablish a normal urinary elimination pattern will improve  Outcome: PROGRESSING AS EXPECTED  Patient receiving lasix and voided 600ml of clear, yellow urine into urinal and toilet overnight    Problem: Pain Management  Goal: Pain level will decrease to patient's comfort goal  Outcome: PROGRESSING AS EXPECTED  Patient complained of back pain which is baseline at home, medicated per MAR with tylenol and repositioning and pain resolved

## 2019-01-05 ENCOUNTER — APPOINTMENT (OUTPATIENT)
Dept: RADIOLOGY | Facility: MEDICAL CENTER | Age: 59
DRG: 224 | End: 2019-01-05
Attending: INTERNAL MEDICINE
Payer: COMMERCIAL

## 2019-01-05 LAB
ANION GAP SERPL CALC-SCNC: 8 MMOL/L (ref 0–11.9)
BUN SERPL-MCNC: 31 MG/DL (ref 8–22)
CALCIUM SERPL-MCNC: 9.4 MG/DL (ref 8.5–10.5)
CHLORIDE SERPL-SCNC: 105 MMOL/L (ref 96–112)
CO2 SERPL-SCNC: 26 MMOL/L (ref 20–33)
CREAT SERPL-MCNC: 0.46 MG/DL (ref 0.5–1.4)
EKG IMPRESSION: NORMAL
GLUCOSE SERPL-MCNC: 140 MG/DL (ref 65–99)
POTASSIUM SERPL-SCNC: 4 MMOL/L (ref 3.6–5.5)
SODIUM SERPL-SCNC: 139 MMOL/L (ref 135–145)

## 2019-01-05 PROCEDURE — 700102 HCHG RX REV CODE 250 W/ 637 OVERRIDE(OP): Performed by: INTERNAL MEDICINE

## 2019-01-05 PROCEDURE — 93010 ELECTROCARDIOGRAM REPORT: CPT | Performed by: INTERNAL MEDICINE

## 2019-01-05 PROCEDURE — A9270 NON-COVERED ITEM OR SERVICE: HCPCS | Performed by: INTERNAL MEDICINE

## 2019-01-05 PROCEDURE — 94669 MECHANICAL CHEST WALL OSCILL: CPT

## 2019-01-05 PROCEDURE — 700102 HCHG RX REV CODE 250 W/ 637 OVERRIDE(OP): Performed by: HOSPITALIST

## 2019-01-05 PROCEDURE — A9270 NON-COVERED ITEM OR SERVICE: HCPCS | Performed by: NURSE PRACTITIONER

## 2019-01-05 PROCEDURE — 71045 X-RAY EXAM CHEST 1 VIEW: CPT

## 2019-01-05 PROCEDURE — 700105 HCHG RX REV CODE 258: Performed by: INTERNAL MEDICINE

## 2019-01-05 PROCEDURE — 93005 ELECTROCARDIOGRAM TRACING: CPT | Performed by: INTERNAL MEDICINE

## 2019-01-05 PROCEDURE — 99232 SBSQ HOSP IP/OBS MODERATE 35: CPT | Performed by: INTERNAL MEDICINE

## 2019-01-05 PROCEDURE — 80048 BASIC METABOLIC PNL TOTAL CA: CPT

## 2019-01-05 PROCEDURE — 700111 HCHG RX REV CODE 636 W/ 250 OVERRIDE (IP): Performed by: INTERNAL MEDICINE

## 2019-01-05 PROCEDURE — 36415 COLL VENOUS BLD VENIPUNCTURE: CPT

## 2019-01-05 PROCEDURE — 700102 HCHG RX REV CODE 250 W/ 637 OVERRIDE(OP): Performed by: NURSE PRACTITIONER

## 2019-01-05 PROCEDURE — 770020 HCHG ROOM/CARE - TELE (206)

## 2019-01-05 PROCEDURE — A9270 NON-COVERED ITEM OR SERVICE: HCPCS | Performed by: HOSPITALIST

## 2019-01-05 PROCEDURE — 99232 SBSQ HOSP IP/OBS MODERATE 35: CPT | Performed by: HOSPITALIST

## 2019-01-05 RX ORDER — SODIUM CHLORIDE 9 MG/ML
INJECTION, SOLUTION INTRAVENOUS
Status: ACTIVE
Start: 2019-01-05 | End: 2019-01-06

## 2019-01-05 RX ORDER — METOPROLOL TARTRATE 50 MG/1
50 TABLET, FILM COATED ORAL TWICE DAILY
Status: DISCONTINUED | OUTPATIENT
Start: 2019-01-05 | End: 2019-01-09

## 2019-01-05 RX ADMIN — FUROSEMIDE 20 MG: 10 INJECTION, SOLUTION INTRAMUSCULAR; INTRAVENOUS at 17:40

## 2019-01-05 RX ADMIN — METOPROLOL TARTRATE 25 MG: 25 TABLET, FILM COATED ORAL at 06:07

## 2019-01-05 RX ADMIN — CEFEPIME 2 G: 2 INJECTION, POWDER, FOR SOLUTION INTRAVENOUS at 06:08

## 2019-01-05 RX ADMIN — OMEPRAZOLE 40 MG: 20 CAPSULE, DELAYED RELEASE ORAL at 17:43

## 2019-01-05 RX ADMIN — CEFEPIME 2 G: 2 INJECTION, POWDER, FOR SOLUTION INTRAVENOUS at 23:08

## 2019-01-05 RX ADMIN — POTASSIUM BICARBONATE 25 MEQ: 25 TABLET, EFFERVESCENT ORAL at 06:26

## 2019-01-05 RX ADMIN — ASPIRIN 81 MG: 81 TABLET, COATED ORAL at 06:07

## 2019-01-05 RX ADMIN — ATORVASTATIN CALCIUM 80 MG: 80 TABLET, FILM COATED ORAL at 17:39

## 2019-01-05 RX ADMIN — METOPROLOL TARTRATE 50 MG: 50 TABLET ORAL at 17:39

## 2019-01-05 RX ADMIN — FUROSEMIDE 20 MG: 10 INJECTION, SOLUTION INTRAMUSCULAR; INTRAVENOUS at 06:08

## 2019-01-05 RX ADMIN — CEFEPIME 2 G: 2 INJECTION, POWDER, FOR SOLUTION INTRAVENOUS at 13:32

## 2019-01-05 RX ADMIN — POTASSIUM BICARBONATE 25 MEQ: 25 TABLET, EFFERVESCENT ORAL at 17:39

## 2019-01-05 RX ADMIN — ACETAMINOPHEN 650 MG: 325 TABLET, FILM COATED ORAL at 06:07

## 2019-01-05 RX ADMIN — OMEPRAZOLE 40 MG: 20 CAPSULE, DELAYED RELEASE ORAL at 06:08

## 2019-01-05 RX ADMIN — ENOXAPARIN SODIUM 40 MG: 100 INJECTION SUBCUTANEOUS at 06:07

## 2019-01-05 ASSESSMENT — PAIN SCALES - GENERAL
PAINLEVEL_OUTOF10: 0

## 2019-01-05 ASSESSMENT — ENCOUNTER SYMPTOMS
WEAKNESS: 0
MYALGIAS: 1
BLURRED VISION: 0
FLANK PAIN: 0
SPUTUM PRODUCTION: 0
DIARRHEA: 0
NAUSEA: 0
HEMOPTYSIS: 0
HALLUCINATIONS: 0
FOCAL WEAKNESS: 0
DIAPHORESIS: 0
SENSORY CHANGE: 0
CHOKING: 1
FEVER: 0
TREMORS: 0
WHEEZING: 0
PALPITATIONS: 0
COUGH: 1
HEADACHES: 0
BRUISES/BLEEDS EASILY: 0
CHEST TIGHTNESS: 0
STRIDOR: 0
SPEECH CHANGE: 0
EYE REDNESS: 0
EYE DISCHARGE: 0
LIGHT-HEADEDNESS: 0
COUGH: 0
NERVOUS/ANXIOUS: 1
NECK PAIN: 0
VOMITING: 0
DIZZINESS: 0
BACK PAIN: 0
SHORTNESS OF BREATH: 1
SHORTNESS OF BREATH: 0
CHILLS: 0
SORE THROAT: 1
ABDOMINAL PAIN: 0

## 2019-01-05 ASSESSMENT — LIFESTYLE VARIABLES: SUBSTANCE_ABUSE: 0

## 2019-01-05 NOTE — CARE PLAN
Problem: Communication  Goal: The ability to communicate needs accurately and effectively will improve  Outcome: PROGRESSING AS EXPECTED  POC reviewed and all questions answered. Pt verbalized understanding of treatment and medications. Calls appropriately with needs and asks questions regarding care.       Problem: Safety  Goal: Will remain free from falls  Outcome: PROGRESSING AS EXPECTED  Pt oriented to call light system and educated to call for assistance. Non slip socks on, bed alarm on. Fall precautions in place. Verbalized understanding of safety measures. Bed in locked and low position, call light in reach.

## 2019-01-05 NOTE — PROGRESS NOTES
Bedside report received, patient care assumed. Pt is A&Ox4, VSS, assessment complete. Tele box in place. C/o no pain at this time. No SOB or distress noted. POC reviewed and questions answered. Bed in locked and low position, fall precautions in place, bed alarm on. Call light in reach. Educated to call for assistance.

## 2019-01-05 NOTE — PROGRESS NOTES
Speech therapy not able to work with patient today, they will be able to on Monday. Notified Pt. Pt states he is unhappy about situation, but understands.

## 2019-01-05 NOTE — PROGRESS NOTES
2 RN skin check with Christie RN:    Bilateral Ears: Red/blanching (gray foam applied to NC)    BUE: scattered bruising    Old central line dressing on right neck. No drainage noted    Left AICD site: dressing in place (left arm in sling)    Sacrum/buttocks: red/blanching (waffle mattress applied, barrier paste used, patient incontinent at times)    Bilateral Heels: pink/blanching      Will continue to monitor.

## 2019-01-05 NOTE — PROGRESS NOTES
Cardiology Follow Up Progress Note    Date of Service  1/5/2019    Attending Physician  Gómez Bansal M.D.    Chief Complaint   OOHA    HPI  Amadou Nicholas is a 58 y.o. male admitted 12/19/2018 with  VF/OOHA, intubated, ROSC prior to arrival.  Echo 12/20/18 showed severe LVH/?  Infiltrative/significant MR with HOCM.  Things were complicated by significant GI bleed.  Patient had been intolerant to increasing AV doreen blockers with significant bradycardia.  White count was elevated with fever as another complication.  Patient then had SVT and was treated with adenosine.  ICD was placed 1/3/2018.    Interim Events  1/5/2019: Patient sitting in bed in no distress.  He is awaiting speech evaluation to remove Cortrak.  Patient reports no chest pain, shortness of breath, palpitations, orthopnea, dizziness, or syncope.  He has no complaints other than he is quite hungry and wants ice scream and a coke.    Telemetry: SR 60-90, rare PVCs      Review of Systems  Review of Systems   Constitutional: Negative for chills and fever.   Respiratory: Positive for cough and choking. Negative for chest tightness and shortness of breath.    Cardiovascular: Negative for chest pain, palpitations and leg swelling.   Gastrointestinal: Negative for nausea and vomiting.   Neurological: Negative for dizziness and light-headedness.   All other systems reviewed and are negative.      Vital signs in last 24 hours      Physical Exam  Physical Exam   Constitutional: He is oriented to person, place, and time. He appears well-developed and well-nourished.   HENT:   Head: Normocephalic and atraumatic.   Eyes: EOM are normal.   Cardiovascular: Normal rate and regular rhythm.    Murmur heard.   Systolic murmur is present with a grade of 3/6   Pulses:       Radial pulses are 2+ on the right side, and 2+ on the left side.   Pulmonary/Chest: Effort normal and breath sounds normal.   Abdominal: Soft. Bowel sounds are normal.   Musculoskeletal: He exhibits no  edema.   Neurological: He is alert and oriented to person, place, and time.   Skin: Skin is warm and dry.   Psychiatric: He has a normal mood and affect. His behavior is normal. Judgment and thought content normal.   Nursing note and vitals reviewed.      Lab Review  Lab Results   Component Value Date/Time    WBC 13.0 (H) 01/04/2019 04:15 AM    RBC 3.48 (L) 01/04/2019 04:15 AM    HEMOGLOBIN 9.6 (L) 01/04/2019 04:15 AM    HEMATOCRIT 31.7 (L) 01/04/2019 04:15 AM    MCV 91.1 01/04/2019 04:15 AM    MCH 27.6 01/04/2019 04:15 AM    MCHC 30.3 (L) 01/04/2019 04:15 AM    MPV 9.8 01/04/2019 04:15 AM      Lab Results   Component Value Date/Time    SODIUM 139 01/05/2019 04:45 AM    POTASSIUM 4.0 01/05/2019 04:45 AM    CHLORIDE 105 01/05/2019 04:45 AM    CO2 26 01/05/2019 04:45 AM    GLUCOSE 140 (H) 01/05/2019 04:45 AM    BUN 31 (H) 01/05/2019 04:45 AM    CREATININE 0.46 (L) 01/05/2019 04:45 AM      Lab Results   Component Value Date/Time    ASTSGOT 36 12/31/2018 09:40 AM    ALTSGPT 35 12/31/2018 09:40 AM     Lab Results   Component Value Date/Time    TROPONINI 6.83 (H) 12/20/2018 05:15 AM             Cardiac Imaging and Procedures Review  ICD Placement 1/3/19  ICD dual chamber implanted by Dr Mendiola :  IMPLANTED DEVICE INFORMATION:    Pulse generator is a MDT model RWEI7U6   Serial number [id number]    LEAD INFORMATION:  1. Right atrial lead is a MDT model 5076-45, serial number [id number], P wave 4.0 millivolts, threshold 0.5 Volts at 0.4 milliseconds, pacing impedance 437 Ohms.  2. Right ventricular lead is a MDT model 6197Y39, serial number [id number], R wave 19.1 millivolts, threshold 0.5 Volts at 0.4 milliseconds, pacing impedance 494 Ohms.     DEVICE PROGRAMMING:    Julian therapy: AAI<->DDD   Tachy therapy:    VF zone  320 ms, defib 35 J x 6  VT zone via  ms, ATP x 3 then, CDV 35 J x 5    DEFIBRILLATION THRESHOLD TESTING:    DFT = 20 Joules  Charge time = 4.4 seconds  Shock impedance = 68 Ohms    ECHOCARDIOGRAM  12/23/18  CONCLUSIONS  Limited echocardiogram performed for valvular evaluation.  Systolic anterior motion of the mitral valve is noted.  Moderate to   severe mitral regurgitation is noted.   There is concern for possible mobile echodensity adherent to the   anterior mitral valve leaflet tip.  Transesophageal echocardiogram can   be considered versus reevaluating with a repeat echocardiogram in a   couple of days.    Left ventricular outflow tract peak gradient of 64 mmHg suggestive of   left ventricular outflow tract obstruction.  Compared to prior study performed 3 days ago, mitral regurgitation and   LV outflow tract obstruction is new.      Assessment/Plan  1. HOCM  -Increase metoprolol to 50 mg twice daily  -Close monitoring of blood pressure  -Continue ASA 81 mg  -Outpatient consideration for possible myomectomy  -EKG shows some ST depression, expected with HOCM    2.  SVT  -None recently  -Increase metoprolol 50 mg twice daily    3.  MR  -Due to obstruction with some intrinsic valve dysfunction as well  -Echo as outpatient 2-3 months        Thank you for allowing me to participate in the care of this patient.  Cardiology will sign off for now.    Please contact me with any questions.    Hellen Lund, WILTON.P.N.   [facility]  [phone]

## 2019-01-05 NOTE — PROGRESS NOTES
Critical Care Progress Note    Date of admission  12/19/2018    Chief Complaint  58 y.o. male admitted 12/19/2018 s/p cardiopulmonary arrest with prolonged CPR in the field    Hospital Course  TTM started in ED and continued in ICU    Interval Problem Updates  Past 24 hours   -Now transferred out of ICU   -Wants to eat, NG tube still in place   -Swallowing evaluation pending    Review of Systems  Review of Systems   Constitutional: Negative for fever.   HENT: Negative for tinnitus.    Eyes: Negative for blurred vision.   Respiratory: Negative for cough, hemoptysis and sputum production.    Cardiovascular: Negative for palpitations.   Gastrointestinal: Negative for nausea.   Genitourinary: Negative for urgency.   Musculoskeletal: Negative for joint pain.   Skin: Negative for rash.   Neurological: Negative for headaches.   Endo/Heme/Allergies: Does not bruise/bleed easily.   Psychiatric/Behavioral: Negative for hallucinations.   All other systems reviewed and are negative.       Vital Signs for last 24 hours   Temp:  [36 °C (96.8 °F)-36.7 °C (98.1 °F)] 36 °C (96.8 °F)  Pulse:  [] 72  Resp:  [16-20] 16  BP: (100-117)/(55-75) 111/75   Blood pressure /60-76     Respiratory   SaO2 90-93% on 6 lpm FM, IS 500mL/PEP --> IPV started    Physical Exam   Physical Exam   Constitutional: He is oriented to person, place, and time. He appears well-developed and well-nourished. He has a sickly appearance. No distress.   Sleeping comfortably after AICD placement procedure   HENT:   Head: Normocephalic and atraumatic.   Right Ear: External ear normal.   Left Ear: External ear normal.   Nasal coretrak in place   Eyes: Pupils are equal, round, and reactive to light. Conjunctivae are normal. No scleral icterus.   Neck: Neck supple. No thyromegaly present.   RIJ CVL without surrounding erythema   Cardiovascular: Regular rhythm, normal heart sounds and intact distal pulses.   Occasional extrasystoles are present. Tachycardia  present.  PMI is displaced.  Exam reveals no distant heart sounds, no friction rub and no decreased pulses.    No murmur heard.  Left anterior chest wall AICD insertion site clean/dry/intact   Pulmonary/Chest: Effort normal. No accessory muscle usage or stridor. No tachypnea. No respiratory distress. He has no decreased breath sounds. He has no wheezes. He has rhonchi in the right lower field and the left lower field. He has no rales.   Abdominal: Soft. Bowel sounds are normal. He exhibits no distension. There is no tenderness. There is no guarding.   Musculoskeletal: He exhibits no edema, tenderness or deformity.   Neurological: He is alert and oriented to person, place, and time. No cranial nerve deficit. Coordination normal.   Remains Impulsive at times   Skin: Skin is warm and dry. No rash noted.   Psychiatric: His speech is normal and behavior is normal. Thought content normal. His affect is blunt. Thought content is not delusional (Occasionally). He expresses impulsivity. He exhibits abnormal recent memory.   Nursing note and vitals reviewed.      Medications  Current Facility-Administered Medications   Medication Dose Route Frequency Provider Last Rate Last Dose   • SODIUM CHLORIDE 0.9 % IV SOLN            • metoprolol (LOPRESSOR) tablet 50 mg  50 mg Oral TWICE DAILY HELLEN Harrington.       • Pharmacy Consult Request ...Pain Management Review 1 Each  1 Each Other PRN Tracey Mendiola M.D.       • acetaminophen (TYLENOL) tablet 650 mg  650 mg Oral Q6HRS Tracey Mendiola M.D.   650 mg at 01/05/19 0607   • aspirin EC (ECOTRIN) tablet 81 mg  81 mg Oral DAILY Essence Hoff M.D.   81 mg at 01/05/19 0607   • potassium bicarbonate (KLYTE) effervescent tablet 25 mEq  25 mEq Feeding Tube BID Jeremy M Gonda, M.D.   25 mEq at 01/05/19 0626   • cefepime (MAXIPIME) 2 g in  mL IVPB  2 g Intravenous Q8HRS Jeremy M Gonda, M.D.   Stopped at 01/05/19 0638   • enoxaparin (LOVENOX) inj 40 mg  40 mg Subcutaneous DAILY  Jeremy M Gonda, M.D.   40 mg at 01/05/19 0607   • furosemide (LASIX) injection 20 mg  20 mg Intravenous Q12HRS Jeremy M Gonda, M.D.   20 mg at 01/05/19 0608   • oxyCODONE immediate-release (ROXICODONE) tablet 5 mg  5 mg Oral Q4HRS PRN Johan Morales M.D.   5 mg at 12/30/18 1653   • omeprazole 2 mg/mL in sodium bicarbonate (PRILOSEC) oral susp 40 mg  40 mg Feeding Tube Q12HRS Johan Morales M.D.   40 mg at 01/05/19 0608   • acetaminophen (TYLENOL) tablet 650 mg  650 mg Feeding Tube Q6HRS PRN Aleshia Uribe M.D.   650 mg at 01/03/19 2104   • Respiratory Care per Protocol   Nebulization Continuous RT Bradley Flores M.D.       • atorvastatin (LIPITOR) tablet 80 mg  80 mg Per NG Tube Q EVENING Yaron Magallanes M.D.   80 mg at 01/04/19 1732   • senna-docusate (PERICOLACE or SENOKOT S) 8.6-50 MG per tablet 2 Tab  2 Tab Feeding Tube BID Yaron Magallanes M.D.   Stopped at 01/04/19 0600    And   • polyethylene glycol/lytes (MIRALAX) PACKET 1 Packet  1 Packet Feeding Tube QDAY PRROGERIO Magallanes M.D.   1 Packet at 12/23/18 1136    And   • magnesium hydroxide (MILK OF MAGNESIA) suspension 30 mL  30 mL Feeding Tube QDAY PRROGERIO Magallanes M.D.   30 mL at 12/24/18 1228    And   • bisacodyl (DULCOLAX) suppository 10 mg  10 mg Rectal QDAY PRN Yaron Magallanes M.D.       • dextrose 50% (D50W) injection 25-50 mL  12.5-25 g Intravenous PRN Jeremy M Gonda, M.D.           Fluids    Intake/Output Summary (Last 24 hours) at 01/05/19 1325  Last data filed at 01/05/19 0800   Gross per 24 hour   Intake             1100 ml   Output             1625 ml   Net             -525 ml       Laboratory          Recent Labs      01/03/19 0315 01/04/19 0415 01/05/19   0445   SODIUM  142  145  139   POTASSIUM  3.7  3.9  4.0   CHLORIDE  108  108  105   CO2  27  26  26   BUN  36*  37*  31*   CREATININE  0.47*  0.51  0.46*   MAGNESIUM  2.1   --    --    PHOSPHORUS  3.6   --    --    CALCIUM  9.6  9.7  9.4     Recent Labs      01/03/19 0315 01/04/19   0415   01/05/19   0445   GLUCOSE  125*  120*  140*     Recent Labs      01/03/19   0315  01/04/19   0415   WBC  16.1*  13.0*   NEUTSPOLYS  80.50*  75.20*   LYMPHOCYTES  12.00*  15.10*   MONOCYTES  3.20  3.60   EOSINOPHILS  2.90  4.80   BASOPHILS  0.80  0.90     Recent Labs      01/03/19   0315  01/04/19   0415   RBC  3.26*  3.48*   HEMOGLOBIN  8.8*  9.6*   HEMATOCRIT  30.2*  31.7*   PLATELETCT  683*  664*       Imaging  X-Ray:  No film today    Assessment/Plan  SVT (supraventricular tachycardia) (HCC)   Assessment & Plan    Status post adenosine cardioversion 1/1  Optimize electrolytes       Sepsis (HCC)   Assessment & Plan    This is severe sepsis with the following associated acute organ dysfunction(s): acute kidney failure, metabolic/septic encephalopathy.  12/31/2018  Source = likely urinary + pulmonary --> improving    Continue cefepime times 7 days  S/p Sepsis protocol, euvolemic     Ventricular fibrillation (HCC)- (present on admission)   Assessment & Plan    S/p AICD 1/3/2019 --> device interrogation prior to discharge  Optimize electrolytes     Acute respiratory failure with hypoxia (HCC)- (present on admission)   Assessment & Plan    Intubated in field 12/19 -12/29  Encourage incentive spirometry, out of bed to chair  Mobilization  RT/O2 protocols targeting SaO2 greater than 92%  Cont diuresis  IPV       Cardiac arrest (HCC)- (present on admission)   Assessment & Plan    Unknown etiology s/p ROSC with prolonged downtime  Continue supportive care  Therapies  Optimize electrolytes and continuous telemetry     Gastrointestinal hemorrhage associated with acute gastritis   Assessment & Plan    EGD 12/28 showing gastric ulcer and gastritis  PPI  DVT prophylaxis      Acute cystitis with hematuria   Assessment & Plan    Continue cefepime 7 days  Ramon catheter removed     Aspiration pneumonia (HCC)   Assessment & Plan    Status post appropriate antibiotic therapy  Aspiration precautions, SLP  Recurrence of pneumonia with  pansensitive pseudomonal 12/31 -continue cefepime times 7 days     Thrombocytopenia (HCC)   Assessment & Plan    Resolved, now thrombocytosis, monitor     Hypernatremia   Assessment & Plan    Improving  Continue free water and reassess      Hypokalemia   Assessment & Plan    IV repletion  and monitor closely     Iron deficiency anemia- (present on admission)   Assessment & Plan    Daily CBC  Conservative transfusion strategy, hemoglobin goal greater than 7     Leukocytosis- (present on admission)   Assessment & Plan    Improving   Monitor     Closed fracture of multiple ribs of both sides- (present on admission)   Assessment & Plan    Secondary to CPR  Analgesia as needed  Encourage incentive spirometry/PEP       FULL CODE    VTE:  On hold post- AICD placement  Ulcer: PPI  Lines: Central Line  Ongoing indication addressed    I have performed a physical exam and reviewed and updated ROS and Plan today (1/5/2019). In review of yesterday's note (1/4/2019), there are no changes except as documented above.

## 2019-01-05 NOTE — CARE PLAN
Problem: Communication  Goal: The ability to communicate needs accurately and effectively will improve  Listened to patients discussion of concerns related to disease process and treatment plan. Discussed with patient concerns, goals and management. Discussed importance of patient reporting new signs and symptoms related to disease process. Patient verbalized understanding and understands importance of communicating needs.  Supported and educated patient by addressing specific questions regarding diagnosis.              Problem: Safety  Goal: Will remain free from falls  Pt is wearing non slip socks, bed is locked and in lowest position, call light and personal belongings are within reach. Fall risk band is on and bed alarm on.

## 2019-01-06 LAB
ANION GAP SERPL CALC-SCNC: 8 MMOL/L (ref 0–11.9)
BASOPHILS # BLD AUTO: 2.2 % (ref 0–1.8)
BASOPHILS # BLD: 0.25 K/UL (ref 0–0.12)
BUN SERPL-MCNC: 25 MG/DL (ref 8–22)
CALCIUM SERPL-MCNC: 9.5 MG/DL (ref 8.5–10.5)
CHLORIDE SERPL-SCNC: 104 MMOL/L (ref 96–112)
CO2 SERPL-SCNC: 26 MMOL/L (ref 20–33)
CREAT SERPL-MCNC: 0.41 MG/DL (ref 0.5–1.4)
EOSINOPHIL # BLD AUTO: 0.8 K/UL (ref 0–0.51)
EOSINOPHIL NFR BLD: 7.1 % (ref 0–6.9)
ERYTHROCYTE [DISTWIDTH] IN BLOOD BY AUTOMATED COUNT: 51 FL (ref 35.9–50)
GLUCOSE SERPL-MCNC: 150 MG/DL (ref 65–99)
HCT VFR BLD AUTO: 31.3 % (ref 42–52)
HGB BLD-MCNC: 9.4 G/DL (ref 14–18)
IMM GRANULOCYTES # BLD AUTO: 0.06 K/UL (ref 0–0.11)
IMM GRANULOCYTES NFR BLD AUTO: 0.5 % (ref 0–0.9)
LYMPHOCYTES # BLD AUTO: 2.26 K/UL (ref 1–4.8)
LYMPHOCYTES NFR BLD: 20.1 % (ref 22–41)
MCH RBC QN AUTO: 26.9 PG (ref 27–33)
MCHC RBC AUTO-ENTMCNC: 30 G/DL (ref 33.7–35.3)
MCV RBC AUTO: 89.7 FL (ref 81.4–97.8)
MONOCYTES # BLD AUTO: 0.54 K/UL (ref 0–0.85)
MONOCYTES NFR BLD AUTO: 4.8 % (ref 0–13.4)
NEUTROPHILS # BLD AUTO: 7.33 K/UL (ref 1.82–7.42)
NEUTROPHILS NFR BLD: 65.3 % (ref 44–72)
NRBC # BLD AUTO: 0 K/UL
NRBC BLD-RTO: 0 /100 WBC
PLATELET # BLD AUTO: 682 K/UL (ref 164–446)
PMV BLD AUTO: 9.5 FL (ref 9–12.9)
POTASSIUM SERPL-SCNC: 4.1 MMOL/L (ref 3.6–5.5)
RBC # BLD AUTO: 3.49 M/UL (ref 4.7–6.1)
SODIUM SERPL-SCNC: 138 MMOL/L (ref 135–145)
WBC # BLD AUTO: 11.2 K/UL (ref 4.8–10.8)

## 2019-01-06 PROCEDURE — A9270 NON-COVERED ITEM OR SERVICE: HCPCS | Performed by: INTERNAL MEDICINE

## 2019-01-06 PROCEDURE — 700102 HCHG RX REV CODE 250 W/ 637 OVERRIDE(OP): Performed by: INTERNAL MEDICINE

## 2019-01-06 PROCEDURE — 99232 SBSQ HOSP IP/OBS MODERATE 35: CPT | Performed by: HOSPITALIST

## 2019-01-06 PROCEDURE — 700111 HCHG RX REV CODE 636 W/ 250 OVERRIDE (IP): Performed by: INTERNAL MEDICINE

## 2019-01-06 PROCEDURE — A9270 NON-COVERED ITEM OR SERVICE: HCPCS | Performed by: NURSE PRACTITIONER

## 2019-01-06 PROCEDURE — 92526 ORAL FUNCTION THERAPY: CPT

## 2019-01-06 PROCEDURE — 770020 HCHG ROOM/CARE - TELE (206)

## 2019-01-06 PROCEDURE — 94669 MECHANICAL CHEST WALL OSCILL: CPT

## 2019-01-06 PROCEDURE — 36415 COLL VENOUS BLD VENIPUNCTURE: CPT

## 2019-01-06 PROCEDURE — 700102 HCHG RX REV CODE 250 W/ 637 OVERRIDE(OP): Performed by: HOSPITALIST

## 2019-01-06 PROCEDURE — 700105 HCHG RX REV CODE 258: Performed by: INTERNAL MEDICINE

## 2019-01-06 PROCEDURE — A9270 NON-COVERED ITEM OR SERVICE: HCPCS | Performed by: HOSPITALIST

## 2019-01-06 PROCEDURE — 99232 SBSQ HOSP IP/OBS MODERATE 35: CPT | Performed by: INTERNAL MEDICINE

## 2019-01-06 PROCEDURE — 80048 BASIC METABOLIC PNL TOTAL CA: CPT

## 2019-01-06 PROCEDURE — 700102 HCHG RX REV CODE 250 W/ 637 OVERRIDE(OP): Performed by: NURSE PRACTITIONER

## 2019-01-06 PROCEDURE — 85025 COMPLETE CBC W/AUTO DIFF WBC: CPT

## 2019-01-06 RX ADMIN — POTASSIUM BICARBONATE 25 MEQ: 25 TABLET, EFFERVESCENT ORAL at 17:24

## 2019-01-06 RX ADMIN — ENOXAPARIN SODIUM 40 MG: 100 INJECTION SUBCUTANEOUS at 05:57

## 2019-01-06 RX ADMIN — ACETAMINOPHEN 650 MG: 325 TABLET, FILM COATED ORAL at 23:15

## 2019-01-06 RX ADMIN — CEFEPIME 2 G: 2 INJECTION, POWDER, FOR SOLUTION INTRAVENOUS at 23:15

## 2019-01-06 RX ADMIN — ACETAMINOPHEN 650 MG: 325 TABLET, FILM COATED ORAL at 17:24

## 2019-01-06 RX ADMIN — OMEPRAZOLE 40 MG: 20 CAPSULE, DELAYED RELEASE ORAL at 05:57

## 2019-01-06 RX ADMIN — OMEPRAZOLE 40 MG: 20 CAPSULE, DELAYED RELEASE ORAL at 17:25

## 2019-01-06 RX ADMIN — ATORVASTATIN CALCIUM 80 MG: 80 TABLET, FILM COATED ORAL at 17:24

## 2019-01-06 RX ADMIN — METOPROLOL TARTRATE 50 MG: 50 TABLET ORAL at 17:24

## 2019-01-06 RX ADMIN — ASPIRIN 81 MG: 81 TABLET, COATED ORAL at 05:58

## 2019-01-06 RX ADMIN — FUROSEMIDE 20 MG: 10 INJECTION, SOLUTION INTRAMUSCULAR; INTRAVENOUS at 17:24

## 2019-01-06 RX ADMIN — FUROSEMIDE 20 MG: 10 INJECTION, SOLUTION INTRAMUSCULAR; INTRAVENOUS at 05:58

## 2019-01-06 RX ADMIN — CEFEPIME 2 G: 2 INJECTION, POWDER, FOR SOLUTION INTRAVENOUS at 05:55

## 2019-01-06 RX ADMIN — METOPROLOL TARTRATE 50 MG: 50 TABLET ORAL at 05:58

## 2019-01-06 RX ADMIN — CEFEPIME 2 G: 2 INJECTION, POWDER, FOR SOLUTION INTRAVENOUS at 15:03

## 2019-01-06 RX ADMIN — POTASSIUM BICARBONATE 25 MEQ: 25 TABLET, EFFERVESCENT ORAL at 05:58

## 2019-01-06 ASSESSMENT — ENCOUNTER SYMPTOMS
BACK PAIN: 0
WHEEZING: 0
SPUTUM PRODUCTION: 0
EYE REDNESS: 0
CHILLS: 0
EYE DISCHARGE: 0
SENSORY CHANGE: 0
FEVER: 0
COUGH: 0
VOMITING: 0
MYALGIAS: 1
HALLUCINATIONS: 0
BRUISES/BLEEDS EASILY: 0
SPEECH CHANGE: 0
NAUSEA: 0
FOCAL WEAKNESS: 0
HEADACHES: 0
SHORTNESS OF BREATH: 1
HEMOPTYSIS: 0
SORE THROAT: 1
ABDOMINAL PAIN: 0
BLURRED VISION: 0
NERVOUS/ANXIOUS: 1
PALPITATIONS: 0
NECK PAIN: 0
STRIDOR: 0
DIARRHEA: 0

## 2019-01-06 ASSESSMENT — LIFESTYLE VARIABLES: SUBSTANCE_ABUSE: 0

## 2019-01-06 ASSESSMENT — PAIN SCALES - GENERAL
PAINLEVEL_OUTOF10: 0

## 2019-01-06 NOTE — PROGRESS NOTES
[facility] Medicine Daily Progress Note    Date of Service  1/6/2019    Chief Complaint  58 y.o. male admitted 12/19/2018 after being found unresponsive with agonal breathing with findings of cardiac arrest.     Hospital Course    Patient history of depression, hypertension was found unresponsive while watching TV.  CPR started by son.  Found to be in V. fib by EMS.  In the field he required multiple defibrillations, given IV amiodarone , CPR, epinephrine with eventual PEA and return to spontaneous circulation and intubated.  Hypothermia protocol initiated in ICU.  Patient findings of HOCM.  Patient was extubated briefly then required intubation and finally extubated 12/29.  Post ICD placement 1/3/19.  Downgraded to telemetry.    Interval Problem Update    Wants to eat.  Failed swallow evaluation.  Core Trak in place.  Telemetry sinus rhythm w  frequent PVCs.  Reports improved strength.  Has been up independently    Consultants/Specialty  Cardiology Dr. Radha Hoff  Pulmonary    Code Status  Full code    Disposition  To be determined    Review of Systems  Review of Systems   Constitutional: Negative for chills and fever.   HENT: Positive for sore throat.    Eyes: Negative for discharge and redness.   Respiratory: Positive for shortness of breath (Improved). Negative for cough, wheezing and stridor.    Cardiovascular: Positive for chest pain (Mild chest wall pain). Negative for palpitations and leg swelling.   Gastrointestinal: Negative for abdominal pain, diarrhea and vomiting.   Musculoskeletal: Positive for myalgias. Negative for back pain, joint pain and neck pain.   Neurological: Negative for sensory change, speech change and focal weakness.   Psychiatric/Behavioral: Negative for hallucinations and substance abuse. The patient is nervous/anxious.         Physical Exam  Temp:  [35.9 °C (96.7 °F)-36.6 °C (97.8 °F)] 36.3 °C (97.3 °F)  Pulse:  [72-89] 80  Resp:  [18-20] 18  BP: ()/(43-72) 103/72    Physical  Exam   Constitutional: He is oriented to person, place, and time. No distress.   Soft, hoarse, weak voice   HENT:   Head: Normocephalic and atraumatic.   Core Trak in place   Eyes: Conjunctivae and EOM are normal. No scleral icterus.   Neck: Normal range of motion.   Cardiovascular: Normal rate and regular rhythm.    No murmur heard.  Pulmonary/Chest: No stridor. He has no wheezes. He has no rales. He exhibits tenderness (Bilateral ribs to palpation).   Mild sporadic inspiratory rales at the bases   Abdominal: Soft. Bowel sounds are normal. He exhibits no distension. There is no tenderness.   Severely obese   Musculoskeletal: He exhibits no edema or tenderness.   Neurological: He is alert and oriented to person, place, and time. No cranial nerve deficit.   Skin: Skin is warm and dry. He is not diaphoretic. No pallor.   Psychiatric: He has a normal mood and affect. His behavior is normal.   Vitals reviewed.      Fluids    Intake/Output Summary (Last 24 hours) at 01/06/19 0806  Last data filed at 01/06/19 0700   Gross per 24 hour   Intake              200 ml   Output             1200 ml   Net            -1000 ml       Laboratory  Recent Labs      01/04/19   0415  01/06/19   0511   WBC  13.0*  11.2*   RBC  3.48*  3.49*   HEMOGLOBIN  9.6*  9.4*   HEMATOCRIT  31.7*  31.3*   MCV  91.1  89.7   MCH  27.6  26.9*   MCHC  30.3*  30.0*   RDW  52.9*  51.0*   PLATELETCT  664*  682*   MPV  9.8  9.5     Recent Labs      01/04/19   0415  01/05/19   0445  01/06/19   0511   SODIUM  145  139  138   POTASSIUM  3.9  4.0  4.1   CHLORIDE  108  105  104   CO2  26  26  26   GLUCOSE  120*  140*  150*   BUN  37*  31*  25*   CREATININE  0.51  0.46*  0.41*   CALCIUM  9.7  9.4  9.5                   Imaging  DX-CHEST-PORTABLE (1 VIEW)   Final Result         1. Stable ICD/pacer lead position. No pneumothorax.   2. Improving interstitial edema. No large pleural effusions.      DX-CHEST-PORTABLE (1 VIEW)   Final Result      Stable position left  transvenous electrodes. No pneumothorax.   Findings consistent with moderate interstitial edema similar to previous.      DX-CHEST-LIMITED (1 VIEW)   Final Result      Stable moderate pulmonary edema      Stable cardiac silhouette enlargement, new AICD in place      DX-CHEST-LIMITED (1 VIEW)   Final Result      No significant interval change.      DX-ABDOMEN FOR TUBE PLACEMENT   Final Result         Feeding tube with tip projecting over the expected area of the stomach.      DX-CHEST-PORTABLE (1 VIEW)   Final Result         1.  Ill-defined opacifications in each lung have increased to a minimal degree compared to the prior radiograph.  This could indicate worsening of pulmonary edema or inflammation.      DX-ABDOMEN FOR TUBE PLACEMENT   Final Result      Enteric tube tip projects over the stomach.      [id number] VIEW   Final Result         No specific finding to suggest small bowel obstruction.      EC-ECHOCARDIOGRAM LTD W/O CONT   Final Result      DX-ABDOMEN FOR TUBE PLACEMENT   Final Result      Feeding tube placement with the tip projecting over the distal stomach or proximal duodenum      DX-CHEST-PORTABLE (1 VIEW)   Final Result      1.  Stable cardiomegaly      2.  Worsening pulmonary vascular congestion and interstitial edema.      3.  Bibasilar opacities may be related to atelectasis. Developing pneumonia could have a similar appearance.      EC-ECHOCARDIOGRAM COMPLETE W/O CONT   Final Result   Limited echocardiogram performed for valvular evaluation.  Systolic anterior motion of the mitral valve is noted.  Moderate to   severe mitral regurgitation is noted.   There is concern for possible mobile echodensity adherent to the   anterior mitral valve leaflet tip.  Transesophageal echocardiogram can   be considered versus reevaluating with a repeat echocardiogram in a   couple of days.    Left ventricular outflow tract peak gradient of 64 mmHg suggestive of   left ventricular outflow tract  obstruction.  Compared to prior study performed 3 days ago, mitral regurgitation and   LV outflow tract obstruction is new.   DX-CHEST-LIMITED (1 VIEW)   Final Result         1.  No acute cardiopulmonary disease.   2.  Right internal jugular central line terminates in the right atrium, could be withdrawn 3 cm.   3.  Cardiomegaly      CT-CTA CHEST PULMONARY ARTERY W/ RECONS   Final Result         1.  No large central pulmonary embolus is appreciated, evaluation of the subsegmental branches is essentially nondiagnostic due to motion artifacts. Additional imaging would be required for definitive exclusion of small distal pulmonary emboli.   2.  Hazy groundglass pulmonary opacities, predominantly on the right, appearance suggests atypical edema or infiltrates.   3.  Anterior bilateral second through sixth rib fractures   4.  Cardiomegaly   5.  Left nephrolithiasis   6.  Atherosclerosis and atherosclerotic coronary artery disease.      CT-HEAD W/O   Final Result         1.  No acute intracranial abnormality.   2.  Atherosclerosis.      DX-CHEST-PORTABLE (1 VIEW)   Final Result         1.  No acute cardiopulmonary disease.   2.  Cardiomegaly           Assessment/Plan  * Cardiac arrest (HCC)- (present on admission)   Assessment & Plan    Post V. fib arrest .  Likely associated with HOCM .  CTA negative for PE.  Post hypothermia protocol with neurologic recovery.   Cardiac cath without significant findings  Echo consistent with LV outflow obstruction  Post ICD placement 1/3/19  Continue with beta-blocker.  Frequent PVCs.  Continue monitor telemetry         SVT (supraventricular tachycardia) (HCC)   Assessment & Plan    With rate to 208. 1/1 converted with adenosine  Continue beta-blocker  Telemetry monitoring     Ventricular fibrillation (HCC)- (present on admission)   Assessment & Plan    Post arrest.  Maintaining sinus rhythm with PVCs with ICD in place  Metoprolol twice daily  Monitor telemetry     Acute respiratory  failure with hypoxia (HCC)- (present on admission)   Assessment & Plan    Patient had to be reintubated on 12/21/2018 with Extubation on 12/29/18 with pulmonary edema attributed to HOCM  Mobilize  O2 requirements decreasing.  Continue IV Lasix.  Encourage I-S  Wean O2 as tolerated     Hypernatremia   Assessment & Plan    Sodium now normalized  DC free water     Gastrointestinal hemorrhage associated with acute gastritis   Assessment & Plan    With EGD findings of gastric ulcer  Continue PPI therapy     Sepsis (HCC)   Assessment & Plan    This is severe sepsis with the following associated acute organ dysfunction(s): acute respiratory failure.   Patient has developed septic picture with Pseudomonas pneumonia--resolving  Plan complete IV cefepime 7-day course  Aspiration precautions, core Trak tube feeds  SLP evaluation     Aspiration pneumonia (HCC)   Assessment & Plan    Pseudomonas growth from BAL  Continue IV cefepime 7-day course  Aspiration precautions  Failed swallow, SLP to reevaluate.     Iron deficiency anemia- (present on admission)   Assessment & Plan    Acute on chronic--improved  EGD findings of Gastric ulceration and gastritis.  Continue PPI therapy  Referral outpatient GI for colonoscopy     Hypokalemia   Assessment & Plan    Replace     Leukocytosis- (present on admission)   Assessment & Plan    WBC in normal range     Closed fracture of multiple ribs of both sides- (present on admission)   Assessment & Plan    Secondary to CPR.    Continue PRN oxycodone          VTE prophylaxis: Lovenox     Discussed with speech therapy plan of care.

## 2019-01-06 NOTE — PROGRESS NOTES
Bedside report received, patient care assumed. Pt is A&Ox4, VSS, assessment complete. Tele box in place. C/o no pain at this time. Currently on 3L O2 NC, no SOB or distress noted. Cortrak to R nare running fibersource at 65mL/hour. POC reviewed and questions answered. Bed in locked and low position, fall precautions in place. Call light in reach. Educated to call for assistance.

## 2019-01-06 NOTE — PROGRESS NOTES
Assumed care of patient, received bedside report from NOC RN, Pt A&Ox4, Vitals stable, currently on 3L O2 via NC sating mid 90s, no SOB or distress noted. Cortrak in right nare running fibersource at 65ml/hr.  Pt updated on plan of care, Call light within reach, personal belongings within reach.  Bed is locked and in lowest position. Tele monitor on. Will continue to monitor. Hourly rounding in place.

## 2019-01-06 NOTE — THERAPY
"Speech Language Therapy dysphagia treatment completed.     Functional Status: Patient was seen on this date for dysphagia reassessment. Upon entry, patient awake and eager for PO. Upper airway congestion noted prior to PO. Vocal quality minimally hoarse and reduced cough strength. PO trials were administered and consisted of single ice chips, NTL, puree, pudding, and thin liquids. Swallow trigger timely and laryngeal elevation reduced to palpation. Overt s/sx of aspiration appreciated across all consistencies tested and evidenced by: increase in audible breath sounds/upper airway congestion across all consistencies, intermittent weak congested cough with NTL and thin liquids, and weak cough x2 following pudding. Falsetto following PO trials revealed clear vocal quality. Cues were given for double effortful swallow per bolus.     Recommendations: At this time, patient continues to present with s/sx concerning for aspiration. Recommend continue NPO/cortrak with strong consideration for FEES as deemed appropriate by SLP. OK for patient to have 3-5 single ice chips an hour with nursing supervision.     Plan of Care: Will benefit from Speech Therapy 3 times per week    Post-Acute Therapy: Recommend inpatient transitional care services for continued speech therapy services.      See \"Rehab Therapy-Acute\" Patient Summary Report for complete documentation.     "

## 2019-01-06 NOTE — PROGRESS NOTES
Critical Care Progress Note    Date of admission  12/19/2018    Chief Complaint  58 y.o. male admitted 12/19/2018 s/p cardiopulmonary arrest with prolonged CPR in the field    Hospital Course  TTM started in ED and continued in ICU    Interval Problem Updates  Past 24 hours   -Now transferred out of ICU   -Wants to eat, NG tube still in place   -Swallowing evaluation pending   -No respiratory distress.  On 3 L/min nasal cannula.  Sats 96%    Review of Systems  Review of Systems   Constitutional: Negative for fever.   HENT: Negative for tinnitus.    Eyes: Negative for blurred vision.   Respiratory: Negative for cough, hemoptysis and sputum production.    Cardiovascular: Negative for palpitations.   Gastrointestinal: Negative for nausea.   Genitourinary: Negative for urgency.   Musculoskeletal: Negative for joint pain.   Skin: Negative for rash.   Neurological: Negative for headaches.   Endo/Heme/Allergies: Does not bruise/bleed easily.   Psychiatric/Behavioral: Negative for hallucinations.   All other systems reviewed and are negative.       Vital Signs for last 24 hours   Temp:  [35.9 °C (96.7 °F)-36.6 °C (97.8 °F)] 36.4 °C (97.5 °F)  Pulse:  [72-89] 79  Resp:  [15-20] 15  BP: ()/(43-72) 112/65   Blood pressure /60-76     Respiratory   SaO2 90-93% on 6 lpm FM, IS 500mL/PEP --> IPV started    Physical Exam   Physical Exam   Constitutional: He is oriented to person, place, and time. He appears well-developed and well-nourished. He has a sickly appearance. No distress.   Sleeping comfortably after AICD placement procedure   HENT:   Head: Normocephalic and atraumatic.   Right Ear: External ear normal.   Left Ear: External ear normal.   Nasal coretrak in place   Eyes: Pupils are equal, round, and reactive to light. Conjunctivae are normal. No scleral icterus.   Neck: Neck supple. No thyromegaly present.   RIJ CVL without surrounding erythema   Cardiovascular: Regular rhythm, normal heart sounds and intact  distal pulses.   Occasional extrasystoles are present. Tachycardia present.  PMI is displaced.  Exam reveals no distant heart sounds, no friction rub and no decreased pulses.    No murmur heard.  Left anterior chest wall AICD insertion site clean/dry/intact   Pulmonary/Chest: Effort normal. No accessory muscle usage or stridor. No tachypnea. No respiratory distress. He has no decreased breath sounds. He has no wheezes. He has rhonchi in the right lower field and the left lower field. He has no rales.   Abdominal: Soft. Bowel sounds are normal. He exhibits no distension. There is no tenderness. There is no guarding.   Musculoskeletal: He exhibits no edema, tenderness or deformity.   Neurological: He is alert and oriented to person, place, and time. No cranial nerve deficit. Coordination normal.   Remains Impulsive at times   Skin: Skin is warm and dry. No rash noted.   Psychiatric: His speech is normal and behavior is normal. Thought content normal. His affect is blunt. Thought content is not delusional (Occasionally). He expresses impulsivity. He exhibits abnormal recent memory.   Nursing note and vitals reviewed.      Medications  Current Facility-Administered Medications   Medication Dose Route Frequency Provider Last Rate Last Dose   • metoprolol (LOPRESSOR) tablet 50 mg  50 mg Oral TWICE DAILY Hellen Lund, A.P.N.   50 mg at 01/06/19 0558   • Pharmacy Consult Request ...Pain Management Review 1 Each  1 Each Other PRN Tracey Mendiola M.D.       • acetaminophen (TYLENOL) tablet 650 mg  650 mg Oral Q6HRS Tracey Mendiola M.D.   650 mg at 01/05/19 0607   • aspirin EC (ECOTRIN) tablet 81 mg  81 mg Oral DAILY Essence Hoff M.D.   81 mg at 01/06/19 0558   • potassium bicarbonate (KLYTE) effervescent tablet 25 mEq  25 mEq Feeding Tube BID Jeremy M Gonda, M.D.   25 mEq at 01/06/19 0558   • cefepime (MAXIPIME) 2 g in  mL IVPB  2 g Intravenous Q8HRS Jeremy M Gonda, M.D.   Stopped at 01/06/19 0625   • enoxaparin  (LOVENOX) inj 40 mg  40 mg Subcutaneous DAILY Jeremy M Gonda, M.D.   40 mg at 01/06/19 0557   • furosemide (LASIX) injection 20 mg  20 mg Intravenous Q12HRS Jeremy M Gonda, M.D.   20 mg at 01/06/19 0558   • oxyCODONE immediate-release (ROXICODONE) tablet 5 mg  5 mg Oral Q4HRS PRN Johan Morales M.D.   5 mg at 12/30/18 1653   • omeprazole 2 mg/mL in sodium bicarbonate (PRILOSEC) oral susp 40 mg  40 mg Feeding Tube Q12HRS Johan Morales M.D.   40 mg at 01/06/19 0557   • acetaminophen (TYLENOL) tablet 650 mg  650 mg Feeding Tube Q6HRS PRN Aleshia Uribe M.D.   650 mg at 01/03/19 2104   • Respiratory Care per Protocol   Nebulization Continuous RT Bradley Flores M.D.       • atorvastatin (LIPITOR) tablet 80 mg  80 mg Per NG Tube Q EVENING Yaron Magallanes M.D.   80 mg at 01/05/19 1739   • senna-docusate (PERICOLACE or SENOKOT S) 8.6-50 MG per tablet 2 Tab  2 Tab Feeding Tube BID Yaron Magallanes M.D.   Stopped at 01/04/19 0600    And   • polyethylene glycol/lytes (MIRALAX) PACKET 1 Packet  1 Packet Feeding Tube QDAY PRROGERIO Magallanes M.D.   1 Packet at 12/23/18 1136    And   • magnesium hydroxide (MILK OF MAGNESIA) suspension 30 mL  30 mL Feeding Tube QDAY PRROGERIO Magallanes M.D.   30 mL at 12/24/18 1228    And   • bisacodyl (DULCOLAX) suppository 10 mg  10 mg Rectal QDAY PRN Yaron Magallanes M.D.       • dextrose 50% (D50W) injection 25-50 mL  12.5-25 g Intravenous PRN Jeremy M Gonda, M.D.           Fluids    Intake/Output Summary (Last 24 hours) at 01/06/19 0931  Last data filed at 01/06/19 0845   Gross per 24 hour   Intake              200 ml   Output             1400 ml   Net            -1200 ml       Laboratory          Recent Labs      01/04/19 0415  01/05/19   0445  01/06/19   0511   SODIUM  145  139  138   POTASSIUM  3.9  4.0  4.1   CHLORIDE  108  105  104   CO2  26  26  26   BUN  37*  31*  25*   CREATININE  0.51  0.46*  0.41*   CALCIUM  9.7  9.4  9.5     Recent Labs      01/04/19 0415  01/05/19 0445  01/06/19   0511    GLUCOSE  120*  140*  150*     Recent Labs      01/04/19   0415  01/06/19   0511   WBC  13.0*  11.2*   NEUTSPOLYS  75.20*  65.30   LYMPHOCYTES  15.10*  20.10*   MONOCYTES  3.60  4.80   EOSINOPHILS  4.80  7.10*   BASOPHILS  0.90  2.20*     Recent Labs      01/04/19   0415  01/06/19   0511   RBC  3.48*  3.49*   HEMOGLOBIN  9.6*  9.4*   HEMATOCRIT  31.7*  31.3*   PLATELETCT  664*  682*       Imaging  X-Ray:  No film today    Assessment/Plan  SVT (supraventricular tachycardia) ([facility])   Assessment & Plan    Status post adenosine cardioversion 1/1  Optimize electrolytes       Sepsis ([facility])   Assessment & Plan    This is severe sepsis with the following associated acute organ dysfunction(s): acute kidney failure, metabolic/septic encephalopathy.  12/31/2018  Source = likely urinary + pulmonary --> improving    Continue cefepime times 7 days  S/p Sepsis protocol, euvolemic     Ventricular fibrillation (HCC)- (present on admission)   Assessment & Plan    S/p AICD 1/3/2019 --> device interrogation prior to discharge  Optimize electrolytes     Acute respiratory failure with hypoxia (HCC)- (present on admission)   Assessment & Plan    Intubated in field 12/19 -12/29  Encourage incentive spirometry, out of bed to chair  Mobilization  RT/O2 protocols targeting SaO2 greater than 92%  Cont diuresis  IPV       Cardiac arrest (HCC)- (present on admission)   Assessment & Plan    Unknown etiology s/p ROSC with prolonged downtime  Continue supportive care  Therapies  Optimize electrolytes and continuous telemetry     Gastrointestinal hemorrhage associated with acute gastritis   Assessment & Plan    EGD 12/28 showing gastric ulcer and gastritis  PPI  DVT prophylaxis      Acute cystitis with hematuria   Assessment & Plan    Continue cefepime 7 days  Ramon catheter removed     Aspiration pneumonia (HCC)   Assessment & Plan    Status post appropriate antibiotic therapy  Aspiration precautions, SLP  Recurrence of pneumonia with pansensitive  pseudomonal 12/31 -continue cefepime times 7 days     Thrombocytopenia (HCC)   Assessment & Plan    Resolved, now thrombocytosis, monitor     Hypernatremia   Assessment & Plan    Improving  Continue free water and reassess      Hypokalemia   Assessment & Plan    IV repletion  and monitor closely     Iron deficiency anemia- (present on admission)   Assessment & Plan    Daily CBC  Conservative transfusion strategy, hemoglobin goal greater than 7     Leukocytosis- (present on admission)   Assessment & Plan    Improving   Monitor     Closed fracture of multiple ribs of both sides- (present on admission)   Assessment & Plan    Secondary to CPR  Analgesia as needed  Encourage incentive spirometry/PEP       FULL CODE    VTE:  On hold post- AICD placement  Ulcer: PPI  Lines: Central Line  Ongoing indication addressed    I have performed a physical exam and reviewed and updated ROS and Plan today (1/6/2019). In review of yesterday's note (1/5/2019), there are no changes except as documented above.

## 2019-01-06 NOTE — PROGRESS NOTES
[facility] Medicine Daily Progress Note    Date of Service  1/5/2019    Chief Complaint  58 y.o. male admitted 12/19/2018 after being found unresponsive with agonal breathing with findings of cardiac arrest.     Hospital Course    Patient history of depression, hypertension was found unresponsive while watching TV.  CPR started by son.  Found to be in V. fib by EMS.  In the field he required multiple defibrillations, given IV amiodarone , CPR, epinephrine with eventual PEA and return to spontaneous circulation and intubated.  Hypothermia protocol initiated in ICU.  Patient findings of HOCM.  Patient was extubated briefly then required intubation and finally extubated 12/29.  Post ICD placement 1/3/19.  Downgraded to telemetry.    Interval Problem Update    Patient remains in sinus rhythm 60s-90s.  Blood pressure stable.  O2 requirements decreased 3 L nasal cannula .  Dysphagia with core Trak tube feeds.  Plan SLP reevaluation.  Afebrile on IV antibiotics for Pseudomonas pneumonia.     Consultants/Specialty  Cardiology Dr. Radha Hoff  Pulmonary    Code Status  Full code    Disposition  To be determined    Review of Systems  Review of Systems   Constitutional: Negative for chills, diaphoresis and fever.   HENT: Positive for sore throat. Negative for congestion.    Eyes: Negative for discharge and redness.   Respiratory: Positive for shortness of breath (Improved). Negative for cough, wheezing and stridor.    Cardiovascular: Positive for chest pain (Mild chest wall pain). Negative for palpitations and leg swelling.   Gastrointestinal: Negative for abdominal pain, diarrhea and vomiting.   Genitourinary: Negative for flank pain and hematuria.   Musculoskeletal: Positive for myalgias. Negative for back pain, joint pain and neck pain.   Neurological: Negative for tremors, sensory change, speech change, focal weakness and weakness.   Psychiatric/Behavioral: Negative for hallucinations and substance abuse. The patient is  nervous/anxious.         Physical Exam  Temp:  [36 °C (96.8 °F)-36.7 °C (98.1 °F)] 36.1 °C (97 °F)  Pulse:  [72-87] 87  Resp:  [16-20] 18  BP: ()/(55-75) 97/68    Physical Exam   Constitutional: He is oriented to person, place, and time. No distress.   HENT:   Head: Normocephalic and atraumatic.   Nose: Nose normal.   Core Trak in place   Eyes: Conjunctivae and EOM are normal. No scleral icterus.   Neck: Normal range of motion. No JVD present.   Cardiovascular: Normal rate and regular rhythm.    No murmur heard.  Pulmonary/Chest: No stridor. He has no wheezes. He has no rales. He exhibits tenderness (Bilateral ribs to palpation).   Mild sporadic inspiratory rales at the bases   Abdominal: Soft. Bowel sounds are normal. He exhibits no distension. There is no tenderness.   Severely obese   Musculoskeletal: He exhibits no edema or tenderness.   Neurological: He is alert and oriented to person, place, and time. No cranial nerve deficit.   Skin: Skin is warm and dry. He is not diaphoretic. No pallor.   Psychiatric: He has a normal mood and affect. His behavior is normal.   Vitals reviewed.      Fluids    Intake/Output Summary (Last 24 hours) at 01/05/19 1716  Last data filed at 01/05/19 0800   Gross per 24 hour   Intake              900 ml   Output             1400 ml   Net             -500 ml       Laboratory  Recent Labs      01/03/19 0315 01/04/19   0415   WBC  16.1*  13.0*   RBC  3.26*  3.48*   HEMOGLOBIN  8.8*  9.6*   HEMATOCRIT  30.2*  31.7*   MCV  92.6  91.1   MCH  27.0  27.6   MCHC  29.1*  30.3*   RDW  55.1*  52.9*   PLATELETCT  683*  664*   MPV  9.8  9.8     Recent Labs      01/03/19 0315 01/04/19 0415 01/05/19   0445   SODIUM  142  145  139   POTASSIUM  3.7  3.9  4.0   CHLORIDE  108  108  105   CO2  27  26  26   GLUCOSE  125*  120*  140*   BUN  36*  37*  31*   CREATININE  0.47*  0.51  0.46*   CALCIUM  9.6  9.7  9.4                   Imaging  DX-CHEST-PORTABLE (1 VIEW)   Final Result         1.  Stable ICD/pacer lead position. No pneumothorax.   2. Improving interstitial edema. No large pleural effusions.      DX-CHEST-PORTABLE (1 VIEW)   Final Result      Stable position left transvenous electrodes. No pneumothorax.   Findings consistent with moderate interstitial edema similar to previous.      DX-CHEST-LIMITED (1 VIEW)   Final Result      Stable moderate pulmonary edema      Stable cardiac silhouette enlargement, new AICD in place      DX-CHEST-LIMITED (1 VIEW)   Final Result      No significant interval change.      DX-ABDOMEN FOR TUBE PLACEMENT   Final Result         Feeding tube with tip projecting over the expected area of the stomach.      DX-CHEST-PORTABLE (1 VIEW)   Final Result         1.  Ill-defined opacifications in each lung have increased to a minimal degree compared to the prior radiograph.  This could indicate worsening of pulmonary edema or inflammation.      DX-ABDOMEN FOR TUBE PLACEMENT   Final Result      Enteric tube tip projects over the stomach.      [id number] VIEW   Final Result         No specific finding to suggest small bowel obstruction.      EC-ECHOCARDIOGRAM LTD W/O CONT   Final Result      DX-ABDOMEN FOR TUBE PLACEMENT   Final Result      Feeding tube placement with the tip projecting over the distal stomach or proximal duodenum      DX-CHEST-PORTABLE (1 VIEW)   Final Result      1.  Stable cardiomegaly      2.  Worsening pulmonary vascular congestion and interstitial edema.      3.  Bibasilar opacities may be related to atelectasis. Developing pneumonia could have a similar appearance.      EC-ECHOCARDIOGRAM COMPLETE W/O CONT   Final Result   Limited echocardiogram performed for valvular evaluation.  Systolic anterior motion of the mitral valve is noted.  Moderate to   severe mitral regurgitation is noted.   There is concern for possible mobile echodensity adherent to the   anterior mitral valve leaflet tip.  Transesophageal echocardiogram can   be considered versus  reevaluating with a repeat echocardiogram in a   couple of days.    Left ventricular outflow tract peak gradient of 64 mmHg suggestive of   left ventricular outflow tract obstruction.  Compared to prior study performed 3 days ago, mitral regurgitation and   LV outflow tract obstruction is new.   DX-CHEST-LIMITED (1 VIEW)   Final Result         1.  No acute cardiopulmonary disease.   2.  Right internal jugular central line terminates in the right atrium, could be withdrawn 3 cm.   3.  Cardiomegaly      CT-CTA CHEST PULMONARY ARTERY W/ RECONS   Final Result         1.  No large central pulmonary embolus is appreciated, evaluation of the subsegmental branches is essentially nondiagnostic due to motion artifacts. Additional imaging would be required for definitive exclusion of small distal pulmonary emboli.   2.  Hazy groundglass pulmonary opacities, predominantly on the right, appearance suggests atypical edema or infiltrates.   3.  Anterior bilateral second through sixth rib fractures   4.  Cardiomegaly   5.  Left nephrolithiasis   6.  Atherosclerosis and atherosclerotic coronary artery disease.      CT-HEAD W/O   Final Result         1.  No acute intracranial abnormality.   2.  Atherosclerosis.      DX-CHEST-PORTABLE (1 VIEW)   Final Result         1.  No acute cardiopulmonary disease.   2.  Cardiomegaly           Assessment/Plan  * Cardiac arrest (HCC)- (present on admission)   Assessment & Plan    Post V. fib arrest .  Likely associated with HOCM .  CTA negative for PE.  Post hypothermia protocol with neurologic recovery.   Cardiac cath without significant findings  Echo consistent with LV outflow obstruction  Post ICD placement 1/3/19  Continue with beta-blocker  Continue to monitor telemetry         SVT (supraventricular tachycardia) (HCC)   Assessment & Plan    With rate to 208. 1/1 converted with adenosine  Continue beta-blocker  Telemetry monitoring     Ventricular fibrillation (HCC)- (present on admission)    Assessment & Plan    Post arrest.  Maintaining sinus rhythm with ICD in place  Metoprolol twice daily  Monitor telemetry     Acute respiratory failure with hypoxia (HCC)- (present on admission)   Assessment & Plan    Patient had to be reintubated on 12/21/2018 with Extubation on 12/29/18 with pulmonary edema attributed to HOCM  IV Lasix diuresis  Mobilize  O2 support wean FiO2 as tolerated  Encourage I-S     Hypernatremia   Assessment & Plan    Corrected  Decrease free water     Gastrointestinal hemorrhage associated with acute gastritis   Assessment & Plan    With EGD findings of gastric ulcer  Continue PPI therapy     Sepsis (HCC)   Assessment & Plan    This is severe sepsis with the following associated acute organ dysfunction(s): acute respiratory failure.   Patient has developed septic picture with Pseudomonas pneumonia  Plan complete IV cefepime 7-day course  Aspiration precautions  SLP evaluation     Aspiration pneumonia (HCC)   Assessment & Plan    Pseudomonas growth from BAL  Continue IV cefepime 7-day course     Iron deficiency anemia- (present on admission)   Assessment & Plan    Acute on chronic--improved  EGD findings of Gastric ulceration and gastritis.  Continue PPI therapy  Referral outpatient GI for colonoscopy     Hypokalemia   Assessment & Plan    Replace     Leukocytosis- (present on admission)   Assessment & Plan    WBC in normal range     Closed fracture of multiple ribs of both sides- (present on admission)   Assessment & Plan    Secondary to CPR.    Continue PRN oxycodone          VTE prophylaxis: Lovenox     Discussed multidisciplinary team plan of care.

## 2019-01-06 NOTE — CARE PLAN
Problem: Infection  Goal: Will remain free from infection  Pt being treated for PNA with IV antibiotics, will continue to monitor for further s/s of infection.     Problem: Bowel/Gastric:  Goal: Normal bowel function is maintained or improved  Pt currently getting nutrition via cortrak in right nare. Pt requesting to see speech therapist to upgrade diet, speech therapy paged.

## 2019-01-07 PROBLEM — R13.10 DYSPHAGIA: Status: ACTIVE | Noted: 2019-01-07

## 2019-01-07 LAB
ALBUMIN SERPL BCP-MCNC: 3 G/DL (ref 3.2–4.9)
ALBUMIN/GLOB SERPL: 0.6 G/DL
ALP SERPL-CCNC: 83 U/L (ref 30–99)
ALT SERPL-CCNC: 30 U/L (ref 2–50)
ANION GAP SERPL CALC-SCNC: 5 MMOL/L (ref 0–11.9)
ANION GAP SERPL CALC-SCNC: 5 MMOL/L (ref 0–11.9)
AST SERPL-CCNC: 25 U/L (ref 12–45)
BILIRUB SERPL-MCNC: 0.4 MG/DL (ref 0.1–1.5)
BUN SERPL-MCNC: 29 MG/DL (ref 8–22)
BUN SERPL-MCNC: 30 MG/DL (ref 8–22)
CALCIUM SERPL-MCNC: 9.6 MG/DL (ref 8.5–10.5)
CALCIUM SERPL-MCNC: 9.7 MG/DL (ref 8.5–10.5)
CHLORIDE SERPL-SCNC: 104 MMOL/L (ref 96–112)
CHLORIDE SERPL-SCNC: 104 MMOL/L (ref 96–112)
CO2 SERPL-SCNC: 28 MMOL/L (ref 20–33)
CO2 SERPL-SCNC: 28 MMOL/L (ref 20–33)
CREAT SERPL-MCNC: 0.42 MG/DL (ref 0.5–1.4)
CREAT SERPL-MCNC: 0.46 MG/DL (ref 0.5–1.4)
CRP SERPL HS-MCNC: 2.24 MG/DL (ref 0–0.75)
GLOBULIN SER CALC-MCNC: 4.7 G/DL (ref 1.9–3.5)
GLUCOSE SERPL-MCNC: 134 MG/DL (ref 65–99)
GLUCOSE SERPL-MCNC: 142 MG/DL (ref 65–99)
MAGNESIUM SERPL-MCNC: 2.4 MG/DL (ref 1.5–2.5)
POTASSIUM SERPL-SCNC: 4 MMOL/L (ref 3.6–5.5)
POTASSIUM SERPL-SCNC: 4.1 MMOL/L (ref 3.6–5.5)
PREALB SERPL-MCNC: 10 MG/DL (ref 18–38)
PROT SERPL-MCNC: 7.7 G/DL (ref 6–8.2)
SODIUM SERPL-SCNC: 137 MMOL/L (ref 135–145)
SODIUM SERPL-SCNC: 137 MMOL/L (ref 135–145)

## 2019-01-07 PROCEDURE — 700102 HCHG RX REV CODE 250 W/ 637 OVERRIDE(OP): Performed by: INTERNAL MEDICINE

## 2019-01-07 PROCEDURE — 80053 COMPREHEN METABOLIC PANEL: CPT

## 2019-01-07 PROCEDURE — 80048 BASIC METABOLIC PNL TOTAL CA: CPT

## 2019-01-07 PROCEDURE — 700102 HCHG RX REV CODE 250 W/ 637 OVERRIDE(OP): Performed by: NURSE PRACTITIONER

## 2019-01-07 PROCEDURE — A9270 NON-COVERED ITEM OR SERVICE: HCPCS | Performed by: HOSPITALIST

## 2019-01-07 PROCEDURE — 36415 COLL VENOUS BLD VENIPUNCTURE: CPT

## 2019-01-07 PROCEDURE — 99232 SBSQ HOSP IP/OBS MODERATE 35: CPT | Performed by: HOSPITALIST

## 2019-01-07 PROCEDURE — 86140 C-REACTIVE PROTEIN: CPT

## 2019-01-07 PROCEDURE — 770020 HCHG ROOM/CARE - TELE (206)

## 2019-01-07 PROCEDURE — A9270 NON-COVERED ITEM OR SERVICE: HCPCS | Performed by: INTERNAL MEDICINE

## 2019-01-07 PROCEDURE — 92526 ORAL FUNCTION THERAPY: CPT

## 2019-01-07 PROCEDURE — 700102 HCHG RX REV CODE 250 W/ 637 OVERRIDE(OP): Performed by: HOSPITALIST

## 2019-01-07 PROCEDURE — A9270 NON-COVERED ITEM OR SERVICE: HCPCS | Performed by: NURSE PRACTITIONER

## 2019-01-07 PROCEDURE — 84134 ASSAY OF PREALBUMIN: CPT

## 2019-01-07 PROCEDURE — 700111 HCHG RX REV CODE 636 W/ 250 OVERRIDE (IP): Performed by: INTERNAL MEDICINE

## 2019-01-07 PROCEDURE — 83735 ASSAY OF MAGNESIUM: CPT

## 2019-01-07 RX ADMIN — OMEPRAZOLE 40 MG: 20 CAPSULE, DELAYED RELEASE ORAL at 17:52

## 2019-01-07 RX ADMIN — METOPROLOL TARTRATE 50 MG: 50 TABLET ORAL at 17:53

## 2019-01-07 RX ADMIN — POTASSIUM BICARBONATE 25 MEQ: 25 TABLET, EFFERVESCENT ORAL at 17:52

## 2019-01-07 RX ADMIN — FUROSEMIDE 20 MG: 10 INJECTION, SOLUTION INTRAMUSCULAR; INTRAVENOUS at 18:00

## 2019-01-07 RX ADMIN — ACETAMINOPHEN 650 MG: 325 TABLET, FILM COATED ORAL at 12:00

## 2019-01-07 RX ADMIN — ACETAMINOPHEN 650 MG: 325 TABLET, FILM COATED ORAL at 17:52

## 2019-01-07 RX ADMIN — ATORVASTATIN CALCIUM 80 MG: 80 TABLET, FILM COATED ORAL at 17:52

## 2019-01-07 RX ADMIN — ENOXAPARIN SODIUM 40 MG: 100 INJECTION SUBCUTANEOUS at 05:30

## 2019-01-07 RX ADMIN — ASPIRIN 81 MG: 81 TABLET, COATED ORAL at 05:31

## 2019-01-07 RX ADMIN — POTASSIUM BICARBONATE 25 MEQ: 25 TABLET, EFFERVESCENT ORAL at 05:30

## 2019-01-07 RX ADMIN — ACETAMINOPHEN 650 MG: 325 TABLET, FILM COATED ORAL at 13:37

## 2019-01-07 RX ADMIN — OMEPRAZOLE 40 MG: 20 CAPSULE, DELAYED RELEASE ORAL at 05:31

## 2019-01-07 RX ADMIN — FUROSEMIDE 20 MG: 10 INJECTION, SOLUTION INTRAMUSCULAR; INTRAVENOUS at 05:30

## 2019-01-07 RX ADMIN — ACETAMINOPHEN 650 MG: 325 TABLET, FILM COATED ORAL at 05:31

## 2019-01-07 RX ADMIN — ACETAMINOPHEN 650 MG: 325 TABLET, FILM COATED ORAL at 18:15

## 2019-01-07 RX ADMIN — METOPROLOL TARTRATE 50 MG: 50 TABLET ORAL at 05:31

## 2019-01-07 ASSESSMENT — ENCOUNTER SYMPTOMS
CHILLS: 0
ABDOMINAL PAIN: 0
EYE REDNESS: 0
PALPITATIONS: 0
DIARRHEA: 0
SORE THROAT: 1
SHORTNESS OF BREATH: 1
SPEECH CHANGE: 0
WEAKNESS: 0
MYALGIAS: 1
STRIDOR: 0
FOCAL WEAKNESS: 0
EYE DISCHARGE: 0
FEVER: 0
NERVOUS/ANXIOUS: 1
VOMITING: 0
SENSORY CHANGE: 0
COUGH: 0
WHEEZING: 0
HALLUCINATIONS: 0
NECK PAIN: 0
BACK PAIN: 0

## 2019-01-07 ASSESSMENT — PAIN SCALES - GENERAL
PAINLEVEL_OUTOF10: 0

## 2019-01-07 ASSESSMENT — LIFESTYLE VARIABLES: SUBSTANCE_ABUSE: 0

## 2019-01-07 NOTE — PROGRESS NOTES
Morning report received. Patient asleep and lying in bed. Fibersource running at 65 ml/hr. On 2.5 Liters O2. Bed in lowest position and locked. Call light and all belongings within reach. No further needs at this time.

## 2019-01-07 NOTE — DOCUMENTATION QUERY
DOCUMENTATION QUERY    PROVIDERS: Please select “Cosign w/ note”to reply to query.    To better represent the severity of illness of your patient, please review the following information and exercise your independent professional judgment in responding to this query.     Unspecified shock is documented in the 12/20 pulmonary consult and 12/20 procedure note for insertion of arterial line. Based upon the clinical findings, risk factors, and treatment, can this diagnosis of shock be further specified?    · Cardiogenic shock  · Hypovolemic shock  · Septic shock  · Other type of shock  (please indicate)  · Unable to determine    The medical record reflects the following:   Clinical Findings unspecified shock documented;  Defibrillation x 4 in field for v fib/v tach; HOCM; acute hypoxic resp. failure   Treatment IVF, levophed; mechanical intubation; arterial line   Risk Factors defibrillation in the field x 4;  Cardiac arrest/v - fib   Location within medical record  History and Physical and Progress Notes     Thank you,  Odalis Rodriguez RN, CCDS, CDIP, CCS  Sr. Clinical    - [phone]

## 2019-01-07 NOTE — THERAPY
"Speech Language Therapy dysphagia treatment completed.   Functional Status:  Pt seen for dysphagia tx this date. CXR 1/5/19: \"Improvement in interstitial edema. No focal consolidation. No large pleural effusions.\" Pt alert, grossly oriented x3 (unsure of exact date), very eager to eat. Pt with congestive cough at baseline. Vocal quality is hoarse, reduced in intensity. Pt able to sustain /a/ for 4s, which is far below normal. PO trials presented include ice chips x5, NTL via tsp x6, 2oz purees via 3/4 tsp. Oral cavity was clear after each bolus presented. Reflexive swallow response was timely-mildly delayed at times. Hyolaryngeal excrusion palpated as reduced. Min verbal/visual cues provided for pt to execute effortful swallows. Pt swallowed 2x/bolus, concerning for potential pharyngeal residue. Intermittent throat clearing, delayed congested, productive coughing observed across PO intake, concerning for penetration/aspiration vs. Vocal irritation vs. \"phlegm\" as pt reports. Pt would benefit from diagnostic swallow evaluation (FEES) to objectively assess swallow function and further direct PO. Pt in agreement, willing to participate. MD paged for order.   Recommendations: NPO/Cortrak, prefeeding with SLP only. FEES to objectively assess swallow function. Oral care 4x/day.   Plan of Care: Will benefit from Speech Therapy 3 times per week  Post-Acute Therapy: Recommend inpatient transitional care services for continued speech therapy services.        See \"Rehab Therapy-Acute\" Patient Summary Report for complete documentation.     "

## 2019-01-07 NOTE — PROGRESS NOTES
Bedside report received from night nurse. Assumed care of pt. Pt awake, laying in bed. A/Ox4, VSS. Cortrak to right nare at 65cm running fibersource at goal of 65 ml/hour. No complaints at this time. POC reviewed and white board updated. Tele box on. Call light in reach. Bed locked in lowest position with 2 upper bed rails up.

## 2019-01-07 NOTE — CARE PLAN
Problem: Knowledge Deficit  Goal: Knowledge of disease process/condition, treatment plan, diagnostic tests, and medications will improve    Intervention: Explain information regarding disease process/condition, treatment plan, diagnostic tests, and medications and document in education  Pt educated on plan of care, medications, pain management, and disease processes. Pt verbalized understanding and was encouraged to ask questions. All questions answered.         Problem: Psychosocial Needs:  Goal: Level of anxiety will decrease    Intervention: Identify and develop with patient strategies to cope with anxiety triggers  Anxiety triggers identified. Coping methods discussed.

## 2019-01-07 NOTE — PROGRESS NOTES
[facility] Medicine Daily Progress Note    Date of Service  1/7/2019    Chief Complaint  58 y.o. male admitted 12/19/2018 after being found unresponsive with agonal breathing with findings of cardiac arrest.     Hospital Course    Patient history of depression, hypertension was found unresponsive while watching TV.  CPR started by son.  Found to be in V. fib by EMS.  In the field he required multiple defibrillations, given IV amiodarone , CPR, epinephrine with eventual PEA and return to spontaneous circulation and intubated.  Hypothermia protocol initiated in ICU.  Patient findings of HOCM.  Patient was extubated briefly then required intubation and finally extubated 12/29.  Post ICD placement 1/3/19.  Downgraded to telemetry.    Interval Problem Update    Wants to eat.  Failed swallow evaluation.  Core Trak in place.  Telemetry sinus rhythm w  frequent PVCs.  Reports improved strength.  Has been up independently    Consultants/Specialty  Cardiology Dr. Radha Hoff  Pulmonary    Code Status  Full code    Disposition  To be determined    Review of Systems  Review of Systems   Constitutional: Negative for chills and fever.   HENT: Positive for sore throat.    Eyes: Negative for discharge and redness.   Respiratory: Positive for shortness of breath (Improved). Negative for cough, wheezing and stridor.    Cardiovascular: Positive for chest pain (Mild chest wall pain). Negative for palpitations and leg swelling.   Gastrointestinal: Negative for abdominal pain, diarrhea and vomiting.   Musculoskeletal: Positive for myalgias. Negative for back pain, joint pain and neck pain.   Neurological: Negative for sensory change, speech change and focal weakness.   Psychiatric/Behavioral: Negative for hallucinations and substance abuse. The patient is nervous/anxious.         Physical Exam  Temp:  [35.9 °C (96.6 °F)-36.5 °C (97.7 °F)] 36 °C (96.8 °F)  Pulse:  [] 80  Resp:  [16-20] 20  BP: ()/(62-74) 98/62    Physical  Exam   Constitutional: He is oriented to person, place, and time. No distress.   Soft, hoarse, weak voice   HENT:   Head: Normocephalic and atraumatic.   Core Trak in place   Eyes: Conjunctivae and EOM are normal. No scleral icterus.   Neck: Normal range of motion.   Cardiovascular: Normal rate and regular rhythm.    No murmur heard.  Pulmonary/Chest: No stridor. He has no wheezes. He has no rales. He exhibits tenderness (Bilateral ribs to palpation).   Mild sporadic inspiratory rales at the bases   Abdominal: Soft. Bowel sounds are normal. He exhibits no distension. There is no tenderness.   Severely obese   Musculoskeletal: He exhibits no edema or tenderness.   Neurological: He is alert and oriented to person, place, and time. No cranial nerve deficit.   Skin: Skin is warm and dry. He is not diaphoretic. No pallor.   Psychiatric: He has a normal mood and affect. His behavior is normal.   Vitals reviewed.      Fluids    Intake/Output Summary (Last 24 hours) at 01/07/19 1009  Last data filed at 01/07/19 0800   Gross per 24 hour   Intake              100 ml   Output             1800 ml   Net            -1700 ml       Laboratory  Recent Labs      01/06/19   0511   WBC  11.2*   RBC  3.49*   HEMOGLOBIN  9.4*   HEMATOCRIT  31.3*   MCV  89.7   MCH  26.9*   MCHC  30.0*   RDW  51.0*   PLATELETCT  682*   MPV  9.5     Recent Labs      01/06/19   0511  01/07/19   0310  01/07/19   0450   SODIUM  138  137  137   POTASSIUM  4.1  4.1  4.0   CHLORIDE  104  104  104   CO2  26  28  28   GLUCOSE  150*  134*  142*   BUN  25*  30*  29*   CREATININE  0.41*  0.46*  0.42*   CALCIUM  9.5  9.7  9.6                   Imaging  DX-CHEST-PORTABLE (1 VIEW)   Final Result         1. Stable ICD/pacer lead position. No pneumothorax.   2. Improving interstitial edema. No large pleural effusions.      DX-CHEST-PORTABLE (1 VIEW)   Final Result      Stable position left transvenous electrodes. No pneumothorax.   Findings consistent with moderate  interstitial edema similar to previous.      DX-CHEST-LIMITED (1 VIEW)   Final Result      Stable moderate pulmonary edema      Stable cardiac silhouette enlargement, new AICD in place      DX-CHEST-LIMITED (1 VIEW)   Final Result      No significant interval change.      DX-ABDOMEN FOR TUBE PLACEMENT   Final Result         Feeding tube with tip projecting over the expected area of the stomach.      DX-CHEST-PORTABLE (1 VIEW)   Final Result         1.  Ill-defined opacifications in each lung have increased to a minimal degree compared to the prior radiograph.  This could indicate worsening of pulmonary edema or inflammation.      DX-ABDOMEN FOR TUBE PLACEMENT   Final Result      Enteric tube tip projects over the stomach.      [id number] VIEW   Final Result         No specific finding to suggest small bowel obstruction.      EC-ECHOCARDIOGRAM LTD W/O CONT   Final Result      DX-ABDOMEN FOR TUBE PLACEMENT   Final Result      Feeding tube placement with the tip projecting over the distal stomach or proximal duodenum      DX-CHEST-PORTABLE (1 VIEW)   Final Result      1.  Stable cardiomegaly      2.  Worsening pulmonary vascular congestion and interstitial edema.      3.  Bibasilar opacities may be related to atelectasis. Developing pneumonia could have a similar appearance.      EC-ECHOCARDIOGRAM COMPLETE W/O CONT   Final Result   Limited echocardiogram performed for valvular evaluation.  Systolic anterior motion of the mitral valve is noted.  Moderate to   severe mitral regurgitation is noted.   There is concern for possible mobile echodensity adherent to the   anterior mitral valve leaflet tip.  Transesophageal echocardiogram can   be considered versus reevaluating with a repeat echocardiogram in a   couple of days.    Left ventricular outflow tract peak gradient of 64 mmHg suggestive of   left ventricular outflow tract obstruction.  Compared to prior study performed 3 days ago, mitral regurgitation and   LV  outflow tract obstruction is new.   DX-CHEST-LIMITED (1 VIEW)   Final Result         1.  No acute cardiopulmonary disease.   2.  Right internal jugular central line terminates in the right atrium, could be withdrawn 3 cm.   3.  Cardiomegaly      CT-CTA CHEST PULMONARY ARTERY W/ RECONS   Final Result         1.  No large central pulmonary embolus is appreciated, evaluation of the subsegmental branches is essentially nondiagnostic due to motion artifacts. Additional imaging would be required for definitive exclusion of small distal pulmonary emboli.   2.  Hazy groundglass pulmonary opacities, predominantly on the right, appearance suggests atypical edema or infiltrates.   3.  Anterior bilateral second through sixth rib fractures   4.  Cardiomegaly   5.  Left nephrolithiasis   6.  Atherosclerosis and atherosclerotic coronary artery disease.      CT-HEAD W/O   Final Result         1.  No acute intracranial abnormality.   2.  Atherosclerosis.      DX-CHEST-PORTABLE (1 VIEW)   Final Result         1.  No acute cardiopulmonary disease.   2.  Cardiomegaly           Assessment/Plan  * Cardiac arrest (HCC)- (present on admission)   Assessment & Plan    Post V. fib arrest .  Likely associated with HOCM .  CTA negative for PE.  Post hypothermia protocol with neurologic recovery.   Cardiac cath without significant findings  Echo consistent with LV outflow obstruction  Post ICD placement 1/3/19  Continue with beta-blocker.  Frequent PVCs.  Continue monitor telemetry         SVT (supraventricular tachycardia) (HCC)   Assessment & Plan    With rate to 208. 1/1 converted with adenosine  Continue beta-blocker  Telemetry monitoring     Ventricular fibrillation (HCC)- (present on admission)   Assessment & Plan    Post arrest.  Maintaining sinus rhythm with PVCs with ICD in place  Metoprolol twice daily  Monitor telemetry     Acute respiratory failure with hypoxia (HCC)- (present on admission)   Assessment & Plan    Patient had to be  reintubated on 12/21/2018 with Extubation on 12/29/18 with pulmonary edema attributed to HOCM  Mobilize  O2 requirements decreasing.  Continue IV Lasix.  Encourage I-S  Wean O2 as tolerated     Hypernatremia   Assessment & Plan    Sodium now normalized  DC free water     Gastrointestinal hemorrhage associated with acute gastritis   Assessment & Plan    With EGD findings of gastric ulcer  Continue PPI therapy     Sepsis (HCC)   Assessment & Plan    This is severe sepsis with the following associated acute organ dysfunction(s): acute respiratory failure.   Patient has developed septic picture with Pseudomonas pneumonia--resolving  Plan complete IV cefepime 7-day course  Aspiration precautions, core Trak tube feeds  SLP evaluation     Aspiration pneumonia (HCC)   Assessment & Plan    Pseudomonas growth from BAL  Continue IV cefepime 7-day course  Aspiration precautions  Failed swallow, SLP to reevaluate.     Iron deficiency anemia- (present on admission)   Assessment & Plan    Acute on chronic--improved  EGD findings of Gastric ulceration and gastritis.  Continue PPI therapy  Referral outpatient GI for colonoscopy     Hypokalemia   Assessment & Plan    Replace     Leukocytosis- (present on admission)   Assessment & Plan    WBC in normal range     Closed fracture of multiple ribs of both sides- (present on admission)   Assessment & Plan    Secondary to CPR.    Continue PRN oxycodone          VTE prophylaxis: Lovenox     Discussed with speech therapy plan of care.

## 2019-01-07 NOTE — CARE PLAN
Problem: Communication  Goal: The ability to communicate needs accurately and effectively will improve  Outcome: PROGRESSING AS EXPECTED  Listened to patients discussion of concerns related to disease process and treatment plan. Discussed with patient concerns, goals and management. Verbalized understanding and understands importance of communicating needs. Addressed all questions and encouraged to ask if needed.        Problem: Knowledge Deficit  Goal: Knowledge of disease process/condition, treatment plan, diagnostic tests, and medications will improve  Outcome: PROGRESSING AS EXPECTED  Pt educated on treatment plan and medication regimen. Asks questions appropriately regarding care. Understands reason for cortrak placement and speech follow-up.

## 2019-01-07 NOTE — PROGRESS NOTES
Bedside report received, patient care assumed. Pt is A&Ox4, VSS, assessment complete. Tele box in place. C/o no pain at this time. POC reviewed and questions answered. Bed in locked and low position, fall precautions in place. Call light in reach. Educated to call for assistance.

## 2019-01-08 ENCOUNTER — APPOINTMENT (OUTPATIENT)
Dept: RADIOLOGY | Facility: MEDICAL CENTER | Age: 59
DRG: 224 | End: 2019-01-08
Attending: FAMILY MEDICINE
Payer: COMMERCIAL

## 2019-01-08 PROBLEM — I42.1 HOCM (HYPERTROPHIC OBSTRUCTIVE CARDIOMYOPATHY) (HCC): Status: ACTIVE | Noted: 2019-01-08

## 2019-01-08 PROBLEM — D75.839 THROMBOCYTOSIS: Status: ACTIVE | Noted: 2019-01-08

## 2019-01-08 PROBLEM — K25.0 ACUTE GASTRIC ULCER WITH HEMORRHAGE: Status: ACTIVE | Noted: 2019-01-08

## 2019-01-08 PROBLEM — I10 ESSENTIAL HYPERTENSION: Status: ACTIVE | Noted: 2019-01-08

## 2019-01-08 LAB
ANION GAP SERPL CALC-SCNC: 7 MMOL/L (ref 0–11.9)
BASOPHILS # BLD AUTO: 2.2 % (ref 0–1.8)
BASOPHILS # BLD: 0.26 K/UL (ref 0–0.12)
BUN SERPL-MCNC: 31 MG/DL (ref 8–22)
CALCIUM SERPL-MCNC: 9.5 MG/DL (ref 8.5–10.5)
CHLORIDE SERPL-SCNC: 102 MMOL/L (ref 96–112)
CO2 SERPL-SCNC: 27 MMOL/L (ref 20–33)
CREAT SERPL-MCNC: 0.49 MG/DL (ref 0.5–1.4)
EOSINOPHIL # BLD AUTO: 0.87 K/UL (ref 0–0.51)
EOSINOPHIL NFR BLD: 7.2 % (ref 0–6.9)
ERYTHROCYTE [DISTWIDTH] IN BLOOD BY AUTOMATED COUNT: 50.6 FL (ref 35.9–50)
GLUCOSE SERPL-MCNC: 107 MG/DL (ref 65–99)
HCT VFR BLD AUTO: 31.9 % (ref 42–52)
HGB BLD-MCNC: 9.6 G/DL (ref 14–18)
IMM GRANULOCYTES # BLD AUTO: 0.07 K/UL (ref 0–0.11)
IMM GRANULOCYTES NFR BLD AUTO: 0.6 % (ref 0–0.9)
LYMPHOCYTES # BLD AUTO: 2.98 K/UL (ref 1–4.8)
LYMPHOCYTES NFR BLD: 24.8 % (ref 22–41)
MCH RBC QN AUTO: 26.7 PG (ref 27–33)
MCHC RBC AUTO-ENTMCNC: 30.1 G/DL (ref 33.7–35.3)
MCV RBC AUTO: 88.9 FL (ref 81.4–97.8)
MONOCYTES # BLD AUTO: 0.76 K/UL (ref 0–0.85)
MONOCYTES NFR BLD AUTO: 6.3 % (ref 0–13.4)
NEUTROPHILS # BLD AUTO: 7.08 K/UL (ref 1.82–7.42)
NEUTROPHILS NFR BLD: 58.9 % (ref 44–72)
NRBC # BLD AUTO: 0 K/UL
NRBC BLD-RTO: 0 /100 WBC
PLATELET # BLD AUTO: 618 K/UL (ref 164–446)
PMV BLD AUTO: 9.7 FL (ref 9–12.9)
POTASSIUM SERPL-SCNC: 4 MMOL/L (ref 3.6–5.5)
PROCALCITONIN SERPL-MCNC: 0.07 NG/ML
RBC # BLD AUTO: 3.59 M/UL (ref 4.7–6.1)
SODIUM SERPL-SCNC: 136 MMOL/L (ref 135–145)
WBC # BLD AUTO: 12 K/UL (ref 4.8–10.8)

## 2019-01-08 PROCEDURE — 700111 HCHG RX REV CODE 636 W/ 250 OVERRIDE (IP): Performed by: INTERNAL MEDICINE

## 2019-01-08 PROCEDURE — 700102 HCHG RX REV CODE 250 W/ 637 OVERRIDE(OP): Performed by: INTERNAL MEDICINE

## 2019-01-08 PROCEDURE — A9270 NON-COVERED ITEM OR SERVICE: HCPCS | Performed by: INTERNAL MEDICINE

## 2019-01-08 PROCEDURE — A9270 NON-COVERED ITEM OR SERVICE: HCPCS | Performed by: NURSE PRACTITIONER

## 2019-01-08 PROCEDURE — 770020 HCHG ROOM/CARE - TELE (206)

## 2019-01-08 PROCEDURE — A9270 NON-COVERED ITEM OR SERVICE: HCPCS | Performed by: HOSPITALIST

## 2019-01-08 PROCEDURE — 99232 SBSQ HOSP IP/OBS MODERATE 35: CPT | Performed by: INTERNAL MEDICINE

## 2019-01-08 PROCEDURE — 36415 COLL VENOUS BLD VENIPUNCTURE: CPT

## 2019-01-08 PROCEDURE — 71046 X-RAY EXAM CHEST 2 VIEWS: CPT

## 2019-01-08 PROCEDURE — 700102 HCHG RX REV CODE 250 W/ 637 OVERRIDE(OP): Performed by: HOSPITALIST

## 2019-01-08 PROCEDURE — 80048 BASIC METABOLIC PNL TOTAL CA: CPT

## 2019-01-08 PROCEDURE — 99233 SBSQ HOSP IP/OBS HIGH 50: CPT | Performed by: FAMILY MEDICINE

## 2019-01-08 PROCEDURE — 700102 HCHG RX REV CODE 250 W/ 637 OVERRIDE(OP): Performed by: NURSE PRACTITIONER

## 2019-01-08 PROCEDURE — 85025 COMPLETE CBC W/AUTO DIFF WBC: CPT

## 2019-01-08 PROCEDURE — 92612 ENDOSCOPY SWALLOW (FEES) VID: CPT

## 2019-01-08 PROCEDURE — 84145 PROCALCITONIN (PCT): CPT

## 2019-01-08 RX ORDER — OMEPRAZOLE 20 MG/1
20 CAPSULE, DELAYED RELEASE ORAL 2 TIMES DAILY
Status: DISCONTINUED | OUTPATIENT
Start: 2019-01-08 | End: 2019-01-10 | Stop reason: HOSPADM

## 2019-01-08 RX ADMIN — ASPIRIN 81 MG: 81 TABLET, COATED ORAL at 10:29

## 2019-01-08 RX ADMIN — FUROSEMIDE 20 MG: 10 INJECTION, SOLUTION INTRAMUSCULAR; INTRAVENOUS at 16:50

## 2019-01-08 RX ADMIN — METOPROLOL TARTRATE 50 MG: 50 TABLET ORAL at 16:49

## 2019-01-08 RX ADMIN — ACETAMINOPHEN 650 MG: 325 TABLET, FILM COATED ORAL at 16:49

## 2019-01-08 RX ADMIN — OMEPRAZOLE 20 MG: 20 CAPSULE, DELAYED RELEASE ORAL at 10:30

## 2019-01-08 RX ADMIN — FUROSEMIDE 20 MG: 10 INJECTION, SOLUTION INTRAMUSCULAR; INTRAVENOUS at 05:55

## 2019-01-08 RX ADMIN — ENOXAPARIN SODIUM 40 MG: 100 INJECTION SUBCUTANEOUS at 05:55

## 2019-01-08 RX ADMIN — ACETAMINOPHEN 650 MG: 325 TABLET, FILM COATED ORAL at 00:00

## 2019-01-08 RX ADMIN — POTASSIUM BICARBONATE 25 MEQ: 25 TABLET, EFFERVESCENT ORAL at 10:30

## 2019-01-08 RX ADMIN — OMEPRAZOLE 20 MG: 20 CAPSULE, DELAYED RELEASE ORAL at 16:50

## 2019-01-08 RX ADMIN — POTASSIUM BICARBONATE 25 MEQ: 25 TABLET, EFFERVESCENT ORAL at 16:50

## 2019-01-08 RX ADMIN — METOPROLOL TARTRATE 50 MG: 50 TABLET ORAL at 10:29

## 2019-01-08 RX ADMIN — ATORVASTATIN CALCIUM 80 MG: 80 TABLET, FILM COATED ORAL at 16:50

## 2019-01-08 ASSESSMENT — ENCOUNTER SYMPTOMS
SHORTNESS OF BREATH: 0
COUGH: 0
VOMITING: 0
NAUSEA: 0
CHILLS: 0
HALLUCINATIONS: 0
NECK PAIN: 0
COUGH: 1
SORE THROAT: 0
HEADACHES: 0
SPUTUM PRODUCTION: 0
PALPITATIONS: 0
HEMOPTYSIS: 0
WEAKNESS: 0
HEARTBURN: 0
FEVER: 0
DIZZINESS: 0
BACK PAIN: 0
WHEEZING: 0
NERVOUS/ANXIOUS: 0
ABDOMINAL PAIN: 0
BLURRED VISION: 0
DIARRHEA: 0
BRUISES/BLEEDS EASILY: 0

## 2019-01-08 ASSESSMENT — PAIN SCALES - GENERAL
PAINLEVEL_OUTOF10: 0

## 2019-01-08 NOTE — THERAPY
"Speech Language Therapy FEES completed.  Functional Status: FEES procedure explained, the patient agreed to proceed and tolerated well.  Upon insertion of the scope space occupying lesion, concerning for a granuloma, noted on the superior  medial aspect of the right vocal cord.  However, complete vocal cord adduction was achieved during phonation and cough.  Presentation of PO included ice chips, nectars, purees, pudding, soft and mixed solids, dry solids, and thin liquids.  The patient presented with mild pharyngeal dysphagia.  The patient had reduced bolus control as seen by rapid premature spillage to the pyriform sinuses with thins and nectars and to the valleculae with all other consistencies.  Impaired tongue base retraction resulted in mild to trace vallecular residue with all consistencies consumed but cleared with a spontaneous second swallow.  Penetration and aspiration were suspected during the swallow with thin liquids as seen by green deep in the laryngeal vestibule and on the vocal cords after the swallow.  Patient had inconsistent cough response to episodes of suspected penetration/aspiration.  At this time, recommend initiation of a Dysphagia III/nectars diet.  Patient to sit EOB or in a chair for all meals. SLP following during the acute care.   Recommendations - Diet: Diet / Liquid Recommendation: Dysphagia III/nectars                          Strategies: No Straws and Sit EOB or in a chair for all meals, No cold cereal, blend soups                          Medication Administration: Medication Administration : whole with a nectar thick liquid wash  Plan of Care: Will benefit from Speech Therapy 3 times per week  Post-Acute Therapy: Recommend outpatient or home health transitional care services for continued speech therapy services      See \"Rehab Therapy-Acute\" Patient Summary Report for complete documentation.   "

## 2019-01-08 NOTE — CARE PLAN
Problem: Knowledge Deficit  Goal: Knowledge of disease process/condition, treatment plan, diagnostic tests, and medications will improve    Intervention: Explain information regarding disease process/condition, treatment plan, diagnostic tests, and medications and document in education  Pt educated on plan of care, medications, pain management, and disease processes. Pt verbalized understanding and was encouraged to ask questions. All questions answered.         Problem: Discharge Barriers/Planning  Goal: Patient's continuum of care needs will be met    Intervention: Assess potential discharge barriers on admission and throughout hospital stay  Barrier to discharge is FEES study with SLP and whether or not pt will need cortrak replaced.

## 2019-01-08 NOTE — PROGRESS NOTES
[facility] Medicine Daily Progress Note    Date of Service  1/7/2019    Chief Complaint  58 y.o. male admitted 12/19/2018 after being found unresponsive with agonal breathing with findings of cardiac arrest.     Hospital Course    Patient history of depression, hypertension was found unresponsive while watching TV.  CPR started by son.  Found to be in V. fib by EMS.  In the field he required multiple defibrillations, given IV amiodarone , CPR, epinephrine with eventual PEA and return to spontaneous circulation and intubated.  Hypothermia protocol initiated in ICU.  Patient findings of HOCM.  Patient was extubated briefly then required intubation and finally extubated 12/29.  Post ICD placement 1/3/19.  Downgraded to telemetry.    Interval Problem Update    Telemetry with frequent PVCs.  Has mild chest wall pain he attributes to CPR, no palpitations.   Core track in place.  Anxious to start orals.  SLP to reevaluate.    Consultants/Specialty  Cardiology Dr. Radha Hoff  Pulmonary    Code Status  Full code    Disposition  To be determined    Review of Systems  Review of Systems   Constitutional: Negative for chills and fever.   HENT: Positive for sore throat.    Respiratory: Positive for shortness of breath (Improved). Negative for cough, wheezing and stridor.    Cardiovascular: Positive for chest pain (Mild chest wall pain). Negative for palpitations.   Gastrointestinal: Negative for abdominal pain, diarrhea and vomiting.   Musculoskeletal: Positive for myalgias. Negative for back pain, joint pain and neck pain.   Neurological: Negative for speech change, focal weakness and weakness.   Psychiatric/Behavioral: Negative for hallucinations and substance abuse. The patient is nervous/anxious.         Physical Exam  Temp:  [36 °C (96.8 °F)-36.5 °C (97.7 °F)] 36.1 °C (97 °F)  Pulse:  [] 89  Resp:  [16-20] 16  BP: ()/(62-74) 114/69    Physical Exam   Constitutional: He is oriented to person, place, and time.  No distress.   Response with soft, hoarse, weak voice   HENT:   Head: Normocephalic and atraumatic.   Core Trak in place   Eyes: Conjunctivae and EOM are normal. No scleral icterus.   Neck: Normal range of motion.   Cardiovascular: Normal rate and regular rhythm.    No murmur heard.  Pulmonary/Chest: No stridor. He has no wheezes. He has no rales. He exhibits tenderness (Bilateral ribs to palpation).   Mild sporadic inspiratory rales at the bases   Abdominal: Soft. Bowel sounds are normal. He exhibits no distension. There is no tenderness.   Severely obese   Musculoskeletal: He exhibits no edema or tenderness.   Neurological: He is alert and oriented to person, place, and time. No cranial nerve deficit.   No gross focal weakness   Skin: Skin is warm and dry. He is not diaphoretic. No pallor.   Vitals reviewed.      Fluids    Intake/Output Summary (Last 24 hours) at 01/07/19 1714  Last data filed at 01/07/19 1427   Gross per 24 hour   Intake              100 ml   Output             2050 ml   Net            -1950 ml       Laboratory  Recent Labs      01/06/19   0511   WBC  11.2*   RBC  3.49*   HEMOGLOBIN  9.4*   HEMATOCRIT  31.3*   MCV  89.7   MCH  26.9*   MCHC  30.0*   RDW  51.0*   PLATELETCT  682*   MPV  9.5     Recent Labs      01/06/19   0511  01/07/19   0310  01/07/19   0450   SODIUM  138  137  137   POTASSIUM  4.1  4.1  4.0   CHLORIDE  104  104  104   CO2  26  28  28   GLUCOSE  150*  134*  142*   BUN  25*  30*  29*   CREATININE  0.41*  0.46*  0.42*   CALCIUM  9.5  9.7  9.6                   Imaging  DX-CHEST-PORTABLE (1 VIEW)   Final Result         1. Stable ICD/pacer lead position. No pneumothorax.   2. Improving interstitial edema. No large pleural effusions.      DX-CHEST-PORTABLE (1 VIEW)   Final Result      Stable position left transvenous electrodes. No pneumothorax.   Findings consistent with moderate interstitial edema similar to previous.      DX-CHEST-LIMITED (1 VIEW)   Final Result      Stable  moderate pulmonary edema      Stable cardiac silhouette enlargement, new AICD in place      DX-CHEST-LIMITED (1 VIEW)   Final Result      No significant interval change.      DX-ABDOMEN FOR TUBE PLACEMENT   Final Result         Feeding tube with tip projecting over the expected area of the stomach.      DX-CHEST-PORTABLE (1 VIEW)   Final Result         1.  Ill-defined opacifications in each lung have increased to a minimal degree compared to the prior radiograph.  This could indicate worsening of pulmonary edema or inflammation.      DX-ABDOMEN FOR TUBE PLACEMENT   Final Result      Enteric tube tip projects over the stomach.      [id number] VIEW   Final Result         No specific finding to suggest small bowel obstruction.      EC-ECHOCARDIOGRAM LTD W/O CONT   Final Result      DX-ABDOMEN FOR TUBE PLACEMENT   Final Result      Feeding tube placement with the tip projecting over the distal stomach or proximal duodenum      DX-CHEST-PORTABLE (1 VIEW)   Final Result      1.  Stable cardiomegaly      2.  Worsening pulmonary vascular congestion and interstitial edema.      3.  Bibasilar opacities may be related to atelectasis. Developing pneumonia could have a similar appearance.      EC-ECHOCARDIOGRAM COMPLETE W/O CONT   Final Result   Limited echocardiogram performed for valvular evaluation.  Systolic anterior motion of the mitral valve is noted.  Moderate to   severe mitral regurgitation is noted.   There is concern for possible mobile echodensity adherent to the   anterior mitral valve leaflet tip.  Transesophageal echocardiogram can   be considered versus reevaluating with a repeat echocardiogram in a   couple of days.    Left ventricular outflow tract peak gradient of 64 mmHg suggestive of   left ventricular outflow tract obstruction.  Compared to prior study performed 3 days ago, mitral regurgitation and   LV outflow tract obstruction is new.   DX-CHEST-LIMITED (1 VIEW)   Final Result         1.  No acute  cardiopulmonary disease.   2.  Right internal jugular central line terminates in the right atrium, could be withdrawn 3 cm.   3.  Cardiomegaly      CT-CTA CHEST PULMONARY ARTERY W/ RECONS   Final Result         1.  No large central pulmonary embolus is appreciated, evaluation of the subsegmental branches is essentially nondiagnostic due to motion artifacts. Additional imaging would be required for definitive exclusion of small distal pulmonary emboli.   2.  Hazy groundglass pulmonary opacities, predominantly on the right, appearance suggests atypical edema or infiltrates.   3.  Anterior bilateral second through sixth rib fractures   4.  Cardiomegaly   5.  Left nephrolithiasis   6.  Atherosclerosis and atherosclerotic coronary artery disease.      CT-HEAD W/O   Final Result         1.  No acute intracranial abnormality.   2.  Atherosclerosis.      DX-CHEST-PORTABLE (1 VIEW)   Final Result         1.  No acute cardiopulmonary disease.   2.  Cardiomegaly           Assessment/Plan  * Cardiac arrest (HCC)- (present on admission)   Assessment & Plan    Post V. fib arrest .  Likely associated with HOCM .  CTA negative for PE.  Post hypothermia protocol with neurologic recovery.   Cardiac cath without significant findings  Echo consistent with LV outflow obstruction  Post ICD placement 1/3/19  Has frequent PVCs continue with beta-blocker.     Monitor telemetry       SVT (supraventricular tachycardia) (HCC)   Assessment & Plan    With rate to 208. 1/1 converted with adenosine  Continue beta-blocker  Telemetry monitoring     Ventricular fibrillation (HCC)- (present on admission)   Assessment & Plan    Post arrest.  Maintaining sinus rhythm with PVCs with ICD in place  Metoprolol twice daily  Monitor telemetry     Acute respiratory failure with hypoxia (HCC)- (present on admission)   Assessment & Plan    Patient had to be reintubated on 12/21/2018 with Extubation on 12/29/18 with pulmonary edema attributed to  HOCM  Mobilize  Encourage I-S  IV Lasix diuresis-monitor renal function, electrolytes  Wean off O2 as tolerated     Dysphagia   Assessment & Plan    Order fees, SLP reevaluation  Continue with core tract tube feeds     Hypernatremia   Assessment & Plan    Sodium now normalized  DC free water     Gastrointestinal hemorrhage associated with acute gastritis   Assessment & Plan    With EGD findings of gastric ulcer  Continue PPI therapy     Sepsis (HCC)   Assessment & Plan    This is severe sepsis with the following associated acute organ dysfunction(s): acute respiratory failure.   Patient has developed septic picture with Pseudomonas pneumonia--resolved.  Completed 7-day course of IV cefepime.  Aspiration precautions     Aspiration pneumonia (HCC)   Assessment & Plan    Pseudomonas growth from BAL  Continue IV cefepime 7-day course  Aspiration precautions  Failed swallow, SLP to reevaluate.     Iron deficiency anemia- (present on admission)   Assessment & Plan    Acute on chronic--improved  EGD findings of Gastric ulceration and gastritis.  Continue PPI therapy  Referral outpatient GI for colonoscopy  Check a.m. hemoglobin     Hypokalemia   Assessment & Plan    Replace     Leukocytosis- (present on admission)   Assessment & Plan    WBC in normal range     Closed fracture of multiple ribs of both sides- (present on admission)   Assessment & Plan    Secondary to CPR.    Continue PRN oxycodone     Discuss with multidisciplinary team plan of care.    VTE prophylaxis: Lovenox     Discussed with speech therapy plan of care.

## 2019-01-08 NOTE — PROGRESS NOTES
Bedside report received. Pt care assumed. Pt resting comfortably. Pt not in distress. AOx 4. No complaints at this time. Family at bedside. Safety precautions in place. Educated to call for assistance.

## 2019-01-08 NOTE — PROGRESS NOTES
Bedside report received from night nurse. Assumed care of pt. Pt awake, sitting up in bed. A/Ox4, VSS. No complaints at this time. POC reviewed and white board updated. Tele box on. Call light in reach. Bed locked in lowest position with 2 upper bed rails up.

## 2019-01-08 NOTE — CARE PLAN
Problem: Knowledge Deficit  Goal: Knowledge of the prescribed therapeutic regimen will improve  Outcome: PROGRESSING AS EXPECTED  Pt updated and very aware of POC    Problem: Discharge Barriers/Planning  Goal: Patient's continuum of care needs will be met  Outcome: PROGRESSING AS EXPECTED  Speech and swallow are the biggest barriers to discharge. Consult still following

## 2019-01-09 LAB
ANION GAP SERPL CALC-SCNC: 10 MMOL/L (ref 0–11.9)
ANISOCYTOSIS BLD QL SMEAR: ABNORMAL
BASOPHILS # BLD AUTO: 3.5 % (ref 0–1.8)
BASOPHILS # BLD: 0.41 K/UL (ref 0–0.12)
BUN SERPL-MCNC: 32 MG/DL (ref 8–22)
BURR CELLS BLD QL SMEAR: NORMAL
CALCIUM SERPL-MCNC: 9.6 MG/DL (ref 8.5–10.5)
CHLORIDE SERPL-SCNC: 99 MMOL/L (ref 96–112)
CO2 SERPL-SCNC: 23 MMOL/L (ref 20–33)
CREAT SERPL-MCNC: 0.64 MG/DL (ref 0.5–1.4)
EOSINOPHIL # BLD AUTO: 1 K/UL (ref 0–0.51)
EOSINOPHIL NFR BLD: 8.6 % (ref 0–6.9)
ERYTHROCYTE [DISTWIDTH] IN BLOOD BY AUTOMATED COUNT: 50.5 FL (ref 35.9–50)
GIANT PLATELETS BLD QL SMEAR: NORMAL
GLUCOSE SERPL-MCNC: 90 MG/DL (ref 65–99)
HCT VFR BLD AUTO: 33.2 % (ref 42–52)
HGB BLD-MCNC: 9.9 G/DL (ref 14–18)
LYMPHOCYTES # BLD AUTO: 2.3 K/UL (ref 1–4.8)
LYMPHOCYTES NFR BLD: 19.8 % (ref 22–41)
MANUAL DIFF BLD: NORMAL
MCH RBC QN AUTO: 26.7 PG (ref 27–33)
MCHC RBC AUTO-ENTMCNC: 29.8 G/DL (ref 33.7–35.3)
MCV RBC AUTO: 89.5 FL (ref 81.4–97.8)
MONOCYTES # BLD AUTO: 0.3 K/UL (ref 0–0.85)
MONOCYTES NFR BLD AUTO: 2.6 % (ref 0–13.4)
MORPHOLOGY BLD-IMP: NORMAL
NEUTROPHILS # BLD AUTO: 7.6 K/UL (ref 1.82–7.42)
NEUTROPHILS NFR BLD: 65.5 % (ref 44–72)
NRBC # BLD AUTO: 0 K/UL
NRBC BLD-RTO: 0 /100 WBC
OVALOCYTES BLD QL SMEAR: NORMAL
PLATELET # BLD AUTO: 560 K/UL (ref 164–446)
PLATELET BLD QL SMEAR: NORMAL
PMV BLD AUTO: 10.4 FL (ref 9–12.9)
POLYCHROMASIA BLD QL SMEAR: NORMAL
POTASSIUM SERPL-SCNC: 4 MMOL/L (ref 3.6–5.5)
RBC # BLD AUTO: 3.71 M/UL (ref 4.7–6.1)
RBC BLD AUTO: PRESENT
SMUDGE CELLS BLD QL SMEAR: NORMAL
SODIUM SERPL-SCNC: 132 MMOL/L (ref 135–145)
WBC # BLD AUTO: 11.6 K/UL (ref 4.8–10.8)

## 2019-01-09 PROCEDURE — 700102 HCHG RX REV CODE 250 W/ 637 OVERRIDE(OP): Performed by: INTERNAL MEDICINE

## 2019-01-09 PROCEDURE — A9270 NON-COVERED ITEM OR SERVICE: HCPCS | Performed by: INTERNAL MEDICINE

## 2019-01-09 PROCEDURE — 80048 BASIC METABOLIC PNL TOTAL CA: CPT

## 2019-01-09 PROCEDURE — A9270 NON-COVERED ITEM OR SERVICE: HCPCS | Performed by: HOSPITALIST

## 2019-01-09 PROCEDURE — 770020 HCHG ROOM/CARE - TELE (206)

## 2019-01-09 PROCEDURE — 700111 HCHG RX REV CODE 636 W/ 250 OVERRIDE (IP): Performed by: FAMILY MEDICINE

## 2019-01-09 PROCEDURE — 700111 HCHG RX REV CODE 636 W/ 250 OVERRIDE (IP): Performed by: INTERNAL MEDICINE

## 2019-01-09 PROCEDURE — 85007 BL SMEAR W/DIFF WBC COUNT: CPT

## 2019-01-09 PROCEDURE — 85027 COMPLETE CBC AUTOMATED: CPT

## 2019-01-09 PROCEDURE — 36415 COLL VENOUS BLD VENIPUNCTURE: CPT

## 2019-01-09 PROCEDURE — A9270 NON-COVERED ITEM OR SERVICE: HCPCS | Performed by: FAMILY MEDICINE

## 2019-01-09 PROCEDURE — 99231 SBSQ HOSP IP/OBS SF/LOW 25: CPT | Performed by: INTERNAL MEDICINE

## 2019-01-09 PROCEDURE — A9270 NON-COVERED ITEM OR SERVICE: HCPCS | Performed by: NURSE PRACTITIONER

## 2019-01-09 PROCEDURE — 700102 HCHG RX REV CODE 250 W/ 637 OVERRIDE(OP): Performed by: NURSE PRACTITIONER

## 2019-01-09 PROCEDURE — 700102 HCHG RX REV CODE 250 W/ 637 OVERRIDE(OP): Performed by: HOSPITALIST

## 2019-01-09 PROCEDURE — 99233 SBSQ HOSP IP/OBS HIGH 50: CPT | Performed by: FAMILY MEDICINE

## 2019-01-09 PROCEDURE — 700102 HCHG RX REV CODE 250 W/ 637 OVERRIDE(OP): Performed by: FAMILY MEDICINE

## 2019-01-09 RX ADMIN — OMEPRAZOLE 20 MG: 20 CAPSULE, DELAYED RELEASE ORAL at 18:21

## 2019-01-09 RX ADMIN — METOPROLOL TARTRATE 25 MG: 25 TABLET, FILM COATED ORAL at 18:21

## 2019-01-09 RX ADMIN — ATORVASTATIN CALCIUM 80 MG: 80 TABLET, FILM COATED ORAL at 18:21

## 2019-01-09 RX ADMIN — ENOXAPARIN SODIUM 40 MG: 100 INJECTION SUBCUTANEOUS at 05:49

## 2019-01-09 RX ADMIN — ASPIRIN 81 MG: 81 TABLET, COATED ORAL at 05:49

## 2019-01-09 RX ADMIN — POTASSIUM BICARBONATE 25 MEQ: 25 TABLET, EFFERVESCENT ORAL at 18:21

## 2019-01-09 RX ADMIN — FUROSEMIDE 20 MG: 10 INJECTION, SOLUTION INTRAMUSCULAR; INTRAVENOUS at 05:53

## 2019-01-09 RX ADMIN — METOPROLOL TARTRATE 50 MG: 50 TABLET ORAL at 05:49

## 2019-01-09 RX ADMIN — ACETAMINOPHEN 650 MG: 325 TABLET, FILM COATED ORAL at 01:00

## 2019-01-09 RX ADMIN — OMEPRAZOLE 20 MG: 20 CAPSULE, DELAYED RELEASE ORAL at 05:49

## 2019-01-09 RX ADMIN — POTASSIUM BICARBONATE 25 MEQ: 25 TABLET, EFFERVESCENT ORAL at 05:49

## 2019-01-09 RX ADMIN — FUROSEMIDE 20 MG: 10 INJECTION, SOLUTION INTRAMUSCULAR; INTRAVENOUS at 18:21

## 2019-01-09 ASSESSMENT — ENCOUNTER SYMPTOMS
HALLUCINATIONS: 0
COUGH: 0
NAUSEA: 0
SPUTUM PRODUCTION: 0
BRUISES/BLEEDS EASILY: 0
FEVER: 0
DIZZINESS: 0
SHORTNESS OF BREATH: 0
SORE THROAT: 0
WEAKNESS: 0
NERVOUS/ANXIOUS: 0
ABDOMINAL PAIN: 0
BLURRED VISION: 0
WHEEZING: 0
VOMITING: 0
NECK PAIN: 0
HEMOPTYSIS: 0
HEARTBURN: 0
BACK PAIN: 0
PALPITATIONS: 0
CHILLS: 0
COUGH: 1
DIARRHEA: 0
HEADACHES: 0

## 2019-01-09 ASSESSMENT — PAIN SCALES - GENERAL
PAINLEVEL_OUTOF10: 0

## 2019-01-09 NOTE — PROGRESS NOTES
Received bedside report from NOC RN. Patient A&Ox4, no signs of distress. Bed in lowest and locked position, call light in reach, all questions and concerns answered.

## 2019-01-09 NOTE — PROGRESS NOTES
[facility] Medicine Daily Progress Note    Date of Service  1/8/2019    Chief Complaint  58 y.o. male admitted 12/19/2018 with cardiac arrest    Hospital Course  Admitted with cardiac arrest, noted to have ventricular fibrillation in the field, intubated on the field, received multiple defibrillations, IV amiodarone and epinephrine, LHC done showed no significant coronary artery disease, workup showed hypertropic obstructive cardiomyopathy.  Also had GI bleeding secondary to gastric ulcer, anemia requiring transfusion.    Interval Problem Update  V fib - ICD placed  Respiratory failure - intubated 12/19, extubated 12/29, off O2 today  Pneumonia - WBC remains elevated  Hypertension - controlled  Anemia - hemoglobin now stable  Dysphagia - reevaluation done today    Consultants/Specialty  Cardiology  Critical care  Gastroenterology    Code Status  Full    Disposition  TBD    Review of Systems  Review of Systems   Constitutional: Negative for chills, fever and malaise/fatigue.   HENT: Positive for congestion. Negative for hearing loss and sore throat.    Eyes: Negative for blurred vision.   Respiratory: Positive for cough. Negative for shortness of breath and wheezing.    Cardiovascular: Negative for chest pain, palpitations and leg swelling.   Gastrointestinal: Negative for abdominal pain, diarrhea, heartburn, nausea and vomiting.   Genitourinary: Negative for dysuria.   Musculoskeletal: Negative for back pain and neck pain.   Skin: Negative for rash.   Neurological: Negative for dizziness, weakness and headaches.   Psychiatric/Behavioral: The patient is not nervous/anxious.         Physical Exam  Temp:  [36.3 °C (97.3 °F)-37.2 °C (98.9 °F)] 36.6 °C (97.8 °F)  Pulse:  [] 109  Resp:  [16-18] 16  BP: ()/(50-74) 110/70    Physical Exam   Constitutional: He is oriented to person, place, and time. He appears well-developed and well-nourished.   HENT:   Head: Normocephalic and atraumatic.   Eyes: Pupils are equal,  round, and reactive to light. Conjunctivae are normal.   Neck: No tracheal deviation present. No thyromegaly present.   Cardiovascular: Normal rate and regular rhythm.    Pulmonary/Chest: Effort normal. He has rales. He exhibits tenderness.   Abdominal: Soft. Bowel sounds are normal. He exhibits no distension. There is no tenderness.   Musculoskeletal: He exhibits no edema.   Lymphadenopathy:     He has no cervical adenopathy.   Neurological: He is alert and oriented to person, place, and time.   Skin: Skin is warm and dry.   Nursing note and vitals reviewed.      Fluids    Intake/Output Summary (Last 24 hours) at 01/08/19 1840  Last data filed at 01/08/19 1007   Gross per 24 hour   Intake              250 ml   Output                0 ml   Net              250 ml       Laboratory  Recent Labs      01/06/19   0511  01/08/19   0256   WBC  11.2*  12.0*   RBC  3.49*  3.59*   HEMOGLOBIN  9.4*  9.6*   HEMATOCRIT  31.3*  31.9*   MCV  89.7  88.9   MCH  26.9*  26.7*   MCHC  30.0*  30.1*   RDW  51.0*  50.6*   PLATELETCT  682*  618*   MPV  9.5  9.7     Recent Labs      01/07/19   0310  01/07/19   0450  01/08/19   0256   SODIUM  137  137  136   POTASSIUM  4.1  4.0  4.0   CHLORIDE  104  104  102   CO2  28  28  27   GLUCOSE  134*  142*  107*   BUN  30*  29*  31*   CREATININE  0.46*  0.42*  0.49*   CALCIUM  9.7  9.6  9.5                   Imaging  DX-CHEST-PORTABLE (1 VIEW)   Final Result         1. Stable ICD/pacer lead position. No pneumothorax.   2. Improving interstitial edema. No large pleural effusions.      DX-CHEST-PORTABLE (1 VIEW)   Final Result      Stable position left transvenous electrodes. No pneumothorax.   Findings consistent with moderate interstitial edema similar to previous.      DX-CHEST-LIMITED (1 VIEW)   Final Result      Stable moderate pulmonary edema      Stable cardiac silhouette enlargement, new AICD in place      DX-CHEST-LIMITED (1 VIEW)   Final Result      No significant interval change.       DX-ABDOMEN FOR TUBE PLACEMENT   Final Result         Feeding tube with tip projecting over the expected area of the stomach.      DX-CHEST-PORTABLE (1 VIEW)   Final Result         1.  Ill-defined opacifications in each lung have increased to a minimal degree compared to the prior radiograph.  This could indicate worsening of pulmonary edema or inflammation.      DX-ABDOMEN FOR TUBE PLACEMENT   Final Result      Enteric tube tip projects over the stomach.      [id number] VIEW   Final Result         No specific finding to suggest small bowel obstruction.      EC-ECHOCARDIOGRAM LTD W/O CONT   Final Result      DX-ABDOMEN FOR TUBE PLACEMENT   Final Result      Feeding tube placement with the tip projecting over the distal stomach or proximal duodenum      DX-CHEST-PORTABLE (1 VIEW)   Final Result      1.  Stable cardiomegaly      2.  Worsening pulmonary vascular congestion and interstitial edema.      3.  Bibasilar opacities may be related to atelectasis. Developing pneumonia could have a similar appearance.      EC-ECHOCARDIOGRAM COMPLETE W/O CONT   Final Result      DX-CHEST-LIMITED (1 VIEW)   Final Result         1.  No acute cardiopulmonary disease.   2.  Right internal jugular central line terminates in the right atrium, could be withdrawn 3 cm.   3.  Cardiomegaly      CT-CTA CHEST PULMONARY ARTERY W/ RECONS   Final Result         1.  No large central pulmonary embolus is appreciated, evaluation of the subsegmental branches is essentially nondiagnostic due to motion artifacts. Additional imaging would be required for definitive exclusion of small distal pulmonary emboli.   2.  Hazy groundglass pulmonary opacities, predominantly on the right, appearance suggests atypical edema or infiltrates.   3.  Anterior bilateral second through sixth rib fractures   4.  Cardiomegaly   5.  Left nephrolithiasis   6.  Atherosclerosis and atherosclerotic coronary artery disease.      CT-HEAD W/O   Final Result         1.  No  acute intracranial abnormality.   2.  Atherosclerosis.      DX-CHEST-PORTABLE (1 VIEW)   Final Result         1.  No acute cardiopulmonary disease.   2.  Cardiomegaly           Assessment/Plan  * Cardiac arrest (HCC)- (present on admission)   Assessment & Plan    Secondary to ventricular fibrillation likely associated with HOCM   LHC - Non-obstructive mild coronary artery disease.12/28/2018  ICD placement 1/3/2019         HOCM (hypertrophic obstructive cardiomyopathy) (HCC)- (present on admission)   Assessment & Plan    Metoprolol, ASA     Gastrointestinal hemorrhage associated with acute gastritis- (present on admission)   Assessment & Plan    Omeprazole     Sepsis (HCC)   Assessment & Plan    This is severe sepsis with the following associated acute organ dysfunction(s): acute respiratory failure.   Source - Pneumonia     Ventricular fibrillation (HCC)- (present on admission)   Assessment & Plan    Metoprolol      Acute respiratory failure with hypoxia (HCC)- (present on admission)   Assessment & Plan    Keep O2 sats above 94%  Encourage I-S, RT protocol  IV Lasix for diuresis     Acute gastric ulcer with hemorrhage- (present on admission)   Assessment & Plan    Omeprazole     Thrombocytosis (HCC)- (present on admission)   Assessment & Plan    Follow CBC     Dysphagia- (present on admission)   Assessment & Plan    Dysphagia 3, NTL  SLP to follow     Hypernatremia- (present on admission)   Assessment & Plan    Follow bmp     SVT (supraventricular tachycardia) (HCC)- (present on admission)   Assessment & Plan    Adenosine 1/1/2019  Metoprolol     Aspiration pneumonia (HCC)   Assessment & Plan    Finished IV cefepime 7-day course     Hypokalemia- (present on admission)   Assessment & Plan    Follow bmp     Iron deficiency anemia- (present on admission)   Assessment & Plan    Transfused 1 unit PRBC  Follow CBC     Leukocytosis- (present on admission)   Assessment & Plan    Follow CBC  Repeat chest x-ray  Check  pro-calcitonin       Closed fracture of multiple ribs of both sides- (present on admission)   Assessment & Plan    Pain control  Encourage I-S     Thrombocytopenia (HCC)- (present on admission)   Assessment & Plan    Resolved          VTE prophylaxis: Lovenox

## 2019-01-09 NOTE — PROGRESS NOTES
[facility] Medicine Daily Progress Note    Date of Service  1/9/2019    Chief Complaint  58 y.o. male admitted 12/19/2018 with cardiac arrest    Hospital Course  Admitted with cardiac arrest, noted to have ventricular fibrillation in the field, intubated on the field, received multiple defibrillations, IV amiodarone and epinephrine, LHC done showed no significant coronary artery disease, workup showed hypertropic obstructive cardiomyopathy.  Also had GI bleeding secondary to gastric ulcer, anemia requiring transfusion.    Interval Problem Update  V fib - ICD placed  Respiratory failure - intubated 12/19, extubated 12/29, off O2, CXR shows pulmonary edema  Pneumonia - WBC remains elevated, procalcitonin negative  Hypertension - BP on low side  Anemia - hemoglobin stable    Consultants/Specialty  Cardiology  Critical care  Gastroenterology    Code Status  Full    Disposition  TBD    Review of Systems  Review of Systems   Constitutional: Negative for chills, fever and malaise/fatigue.   HENT: Positive for congestion. Negative for hearing loss and sore throat.    Eyes: Negative for blurred vision.   Respiratory: Positive for cough. Negative for shortness of breath and wheezing.    Cardiovascular: Negative for chest pain, palpitations and leg swelling.   Gastrointestinal: Negative for abdominal pain, diarrhea, heartburn, nausea and vomiting.   Genitourinary: Negative for dysuria.   Musculoskeletal: Negative for back pain and neck pain.   Skin: Negative for rash.   Neurological: Negative for dizziness, weakness and headaches.   Psychiatric/Behavioral: The patient is not nervous/anxious.         Physical Exam  Temp:  [35.9 °C (96.6 °F)-36.3 °C (97.4 °F)] 35.9 °C (96.6 °F)  Pulse:  [73-94] 76  Resp:  [16-18] 18  BP: ()/(52-86) 121/86    Physical Exam   Constitutional: He is oriented to person, place, and time. He appears well-developed and well-nourished.   HENT:   Head: Normocephalic and atraumatic.   Eyes: Pupils are  equal, round, and reactive to light. Conjunctivae are normal.   Neck: No tracheal deviation present. No thyromegaly present.   Cardiovascular: Normal rate and regular rhythm.    Pulmonary/Chest: Effort normal. He has rales. He exhibits tenderness.   Abdominal: Soft. Bowel sounds are normal. He exhibits no distension. There is no tenderness.   Musculoskeletal: He exhibits no edema.   Lymphadenopathy:     He has no cervical adenopathy.   Neurological: He is alert and oriented to person, place, and time.   Skin: Skin is warm and dry.   Nursing note and vitals reviewed.      Fluids    Intake/Output Summary (Last 24 hours) at 01/09/19 1451  Last data filed at 01/09/19 0915   Gross per 24 hour   Intake              480 ml   Output                0 ml   Net              480 ml       Laboratory  Recent Labs      01/08/19   0256  01/09/19   0233   WBC  12.0*  11.6*   RBC  3.59*  3.71*   HEMOGLOBIN  9.6*  9.9*   HEMATOCRIT  31.9*  33.2*   MCV  88.9  89.5   MCH  26.7*  26.7*   MCHC  30.1*  29.8*   RDW  50.6*  50.5*   PLATELETCT  618*  560*   MPV  9.7  10.4     Recent Labs      01/07/19   0450  01/08/19   0256  01/09/19   0233   SODIUM  137  136  132*   POTASSIUM  4.0  4.0  4.0   CHLORIDE  104  102  99   CO2  28  27  23   GLUCOSE  142*  107*  90   BUN  29*  31*  32*   CREATININE  0.42*  0.49*  0.64   CALCIUM  9.6  9.5  9.6                   Imaging  DX-CHEST-2 VIEWS   Final Result         1.  Pulmonary edema and/or infiltrates are identified, which are somewhat decreased since the prior exam.      DX-CHEST-PORTABLE (1 VIEW)   Final Result         1. Stable ICD/pacer lead position. No pneumothorax.   2. Improving interstitial edema. No large pleural effusions.      DX-CHEST-PORTABLE (1 VIEW)   Final Result      Stable position left transvenous electrodes. No pneumothorax.   Findings consistent with moderate interstitial edema similar to previous.      DX-CHEST-LIMITED (1 VIEW)   Final Result      Stable moderate pulmonary edema       Stable cardiac silhouette enlargement, new AICD in place      DX-CHEST-LIMITED (1 VIEW)   Final Result      No significant interval change.      DX-ABDOMEN FOR TUBE PLACEMENT   Final Result         Feeding tube with tip projecting over the expected area of the stomach.      DX-CHEST-PORTABLE (1 VIEW)   Final Result         1.  Ill-defined opacifications in each lung have increased to a minimal degree compared to the prior radiograph.  This could indicate worsening of pulmonary edema or inflammation.      DX-ABDOMEN FOR TUBE PLACEMENT   Final Result      Enteric tube tip projects over the stomach.      [id number] VIEW   Final Result         No specific finding to suggest small bowel obstruction.      EC-ECHOCARDIOGRAM LTD W/O CONT   Final Result      DX-ABDOMEN FOR TUBE PLACEMENT   Final Result      Feeding tube placement with the tip projecting over the distal stomach or proximal duodenum      DX-CHEST-PORTABLE (1 VIEW)   Final Result      1.  Stable cardiomegaly      2.  Worsening pulmonary vascular congestion and interstitial edema.      3.  Bibasilar opacities may be related to atelectasis. Developing pneumonia could have a similar appearance.      EC-ECHOCARDIOGRAM COMPLETE W/O CONT   Final Result      DX-CHEST-LIMITED (1 VIEW)   Final Result         1.  No acute cardiopulmonary disease.   2.  Right internal jugular central line terminates in the right atrium, could be withdrawn 3 cm.   3.  Cardiomegaly      CT-CTA CHEST PULMONARY ARTERY W/ RECONS   Final Result         1.  No large central pulmonary embolus is appreciated, evaluation of the subsegmental branches is essentially nondiagnostic due to motion artifacts. Additional imaging would be required for definitive exclusion of small distal pulmonary emboli.   2.  Hazy groundglass pulmonary opacities, predominantly on the right, appearance suggests atypical edema or infiltrates.   3.  Anterior bilateral second through sixth rib fractures   4.   Cardiomegaly   5.  Left nephrolithiasis   6.  Atherosclerosis and atherosclerotic coronary artery disease.      CT-HEAD W/O   Final Result         1.  No acute intracranial abnormality.   2.  Atherosclerosis.      DX-CHEST-PORTABLE (1 VIEW)   Final Result         1.  No acute cardiopulmonary disease.   2.  Cardiomegaly           Assessment/Plan  * Cardiac arrest (HCC)- (present on admission)   Assessment & Plan    Secondary to ventricular fibrillation likely associated with HOCM   LHC - Non-obstructive mild coronary artery disease.12/28/2018  ICD placement 1/3/2019         HOCM (hypertrophic obstructive cardiomyopathy) ([facility])- (present on admission)   Assessment & Plan    Metoprolol, ASA     Gastrointestinal hemorrhage associated with acute gastritis- (present on admission)   Assessment & Plan    Omeprazole     Sepsis ([facility])   Assessment & Plan    This is severe sepsis with the following associated acute organ dysfunction(s): acute respiratory failure.   Source - Pneumonia     Ventricular fibrillation (HCC)- (present on admission)   Assessment & Plan    Metoprolol      Acute respiratory failure with hypoxia (HCC)- (present on admission)   Assessment & Plan    Keep O2 sats above 94%  Encourage I-S, RT protocol  IV Lasix for diuresis     Essential hypertension- (present on admission)   Assessment & Plan    Decrease Metoprolol to 25 mg twice daily     Acute gastric ulcer with hemorrhage- (present on admission)   Assessment & Plan    Omeprazole     Thrombocytosis (HCC)- (present on admission)   Assessment & Plan    Follow CBC     Dysphagia- (present on admission)   Assessment & Plan    Dysphagia 3, NTL  SLP to follow     Hypernatremia- (present on admission)   Assessment & Plan    Follow bmp     SVT (supraventricular tachycardia) ([facility])- (present on admission)   Assessment & Plan    Adenosine 1/1/2019  Metoprolol     Aspiration pneumonia ([facility])   Assessment & Plan    Finished IV cefepime 7-day course     Hypokalemia-  (present on admission)   Assessment & Plan    Follow bmp     Iron deficiency anemia- (present on admission)   Assessment & Plan    Transfused 1 unit PRBC  Follow CBC     Leukocytosis- (present on admission)   Assessment & Plan    Follow CBC       Closed fracture of multiple ribs of both sides- (present on admission)   Assessment & Plan    Pain control  Encourage I-S     Thrombocytopenia (HCC)- (present on admission)   Assessment & Plan    Resolved          VTE prophylaxis: Lovenox

## 2019-01-09 NOTE — PROGRESS NOTES
Bedside report received. Pt care assumed. Pt eating dinner sitting up in bed. Pt not in distress. AOx 4 gets confused at times. No complaints at this time. Family at bedside. Safety precautions in place. Educated to call for assistance.

## 2019-01-09 NOTE — DIETARY
Nutrition Support Update: transition to PO diet    Pt sneezed Cortrak out early morning 1/8 and was refusing replacement. FEES performed 1/8 and pt was started on a dysphagia 3/nectar thick diet, therefore Cortrak was not replaced. 3 meals recorded since PO diet started, all % consumed. PO intake is adequate at this time, therefore no need for RD to monitor - please consult for any nutritional concerns that arise.     RD to re-screen nutritional status weekly per dept policy

## 2019-01-09 NOTE — PROGRESS NOTES
Critical Care Progress Note    Date of admission  12/19/2018    Chief Complaint  58 y.o. male admitted 12/19/2018 s/p cardiopulmonary arrest with prolonged CPR in the field    Hospital Course  TTM started in ED and continued in ICU. was a vfib arrest,+HOCM s/p ICD placement 1/3/19  Pulmonary following for hypoxemia which has resolved    Interval Problem Updates  Past 24 hours   -on room air    Review of Systems  Review of Systems   Constitutional: Negative for fever.   HENT: Negative for tinnitus.    Eyes: Negative for blurred vision.   Respiratory: Negative for cough, hemoptysis and sputum production.    Cardiovascular: Negative for palpitations.   Gastrointestinal: Negative for nausea.   Genitourinary: Negative for urgency.   Musculoskeletal: Negative for joint pain.   Skin: Negative for rash.   Neurological: Negative for headaches.   Endo/Heme/Allergies: Does not bruise/bleed easily.   Psychiatric/Behavioral: Negative for hallucinations.   All other systems reviewed and are negative.       Vital Signs for last 24 hours   Temp:  [36.3 °C (97.3 °F)-37.2 °C (98.9 °F)] 36.6 °C (97.8 °F)  Pulse:  [] 109  Resp:  [16-18] 16  BP: ()/(50-74) 110/70   Blood pressure /60-76     Respiratory   SaO2 90-93% ROOM Air  Physical Exam   Physical Exam   Constitutional: He is oriented to person, place, and time. He appears well-developed and well-nourished. He has a sickly appearance. No distress.   Sitting up in a chair   HENT:   Head: Normocephalic and atraumatic.   Right Ear: External ear normal.   Left Ear: External ear normal.   Eyes: Pupils are equal, round, and reactive to light. Conjunctivae are normal. No scleral icterus.   Neck: Neck supple. No thyromegaly present.   Cardiovascular: Regular rhythm and intact distal pulses.  PMI is displaced.  Exam reveals no distant heart sounds, no friction rub and no decreased pulses.    Murmur heard.  AICD insertion site clean   Pulmonary/Chest: Effort normal. No  accessory muscle usage or stridor. No tachypnea. No respiratory distress. He has no decreased breath sounds. He has no wheezes. He has no rales.   Abdominal: Soft. Bowel sounds are normal. He exhibits no distension. There is no tenderness. There is no guarding.   Musculoskeletal: He exhibits no edema, tenderness or deformity.   Neurological: He is alert and oriented to person, place, and time. No cranial nerve deficit. Coordination normal.   Remains Impulsive at times   Skin: Skin is warm and dry. No rash noted.   Psychiatric: His speech is normal and behavior is normal. Thought content normal. His affect is blunt. Thought content is not delusional (Occasionally). He expresses impulsivity. He exhibits abnormal recent memory.   Nursing note and vitals reviewed.      Medications  Current Facility-Administered Medications   Medication Dose Route Frequency Provider Last Rate Last Dose   • omeprazole (PRILOSEC) capsule 20 mg  20 mg Oral BID Johan Morales M.D.   20 mg at 01/08/19 1650   • metoprolol (LOPRESSOR) tablet 50 mg  50 mg Oral TWICE DAILY Hellen Lund, A.P.N.   50 mg at 01/08/19 1649   • Pharmacy Consult Request ...Pain Management Review 1 Each  1 Each Other PRN Tracey Mendiola M.D.       • acetaminophen (TYLENOL) tablet 650 mg  650 mg Oral Q6HRS Tracey Mendiola M.D.   650 mg at 01/08/19 1649   • aspirin EC (ECOTRIN) tablet 81 mg  81 mg Oral DAILY Essence Hoff M.D.   81 mg at 01/08/19 1029   • potassium bicarbonate (KLYTE) effervescent tablet 25 mEq  25 mEq Feeding Tube BID Jeremy M Gonda, M.D.   25 mEq at 01/08/19 1650   • enoxaparin (LOVENOX) inj 40 mg  40 mg Subcutaneous DAILY Jeremy M Gonda, M.D.   40 mg at 01/08/19 0555   • furosemide (LASIX) injection 20 mg  20 mg Intravenous Q12HRS Jeremy M Gonda, M.D.   20 mg at 01/08/19 1650   • oxyCODONE immediate-release (ROXICODONE) tablet 5 mg  5 mg Oral Q4HRS PRN Johan Morales M.D.   5 mg at 12/30/18 1653   • acetaminophen (TYLENOL) tablet 650 mg  650 mg  Feeding Tube Q6HRS PRN Aleshia Uribe M.D.   650 mg at 01/07/19 1752   • Respiratory Care per Protocol   Nebulization Continuous RT Bradley Flores M.D.       • atorvastatin (LIPITOR) tablet 80 mg  80 mg Per NG Tube Q EVENING Yaron Magallanes M.D.   80 mg at 01/08/19 1650   • senna-docusate (PERICOLACE or SENOKOT S) 8.6-50 MG per tablet 2 Tab  2 Tab Feeding Tube BID Yaron Magallanes M.D.   Stopped at 01/04/19 0600    And   • polyethylene glycol/lytes (MIRALAX) PACKET 1 Packet  1 Packet Feeding Tube QDAY PRN Yaron Magallanes M.D.   1 Packet at 12/23/18 1136    And   • magnesium hydroxide (MILK OF MAGNESIA) suspension 30 mL  30 mL Feeding Tube QDAY PRN Yaron Magallanes M.D.   30 mL at 12/24/18 1228    And   • bisacodyl (DULCOLAX) suppository 10 mg  10 mg Rectal QDAY PRN Yaron Magallanes M.D.       • dextrose 50% (D50W) injection 25-50 mL  12.5-25 g Intravenous PRN Jeremy M Gonda, M.D.           Fluids    Intake/Output Summary (Last 24 hours) at 01/08/19 1716  Last data filed at 01/08/19 1007   Gross per 24 hour   Intake              250 ml   Output                0 ml   Net              250 ml       Laboratory          Recent Labs      01/07/19 0310 01/07/19   0450  01/08/19   0256   SODIUM  137  137  136   POTASSIUM  4.1  4.0  4.0   CHLORIDE  104  104  102   CO2  28  28  27   BUN  30*  29*  31*   CREATININE  0.46*  0.42*  0.49*   MAGNESIUM   --   2.4   --    CALCIUM  9.7  9.6  9.5     Recent Labs      01/07/19   0310 01/07/19   0450  01/08/19   0256   ALTSGPT  30   --    --    ASTSGOT  25   --    --    ALKPHOSPHAT  83   --    --    TBILIRUBIN  0.4   --    --    PREALBUMIN   --   10.0*   --    GLUCOSE  134*  142*  107*     Recent Labs      01/06/19   0511  01/07/19   0310  01/08/19   0256   WBC  11.2*   --   12.0*   NEUTSPOLYS  65.30   --   58.90   LYMPHOCYTES  20.10*   --   24.80   MONOCYTES  4.80   --   6.30   EOSINOPHILS  7.10*   --   7.20*   BASOPHILS  2.20*   --   2.20*   ASTSGOT   --   25   --    ALTSGPT   --   30   --     ALKPHOSPHAT   --   83   --    TBILIRUBIN   --   0.4   --      Recent Labs      01/06/19   0511  01/08/19   0256   RBC  3.49*  3.59*   HEMOGLOBIN  9.4*  9.6*   HEMATOCRIT  31.3*  31.9*   PLATELETCT  682*  618*      Assessment/Plan   #Cardiac arrest with recurrent intubations  Now On RA now  + cough  CXR ordered today will review when available  Even though wbc elevated not convinced he has a pna    VTE:  On hold post- AICD placement  Ulcer: PPI  Lines: Central Line  Ongoing indication addressed    I have performed a physical exam and reviewed and updated ROS and Plan today (1/8/2019). In review of yesterday's note (1/7/2019), there are no changes except as documented above.

## 2019-01-09 NOTE — DISCHARGE PLANNING
Cardiac/pulomnary debility ongoing medical management as well as anticipated therapy need. Mobility and self care score 6/6. Anticipate post acute services to facilitate a successful transition to community home. Please consider PT/OT evaluations as medically appropriate and if appropriate please consider PMR referral to assist with discharge planing.

## 2019-01-09 NOTE — CARE PLAN
Problem: Safety  Goal: Will remain free from falls    Intervention: Implement fall precautions  Safety precautions and fall prevention in place. Fall prevention education provided. Patient verbalized understanding. Bed in low locked position, bed alarm on, treaded socks on patient, call bell within reach. Patient calls appropriately as needed.      Problem: Skin Integrity  Goal: Risk for impaired skin integrity will decrease    Intervention: Assess and monitor skin integrity, appearance and/or temperature  Educated patient to be turing at least every two hours to help prevent skin breakdown and pressure ulcers. Patient verbalizes understanding.

## 2019-01-09 NOTE — CARE PLAN
Problem: Mobility  Goal: Risk for activity intolerance will decrease  Outcome: PROGRESSING AS EXPECTED  Pt able to ambulate around unit without assistance.     Problem: Medication  Goal: Compliance with prescribed medication will improve  Outcome: PROGRESSING AS EXPECTED  Pt takes all prescribed medications as ordered

## 2019-01-10 ENCOUNTER — PATIENT OUTREACH (OUTPATIENT)
Dept: HEALTH INFORMATION MANAGEMENT | Facility: OTHER | Age: 59
End: 2019-01-10

## 2019-01-10 VITALS
OXYGEN SATURATION: 97 % | WEIGHT: 155.87 LBS | DIASTOLIC BLOOD PRESSURE: 74 MMHG | SYSTOLIC BLOOD PRESSURE: 115 MMHG | TEMPERATURE: 97.1 F | HEART RATE: 82 BPM | HEIGHT: 63 IN | RESPIRATION RATE: 18 BRPM | BODY MASS INDEX: 27.62 KG/M2

## 2019-01-10 LAB
ANION GAP SERPL CALC-SCNC: 8 MMOL/L (ref 0–11.9)
BUN SERPL-MCNC: 23 MG/DL (ref 8–22)
CALCIUM SERPL-MCNC: 9.7 MG/DL (ref 8.5–10.5)
CHLORIDE SERPL-SCNC: 98 MMOL/L (ref 96–112)
CO2 SERPL-SCNC: 24 MMOL/L (ref 20–33)
CREAT SERPL-MCNC: 0.55 MG/DL (ref 0.5–1.4)
GLUCOSE SERPL-MCNC: 113 MG/DL (ref 65–99)
MAGNESIUM SERPL-MCNC: 2 MG/DL (ref 1.5–2.5)
POTASSIUM SERPL-SCNC: 3.8 MMOL/L (ref 3.6–5.5)
SODIUM SERPL-SCNC: 130 MMOL/L (ref 135–145)

## 2019-01-10 PROCEDURE — 700102 HCHG RX REV CODE 250 W/ 637 OVERRIDE(OP): Performed by: INTERNAL MEDICINE

## 2019-01-10 PROCEDURE — 90471 IMMUNIZATION ADMIN: CPT

## 2019-01-10 PROCEDURE — 700111 HCHG RX REV CODE 636 W/ 250 OVERRIDE (IP): Performed by: FAMILY MEDICINE

## 2019-01-10 PROCEDURE — 700102 HCHG RX REV CODE 250 W/ 637 OVERRIDE(OP): Performed by: HOSPITALIST

## 2019-01-10 PROCEDURE — 3E02340 INTRODUCTION OF INFLUENZA VACCINE INTO MUSCLE, PERCUTANEOUS APPROACH: ICD-10-PCS | Performed by: FAMILY MEDICINE

## 2019-01-10 PROCEDURE — A9270 NON-COVERED ITEM OR SERVICE: HCPCS | Performed by: INTERNAL MEDICINE

## 2019-01-10 PROCEDURE — 80048 BASIC METABOLIC PNL TOTAL CA: CPT

## 2019-01-10 PROCEDURE — 90732 PPSV23 VACC 2 YRS+ SUBQ/IM: CPT | Performed by: FAMILY MEDICINE

## 2019-01-10 PROCEDURE — 83735 ASSAY OF MAGNESIUM: CPT

## 2019-01-10 PROCEDURE — A9270 NON-COVERED ITEM OR SERVICE: HCPCS | Performed by: FAMILY MEDICINE

## 2019-01-10 PROCEDURE — 3E0234Z INTRODUCTION OF SERUM, TOXOID AND VACCINE INTO MUSCLE, PERCUTANEOUS APPROACH: ICD-10-PCS | Performed by: FAMILY MEDICINE

## 2019-01-10 PROCEDURE — 700111 HCHG RX REV CODE 636 W/ 250 OVERRIDE (IP): Performed by: INTERNAL MEDICINE

## 2019-01-10 PROCEDURE — A9270 NON-COVERED ITEM OR SERVICE: HCPCS | Performed by: HOSPITALIST

## 2019-01-10 PROCEDURE — 92526 ORAL FUNCTION THERAPY: CPT

## 2019-01-10 PROCEDURE — 36415 COLL VENOUS BLD VENIPUNCTURE: CPT

## 2019-01-10 PROCEDURE — 99239 HOSP IP/OBS DSCHRG MGMT >30: CPT | Performed by: FAMILY MEDICINE

## 2019-01-10 PROCEDURE — 700102 HCHG RX REV CODE 250 W/ 637 OVERRIDE(OP): Performed by: FAMILY MEDICINE

## 2019-01-10 PROCEDURE — 90686 IIV4 VACC NO PRSV 0.5 ML IM: CPT | Performed by: FAMILY MEDICINE

## 2019-01-10 RX ORDER — ASPIRIN 81 MG/1
81 TABLET ORAL DAILY
Qty: 100 TAB | Refills: 0 | Status: SHIPPED | OUTPATIENT
Start: 2019-01-11 | End: 2019-01-10

## 2019-01-10 RX ORDER — ATORVASTATIN CALCIUM 80 MG/1
80 TABLET, FILM COATED ORAL EVERY EVENING
Qty: 30 TAB | Refills: 2 | Status: SHIPPED | OUTPATIENT
Start: 2019-01-10 | End: 2019-01-21 | Stop reason: SDUPTHER

## 2019-01-10 RX ORDER — ATORVASTATIN CALCIUM 80 MG/1
80 TABLET, FILM COATED ORAL EVERY EVENING
Qty: 30 TAB | Refills: 2 | Status: SHIPPED | OUTPATIENT
Start: 2019-01-10 | End: 2019-01-10

## 2019-01-10 RX ORDER — ASPIRIN 81 MG/1
81 TABLET ORAL DAILY
Qty: 100 TAB | Refills: 0 | Status: SHIPPED | OUTPATIENT
Start: 2019-01-11 | End: 2019-04-01

## 2019-01-10 RX ADMIN — PNEUMOCOCCAL VACCINE POLYVALENT 25 MCG
25; 25; 25; 25; 25; 25; 25; 25; 25; 25; 25; 25; 25; 25; 25; 25; 25; 25; 25; 25; 25; 25; 25 INJECTION, SOLUTION INTRAMUSCULAR; SUBCUTANEOUS at 14:53

## 2019-01-10 RX ADMIN — INFLUENZA A VIRUS A/MICHIGAN/45/2015 X-275 (H1N1) ANTIGEN (FORMALDEHYDE INACTIVATED), INFLUENZA A VIRUS A/SINGAPORE/INFIMH-16-0019/2016 IVR-186 (H3N2) ANTIGEN (FORMALDEHYDE INACTIVATED), INFLUENZA B VIRUS B/PHUKET/3073/2013 ANTIGEN (FORMALDEHYDE INACTIVATED), AND INFLUENZA B VIRUS B/MARYLAND/15/2016 BX-69A ANTIGEN (FORMALDEHYDE INACTIVATED) 0.5 ML: 15; 15; 15; 15 INJECTION, SUSPENSION INTRAMUSCULAR at 14:53

## 2019-01-10 RX ADMIN — FUROSEMIDE 20 MG: 10 INJECTION, SOLUTION INTRAMUSCULAR; INTRAVENOUS at 05:18

## 2019-01-10 RX ADMIN — POTASSIUM BICARBONATE 25 MEQ: 25 TABLET, EFFERVESCENT ORAL at 05:18

## 2019-01-10 RX ADMIN — ENOXAPARIN SODIUM 40 MG: 100 INJECTION SUBCUTANEOUS at 05:18

## 2019-01-10 RX ADMIN — OMEPRAZOLE 20 MG: 20 CAPSULE, DELAYED RELEASE ORAL at 05:18

## 2019-01-10 RX ADMIN — STANDARDIZED SENNA CONCENTRATE AND DOCUSATE SODIUM 2 TABLET: 8.6; 5 TABLET, FILM COATED ORAL at 05:18

## 2019-01-10 RX ADMIN — METOPROLOL TARTRATE 25 MG: 25 TABLET, FILM COATED ORAL at 05:18

## 2019-01-10 RX ADMIN — ASPIRIN 81 MG: 81 TABLET, COATED ORAL at 05:17

## 2019-01-10 ASSESSMENT — PAIN SCALES - GENERAL
PAINLEVEL_OUTOF10: 0

## 2019-01-10 NOTE — PROGRESS NOTES
Critical Care Progress Note    Date of admission  12/19/2018    Chief Complaint  58 y.o. male admitted 12/19/2018 s/p cardiopulmonary arrest with prolonged CPR in the field    Hospital Course  TTM started in ED and continued in ICU. was a vfib arrest,+HOCM s/p ICD placement 1/3/19  Pulmonary following for hypoxemia which has resolved    Interval Problem Updates  Past 24 hours   -on room air    Review of Systems  Review of Systems   Constitutional: Negative for fever.   HENT: Negative for tinnitus.    Eyes: Negative for blurred vision.   Respiratory: Negative for cough, hemoptysis and sputum production.    Cardiovascular: Negative for palpitations.   Gastrointestinal: Negative for nausea.   Genitourinary: Negative for urgency.   Musculoskeletal: Negative for joint pain.   Skin: Negative for rash.   Neurological: Negative for headaches.   Endo/Heme/Allergies: Does not bruise/bleed easily.   Psychiatric/Behavioral: Negative for hallucinations.   All other systems reviewed and are negative.       Vital Signs for last 24 hours   Temp:  [35.9 °C (96.6 °F)-36.7 °C (98.1 °F)] 36.7 °C (98.1 °F)  Pulse:  [] 100  Resp:  [16-18] 18  BP: ()/(52-86) 113/73   Blood pressure /60-76     Respiratory   SaO2 93% ROOM Air  Physical Exam   Physical Exam   Constitutional: He is oriented to person, place, and time. He appears well-developed and well-nourished. He has a sickly appearance. No distress.   Sitting up in a chair   HENT:   Head: Normocephalic and atraumatic.   Right Ear: External ear normal.   Left Ear: External ear normal.   Eyes: Pupils are equal, round, and reactive to light. Conjunctivae are normal. No scleral icterus.   Neck: Neck supple. No thyromegaly present.   Cardiovascular: Regular rhythm and intact distal pulses.  PMI is displaced.  Exam reveals no distant heart sounds, no friction rub and no decreased pulses.    Murmur heard.  AICD insertion site clean   Pulmonary/Chest: Effort normal. No accessory  muscle usage or stridor. No tachypnea. No respiratory distress. He has no decreased breath sounds. He has no wheezes. He has no rales.   Abdominal: Soft. Bowel sounds are normal. He exhibits no distension. There is no tenderness. There is no guarding.   Musculoskeletal: He exhibits no edema, tenderness or deformity.   Neurological: He is alert and oriented to person, place, and time. No cranial nerve deficit. Coordination normal.   Remains Impulsive at times   Skin: Skin is warm and dry. No rash noted.   Psychiatric: His speech is normal and behavior is normal. Thought content normal. His affect is blunt. Thought content is not delusional (Occasionally). He expresses impulsivity. He exhibits abnormal recent memory.   Nursing note and vitals reviewed.      Medications  Current Facility-Administered Medications   Medication Dose Route Frequency Provider Last Rate Last Dose   • metoprolol (LOPRESSOR) tablet 25 mg  25 mg Oral TWICE DAILY Shaka Jauregui M.D.   25 mg at 01/09/19 1821   • omeprazole (PRILOSEC) capsule 20 mg  20 mg Oral BID Johan Morales M.D.   20 mg at 01/09/19 1821   • Pharmacy Consult Request ...Pain Management Review 1 Each  1 Each Other PRN Tracey Mendiola M.D.       • aspirin EC (ECOTRIN) tablet 81 mg  81 mg Oral DAILY Essence Hoff M.D.   81 mg at 01/09/19 0549   • potassium bicarbonate (KLYTE) effervescent tablet 25 mEq  25 mEq Feeding Tube BID Jeremy M Gonda, M.D.   25 mEq at 01/09/19 1821   • enoxaparin (LOVENOX) inj 40 mg  40 mg Subcutaneous DAILY Jeremy M Gonda, M.D.   40 mg at 01/09/19 0549   • furosemide (LASIX) injection 20 mg  20 mg Intravenous Q12HRS Shaka Jauregui M.D.   20 mg at 01/09/19 1821   • oxyCODONE immediate-release (ROXICODONE) tablet 5 mg  5 mg Oral Q4HRS PRN Johan Morales M.D.   5 mg at 12/30/18 1653   • acetaminophen (TYLENOL) tablet 650 mg  650 mg Feeding Tube Q6HRS PRN Aleshia Uribe M.D.   650 mg at 01/07/19 1752   • Respiratory Care per Protocol   Nebulization  Continuous RT Bradley Flores M.D.       • atorvastatin (LIPITOR) tablet 80 mg  80 mg Per NG Tube Q EVENING Yaron Magallanes M.D.   80 mg at 01/09/19 1821   • senna-docusate (PERICOLACE or SENOKOT S) 8.6-50 MG per tablet 2 Tab  2 Tab Feeding Tube BID Yaron Magallanes M.D.   Stopped at 01/04/19 0600    And   • polyethylene glycol/lytes (MIRALAX) PACKET 1 Packet  1 Packet Feeding Tube QDAY PRN Yaron Magallanes M.D.   1 Packet at 12/23/18 1136    And   • magnesium hydroxide (MILK OF MAGNESIA) suspension 30 mL  30 mL Feeding Tube QDAY PRN Yaron Magallanes M.D.   30 mL at 12/24/18 1228    And   • bisacodyl (DULCOLAX) suppository 10 mg  10 mg Rectal QDAY PRN Yaron Magallanes M.D.       • dextrose 50% (D50W) injection 25-50 mL  12.5-25 g Intravenous PRN Jeremy M Gonda, M.D.           Fluids    Intake/Output Summary (Last 24 hours) at 01/09/19 1955  Last data filed at 01/09/19 0915   Gross per 24 hour   Intake              480 ml   Output                0 ml   Net              480 ml       Laboratory          Recent Labs      01/07/19 0450 01/08/19 0256 01/09/19 0233   SODIUM  137  136  132*   POTASSIUM  4.0  4.0  4.0   CHLORIDE  104  102  99   CO2  28  27  23   BUN  29*  31*  32*   CREATININE  0.42*  0.49*  0.64   MAGNESIUM  2.4   --    --    CALCIUM  9.6  9.5  9.6     Recent Labs      01/07/19   0310  01/07/19   0450  01/08/19   0256 01/09/19 0233   ALTSGPT  30   --    --    --    ASTSGOT  25   --    --    --    ALKPHOSPHAT  83   --    --    --    TBILIRUBIN  0.4   --    --    --    PREALBUMIN   --   10.0*   --    --    GLUCOSE  134*  142*  107*  90     Recent Labs      01/07/19   0310  01/08/19   0256 01/09/19 0233   WBC   --   12.0*  11.6*   NEUTSPOLYS   --   58.90  65.50   LYMPHOCYTES   --   24.80  19.80*   MONOCYTES   --   6.30  2.60   EOSINOPHILS   --   7.20*  8.60*   BASOPHILS   --   2.20*  3.50*   ASTSGOT  25   --    --    ALTSGPT  30   --    --    ALKPHOSPHAT  83   --    --    TBILIRUBIN  0.4   --    --      Recent  Labs      01/08/19   0256  01/09/19   0233   RBC  3.59*  3.71*   HEMOGLOBIN  9.6*  9.9*   HEMATOCRIT  31.9*  33.2*   PLATELETCT  618*  560*      Assessment/Plan   #Cardiac arrest with recurrent intubations  Reviewed CXR of 1/8/19 which shows atelectasis right lower lobe, and increased interstitial markings in bilateral lower lobes likely associated with persistent edema   EnCourage incentive spirometry  Not think this is a pneumonia  At this time no additional recommendations please reconsult as needed as needed  VTE:  On hold post- AICD placement  Ulcer: PPI  Lines: Central Line  Ongoing indication addressed    I have performed a physical exam and reviewed and updated ROS and Plan today (1/9/2019). In review of yesterday's note (1/8/2019), there are no changes except as documented above.

## 2019-01-10 NOTE — CARE PLAN
Problem: Safety  Goal: Will remain free from falls    Intervention: Implement fall precautions  Safety precautions and fall prevention in place. Fall prevention education provided. Patient verbalized understanding. Bed in low locked position, bed alarm on, treaded socks on patient, call bell within reach. Patient calls appropriately as needed.      Problem: Respiratory:  Goal: Respiratory status will improve    Intervention: Educate and encourage incentive spirometry usage  Educated patient to continually using the incentive spirometer as tolerated. Patient verbalizes understanding.

## 2019-01-10 NOTE — PROGRESS NOTES
Patient is A&Ox4. Discharge instructions. Personal belongings in possession with patient. PIV and tele monitor removed. Copy of discharge instructions in patient chart, signed and reviewed. Patient verbalizes the understanding of the discharge instructions. Questions and concerns addressed prior to leaving the unit. Patient discharged to home.

## 2019-01-10 NOTE — PROGRESS NOTES
Received bedside report from NOC RN. Patient A&Ox4, all questions and concerns answered. Bed in lowest and locked position, call light in reach, will continue to monitor.

## 2019-01-10 NOTE — THERAPY
"Speech Language Therapy dysphagia treatment completed.   Functional Status:  Patient seen for dysphagia therapy on this date.  He was pleasant and agreeable.  Multiple empty container of thin liquids present at bedside.  Provided education to aspiration risks.  Patient verbalized understanding and stated he had no difficulty consuming.  Presentation of PO included mixed consistencies, dry solids, and thin liquids.  The patient presented with functional mastication and bolus manipulation, timely initiation of swallow trigger and complete laryngeal elevation upon palpation.  He demonstrated adequate pacing and bolus size.  Patient maintained clear vocal quality throughout the session and had no overt s/sx of aspiration with any consistency consumed.    Recommendations: At this time, recommend diet upgrade to regular/thins with swallowing strategies/precautions (small bites/sips, pacing, up to a chair for all meals).  SLP following during the acute care.    Plan of Care: Will benefit from Speech Therapy 3 times per week  Post-Acute Therapy: Recommend outpatient or home health transitional care services for continued speech therapy services      See \"Rehab Therapy-Acute\" Patient Summary Report for complete documentation.     "

## 2019-01-10 NOTE — PROGRESS NOTES
Received report from day shift RN.  Assumed care at 1900. Pt denies any discomfort at this time.  Call light within reach. Bed locked and in lowest position.  Non skid socks on, Belongings within  Reach.  Will continue to monitor hourly.

## 2019-01-10 NOTE — DISCHARGE SUMMARY
Discharge Summary    CHIEF COMPLAINT ON ADMISSION  Chief Complaint   Patient presents with   • Cardiac Arrest     witnessed        Reason for Admission  EMS     Admission Date  12/19/2018    CODE STATUS  Full Code    HPI & HOSPITAL COURSE  This is a 58 y.o. male here with cardiac arrest, EMS found him in ventricular fibrillation, he was shocked multiple times, given amiodarone and epinephrine in the field, he was intubated.  He was admitted to ICU, he required intubation from 12/19-12/29.  Left heart catheterization was done which showed nonobstructive mild coronary artery disease this was done 12/28/2018.  ICD placement was done on 1/3/2019.  Workup shows hypertrophic obstructive cardiomyopathy.  He had an episode of SVT, given 1 dose of adenosine he currently is in sinus rhythm.  He is now on metoprolol and aspirin per cardiology recommendations.  He also had an episode of GI bleeding which showed gastric ulcer and gastritis.  He was placed on proton pump inhibitor.  He did require 1 unit of packed PRBC transfusion.  He also had aspiration pneumonia, finished a course of IV cefepime for 7 days.  Been diuresed with IV Lasix, he has been weaned off oxygen supplementation at this point.  He has some dysphagia after intubation, speech therapy continue to work with him, he is now on a regular diet.  Cardiology and critical care have cleared him for discharge.       Therefore, he is discharged in good and stable condition to home with close outpatient follow-up.    The patient met 2-midnight criteria for an inpatient stay at the time of discharge.    Discharge Date  1/10/2018    FOLLOW UP ITEMS POST DISCHARGE  Follow-up with cardiology    DISCHARGE DIAGNOSES  Principal Problem:    Cardiac arrest (HCC) POA: Yes  Active Problems:    Acute respiratory failure with hypoxia (HCC) POA: Yes    Ventricular fibrillation (HCC) POA: Yes    Sepsis (HCC) POA: No    Gastrointestinal hemorrhage associated with acute gastritis POA:  Yes    HOCM (hypertrophic obstructive cardiomyopathy) (HCC) POA: Yes    Closed fracture of multiple ribs of both sides POA: Yes    Leukocytosis POA: Yes    Iron deficiency anemia POA: Yes    Hypokalemia POA: Yes    Aspiration pneumonia (HCC) POA: No    SVT (supraventricular tachycardia) (HCC) POA: Yes    Hypernatremia POA: Yes    Dysphagia POA: Yes    Thrombocytosis (HCC) POA: Yes    Acute gastric ulcer with hemorrhage POA: Yes    Essential hypertension POA: Yes    Thrombocytopenia (HCC) POA: Yes  Resolved Problems:    Respiratory failure with hypoxia (HCC) POA: Yes      Overview:           Coagulopathy (HCC) POA: Yes    Lactic acidosis POA: Yes    Fever POA: No    Metabolic acidosis POA: Yes    Hyperphosphatemia POA: Yes    Elevated liver enzymes POA: Yes      FOLLOW UP  Future Appointments  Date Time Provider Department Center   1/11/2019 2:15 PM [facility] None   1/23/2019 3:20 PM CARRILLO Harrington [facility] None   2/5/2019 1:00 PM ANTONIO Nunez     No follow-up provider specified.    MEDICATIONS ON DISCHARGE     Medication List      START taking these medications      Instructions   aspirin 81 MG EC tablet  Start taking on:  1/11/2019   Take 1 Tab by mouth every day.  Dose:  81 mg     atorvastatin 80 MG tablet  Commonly known as:  LIPITOR   Take 1 Tab by mouth every evening.  Dose:  80 mg     metoprolol 25 MG Tabs  Commonly known as:  LOPRESSOR   Take 1 Tab by mouth 2 Times a Day.  Dose:  25 mg        CONTINUE taking these medications      Instructions   MULTIVITAMIN PO   Take 1 Tab by mouth every morning.  Dose:  1 Tab     ZOLOFT 25 MG tablet  Generic drug:  sertraline   Take 25 mg by mouth every morning.  Dose:  25 mg        STOP taking these medications    lisinopril 5 MG Tabs  Commonly known as:  PRINIVIL            Allergies  No Known Allergies    DIET  Orders Placed This Encounter   Procedures   • Diet Order Regular     Standing Status:   Standing     Number of  Occurrences:   1     Order Specific Question:   Diet:     Answer:   Regular [1]       ACTIVITY  As tolerated.  Weight bearing as tolerated    CONSULTATIONS  Cardiology - [facility]  Critical care  Palliative care    PROCEDURES  Bronchoscopy with bronchoalveolar lavage and therapeutic aspiration of secretions. 12/20/2018    Bronchoscopy. 12/21/2018    EGD - Gastric ulceration and gastritis. 12/28/2018    LHC - Non-obstructive mild coronary artery disease. 12/28/2018    ICD implantation. 1/3/2019    LABORATORY  Lab Results   Component Value Date    SODIUM 130 (L) 01/10/2019    POTASSIUM 3.8 01/10/2019    CHLORIDE 98 01/10/2019    CO2 24 01/10/2019    GLUCOSE 113 (H) 01/10/2019    BUN 23 (H) 01/10/2019    CREATININE 0.55 01/10/2019        Lab Results   Component Value Date    WBC 11.6 (H) 01/09/2019    HEMOGLOBIN 9.9 (L) 01/09/2019    HEMATOCRIT 33.2 (L) 01/09/2019    PLATELETCT 560 (H) 01/09/2019        Total time of the discharge process exceeds 45 minutes.

## 2019-01-11 ENCOUNTER — NON-PROVIDER VISIT (OUTPATIENT)
Dept: CARDIOLOGY | Facility: MEDICAL CENTER | Age: 59
End: 2019-01-11
Payer: COMMERCIAL

## 2019-01-11 DIAGNOSIS — I46.9 CARDIAC ARREST (HCC): ICD-10-CM

## 2019-01-11 PROCEDURE — 93283 PRGRMG EVAL IMPLANTABLE DFB: CPT | Performed by: INTERNAL MEDICINE

## 2019-01-11 NOTE — DISCHARGE INSTRUCTIONS
Discharge Instructions    Discharged to home by car with relative. Discharged via wheelchair, hospital escort: Yes.  Special equipment needed: Not Applicable    Be sure to schedule a follow-up appointment with your primary care doctor or any specialists as instructed.     Discharge Plan:   Diet Plan: Discussed  Activity Level: Discussed  Confirmed Follow up Appointment: Appointment Scheduled  Confirmed Symptoms Management: Discussed  Medication Reconciliation Updated: Yes  Influenza Vaccine Indication: Indicated: 9 to 64 years of age    I understand that a diet low in cholesterol, fat, and sodium is recommended for good health. Unless I have been given specific instructions below for another diet, I accept this instruction as my diet prescription.   Other diet: Heart healthy as tolerated. Be cautious of fluid intake.     Special Instructions: No    · Is patient discharged on Warfarin / Coumadin?   No     Depression / Suicide Risk    As you are discharged from this [facility] facility, it is important to learn how to keep safe from harming yourself.    Recognize the warning signs:  · Abrupt changes in personality, positive or negative- including increase in energy   · Giving away possessions  · Change in eating patterns- significant weight changes-  positive or negative  · Change in sleeping patterns- unable to sleep or sleeping all the time   · Unwillingness or inability to communicate  · Depression  · Unusual sadness, discouragement and loneliness  · Talk of wanting to die  · Neglect of personal appearance   · Rebelliousness- reckless behavior  · Withdrawal from people/activities they love  · Confusion- inability to concentrate     If you or a loved one observes any of these behaviors or has concerns about self-harm, here's what you can do:  · Talk about it- your feelings and reasons for harming yourself  · Remove any means that you might use to hurt yourself (examples: pills, rope, extension cords,  firearm)  · Get professional help from the community (Mental Health, Substance Abuse, psychological counseling)  · Do not be alone:Call your Safe Contact- someone whom you trust who will be there for you.  · Call your local CRISIS HOTLINE 016-1229 or 883-771-5433  · Call your local Children's Mobile Crisis Response Team [place] (792) 923-7668 or www.[employer]  · Call the toll free National Suicide Prevention Hotlines   · National Suicide Prevention Lifeline 971-398-KCNY (1044)  · [employer] Discovery Line Network 800-SUICIDE (081-5049)      Hypertrophic Cardiomyopathy  Hypertrophic cardiomyopathy (HCM) is a heart condition in which part of your heart muscle gets too thick. This thickness can make your heart become stiff. This may cause you to develop a dangerous abnormal heart rhythm. Your heart may also get weak over time.   Your heart is divided into four chambers. The thickening usually affects the pumping chamber on the lower left (left ventricle). HCM may also affect the valve (mitral valve) that lets blood flow out of your left ventricle.   CAUSES   Abnormal genes usually cause HCM. These genes control heart muscle growth. They are passed down through families (inherited).   RISK FACTORS  You are at higher risk for HCM if you have a family history of the condition.  SIGNS AND SYMPTOMS   Symptoms often begin at about age 30. They may include:  · Shortness of breath (especially after exercising or lying down).  · Chest pain.  · Dizziness.  · Fatigue.  · Irregular or fast heart rate.  · Fainting (especially after physical activity).    DIAGNOSIS   Your health care provider will do a physical exam to check for an abnormal heart sound (heart murmur). You may also have other tests, including:   · An electrocardiogram (ECG). This test records your heart's electrical activity.  · An echocardiogram. This can show an enlarged left ventricle and slow filling of the chamber.  · A Doppler test. This test shows  irregular blood flow and pressure differences inside the heart.  · A chest X-ray to see if your heart is enlarged.  · An MRI.  TREATMENT   Treatment for HCM depends on how severe your symptoms are. There are several options for treatment. These may include:   · Medicine to:  ¨ Reduce the workload of your heart.  ¨ Lower your blood pressure.  ¨ Thin your blood and prevent clots.  · Devices, including:  ¨ A pacemaker to control your heartbeat.  ¨ A defibrillator.  · Surgery. This may include a procedure to:  ¨ Inject alcohol into the small blood vessels that supply your heart muscle (alcohol septal ablation). The goal is to cause the muscle to become thinner.  ¨ Remove part of the wall that divides the right and left sides of the heart (septum).  ¨ Replace the mitral valve.  HOME CARE INSTRUCTIONS  · Follow all your health care provider's instructions.  · Avoid strenuous exercise and activities, like heavy lifting or shoveling snow.  · Make sure the members of your household know how to do CPR in case of an emergency.  · Follow a healthy diet and maintain a healthy weight. If you need help with losing weight, ask your health care provider.  · Limit alcohol intake to no more than 1 drink per day for nonpregnant women and 2 drinks per day for men. One drink equals 12 ounces of beer, 5 ounces of wine, or 1½ ounces of hard liquor.  · Do not use any tobacco products including cigarettes, chewing tobacco, or electronic cigarettes. If you need help quitting, ask your health care provider.  · Keep all follow-up visits as directed by your health care provider. This is important.  SEEK MEDICAL CARE IF:   · You have new symptoms.  · Your symptoms get worse.  SEEK IMMEDIATE MEDICAL CARE IF:   · You have chest pain or shortness of breath, especially during or after sports.  · You feel faint or pass out.  · You have trouble breathing even at rest.  · Your feet or ankles swell.  · You feel palpitations or abnormal heartbeats.     MAKE SURE YOU:   · Understand these instructions.  · Will watch your condition.  · Will get help right away if you are not doing well or get worse.  This information is not intended to replace advice given to you by your health care provider. Make sure you discuss any questions you have with your health care provider.  Document Released: 11/23/2005 Document Revised: 01/08/2016 Document Reviewed: 02/20/2015  [employer] Interactive Patient Education © 2017 [employer]    Cardioverter Defibrillator Implantation, Care After  Refer to this sheet in the next few weeks. These instructions provide you with information on caring for yourself after your procedure. Your health care provider may also give you more specific instructions. Your treatment has been planned according to current medical practices, but problems sometimes occur. Call your health care provider if you have any problems or questions after your procedure.   WHAT TO EXPECT AFTER THE PROCEDURE  · You may feel pain. Some pain is normal. It may last a few days.  · A slight bump may be seen over the skin where the device was placed. Sometimes, it is possible to feel the device under the skin. This is normal.  · In the months and years afterward, your health care provider will check the device, the leads, and the battery every few months. Eventually, when the battery is low, the device will be replaced.  HOME CARE INSTRUCTIONS   Medicines  · Take medicines only as directed by your health care provider.  · If you were prescribed an antibiotic medicine, finish it all even if you start to feel better.    · Do not take any other medicines without asking your health care provider first. Some medicines, including certain painkillers, can cause bleeding after surgery.    Wound Care   · Do not remove the bandage on your chest until directed to do so by your health care provider.  · Once your bandage is removed, you may see pieces of tape called skin adhesive strips  over the area where the cut was made (incision site). Let them fall off on their own.    · Check the incision site every day to make sure it is not infected, bleeding, or starting to pull apart.  · Do not use lotions or ointments near the incision site unless directed to do so.    · Keep the incision area clean and dry for 2-3 days after the procedure or as directed by your health care provider. It takes several weeks for the incision site to completely heal.    · Do not take baths, swim, or use a hot tub until your health care provider approves.  Activities   · Try to walk a little every day. Exercising is important after this procedure. It is also important to use your shoulder on the side of the pacemaker in daily tasks that do not require exaggerated motion.  · Avoid sudden jerking, pulling, or chopping movements that pull your upper arm far away from your body for at least 6 weeks.  · Do not lift your upper arm above your shoulders for at least 6 weeks. This means no tennis, golf, or swimming for this period of time. If you sleep with the arm above your head, use a restraint to prevent this from happening as you sleep.  · You may go back to work when your health care provider says it is okay. Check with your health care provider before you start to drive or play sports.    Other Instructions   · Follow diet instructions if they were provided. You should be able to eat what you usually do right away, but you may need to limit your salt intake.    · Weigh yourself every day. If you suddenly gain weight, fluid may be building up in your body.    · Always carry your pacemaker identification card with you. The card should list the implant date, device model, and . Consider wearing a medical alert bracelet or necklace.  · Tell all health care providers that you have a pacemaker. This may prevent them from giving you a magnetic resonance imaging (MRI) scan because of the strong magnets used during that  test.  · If you must pass through a metal detector, quickly walk through it. Do not stop under the detector or stand near it.  · Avoid places or objects with a strong electric or magnetic field, including:    ¨ Airport security paredes. When at the airport, let officials know you have a pacemaker. Your ID card will let you be checked in a way that is safe for you and that will not damage your pacemaker. Also, do not let a security person wave a magnetic wand near your pacemaker. That can make it stop working.  ¨ Power plants.    ¨ Large electrical generators.    ¨ Radiofrequency transmission towers, such as cell phone and radio towers.    · Do not use amateur (ham) radio equipment or electric (arc) welding torches. Some devices are safe to use if held at least 1 foot from your pacemaker. These include power tools, lawn mowers, and speakers. If you are unsure of whether something is safe to use, ask your health care provider.    · You may safely use electric blankets, heating pads, computers, and microwave ovens.    · When using your cell phone, hold it to the ear opposite the pacemaker. Do not leave your cell phone in a pocket over the pacemaker.    · Keep all follow-up visits as directed by your health care provider. This is how your health care provider makes sure your chest is healing the way it should. Ask your health care provider when you should come back to have your stitches or staples taken out.    · Have your pacemaker checked every 3-6 months or as directed by your health care provider. Most pacemakers last for 4-8 years before a new one is needed.  SEEK MEDICAL CARE IF:   · You feel one shock in your chest.  · You gain weight suddenly.    · Your legs or feet swell more than they have before.    · It feels like your heart is fluttering or skipping beats (heart palpitations).  · You have a fever.  SEEK IMMEDIATE MEDICAL CARE IF:   · You have chest pain.   · You feel more than one shock.  · You feel more  short of breath than you have felt before.   · You feel more light-headed than you have felt before.   · You have problems with your incision site, such as swelling or bleeding, or it starts to open up.    · You notice signs of infection around your incision site. Watch for:    ¨ Warmth.    ¨ Redness.    ¨ Worsening pain.    ¨ Swelling.    ¨ Fluid leaking from the incision site.       This information is not intended to replace advice given to you by your health care provider. Make sure you discuss any questions you have with your health care provider.     Document Released: 07/07/2006 Document Revised: 01/08/2016 Document Reviewed: 01/08/2014  [employer] Interactive Patient Education ©2016 [employer]    Pacemaker Implantation, Care After  Refer to this sheet over the next few weeks. These instructions provide you with information on caring for yourself after the procedure. Your health care provider may also give you more specific instructions. Your treatment has been planned according to current medical practices, but problems sometimes occur. Call your health care provider if you have any problems or questions regarding your pacemaker.   WHAT TO EXPECT AFTER THE PROCEDURE  · You may feel pain. Some pain is normal. It may last a few days.  · A slight bump may be seen over the skin where the device was placed. Sometimes, it is possible to feel the device under the skin. This is normal.  · In the months and years afterward, your health care provider will check the device, the leads, and the battery every few months. Eventually, when the battery is low, the device will be replaced.  HOME CARE INSTRUCTIONS  Medicines  · Take medicines only as directed by your health care provider.  · If you were prescribed an antibiotic medicine, finish it all even if you start to feel better.  · Do not take any other medicines without asking your health care provider first. Some medicines, including certain painkillers, can cause  bleeding in your stomach after surgery.  Wound Care  · Do not remove the bandage on your chest until directed to do so by your health care provider.  · After your bandage is removed, you may see pieces of tape called skin adhesive strips over the area where the cut was made (incision site). Let them fall off on their own.  · Check the incision site every day to make sure it is not infected, bleeding, or starting to pull apart.  · Do not use lotions or ointments near the incision site unless directed to do so.  · Keep the incision area clean and dry for 2-3 days after the procedure or as directed by your health care provider. It takes several weeks for the incision site to completely heal.  · Do not take baths, swim, or use a hot tub until your health care provider approves.  Activities  · Try to walk a little every day. Exercising is important after this procedure. It is also important to use your shoulder on the side of the pacemaker in daily tasks that do not require exaggerated motion.  · Avoid sudden jerking, pulling, or chopping movements that pull your upper arm far away from your body for at least 6 weeks.  · Do not lift your upper arm above your shoulders for at least 6 weeks. This means no tennis, golf, or swimming for this period of time. If you sleep with the arm above your head, use a restraint to prevent this from happening as you sleep.  · You may go back to work when your health care provider says it is okay. Check with your health care provider before you start to drive or play sports.  Other Instructions  · Follow diet instructions if they were provided. You should be able to eat what you usually do right away, but you may need to limit your salt intake.  · Weigh yourself every day. If you suddenly gain weight, fluid may be building up in your body.  · Always carry your pacemaker identification card with you. The card should list the implant date, device model, and . Consider wearing a  medical alert bracelet or necklace.  · Tell all health care providers that you have a pacemaker. This may prevent them from giving you a magnetic resource imaging scan (MRI) because of the strong magnets used during that test.  · If you must pass through a metal detector, quickly walk through it. Do not stop under the detector or stand near it.  · Avoid places or objects with a strong electric or magnetic field, including:  ¨ Airport security paredes. When at the airport, let officials know you have a pacemaker. Your ID card will let you be checked in a way that is safe for you and that will not damage your pacemaker. Also, do not let a security person wave a magnetic wand near your pacemaker. That can make it stop working.  ¨ Power plants.  ¨ Large electrical generators.  ¨ Radiofrequency transmission towers, such as cell phone and radio towers.  · Do not use amateur (ham) radio equipment or electric (arc) welding torches. Some devices are safe to use if held at least 1 foot from your pacemaker. These include power tools, lawn mowers, and speakers. If you are unsure of whether something is safe to use, ask your health care provider.  · You may safely use electric blankets, heating pads, computers, and microwave ovens.  · When using your cell phone, hold it to the ear opposite the pacemaker. Do not leave your cell phone in a pocket over the pacemaker.  · Keep all follow-up visits as directed by your health care provider. This is how your health care provider makes sure your chest is healing the way it should. Ask your health care provider when you should come back to have your stitches or staples taken out.  · Have your pacemaker checked every 3-6 months or as directed by your health care provider. Most pacemakers last for 4-8 years before a new one is needed.  SEEK MEDICAL CARE IF:  · You gain weight suddenly.  · Your legs or feet swell more than they have before.  · It feels like your heart is fluttering or skipping  beats (heart palpitations).  · You have a fever.  SEEK IMMEDIATE MEDICAL CARE IF:  · You have chest pain.  · You feel more short of breath than you have felt before.  · You feel more light-headed than you have felt before.  · You have problems with your incision site, such as swelling or bleeding, or it starts to open up.  · You have drainage, redness, swelling, or pain at your incision site.     This information is not intended to replace advice given to you by your health care provider. Make sure you discuss any questions you have with your health care provider.     Document Released: 07/07/2006 Document Revised: 01/08/2016 Document Reviewed: 04/19/2013  [employer] Interactive Patient Education ©2016 [employer]

## 2019-01-11 NOTE — PROGRESS NOTES
Discharge instructions reiterated with patient, wife and son. Questions answered as needed, all verbalized understanding. Pt transported to [facility] in wheelchair.

## 2019-01-16 LAB
FUNGUS SPEC CULT: ABNORMAL
FUNGUS SPEC CULT: ABNORMAL
SIGNIFICANT IND 70042: ABNORMAL
SITE SITE: ABNORMAL
SOURCE SOURCE: ABNORMAL

## 2019-01-21 ENCOUNTER — OFFICE VISIT (OUTPATIENT)
Dept: CARDIOLOGY | Facility: MEDICAL CENTER | Age: 59
End: 2019-01-21
Payer: COMMERCIAL

## 2019-01-21 VITALS
WEIGHT: 158 LBS | SYSTOLIC BLOOD PRESSURE: 122 MMHG | HEIGHT: 63 IN | HEART RATE: 60 BPM | OXYGEN SATURATION: 95 % | BODY MASS INDEX: 28 KG/M2 | DIASTOLIC BLOOD PRESSURE: 64 MMHG

## 2019-01-21 DIAGNOSIS — I10 ESSENTIAL HYPERTENSION: ICD-10-CM

## 2019-01-21 DIAGNOSIS — E78.5 DYSLIPIDEMIA: ICD-10-CM

## 2019-01-21 DIAGNOSIS — Z95.810 AICD (AUTOMATIC CARDIOVERTER/DEFIBRILLATOR) PRESENT: ICD-10-CM

## 2019-01-21 DIAGNOSIS — I42.1 HOCM (HYPERTROPHIC OBSTRUCTIVE CARDIOMYOPATHY) (HCC): ICD-10-CM

## 2019-01-21 DIAGNOSIS — I46.9 CARDIAC ARREST (HCC): ICD-10-CM

## 2019-01-21 DIAGNOSIS — R04.0 EPISTAXIS: ICD-10-CM

## 2019-01-21 DIAGNOSIS — I49.01 VENTRICULAR FIBRILLATION (HCC): ICD-10-CM

## 2019-01-21 DIAGNOSIS — I34.0 NON-RHEUMATIC MITRAL REGURGITATION: ICD-10-CM

## 2019-01-21 PROCEDURE — 99214 OFFICE O/P EST MOD 30 MIN: CPT | Performed by: NURSE PRACTITIONER

## 2019-01-21 RX ORDER — ATORVASTATIN CALCIUM 80 MG/1
80 TABLET, FILM COATED ORAL EVERY EVENING
Qty: 90 TAB | Refills: 3 | Status: SHIPPED | OUTPATIENT
Start: 2019-01-21 | End: 2019-02-05 | Stop reason: SDUPTHER

## 2019-01-21 ASSESSMENT — ENCOUNTER SYMPTOMS
PALPITATIONS: 0
SHORTNESS OF BREATH: 0
PND: 0
MYALGIAS: 0
DIZZINESS: 0
COUGH: 0
CLAUDICATION: 0
WEAKNESS: 0
ORTHOPNEA: 0
ABDOMINAL PAIN: 0

## 2019-01-21 NOTE — PROGRESS NOTES
"Chief Complaint   Patient presents with   • Coronary Artery Disease     hospital follow up       Subjective:   Amadou \"Sukhdev\" Cristopher is a 58 y.o. male who presents today with his wife, Marianne, for hospital follow-up.  On December 18, 2018 he was unarousable at home.  CPR was started and EMS was called.  He was found to be in ventricular fibrillation and was defibrillated 4 times.  Upon arrival to the emergency room he was in a sinus rhythm.    He was found to have hypertrophic obstructive cardiomyopathy and received an AICD on January 3, 2019.    He underwent cardiac catheterization on December 20, 2018 which showed nonobstructive coronary artery disease.  He had multiple complications during his hospitalization including pneumonia and GI bleed.    Since being out of the hospital, he states he feels well.  His wife verifies that he is neurologically intact.  He denies any pain, shortness of breath, palpitations or ankle edema.    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  He does not feel ready to return to work.    Past Medical History:   Diagnosis Date   • Hypertrophic cardiomyopathy (HCC)      Past Surgical History:   Procedure Laterality Date   • GASTROSCOPY-ENDO N/A 12/28/2018    Procedure: GASTROSCOPY-ENDO;  Surgeon: Rojas Warren D.O.;  Location: ENDOSCOPY [facility];  Service: Gastroenterology   • CARDIAC CATH  12/28/2018    normal coronaries     History reviewed. No pertinent family history.  Social History     Social History   • Marital status:      Spouse name: N/A   • Number of children: N/A   • Years of education: N/A     Occupational History   • Not on file.     Social History Main Topics   • Smoking status: Never Smoker   • Smokeless tobacco: Never Used   • Alcohol use No   • Drug use: No   • Sexual activity: Not on file     Other Topics Concern   • Not on file     Social History Narrative   • No narrative on file     No Known Allergies  Outpatient " "Encounter Prescriptions as of 1/21/2019   Medication Sig Dispense Refill   • atorvastatin (LIPITOR) 80 MG tablet Take 1 Tab by mouth every evening. 90 Tab 3   • metoprolol (LOPRESSOR) 25 MG Tab Take 1 Tab by mouth 2 Times a Day. 180 Tab 3   • aspirin 81 MG EC tablet Take 1 Tab by mouth every day. 100 Tab 0   • sertraline (ZOLOFT) 25 MG tablet Take 25 mg by mouth every morning.     • Multiple Vitamins-Minerals (MULTIVITAMIN PO) Take 1 Tab by mouth every morning.     • [DISCONTINUED] atorvastatin (LIPITOR) 80 MG tablet Take 1 Tab by mouth every evening. 30 Tab 2   • [DISCONTINUED] metoprolol (LOPRESSOR) 25 MG Tab Take 1 Tab by mouth 2 Times a Day. 60 Tab 2     No facility-administered encounter medications on file as of 1/21/2019.      Review of Systems   Constitutional: Negative for malaise/fatigue.   Respiratory: Negative for cough and shortness of breath.    Cardiovascular: Negative for chest pain, palpitations, orthopnea, claudication, leg swelling and PND.   Gastrointestinal: Negative for abdominal pain.   Musculoskeletal: Negative for myalgias.   Neurological: Negative for dizziness and weakness.        Objective:   /64 (BP Location: Left arm, Patient Position: Sitting)   Pulse 60   Ht 1.6 m (5' 3\")   Wt 71.7 kg (158 lb)   SpO2 95%   BMI 27.99 kg/m²     Physical Exam   Constitutional: He is oriented to person, place, and time. He appears well-developed and well-nourished.   HENT:   Head: Normocephalic.   Eyes: EOM are normal.   Neck: Normal range of motion. No JVD present.   Cardiovascular: Normal rate and regular rhythm.  Exam reveals no friction rub.    Murmur heard.   Systolic murmur is present with a grade of 3/6   Pulmonary/Chest: Effort normal.   AICD left upper chest with some old bruising.  Steri-Strips are dry and intact with old blood underneath.   Abdominal: Soft. Bowel sounds are normal.   Musculoskeletal: He exhibits no edema.   Neurological: He is alert and oriented to person, place, and " time.   Skin: Skin is warm and dry.   Psychiatric: He has a normal mood and affect.     Results for WINDY OTERO (MRN [id number])    Ref. Range 1/9/2019 02:33   WBC Latest Ref Range: 4.8 - 10.8 K/uL 11.6 (H)   RBC Latest Ref Range: 4.70 - 6.10 M/uL 3.71 (L)   Hemoglobin Latest Ref Range: 14.0 - 18.0 g/dL 9.9 (L)   Hematocrit Latest Ref Range: 42.0 - 52.0 % 33.2 (L)   MCV Latest Ref Range: 81.4 - 97.8 fL 89.5   MCH Latest Ref Range: 27.0 - 33.0 pg 26.7 (L)   MCHC Latest Ref Range: 33.7 - 35.3 g/dL 29.8 (L)   RDW Latest Ref Range: 35.9 - 50.0 fL 50.5 (H)   Platelet Count Latest Ref Range: 164 - 446 K/uL 560 (H)   MPV Latest Ref Range: 9.0 - 12.9 fL 10.4   Neutrophils-Polys Latest Ref Range: 44.00 - 72.00 % 65.50   Neutrophils (Absolute) Latest Ref Range: 1.82 - 7.42 K/uL 7.60 (H)   Lymphocytes Latest Ref Range: 22.00 - 41.00 % 19.80 (L)   Lymphs (Absolute) Latest Ref Range: 1.00 - 4.80 K/uL 2.30   Monocytes Latest Ref Range: 0.00 - 13.40 % 2.60   Monos (Absolute) Latest Ref Range: 0.00 - 0.85 K/uL 0.30   Eosinophils Latest Ref Range: 0.00 - 6.90 % 8.60 (H)   Eos (Absolute) Latest Ref Range: 0.00 - 0.51 K/uL 1.00 (H)   Basophils Latest Ref Range: 0.00 - 1.80 % 3.50 (H)   Baso (Absolute) Latest Ref Range: 0.00 - 0.12 K/uL 0.41 (H)   Nucleated RBC Latest Units: /100 WBC 0.00   NRBC (Absolute) Latest Units: K/uL 0.00   Plt Estimation Unknown Increased   Giant Platelets Unknown 2+   RBC Morphology Unknown Present   Anisocytosis Unknown 1+   Polychromia Unknown 1+   Ovalocytes Unknown 1+   Echinocytes Unknown 1+   Smudge Cells Unknown Few   Peripheral Smear Review Unknown see below   Manual Diff Status Unknown PERFORMED   Sodium Latest Ref Range: 135 - 145 mmol/L 132 (L)   Potassium Latest Ref Range: 3.6 - 5.5 mmol/L 4.0   Chloride Latest Ref Range: 96 - 112 mmol/L 99   Co2 Latest Ref Range: 20 - 33 mmol/L 23   Anion Gap Latest Ref Range: 0.0 - 11.9  10.0   Glucose Latest Ref Range: 65 - 99 mg/dL 90   Bun Latest Ref Range:  8 - 22 mg/dL 32 (H)   Creatinine Latest Ref Range: 0.50 - 1.40 mg/dL 0.64   GFR If  Latest Ref Range: >60 mL/min/1.73 m 2 >60   GFR If Non  Latest Ref Range: >60 mL/min/1.73 m 2 >60   Calcium Latest Ref Range: 8.5 - 10.5 mg/dL 9.6     December 20, 2018: Transthoracic Echo Report  CONCLUSIONS  Mildly reduced left ventricular systolic function.  Left ventricular ejection fraction is visually estimated to be 45%.  Severe concentric left ventricular hypertrophy, consider infiltrative disease.  Indeterminate diastolic function.  Moderate mitral regurgitation.  Moderate tricuspid regurgitation.  Right ventricular systolic pressure is estimated to be 50  mmHg.    December 23, 2018: Transthoracic Echo Report  CONCLUSIONS  Limited echocardiogram performed for valvular evaluation.  Systolic anterior motion of the mitral valve is noted.  Moderate to severe mitral regurgitation is noted.   There is concern for possible mobile echodensity adherent to the anterior mitral valve leaflet tip.  Transesophageal echocardiogram can   be considered versus reevaluating with a repeat echocardiogram in a couple of days.    Left ventricular outflow tract peak gradient of 64 mmHg suggestive of   left ventricular outflow tract obstruction.  Compared to prior study performed 3 days ago, mitral regurgitation and LV outflow tract obstruction is new.    Assessment:     1. Cardiac arrest (HCC)     2. Ventricular fibrillation (HCC)  metoprolol (LOPRESSOR) 25 MG Tab   3. AICD (automatic cardioverter/defibrillator) present     4. HOCM (hypertrophic obstructive cardiomyopathy) ([facility])  EC-ECHOCARDIOGRAM COMPLETE W/O CONT   5. Essential hypertension     6. Dyslipidemia  atorvastatin (LIPITOR) 80 MG tablet    LIPID PANEL    COMP METABOLIC PANEL   7. Non-rheumatic mitral regurgitation     8. Epistaxis         Medical Decision Making:  Today's Assessment / Status / Plan:     Cardiac arrest: Patient was found to be in  ventricular fibrillation and was resuscitated.  He appears to be neurologically intact.    Ventricular fibrillation: No further episodes.  Patient has an AICD in place.  This will be followed by the device clinic.  He may shower and let the Steri-Strips and have them fall off by themselves.    Hypertrophic obstructive cardiomyopathy: I consulted with Dr. Lv Ho who recommended a echocardiogram prior to his next follow-up appointment to evaluate him for gradients and for further medication if needed.    Hypertension: Blood pressure is good in the office today.  Continue current regimen.    Dyslipidemia: He has been started on a statin.  We will check lipids and adjust accordingly.    Mitral regurgitation: He has mitral regurgitation probably secondary to HOCM.  He does have a murmur which is expected.    Epistaxis: He has history of nosebleeds.  He is on aspirin 81 mg and hopefully he will not have nosebleeds from this.  [facility]    He will follow-up in 1 month with myself to determine if he is ready to return to work.  He will have echo prior.  We will have him follow-up in 3 months Dr. Ilene Moreno who saw him in the hospital.  If he has any problems prior to these appointments he will contact our office or go to the emergency room.    Collaborating Provider: Lv Ho.

## 2019-01-21 NOTE — LETTER
"     [facility]   [address], [address]  [place] [address]  Phone: [phone]  Fax: [phone]              Amadou Nicholas  1960    Encounter Date: 1/21/2019    CARRILLO Traylor          PROGRESS NOTE:  Chief Complaint   Patient presents with   • Coronary Artery Disease     hospital follow up       Subjective:   Amadou \"Sukhdev\" Cristopher is a 58 y.o. male who presents today with his wife, Marianne, for hospital follow-up.  On December 18, 2018 he was unarousable at home.  CPR was started and EMS was called.  He was found to be in ventricular fibrillation and was defibrillated 4 times.  Upon arrival to the emergency room he was in a sinus rhythm.    He was found to have hypertrophic obstructive cardiomyopathy and received an AICD on January 3, 2019.    He underwent cardiac catheterization on December 20, 2018 which showed nonobstructive coronary artery disease.  He had multiple complications during his hospitalization including pneumonia and GI bleed.    Since being out of the hospital, he states he feels well.  His wife verifies that he is neurologically intact.  He denies any pain, shortness of breath, palpitations or ankle edema.    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  He does not feel ready to return to work.    Past Medical History:   Diagnosis Date   • Hypertrophic cardiomyopathy (HCC)      Past Surgical History:   Procedure Laterality Date   • GASTROSCOPY-ENDO N/A 12/28/2018    Procedure: GASTROSCOPY-ENDO;  Surgeon: Rojas Warren D.O.;  Location: [facility];  Service: Gastroenterology   • CARDIAC CATH  12/28/2018    normal coronaries     History reviewed. No pertinent family history.  Social History     Social History   • Marital status:      Spouse name: N/A   • Number of children: N/A   • Years of education: N/A     Occupational History   • Not on file.     Social History Main Topics  " "  • Smoking status: Never Smoker   • Smokeless tobacco: Never Used   • Alcohol use No   • Drug use: No   • Sexual activity: Not on file     Other Topics Concern   • Not on file     Social History Narrative   • No narrative on file     No Known Allergies  Outpatient Encounter Prescriptions as of 1/21/2019   Medication Sig Dispense Refill   • atorvastatin (LIPITOR) 80 MG tablet Take 1 Tab by mouth every evening. 90 Tab 3   • metoprolol (LOPRESSOR) 25 MG Tab Take 1 Tab by mouth 2 Times a Day. 180 Tab 3   • aspirin 81 MG EC tablet Take 1 Tab by mouth every day. 100 Tab 0   • sertraline (ZOLOFT) 25 MG tablet Take 25 mg by mouth every morning.     • Multiple Vitamins-Minerals (MULTIVITAMIN PO) Take 1 Tab by mouth every morning.     • [DISCONTINUED] atorvastatin (LIPITOR) 80 MG tablet Take 1 Tab by mouth every evening. 30 Tab 2   • [DISCONTINUED] metoprolol (LOPRESSOR) 25 MG Tab Take 1 Tab by mouth 2 Times a Day. 60 Tab 2     No facility-administered encounter medications on file as of 1/21/2019.      Review of Systems   Constitutional: Negative for malaise/fatigue.   Respiratory: Negative for cough and shortness of breath.    Cardiovascular: Negative for chest pain, palpitations, orthopnea, claudication, leg swelling and PND.   Gastrointestinal: Negative for abdominal pain.   Musculoskeletal: Negative for myalgias.   Neurological: Negative for dizziness and weakness.        Objective:   /64 (BP Location: Left arm, Patient Position: Sitting)   Pulse 60   Ht 1.6 m (5' 3\")   Wt 71.7 kg (158 lb)   SpO2 95%   BMI 27.99 kg/m²      Physical Exam   Constitutional: He is oriented to person, place, and time. He appears well-developed and well-nourished.   HENT:   Head: Normocephalic.   Eyes: EOM are normal.   Neck: Normal range of motion. No JVD present.   Cardiovascular: Normal rate and regular rhythm.  Exam reveals no friction rub.    Murmur heard.   Systolic murmur is present with a grade of 3/6   Pulmonary/Chest: " Effort normal.   AICD left upper chest with some old bruising.  Steri-Strips are dry and intact with old blood underneath.   Abdominal: Soft. Bowel sounds are normal.   Musculoskeletal: He exhibits no edema.   Neurological: He is alert and oriented to person, place, and time.   Skin: Skin is warm and dry.   Psychiatric: He has a normal mood and affect.     Results for WINDY OTERO (MRN [id number])    Ref. Range 1/9/2019 02:33   WBC Latest Ref Range: 4.8 - 10.8 K/uL 11.6 (H)   RBC Latest Ref Range: 4.70 - 6.10 M/uL 3.71 (L)   Hemoglobin Latest Ref Range: 14.0 - 18.0 g/dL 9.9 (L)   Hematocrit Latest Ref Range: 42.0 - 52.0 % 33.2 (L)   MCV Latest Ref Range: 81.4 - 97.8 fL 89.5   MCH Latest Ref Range: 27.0 - 33.0 pg 26.7 (L)   MCHC Latest Ref Range: 33.7 - 35.3 g/dL 29.8 (L)   RDW Latest Ref Range: 35.9 - 50.0 fL 50.5 (H)   Platelet Count Latest Ref Range: 164 - 446 K/uL 560 (H)   MPV Latest Ref Range: 9.0 - 12.9 fL 10.4   Neutrophils-Polys Latest Ref Range: 44.00 - 72.00 % 65.50   Neutrophils (Absolute) Latest Ref Range: 1.82 - 7.42 K/uL 7.60 (H)   Lymphocytes Latest Ref Range: 22.00 - 41.00 % 19.80 (L)   Lymphs (Absolute) Latest Ref Range: 1.00 - 4.80 K/uL 2.30   Monocytes Latest Ref Range: 0.00 - 13.40 % 2.60   Monos (Absolute) Latest Ref Range: 0.00 - 0.85 K/uL 0.30   Eosinophils Latest Ref Range: 0.00 - 6.90 % 8.60 (H)   Eos (Absolute) Latest Ref Range: 0.00 - 0.51 K/uL 1.00 (H)   Basophils Latest Ref Range: 0.00 - 1.80 % 3.50 (H)   Baso (Absolute) Latest Ref Range: 0.00 - 0.12 K/uL 0.41 (H)   Nucleated RBC Latest Units: /100 WBC 0.00   NRBC (Absolute) Latest Units: K/uL 0.00   Plt Estimation Unknown Increased   Giant Platelets Unknown 2+   RBC Morphology Unknown Present   Anisocytosis Unknown 1+   Polychromia Unknown 1+   Ovalocytes Unknown 1+   Echinocytes Unknown 1+   Smudge Cells Unknown Few   Peripheral Smear Review Unknown see below   Manual Diff Status Unknown PERFORMED   Sodium Latest Ref Range: 135 - 145  mmol/L 132 (L)   Potassium Latest Ref Range: 3.6 - 5.5 mmol/L 4.0   Chloride Latest Ref Range: 96 - 112 mmol/L 99   Co2 Latest Ref Range: 20 - 33 mmol/L 23   Anion Gap Latest Ref Range: 0.0 - 11.9  10.0   Glucose Latest Ref Range: 65 - 99 mg/dL 90   Bun Latest Ref Range: 8 - 22 mg/dL 32 (H)   Creatinine Latest Ref Range: 0.50 - 1.40 mg/dL 0.64   GFR If  Latest Ref Range: >60 mL/min/1.73 m 2 >60   GFR If Non  Latest Ref Range: >60 mL/min/1.73 m 2 >60   Calcium Latest Ref Range: 8.5 - 10.5 mg/dL 9.6     December 20, 2018: Transthoracic Echo Report  CONCLUSIONS  Mildly reduced left ventricular systolic function.  Left ventricular ejection fraction is visually estimated to be 45%.  Severe concentric left ventricular hypertrophy, consider infiltrative disease.  Indeterminate diastolic function.  Moderate mitral regurgitation.  Moderate tricuspid regurgitation.  Right ventricular systolic pressure is estimated to be 50  mmHg.    December 23, 2018: Transthoracic Echo Report  CONCLUSIONS  Limited echocardiogram performed for valvular evaluation.  Systolic anterior motion of the mitral valve is noted.  Moderate to severe mitral regurgitation is noted.   There is concern for possible mobile echodensity adherent to the anterior mitral valve leaflet tip.  Transesophageal echocardiogram can   be considered versus reevaluating with a repeat echocardiogram in a couple of days.    Left ventricular outflow tract peak gradient of 64 mmHg suggestive of   left ventricular outflow tract obstruction.  Compared to prior study performed 3 days ago, mitral regurgitation and LV outflow tract obstruction is new.    Assessment:     1. Cardiac arrest (HCC)     2. Ventricular fibrillation (HCC)  metoprolol (LOPRESSOR) 25 MG Tab   3. AICD (automatic cardioverter/defibrillator) present     4. HOCM (hypertrophic obstructive cardiomyopathy) ([facility])  EC-ECHOCARDIOGRAM COMPLETE W/O CONT   5. Essential hypertension     6.  Dyslipidemia  atorvastatin (LIPITOR) 80 MG tablet    LIPID PANEL    COMP METABOLIC PANEL   7. Non-rheumatic mitral regurgitation     8. Epistaxis         Medical Decision Making:  Today's Assessment / Status / Plan:     Cardiac arrest: Patient was found to be in ventricular fibrillation and was resuscitated.  He appears to be neurologically intact.    Ventricular fibrillation: No further episodes.  Patient has an AICD in place.  This will be followed by the device clinic.  He may shower and let the Steri-Strips and have them fall off by themselves.    Hypertrophic obstructive cardiomyopathy: I consulted with Dr. Lv Ho who recommended a echocardiogram prior to his next follow-up appointment to evaluate him for gradients and for further medication if needed.    Hypertension: Blood pressure is good in the office today.  Continue current regimen.    Dyslipidemia: He has been started on a statin.  We will check lipids and adjust accordingly.    Mitral regurgitation: He has mitral regurgitation probably secondary to HOCM.  He does have a murmur which is expected.    Epistaxis: He has history of nosebleeds.  He is on aspirin 81 mg and hopefully he will not have nosebleeds from this.  [facility]    He will follow-up in 1 month with myself to determine if he is ready to return to work.  He will have echo prior.  We will have him follow-up in 3 months Dr. Ilene Moreno who saw him in the hospital.  If he has any problems prior to these appointments he will contact our office or go to the emergency room.    Collaborating Provider: Lv Ho.        No Recipients

## 2019-02-01 ENCOUNTER — HOSPITAL ENCOUNTER (OUTPATIENT)
Dept: CARDIOLOGY | Facility: MEDICAL CENTER | Age: 59
End: 2019-02-01
Attending: NURSE PRACTITIONER
Payer: COMMERCIAL

## 2019-02-01 LAB
LV EJECT FRACT  99904: 55
LV EJECT FRACT MOD 2C 99903: 61.81
LV EJECT FRACT MOD 4C 99902: 52.96
LV EJECT FRACT MOD BP 99901: 58.34

## 2019-02-01 PROCEDURE — 93306 TTE W/DOPPLER COMPLETE: CPT

## 2019-02-01 PROCEDURE — 93306 TTE W/DOPPLER COMPLETE: CPT | Mod: 26 | Performed by: INTERNAL MEDICINE

## 2019-02-04 ENCOUNTER — TELEPHONE (OUTPATIENT)
Dept: CARDIOLOGY | Facility: MEDICAL CENTER | Age: 59
End: 2019-02-04

## 2019-02-05 ENCOUNTER — OFFICE VISIT (OUTPATIENT)
Dept: MEDICAL GROUP | Facility: MEDICAL CENTER | Age: 59
End: 2019-02-05
Payer: COMMERCIAL

## 2019-02-05 VITALS
TEMPERATURE: 98.2 F | DIASTOLIC BLOOD PRESSURE: 60 MMHG | WEIGHT: 164.9 LBS | HEART RATE: 60 BPM | HEIGHT: 63 IN | SYSTOLIC BLOOD PRESSURE: 118 MMHG | BODY MASS INDEX: 29.22 KG/M2

## 2019-02-05 DIAGNOSIS — D75.839 THROMBOCYTOSIS: ICD-10-CM

## 2019-02-05 DIAGNOSIS — I46.9 CARDIAC ARREST (HCC): ICD-10-CM

## 2019-02-05 DIAGNOSIS — N30.01 ACUTE CYSTITIS WITH HEMATURIA: ICD-10-CM

## 2019-02-05 DIAGNOSIS — R13.10 DYSPHAGIA, UNSPECIFIED TYPE: ICD-10-CM

## 2019-02-05 DIAGNOSIS — I49.01 VENTRICULAR FIBRILLATION (HCC): ICD-10-CM

## 2019-02-05 DIAGNOSIS — E78.5 DYSLIPIDEMIA: ICD-10-CM

## 2019-02-05 DIAGNOSIS — K29.01 GASTROINTESTINAL HEMORRHAGE ASSOCIATED WITH ACUTE GASTRITIS: ICD-10-CM

## 2019-02-05 PROBLEM — A41.9 SEPSIS (HCC): Status: RESOLVED | Noted: 2018-12-31 | Resolved: 2019-02-05

## 2019-02-05 PROBLEM — R04.0 EPISTAXIS: Status: RESOLVED | Noted: 2019-01-21 | Resolved: 2019-02-05

## 2019-02-05 PROBLEM — D69.6 THROMBOCYTOPENIA (HCC): Status: RESOLVED | Noted: 2019-01-02 | Resolved: 2019-02-05

## 2019-02-05 PROBLEM — J69.0 ASPIRATION PNEUMONIA (HCC): Status: RESOLVED | Noted: 2018-12-25 | Resolved: 2019-02-05

## 2019-02-05 PROCEDURE — 99204 OFFICE O/P NEW MOD 45 MIN: CPT | Performed by: INTERNAL MEDICINE

## 2019-02-05 RX ORDER — ATORVASTATIN CALCIUM 80 MG/1
80 TABLET, FILM COATED ORAL EVERY EVENING
Qty: 90 TAB | Refills: 3 | Status: SHIPPED | OUTPATIENT
Start: 2019-02-05 | End: 2019-02-22

## 2019-02-05 NOTE — ASSESSMENT & PLAN NOTE
He experienced dysphasia after extubation.  He says overall this is much better and seems to be resolving.  At this time he feels it is more due to a little bit of scant phlegm than anything else.

## 2019-02-05 NOTE — LETTER
[facility]  Norma Quinn M.D.  [address]  [place] [address]  Fax: [phone]   Authorization for Release/Disclosure of   Protected Health Information   Name: WINDY OTERO : 1960 SSN: [id number]   Address: [address]  [place] [address] Phone:    [phone] (home)    I authorize the entity listed below to release/disclose the PHI below to:   [facility]/Norma Quinn M.D. and Norma Quinn M.D.   Provider or Entity Name:     Address   City, State, [facility]   Phone:      Fax:     Reason for request: continuity of care   Information to be released:    [  ] LAST COLONOSCOPY,  including any PATH REPORT and follow-up  [  ] LAST FIT/COLOGUARD RESULT [  ] LAST DEXA  [  ] LAST MAMMOGRAM  [  ] LAST PAP  [  ] LAST LABS [  ] RETINA EXAM REPORT  [  ] IMMUNIZATION RECORDS  [  ] Release all info      [  ] Check here and initial the line next to each item to release ALL health information INCLUDING  _____ Care and treatment for drug and / or alcohol abuse  _____ HIV testing, infection status, or AIDS  _____ Genetic Testing    DATES OF SERVICE OR TIME PERIOD TO BE DISCLOSED: _____________  I understand and acknowledge that:  * This Authorization may be revoked at any time by you in writing, except if your health information has already been used or disclosed.  * Your health information that will be used or disclosed as a result of you signing this authorization could be re-disclosed by the recipient. If this occurs, your re-disclosed health information may no longer be protected by State or Federal laws.  * You may refuse to sign this Authorization. Your refusal will not affect your ability to obtain treatment.  * This Authorization becomes effective upon signing and will  on (date) __________.      If no date is indicated, this Authorization will  one (1) year from the signature date.    Name: Windy Otero    Signature:   Date:     2019       PLEASE FAX  REQUESTED RECORDS BACK TO: [phone]   Statement Selected

## 2019-02-05 NOTE — PROGRESS NOTES
CC:  Diagnoses of Dyslipidemia, Ventricular fibrillation (HCC), Cardiac arrest (HCC), Gastrointestinal hemorrhage associated with acute gastritis, Dysphagia, unspecified type, Acute cystitis with hematuria, and Thrombocytosis (HCC) were pertinent to this visit.    HISTORY OF THE PRESENT ILLNESS: Patient is a 58 y.o. male. This pleasant patient is here today to establish care though his primary PCP is at the [facility] Dr. De La Rosa.      Cardiac arrest (HCC)  Patient was admitted 12/19/18 for out of hospital cardiac arrest, discharge 1/10/18.  Left heart catheterization 12/28/18 demonstrated nonobstructive mild coronary artery disease.  ICD placement was done on 1/3/2019.    Echocardiogram showed normal ejection fraction, mitral regurgitation and hypertrophic obstructive cardiomyopathy.  He has since been followed by cardiology, their note was briefly reviewed and he has pending follow-up this month.  He denies any angina, pulmonary symptoms, leg edema or associated symptoms.  Blood pressure /heart rate have been well controlled on his beta-blocker.  He is also on a statin and aspirin.  He feels overall he is doing well, still on medical leave.    Gastrointestinal hemorrhage associated with acute gastritis  While hospitalized for his out of hospital cardiac arrest he was noted to have gastric ulcer per discharge summary, presumably stress-induced.  He denies any current symptoms of nausea, vomiting, diarrhea, constipation, bleeding sites, epigastric or other abdominal pain.  Wife indicates she was told that a lot of the blood was also trauma associated with CPR and ET tube.  [facility] not currently [facility] this.    Dysphagia  He experienced dysphasia after extubation.  He says overall this is much better and seems to be resolving.  At this time he feels it is more due to a little bit of scant phlegm than anything else.    Dyslipidemia  He is  tolerating his statin well.    Acute cystitis with hematuria    Additionally on prior listed diagnoses, he has acute cystitis with hematuria.  I am going to resolve this today.  The only urinalysis in [facility] computer was from 12/30/18 of turbid urine with a lot of protein, nitrate and also LE,  assuming this was an infection at that time.  If [facility] indicate any persistent hematuria then we will pursue workup at that time but patient indicates he is a never smoker.    Thrombocytosis (HCC)  Most recent CBC showed thrombocytosis with platelet count of 560 on 1/8/19.  Presumably this was in the setting of inflammation during his hospitalization at that time.  We will plan to review [facility] when available.    Allergies: Patient has no known allergies.    Current Outpatient Prescriptions Ordered in [facility]   Medication Sig Dispense Refill   • atorvastatin (LIPITOR) 80 MG tablet Take 1 Tab by mouth every evening. 90 Tab 3   • metoprolol (LOPRESSOR) 25 MG Tab Take 1 Tab by mouth 2 Times a Day. 180 Tab 3   • aspirin 81 MG EC tablet Take 1 Tab by mouth every day. 100 Tab 0   • sertraline (ZOLOFT) 25 MG tablet Take 25 mg by mouth every morning.     • Multiple Vitamins-Minerals (MULTIVITAMIN PO) Take 1 Tab by mouth every morning.       No current [facility]-ordered facility-administered medications on file.        Past Medical History:   Diagnosis Date   • Hyperlipidemia    • Hypertrophic cardiomyopathy (HCC)        Past Surgical History:   Procedure Laterality Date   • GASTROSCOPY-ENDO N/A 12/28/2018    Procedure: GASTROSCOPY-ENDO;  Surgeon: Rojas Warren D.O.;  Location: ENDOSCOPY [facility];  Service: Gastroenterology   • CARDIAC CATH  12/28/2018    normal coronaries   • APPENDECTOMY         Social History   Substance Use Topics   • Smoking status: Never Smoker   • Smokeless tobacco: Never Used   • Alcohol use No       Social History     Social History Narrative   • No narrative on file       Family History  "  Problem Relation Age of Onset   • Kidney Disease Mother         dialysis   • Diabetes Father    • Hypertension Father    • Heart Disease Father    • Diabetes Brother    • Cancer Brother         unk   • Heart Disease Brother    • Hypertension Brother        ROS:     - Constitutional: Negative for fever, chills, unexpected weight change, night sweats    - Eyes:   Negative for blurry vission, eye pain, discharge    - ENT:  Negative for hearing changes, ear pain, ear discharge, rhinorrhea, sinus congestion, sore throat     - Respiratory: Negative for cough,  chest congestion, dyspnea, wheezing, and crackles.   +phlegm, clear    - Cardiovascular: Negative for chest pain, palpitations, orthopnea, and bilateral lower extremity edema.     - Gastrointestinal: Negative for heartburn, nausea, vomiting, abdominal pain, hematochezia, melena, diarrhea, constipation, and greasy/foul-smelling stools.     - Genitourinary: Negative for dysuria,hematuria, pyuria, urinary urgency, and urinary incontinence. ++ Nocturia the wife says he is on a bad sleep cycle going to bed at midnight getting up at noon, drinking fluid before bed as well as coffee and soda.    - Musculoskeletal: Negative for myalgias, back pain, and joint pain.     - Skin: Negative for rash, itching, cyanotic skin color change.     - Neurological: Negative for migraines, numbness, ataxia, tremors, vertigo    - Endo:Negative for polyuria, heat/cold intolerance, excessive thirst    - Hem/lymphatic: Negative for easy bruising, blood clots, lymphedema, swollen glands    -Allergic/immun: Negative for allergic rhinitis    - Psychiatric/Behavioral: Negative for depression, suicidal/homicidal ideation and memory loss.      Exam: Blood pressure 118/60, pulse 60, temperature 36.8 °C (98.2 °F), temperature source Tympanic, height 1.6 m (5' 3\"), weight 74.8 kg (164 lb 14.5 oz). Body mass index is 29.21 kg/m².    General: Normal appearing. No distress.  EYES: Conjunctiva clear lids " without ptosis, pupils equal  EARS: Normal shape and contour, canals are clear bilaterally  Neck: Supple without LAD. Thyroid is not enlarged.  Pulmonary: Clear to ausculation.  Normal effort. No rales or wheezing.  Cardiovascular: Regular rate and rhythm, +VALERI Sternal border and apical murmur.   Abdomen: Soft, nontender, nondistended. Normal bowel sounds.  Neurologic: Cranial nerves grossly nonfocal  Lymph: No cervical, supraclavicular nodes palpable  Skin: Warm and dry.  No obvious lesions.  Musculoskeletal: Normal gait. No extremity cyanosis, clubbing, or edema.  Psych: Normal mood and affect. Alert and oriented x3. Judgment and insight is normal.      Assessment/Plan  1. Dyslipidemia  - atorvastatin (LIPITOR) 80 MG tablet; Take 1 Tab by mouth every evening.  Dispense: 90 Tab; Refill: 3    2.  Out-of-hospital cardiac arrest with ventricular fibrillation (HCC) status post defibrillator, normal ejection fraction and associated with HOCM and mitral regurgitation.  - metoprolol (LOPRESSOR) 25 MG Tab; Take 1 Tab by mouth 2 Times a Day.  Dispense: 180 Tab; Refill: 3    3. Gastrointestinal hemorrhage associated with acute gastritis  This was associated during his hospital stay of cardiac arrest.  Currently asymptomatic and exam is benign.  I have requested his prior GI records at today's visit.    5. Dysphagia, unspecified type  This was associated post extubation, symptom has almost resolved, today it sounds like it is more of a sensation of mucus in the throat than actual dysphasia per se.      Return to clinic 3 months.  Patient's care is primarily through the [facility] where he has a long-term PCP.  He says he [facility] at [facility], he will bring labs to me at next visit.    He indicates his vaccines are up-to-date and we have also requested his vaccine record.    Of note he has a history of anemia, this will need to be followed with repeat blood work [facility] the [facility].  Depending on records from GI may need additional endoscopy, etc.          Please note that this dictation was created using voice recognition software. I have made every reasonable attempt to correct obvious errors, but I expect that there are errors of grammar and possibly content that I did not discover before finalizing the note.

## 2019-02-05 NOTE — ASSESSMENT & PLAN NOTE
While hospitalized for his out of hospital cardiac arrest he was noted to have gastric ulcer per discharge summary, presumably stress-induced.  He denies any current symptoms of nausea, vomiting, diarrhea, constipation, bleeding sites, epigastric or other abdominal pain.  Wife indicates she was told that a lot of the blood was also trauma associated with CPR and ET tube.  Patient has previously done his colonoscopies through the [facility] says he is not currently due for this.

## 2019-02-05 NOTE — ASSESSMENT & PLAN NOTE
Most recent CBC showed thrombocytosis with platelet count of 560 on 1/8/19.  Presumably this was in the setting of inflammation during his hospitalization at that time.  We will plan to review [facility] when available.

## 2019-02-05 NOTE — ASSESSMENT & PLAN NOTE
Additionally on prior listed diagnoses, he has acute cystitis with hematuria.  I am going to resolve this today.  The only urinalysis in [facility] computer was from 12/30/18 of turbid urine with a lot of protein, nitrate and also LE,  assuming this was an infection at that time.  If [facility] records indicate any persistent hematuria then we will pursue workup at that time but patient indicates he is a never smoker.

## 2019-02-05 NOTE — ASSESSMENT & PLAN NOTE
Patient was admitted 12/19/18 for out of hospital cardiac arrest, discharge 1/10/18.  Left heart catheterization 12/28/18 demonstrated nonobstructive mild coronary artery disease.  ICD placement was done on 1/3/2019.    Echocardiogram showed normal ejection fraction, mitral regurgitation and hypertrophic obstructive cardiomyopathy.  He has since been followed by cardiology, their note was briefly reviewed and he has pending follow-up this month.  He denies any angina, pulmonary symptoms, leg edema or associated symptoms.  Blood pressure /heart rate have been well controlled on his beta-blocker.  He is also on a statin and aspirin.  He feels overall he is doing well, still on medical leave.

## 2019-02-07 ENCOUNTER — NON-PROVIDER VISIT (OUTPATIENT)
Dept: CARDIOLOGY | Facility: MEDICAL CENTER | Age: 59
End: 2019-02-07
Payer: COMMERCIAL

## 2019-02-07 DIAGNOSIS — Z02.9 ENCOUNTERS FOR ADMINISTRATIVE PURPOSE: ICD-10-CM

## 2019-02-13 ENCOUNTER — TELEPHONE (OUTPATIENT)
Dept: CARDIOLOGY | Facility: MEDICAL CENTER | Age: 59
End: 2019-02-13

## 2019-02-16 LAB
MYCOBACTERIUM SPEC CULT: NORMAL
RHODAMINE-AURAMINE STN SPEC: NORMAL
SIGNIFICANT IND 70042: NORMAL
SITE SITE: NORMAL
SOURCE SOURCE: NORMAL

## 2019-02-21 ENCOUNTER — TELEPHONE (OUTPATIENT)
Dept: CARDIOLOGY | Facility: MEDICAL CENTER | Age: 59
End: 2019-02-21

## 2019-02-21 NOTE — TELEPHONE ENCOUNTER
Attempted to call patient, unable to reach.  Left voicemail to offer patient to be seen today at 1615 by [employer] as he was requesting for disability paperwork to be done 2 weeks after 2/7.  It is noted that patient next FV is 02/22/2019.

## 2019-02-22 ENCOUNTER — OFFICE VISIT (OUTPATIENT)
Dept: CARDIOLOGY | Facility: MEDICAL CENTER | Age: 59
End: 2019-02-22
Payer: COMMERCIAL

## 2019-02-22 ENCOUNTER — TELEPHONE (OUTPATIENT)
Dept: CARDIOLOGY | Facility: MEDICAL CENTER | Age: 59
End: 2019-02-22

## 2019-02-22 VITALS
WEIGHT: 164.8 LBS | BODY MASS INDEX: 29.2 KG/M2 | DIASTOLIC BLOOD PRESSURE: 68 MMHG | HEART RATE: 50 BPM | OXYGEN SATURATION: 99 % | HEIGHT: 63 IN | SYSTOLIC BLOOD PRESSURE: 108 MMHG

## 2019-02-22 DIAGNOSIS — E78.5 DYSLIPIDEMIA: ICD-10-CM

## 2019-02-22 DIAGNOSIS — I34.0 NON-RHEUMATIC MITRAL REGURGITATION: ICD-10-CM

## 2019-02-22 DIAGNOSIS — I49.01 VENTRICULAR FIBRILLATION (HCC): ICD-10-CM

## 2019-02-22 DIAGNOSIS — I10 ESSENTIAL HYPERTENSION: ICD-10-CM

## 2019-02-22 PROBLEM — I42.1 HOCM (HYPERTROPHIC OBSTRUCTIVE CARDIOMYOPATHY) (HCC): Status: RESOLVED | Noted: 2019-01-08 | Resolved: 2019-02-22

## 2019-02-22 PROCEDURE — 99214 OFFICE O/P EST MOD 30 MIN: CPT | Performed by: NURSE PRACTITIONER

## 2019-02-22 ASSESSMENT — ENCOUNTER SYMPTOMS
DIZZINESS: 0
MYALGIAS: 0
CLAUDICATION: 0
ORTHOPNEA: 0
WEAKNESS: 0
ABDOMINAL PAIN: 0
SHORTNESS OF BREATH: 0
PALPITATIONS: 0
COUGH: 0
PND: 0

## 2019-02-22 NOTE — TELEPHONE ENCOUNTER
Patient and wife are seen in the office for FV with [employer].  Disability paperwork completed by [employer].      Disability paperwork deferred to Maribell NUNEZ.

## 2019-02-22 NOTE — PROGRESS NOTES
"Chief Complaint   Patient presents with   • Cardiac Arrest       Subjective:   Amadou \"Sukhdev\"  Cristopher is a 58 y.o. male who presents today with his wife, Marianne, to follow-up on cardiac arrest and ventricular fibrillation.    He suffered cardiac arrest on December 18, 2018.  He was defibrillated several times and taken to the hospital.  Cath Lab showed nonobstructive coronary artery disease that was minimal..  He received an AICD on January 3, 2019.    He was last seen by myself on January 22, 2019.  At that time he was doing fine.  He did have some problems with nosebleed which were addressed by ENT.  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    He states he feels generally well and denies any chest tightness, heaviness, pressure or palpitations.  He does feel slightly short of breath when carrying heavy items.  He has had no firings of his device.    He was originally thought to have HOCM therefore repeat echo was done.  He is here for the results.    His FMLA runs out on March 6, 2019.  He feels that he is ready to go back to work.    Past Medical History:   Diagnosis Date   • Hyperlipidemia    • Hypertrophic cardiomyopathy (HCC)      Past Surgical History:   Procedure Laterality Date   • GASTROSCOPY-ENDO N/A 12/28/2018    Procedure: GASTROSCOPY-ENDO;  Surgeon: Rojas Warren D.O.;  Location: [facility];  Service: Gastroenterology   • CARDIAC CATH  12/28/2018    normal coronaries   • APPENDECTOMY       Family History   Problem Relation Age of Onset   • Kidney Disease Mother         dialysis   • Diabetes Father    • Hypertension Father    • Heart Disease Father    • Diabetes Brother    • Cancer Brother         unk   • Heart Disease Brother    • Hypertension Brother      Social History     Social History   • Marital status:      Spouse name: N/A   • Number of children: N/A   • Years of education: N/A     Occupational History   • Not on file. " "    Social History Main Topics   • Smoking status: Never Smoker   • Smokeless tobacco: Never Used   • Alcohol use No   • Drug use: No   • Sexual activity: Not Currently     Other Topics Concern   • Not on file     Social History Narrative   • No narrative on file     No Known Allergies  Outpatient Encounter Prescriptions as of 2/22/2019   Medication Sig Dispense Refill   • metoprolol (LOPRESSOR) 25 MG Tab Take 1 Tab by mouth 2 Times a Day. 180 Tab 3   • aspirin 81 MG EC tablet Take 1 Tab by mouth every day. 100 Tab 0   • sertraline (ZOLOFT) 25 MG tablet Take 25 mg by mouth every morning.     • Multiple Vitamins-Minerals (MULTIVITAMIN PO) Take 1 Tab by mouth every morning.     • [DISCONTINUED] atorvastatin (LIPITOR) 80 MG tablet Take 1 Tab by mouth every evening. 90 Tab 3     No facility-administered encounter medications on file as of 2/22/2019.      Review of Systems   Constitutional: Positive for malaise/fatigue (Tires easier than normal.).   Respiratory: Negative for cough and shortness of breath.    Cardiovascular: Negative for chest pain, palpitations, orthopnea, claudication, leg swelling and PND.   Gastrointestinal: Negative for abdominal pain.   Musculoskeletal: Negative for myalgias.   Neurological: Negative for dizziness and weakness.        Objective:   /68 (BP Location: Left arm, Patient Position: Sitting, BP Cuff Size: Adult)   Pulse (!) 50   Ht 1.6 m (5' 3\")   Wt 74.8 kg (164 lb 12.8 oz)   SpO2 99%   BMI 29.19 kg/m²     Physical Exam   Constitutional: He is oriented to person, place, and time. He appears well-developed and well-nourished.   HENT:   Head: Normocephalic.   Eyes: EOM are normal.   Neck: Normal range of motion. No JVD present.   Cardiovascular: Normal rate and regular rhythm.  Exam reveals no friction rub.    Murmur heard.   Systolic murmur is present with a grade of 3/6   Pulmonary/Chest: Effort normal.   Abdominal: Soft. Bowel sounds are normal.   Musculoskeletal: He exhibits " no edema.   Neurological: He is alert and oriented to person, place, and time.   Skin: Skin is warm and dry.   Psychiatric: He has a normal mood and affect.     February 1, 2019: Transthoracic Echo Report  CONCLUSIONS  Prior Echo - 12/20/18  Mild concentric left ventricular hypertrophy.  Left ventricular systolic function is normal. Left ventricular ejection   fraction is visually estimated to be 55%.  Severely dilated left atrium. Left atrial volume index is 64 mL/sq m  Mitral annular calcification. No mitral stenosis. Mild to moderate mitral regurgitation. ERO by PISA method is 0.3 sq cm. Vena contracta is 0.5 cm.  Estimated right ventricular systolic pressure is 26 mmHg.  No left ventricular outflow tract gradient noted.      Assessment:     1. Ventricular fibrillation (HCC)     2. Non-rheumatic mitral regurgitation     3. Essential hypertension     4. Dyslipidemia         Medical Decision Making:  Today's Assessment / Status / Plan:   Ventricular fibrillation: He had a VF arrest and was successfully resuscitated.  He has no significant coronary artery disease therefore this may be purely an electrical problem.  He does have an AICD and has not had any firings.    He was originally thought to have HOCM but his most recent echo does not have any left ventricular outflow tract gradient.    Mitral regurgitation: He has moderate regurgitation therefore a murmur.  Left atrial index is 64 which may be related to this regurgitation.  We will follow this expectantly.    Hypertension: His blood pressure is excellent in the office today.  Continue current regimen.    Dyslipidemia: I have no lipids in the chart.  He was started on atorvastatin which he may not need since he does not have coronary artery disease.  I will have him discontinue the atorvastatin and check labs prior to his follow-up appointment.    Since he does not have hypertrophic cardiomyopathy and has a normal ejection fraction.  He may return to work, full  duty.    He will follow-up as scheduled on April 26, 2019 with Dr. Ilene Moreno.  He will follow-up sooner if problems.    Collaborating Provider: Dr. Moustapha Hoff.

## 2019-02-26 ENCOUNTER — NON-PROVIDER VISIT (OUTPATIENT)
Dept: CARDIOLOGY | Facility: MEDICAL CENTER | Age: 59
End: 2019-02-26
Payer: COMMERCIAL

## 2019-02-26 ENCOUNTER — TELEPHONE (OUTPATIENT)
Dept: CARDIOLOGY | Facility: MEDICAL CENTER | Age: 59
End: 2019-02-26

## 2019-02-26 DIAGNOSIS — Z95.810 AICD (AUTOMATIC CARDIOVERTER/DEFIBRILLATOR) PRESENT: ICD-10-CM

## 2019-02-26 DIAGNOSIS — I46.9 CARDIAC ARREST (HCC): ICD-10-CM

## 2019-02-26 PROCEDURE — 93283 PRGRMG EVAL IMPLANTABLE DFB: CPT | Performed by: INTERNAL MEDICINE

## 2019-02-26 NOTE — NON-PROVIDER
Final testing. Appropriate device function demonstrated. Wound site appears clear. See back in 4 months.

## 2019-02-26 NOTE — TELEPHONE ENCOUNTER
"Pt is seen for a pacemaker check.  Pt is requesting return to work letter.  Per [employer]'s recommendations from pt's disability form, \"Patient is back to normal.  He may return to work, full-time, and may perform all of his job duties.\"    Letter printed with [employer] recommendations.  Letter given to pt.  He states no other concerns or questions at this time and is appreciative of information given.  "

## 2019-03-26 ENCOUNTER — TELEPHONE (OUTPATIENT)
Dept: CARDIOLOGY | Facility: MEDICAL CENTER | Age: 59
End: 2019-03-26

## 2019-03-27 NOTE — TELEPHONE ENCOUNTER
Patient transmitted today via home monitor--device functioning appropriately.  Patient has had 8 Brief NST VT episodes <5 seconds with VT rates  @ 220-300 bpm.

## 2019-03-27 NOTE — TELEPHONE ENCOUNTER
Called patient to follow up.  He states he feels fine.  Pt denies SOB or CP.  He states no complaints or questions at this time.

## 2019-04-01 ENCOUNTER — OFFICE VISIT (OUTPATIENT)
Dept: CARDIOLOGY | Facility: MEDICAL CENTER | Age: 59
End: 2019-04-01
Payer: COMMERCIAL

## 2019-04-01 VITALS
HEART RATE: 90 BPM | OXYGEN SATURATION: 94 % | SYSTOLIC BLOOD PRESSURE: 132 MMHG | BODY MASS INDEX: 29.77 KG/M2 | DIASTOLIC BLOOD PRESSURE: 74 MMHG | HEIGHT: 63 IN | WEIGHT: 168 LBS

## 2019-04-01 DIAGNOSIS — R04.0 FREQUENT NOSEBLEEDS: ICD-10-CM

## 2019-04-01 DIAGNOSIS — E78.5 DYSLIPIDEMIA: ICD-10-CM

## 2019-04-01 DIAGNOSIS — I10 ESSENTIAL HYPERTENSION: ICD-10-CM

## 2019-04-01 DIAGNOSIS — I34.0 NON-RHEUMATIC MITRAL REGURGITATION: ICD-10-CM

## 2019-04-01 PROCEDURE — 99214 OFFICE O/P EST MOD 30 MIN: CPT | Performed by: NURSE PRACTITIONER

## 2019-04-01 RX ORDER — FERROUS SULFATE 325(65) MG
325 TABLET ORAL DAILY
COMMUNITY

## 2019-04-01 ASSESSMENT — ENCOUNTER SYMPTOMS
SHORTNESS OF BREATH: 0
DIZZINESS: 0
MYALGIAS: 0
PND: 0
PALPITATIONS: 0
ORTHOPNEA: 0
COUGH: 0
ABDOMINAL PAIN: 0
WEAKNESS: 0
CLAUDICATION: 0

## 2019-04-01 NOTE — PROGRESS NOTES
"Chief Complaint   Patient presents with   • Atrial Fibrillation       Subjective:   Amadou \"Sukhdev\" Cristopher is a 58 y.o. male who presents today with his wife, Marianne, to follow-up on nosebleeds.    He tells me he has been having frequent nosebleeds and [facility]  He had a severe nosebleed where his hemoglobin dropped and he received 2 units of blood.  Aspirin was stopped over a month ago.  [facility]    He has a history of a ventricular fibrillation arrest and now has an AICD.  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    He denies any chest tightness, heaviness pressure.  No palpitations.  He complains of fatigue.    Past Medical History:   Diagnosis Date   • Hyperlipidemia    • Hypertrophic cardiomyopathy (HCC)      Past Surgical History:   Procedure Laterality Date   • GASTROSCOPY-ENDO N/A 12/28/2018    Procedure: GASTROSCOPY-ENDO;  Surgeon: Rojas Warren D.O.;  Location: ENDOSCOPY [facility];  Service: Gastroenterology   • ZZZ CARDIAC CATH  12/28/2018    normal coronaries   • APPENDECTOMY       Family History   Problem Relation Age of Onset   • Kidney Disease Mother         dialysis   • Diabetes Father    • Hypertension Father    • Heart Disease Father    • Diabetes Brother    • Cancer Brother         unk   • Heart Disease Brother    • Hypertension Brother      Social History     Social History   • Marital status:      Spouse name: N/A   • Number of children: N/A   • Years of education: N/A     Occupational History   • Not on file.     Social History Main Topics   • Smoking status: Never Smoker   • Smokeless tobacco: Never Used   • Alcohol use No   • Drug use: No   • Sexual activity: Not Currently     Other Topics Concern   • Not on file     Social History Narrative   • No narrative on file     No Known Allergies  Outpatient Encounter Prescriptions as of 4/1/2019   Medication Sig Dispense Refill   • Cyanocobalamin " "(VITAMIN B-12 PO) Take  by mouth.     • FOLIC ACID PO Take  by mouth.     • ferrous sulfate 325 (65 Fe) MG tablet Take 325 mg by mouth every day.     • metoprolol (LOPRESSOR) 25 MG Tab Take 1 Tab by mouth 2 Times a Day. 180 Tab 3   • sertraline (ZOLOFT) 25 MG tablet Take 25 mg by mouth every morning.     • Multiple Vitamins-Minerals (MULTIVITAMIN PO) Take 1 Tab by mouth every morning.     • [DISCONTINUED] aspirin 81 MG EC tablet Take 1 Tab by mouth every day. (Patient not taking: Reported on 4/1/2019) 100 Tab 0     No facility-administered encounter medications on file as of 4/1/2019.      Review of Systems   Constitutional: Positive for malaise/fatigue.   HENT: Positive for nosebleeds ([facility] and received 2 units of blood due to nosebleed.).    Respiratory: Negative for cough and shortness of breath.    Cardiovascular: Negative for chest pain, palpitations, orthopnea, claudication, leg swelling and PND.   Gastrointestinal: Negative for abdominal pain.   Musculoskeletal: Negative for myalgias.   Neurological: Negative for dizziness and weakness.        Objective:   /74 (BP Location: Left arm, Patient Position: Sitting, BP Cuff Size: Adult)   Pulse 90   Ht 1.6 m (5' 3\")   Wt 76.2 kg (168 lb)   SpO2 94%   BMI 29.76 kg/m²     Physical Exam   Constitutional: He is oriented to person, place, and time. He appears well-developed and well-nourished.   HENT:   Head: Normocephalic.   Eyes: EOM are normal.   Neck: Normal range of motion. No JVD present.   Cardiovascular: Normal rate and regular rhythm.  Exam reveals no friction rub.    Murmur heard.   Systolic murmur is present with a grade of 3/6   Pulmonary/Chest: Effort normal.   AICD left upper chest without redness, fluctuance or erosion.   Abdominal: Soft. Bowel sounds are normal.   Musculoskeletal: He exhibits no edema.   Neurological: He is alert and oriented to person, place, and time.   Skin: Skin is warm and dry.   Psychiatric: He has a normal " mood and affect.     February 1, 2019: Transthoracic Echo Report  CONCLUSIONS  Prior Echo - 12/20/18  Mild concentric left ventricular hypertrophy.  Left ventricular systolic function is normal.   Left ventricular ejection fraction is visually estimated to be 55%.  Estimated right ventricular systolic pressure is 26 mmHg.  No left ventricular outflow tract gradient noted.      Assessment:     1. Frequent nosebleeds     2. Dyslipidemia     3. Essential hypertension     4. Non-rheumatic mitral regurgitation         Medical Decision Making:  Today's Assessment / Status / Plan:   Nosebleeds: [facility] and given [facility] units of blood due to a nosebleed.  He has not had any nosebleeds since his discharge.  He will be [facility] up [facility] at the [facility].  He is instructed to carry his nose clip with him.  He will remain off aspirin or any blood thinning medications.    Dyslipidemia: Patient will check lipids and metabolic panel with the next [facility] blood draw which is in a couple weeks.  We will adjust medications accordingly.    Hypertension: His blood pressure is acceptable in the office today.  Continue on metoprolol.    Mitral regurgitation: Moderate.  Probably the reason for his murmur as he does not have a left ventricular outflow tract obstruction.      He will follow-up with Dr. Ilene Moreno in 3 months.  He will follow-up sooner if problems.    Collaborating Provider: Dr Mendiola.    Please note that this dictation was created using voice recognition software. I have made every reasonable attempt to correct obvious errors, but it is possible there are errors of grammar and possibly content that I did not discover before finalizing the note.

## 2019-04-01 NOTE — LETTER
[facility]   [address], [address]  [place] [address]  Phone: [phone]  Fax: [phone]              Amadou Nicholas  1960    Encounter Date: 4/1/2019    CARRILLO Traylor          PROGRESS NOTE:  Chief Complaint   Patient presents with   • Atrial Fibrillation       Subjective:   Amadou Nicholas is a 58 y.o. male who presents today     Past Medical History:   Diagnosis Date   • Hyperlipidemia    • Hypertrophic cardiomyopathy (HCC)      Past Surgical History:   Procedure Laterality Date   • GASTROSCOPY-ENDO N/A 12/28/2018    Procedure: GASTROSCOPY-ENDO;  Surgeon: Rojas Warren D.O.;  Location: ENDOSCOPY [facility];  Service: Gastroenterology   • ZZZ CARDIAC CATH  12/28/2018    normal coronaries   • APPENDECTOMY       Family History   Problem Relation Age of Onset   • Kidney Disease Mother         dialysis   • Diabetes Father    • Hypertension Father    • Heart Disease Father    • Diabetes Brother    • Cancer Brother         unk   • Heart Disease Brother    • Hypertension Brother      Social History     Social History   • Marital status:      Spouse name: N/A   • Number of children: N/A   • Years of education: N/A     Occupational History   • Not on file.     Social History Main Topics   • Smoking status: Never Smoker   • Smokeless tobacco: Never Used   • Alcohol use No   • Drug use: No   • Sexual activity: Not Currently     Other Topics Concern   • Not on file     Social History Narrative   • No narrative on file     No Known Allergies  Outpatient Encounter Prescriptions as of 4/1/2019   Medication Sig Dispense Refill   • Cyanocobalamin (VITAMIN B-12 PO) Take  by mouth.     • FOLIC ACID PO Take  by mouth.     • ferrous sulfate 325 (65 Fe) MG tablet Take 325 mg by mouth every day.     • metoprolol (LOPRESSOR) 25 MG Tab Take 1 Tab by mouth 2 Times a Day. 180 Tab 3   • sertraline (ZOLOFT) 25 MG tablet Take 25 mg by mouth every morning.     •  "Multiple Vitamins-Minerals (MULTIVITAMIN PO) Take 1 Tab by mouth every morning.     • [DISCONTINUED] aspirin 81 MG EC tablet Take 1 Tab by mouth every day. (Patient not taking: Reported on 4/1/2019) 100 Tab 0     No facility-administered encounter medications on file as of 4/1/2019.      Review of Systems   Constitutional: Positive for malaise/fatigue.   Respiratory: Negative for cough and shortness of breath.    Cardiovascular: Negative for chest pain, palpitations, orthopnea, claudication, leg swelling and PND.   Gastrointestinal: Negative for abdominal pain.   Musculoskeletal: Negative for myalgias.   Neurological: Negative for dizziness and weakness.        Objective:   /74 (BP Location: Left arm, Patient Position: Sitting, BP Cuff Size: Adult)   Pulse 90   Ht 1.6 m (5' 3\")   Wt 76.2 kg (168 lb)   SpO2 94%   BMI 29.76 kg/m²      Physical Exam   Constitutional: He is oriented to person, place, and time. He appears well-developed and well-nourished.   HENT:   Head: Normocephalic.   Eyes: EOM are normal.   Neck: Normal range of motion. No JVD present.   Cardiovascular: Normal rate and regular rhythm.  Exam reveals no friction rub.    No murmur heard.  Pulmonary/Chest: Effort normal.   Abdominal: Soft. Bowel sounds are normal.   Musculoskeletal: He exhibits no edema.   Neurological: He is alert and oriented to person, place, and time.   Skin: Skin is warm and dry.   Psychiatric: He has a normal mood and affect.     Transthoracic  Echo Report      Echocardiography Laboratory    CONCLUSIONS  Prior Echo - 12/20/18  Mild concentric left ventricular hypertrophy.  Left ventricular systolic function is normal. Left ventricular ejection   fraction is visually estimated to be 55%.  Estimated right ventricular systolic pressure is 26 mmHg.  No left ventricular outflow tract gradient noted.  Assessment:     1. Frequent nosebleeds     2. Dyslipidemia     3. Essential hypertension         Medical Decision Making:  " Today's Assessment / Status / Plan:            No Recipients

## 2019-05-13 ENCOUNTER — OFFICE VISIT (OUTPATIENT)
Dept: MEDICAL GROUP | Facility: MEDICAL CENTER | Age: 59
End: 2019-05-13
Payer: COMMERCIAL

## 2019-05-13 VITALS
RESPIRATION RATE: 12 BRPM | OXYGEN SATURATION: 95 % | HEART RATE: 77 BPM | HEIGHT: 63 IN | TEMPERATURE: 98 F | WEIGHT: 176.37 LBS | DIASTOLIC BLOOD PRESSURE: 80 MMHG | BODY MASS INDEX: 31.25 KG/M2 | SYSTOLIC BLOOD PRESSURE: 132 MMHG

## 2019-05-13 DIAGNOSIS — F41.9 ANXIETY: ICD-10-CM

## 2019-05-13 PROBLEM — S22.43XA CLOSED FRACTURE OF MULTIPLE RIBS OF BOTH SIDES: Status: RESOLVED | Noted: 2018-12-20 | Resolved: 2019-05-13

## 2019-05-13 PROBLEM — E78.5 DYSLIPIDEMIA: Status: RESOLVED | Noted: 2019-01-21 | Resolved: 2019-05-13

## 2019-05-13 PROBLEM — R13.10 DYSPHAGIA: Status: RESOLVED | Noted: 2019-01-07 | Resolved: 2019-05-13

## 2019-05-13 PROBLEM — Z86.74 HISTORY OF CARDIAC ARREST: Status: ACTIVE | Noted: 2018-12-20

## 2019-05-13 PROCEDURE — 99213 OFFICE O/P EST LOW 20 MIN: CPT | Performed by: INTERNAL MEDICINE

## 2019-05-13 NOTE — LETTER
[employer] [facility]  Norma Quinn M.D.  [address]  [place] [address]  Fax: [phone]   Authorization for Release/Disclosure of   Protected Health Information   Name: WINDY OTERO : 1960 SSN: [id number]   Address: [address]  [place] [address] Phone:    [phone] (home)    I authorize the entity listed below to release/disclose the PHI below to:   [facility]/Norma Quinn M.D. and Norma Quinn M.D.   Provider or Entity Name:     Address   City, State, [facility]   Phone:      Fax:     Reason for request: continuity of care   Information to be released:    [  ] LAST COLONOSCOPY,  including any PATH REPORT and follow-up  [  ] LAST FIT/COLOGUARD RESULT [  ] LAST DEXA  [  ] LAST MAMMOGRAM  [  ] LAST PAP  [  ] LAST LABS [  ] RETINA EXAM REPORT  [  ] IMMUNIZATION RECORDS  [  ] Release all info      [  ] Check here and initial the line next to each item to release ALL health information INCLUDING  _____ Care and treatment for drug and / or alcohol abuse  _____ HIV testing, infection status, or AIDS  _____ Genetic Testing    DATES OF SERVICE OR TIME PERIOD TO BE DISCLOSED: _____________  I understand and acknowledge that:  * This Authorization may be revoked at any time by you in writing, except if your health information has already been used or disclosed.  * Your health information that will be used or disclosed as a result of you signing this authorization could be re-disclosed by the recipient. If this occurs, your re-disclosed health information may no longer be protected by State or Federal laws.  * You may refuse to sign this Authorization. Your refusal will not affect your ability to obtain treatment.  * This Authorization becomes effective upon signing and will  on (date) __________.      If no date is indicated, this Authorization will  one (1) year from the signature date.    Name: Windy Otero    Signature:   Date:     2019       PLEASE FAX  REQUESTED RECORDS BACK TO: [phone]

## 2019-05-13 NOTE — PROGRESS NOTES
CC:  Diagnoses of BMI 31.0-31.9,adult and Anxiety were pertinent to this visit.    HISTORY OF THE PRESENT ILLNESS: Patient is a 58 y.o. male. This pleasant patient is here today to follow-up.  He is here today with his wife.    Since last visit he has seen a cardiologist, they have taken him off his statin.  Repeat echocardiogram did not show any outflow obstruction.  He has a known mitral regurgitation.  Volume status seems to be about euvolemic denies any pulmonary symptoms, leg edema, etc.  Denies any chest pain, palpitations.  Double product is well controlled on metoprolol which is a well-tolerated medication.    Did have nosebleed that required blood transfusion and cautery.  He was on aspirin at that time.  Now off aspirin and no further bleeds.  Pending ENT follow-up.  Is on iron, folic acid, B12.  [facility] have [facility] today, patient will also try to bring the labs just in case we cannot obtain them.  He indicates his colonoscopy was about 2 years ago was normal he was due again 10 years from that timeframe.  Denies any GI symptoms or other sources of bleeding.    Sertraline is controlling his anxiety, he says his mood is stable.    Still on light duty at work after his cardiac event.  He is trying to walk daily to stay active.  His weight is up due to dietary indiscretions, his wife will address this at home.    No new health concerns or focal issues.    Allergies: Patient has no known allergies.    Current Outpatient Prescriptions Ordered in [facility]   Medication Sig Dispense Refill   • Cyanocobalamin (VITAMIN B-12 PO) Take  by mouth.     • FOLIC ACID PO Take  by mouth.     • ferrous sulfate 325 (65 Fe) MG tablet Take 325 mg by mouth every day.     • metoprolol (LOPRESSOR) 25 MG Tab Take 1 Tab by mouth 2 Times a Day. 180 Tab 3   • sertraline (ZOLOFT) 25 MG tablet Take 25 mg by mouth every morning.     • Multiple Vitamins-Minerals (MULTIVITAMIN PO) Take 1 Tab  by mouth every morning.       No current [facility]-ordered facility-administered medications on file.        Past Medical History:   Diagnosis Date   • Heart murmur    • Hyperlipidemia    • Hypertrophic cardiomyopathy (HCC)        Past Surgical History:   Procedure Laterality Date   • GASTROSCOPY-ENDO N/A 12/28/2018    Procedure: GASTROSCOPY-ENDO;  Surgeon: Rojas Warren D.O.;  Location: ENDOSCOPY [facility];  Service: Gastroenterology   • ZZZ CARDIAC CATH  12/28/2018    normal coronaries   • APPENDECTOMY         Social History   Substance Use Topics   • Smoking status: Never Smoker   • Smokeless tobacco: Never Used   • Alcohol use No       Social History     Social History Narrative   • No narrative on file       Family History   Problem Relation Age of Onset   • Kidney Disease Mother         dialysis   • Diabetes Father    • Hypertension Father    • Heart Disease Father    • Diabetes Brother    • Cancer Brother         unk   • Heart Disease Brother    • Hypertension Brother        ROS:     - Constitutional: Negative for fever, chills     - Eyes:   Negative for blurry vision, eye pain, discharge    - ENT:  Negative for hearing changes, ear pain, ear discharge, rhinorrhea, sinus congestion, sore throat     - Respiratory: Negative for cough, sputum production, chest congestion, dyspnea, wheezing, and crackles.      - Cardiovascular: Negative for chest pain     - Gastrointestinal: Negative for heartburn, nausea, vomiting, abdominal pain, hematochezia, melena, diarrhea, constipation, and greasy/foul-smelling stools.  Dysphagia resolved, no gerd    - Genitourinary: Negative for dysuria    - Musculoskeletal: Negative for myalgias, back pain, and joint pain.     - Skin: Negative for rash, itching, cyanotic skin color change.     - Neurological: Negative for vertigo    - Endo:Negative for polyuria, heat/cold intolerance, excessive thirst    - Hem/lymphatic: Negative for  swollen glands    -Allergic/immun: Negative for  "allergic rhinitis    - Psychiatric/Behavioral:see hpi    Exam: /80 (BP Location: Left arm, Patient Position: Sitting, BP Cuff Size: Adult)   Pulse 77   Temp 36.7 °C (98 °F) (Temporal)   Resp 12   Ht 1.6 m (5' 3\")   Wt 80 kg (176 lb 5.9 oz)   SpO2 95%  Body mass index is 31.24 kg/m².    General: Normal appearing. No distress.  EYES: Conjunctiva clear lids without ptosis, pupils equal  EARS: Normal shape and contour   Pulmonary: Clear to ausculation.  Normal effort. No rales or wheezing.  Cardiovascular: Regular rate and rhythm. Ii/vi systolic murmur at apex. No gallop  Abdomen: Soft, nontender, nondistended. Normal bowel sounds.  Neurologic: Cranial nerves grossly nonfocal  Skin: Warm and dry.  No obvious lesions.  Musculoskeletal: Normal gait. No extremity cyanosis, clubbing, or edema.  Psych: Normal mood and affect. Alert and oriented x3. Judgment and insight is normal.      Assessment/Plan  1. BMI 31.0-31.9,adult  Patient will work on changing diet home, regular exercise as tolerated.  - Patient identified as having weight management issue.  Appropriate orders and counseling given.    2. Anxiety  Stable, chronic well controlled medical condition on sertraline.    3.  Epistaxis  Resolved  Records from the [facility] were requested.  He says his colonoscopy is up-to-date.  Labs were also requested to follow along the anemia post epistaxis.  No recurrent epistaxis after stopping aspirin.    4.  History of cardiac arrest, ventricular fibrillation, no obstructive coronary artery disease, status post AICD. + MR.   Double product controlled on metoprolol  Euvolemic  No recurrence of ventricular arrhythmia, asymptomatic  [facility] noted mg 1/10/19 nl at 2.0; K+ nl at 3.8  Follow-up cardiology    Return to clinic 6 months or as needed.  Patient continues to follow regularly at the [facility].              Please note that this dictation was created using voice recognition software. I " have made every reasonable attempt to correct obvious errors, but I expect that there are errors of grammar and possibly content that I did not discover before finalizing the note.

## 2021-01-01 NOTE — PROGRESS NOTES
Assumed care of patient, received bedside report from Nata MARTINEZ, Pt A&Ox4, Vitals stable, no SOB or distress noted. Currently on 3L O2 via NC sating mid 90s. Cortrak in right nare running fibersource at 65ml/hr.  Pt updated on plan of care, Call light within reach, personal belongings within reach.  Bed is locked and in lowest position, Bed Strip alarm is on. Tele monitor on. Will continue to monitor. Hourly rounding in place.      malpresentation

## 2023-12-21 ENCOUNTER — HOSPITAL ENCOUNTER (OUTPATIENT)
Dept: RADIOLOGY | Facility: MEDICAL CENTER | Age: 63
End: 2023-12-21
Payer: COMMERCIAL

## 2023-12-21 DIAGNOSIS — I33.9 ACUTE AND SUBACUTE ENDOCARDITIS, UNSPECIFIED: ICD-10-CM

## 2023-12-21 PROCEDURE — A9552 F18 FDG: HCPCS

## 2024-05-28 NOTE — PROGRESS NOTES
Cardiology Follow Up Progress Note    Date of Service  1/4/2019    Attending Physician  Dr Kaplan    Chief Complaint   OOHA    HPI  Amadou Nicholas is a 58 y.o. male who lives with his wife in an apt in [place],  anxiety/depression & HTN admitted 12/19/2018 with witnessed VF/OOHA, intubated, ROSC prior to arrival.  Echo 12/20 with severe LVH/?infiltrative/significant MR with HOCM.  Seen by EP 12/28 - awaiting SQ ICD for secondary prevention.      Unfortunately, significant GIB   Also - intolerant of increasing AVN blockers with bradycardia  White count shot up this week with fever, aggressive treatment for the primary team.  Afebrile with decreasing white counts for 48 hours  SVT on Tuesday treated with adenosine no recurrence    Interim Events  ICD placed - sinus today     Review of Systems  Review of Systems   Constitutional: Negative for activity change and appetite change.        Still hungry    HENT: Negative for congestion and dental problem.    Eyes: Negative.    Respiratory: Positive for cough. Negative for apnea, choking, chest tightness, shortness of breath and wheezing.    Cardiovascular: Negative for chest pain, palpitations and leg swelling.   Gastrointestinal: Negative for abdominal distention, abdominal pain, diarrhea and nausea.   Endocrine: Negative for cold intolerance and heat intolerance.   Genitourinary: Negative for difficulty urinating and dysuria.   Musculoskeletal: Negative for arthralgias and back pain.   Skin: Negative for color change and pallor.   Allergic/Immunologic: Negative for environmental allergies and food allergies.   Neurological: Negative for dizziness and weakness.   Hematological: Negative for adenopathy. Does not bruise/bleed easily.   Psychiatric/Behavioral: Negative for agitation and suicidal ideas. The patient is not nervous/anxious.    All other systems reviewed and are negative.      Vital signs in last 24 hours  Temp:  [36.2 °C (97.2 °F)-36.8 °C (98.2 °F)] 36.4 °C (97.6  °F)  Pulse:  [] 89  Resp:  [17-36] 22    Physical Exam  Physical Exam   Constitutional: He is oriented to person, place, and time.   In bed, watching tv.  Patient seen and examined again today changes noted -- as below   HENT:   Head: Normocephalic and atraumatic.   Core track in nares   Eyes: Pupils are equal, round, and reactive to light. EOM are normal. No scleral icterus.   Neck: No JVD present. No thyromegaly present.   Cardiovascular: Exam reveals no friction rub.    Murmur (3/6 sm across precordium) heard.  Sinus tach   Pulmonary/Chest: Effort normal. He has no wheezes. He has no rales. He exhibits no tenderness.   Bilateral rhonchi   Abdominal: Soft. Bowel sounds are normal. He exhibits no distension. There is no tenderness.   Musculoskeletal: Normal range of motion. He exhibits no edema or tenderness.   Lymphadenopathy:     He has no cervical adenopathy.   Neurological: He is alert and oriented to person, place, and time. No cranial nerve deficit. He exhibits normal muscle tone. Coordination normal.   Skin: Skin is warm and dry. No rash noted.   Cool, dp 1/2 bilat   Psychiatric: He has a normal mood and affect.   Vitals reviewed.      Lab Review  Lab Results   Component Value Date/Time    WBC 13.0 (H) 01/04/2019 04:15 AM    RBC 3.48 (L) 01/04/2019 04:15 AM    HEMOGLOBIN 9.6 (L) 01/04/2019 04:15 AM    HEMATOCRIT 31.7 (L) 01/04/2019 04:15 AM    MCV 91.1 01/04/2019 04:15 AM    MCH 27.6 01/04/2019 04:15 AM    MCHC 30.3 (L) 01/04/2019 04:15 AM    MPV 9.8 01/04/2019 04:15 AM      Lab Results   Component Value Date/Time    SODIUM 145 01/04/2019 04:15 AM    POTASSIUM 3.9 01/04/2019 04:15 AM    CHLORIDE 108 01/04/2019 04:15 AM    CO2 26 01/04/2019 04:15 AM    GLUCOSE 120 (H) 01/04/2019 04:15 AM    BUN 37 (H) 01/04/2019 04:15 AM    CREATININE 0.51 01/04/2019 04:15 AM      Lab Results   Component Value Date/Time    ASTSGOT 36 12/31/2018 09:40 AM    ALTSGPT 35 12/31/2018 09:40 AM     Lab Results   Component Value  "Date/Time    TROPONINI 6.83 (H) 12/20/2018 05:15 AM             Cardiac Imaging and Procedures Review  EKG:  My personal interpretation of the EKG dated January 1 is sinus tachycardia with LVH    Echocardiogram:  12/20 and 12/23 - improvement/normalized LV function, EF 60, severe MR with an obstructive component    Imaging  Chest X-Ray:  Stable bilat infiltrates 12/30    Assessment/Plan    -OOHA, PEA/TdP - s/p ICD for secondary prevention  -HOCM, \"new\" diagnosis  -MR  -SVT  -GIB, post transfusion (thought traumatic from NGT)  -Sepsis    OOHA   1/3 ICD for primary prevention  Appreciate EP support    HOCM  Intolerant of higher dose AVN blockers with marginal BP in the past  Attempt increase now - I did, eye on BP, safer with device now (no CHFrEF - short acting is fine)  Lower asa to 81  Long term plan may be intervention in terms of myomectomy, but not in an acute scenario.  Talked with staff and pt about physiology of HCM    SVT  In remission  Increasing BB as above    Sepsis  Resolving, no recurrence of fevers or scenario of septic shock.  No call for BETZAIDA - on standby    Mitral regurgitation  Due to obstruction but also looks like some intrinsic valve dysfunction  Follow with maximal medical therapy, as above  Echo w/i 2-3m as outpt    D/w  RN and electrophysiology  Thank you for allowing me to participate in the care of this patient.  Please contact me with any questions.    Essence Hoff M.D.   Cardiologist, [facility]  [phone]      " No

## 2024-09-30 NOTE — ASSESSMENT & PLAN NOTE
Daily CBC  Conservative transfusion strategy, hemoglobin goal greater than 7   Detail Level: Simple Detail Level: Zone

## (undated) DEVICE — MASK WITH FACE SHIELD (25/BX 4BX/CA)

## (undated) DEVICE — GOWN SURGEONS X-LARGE - DISP. (30/CA)

## (undated) DEVICE — FILM CASSETTE ENDO

## (undated) DEVICE — KIT CUSTOM PROCEDURE SINGLE FOR ENDO  (15/CA)

## (undated) DEVICE — BITE BLOCK ADULT 60FR (100EA/CA)

## (undated) DEVICE — SOD. CHL 10CC SYRINGE PREFILL - W/10 CC (30/BX)

## (undated) DEVICE — CON SEDATION/>5 YR 1ST 15 MIN

## (undated) DEVICE — SYRINGE 3 CC 22 GA X 1-1/2 - NDL SAFETY (50/BX 8BX/CA)

## (undated) DEVICE — SYRINGE 6 CC 20 GA X 1 1/2 - NDL SAFETY  (50/BX)